# Patient Record
Sex: FEMALE | Race: WHITE | NOT HISPANIC OR LATINO | URBAN - METROPOLITAN AREA
[De-identification: names, ages, dates, MRNs, and addresses within clinical notes are randomized per-mention and may not be internally consistent; named-entity substitution may affect disease eponyms.]

---

## 2023-10-24 ENCOUNTER — SPECIALTY PHARMACY (OUTPATIENT)
Dept: PHARMACY | Facility: CLINIC | Age: 68
End: 2023-10-24

## 2023-10-24 ENCOUNTER — PHARMACY VISIT (OUTPATIENT)
Dept: PHARMACY | Facility: CLINIC | Age: 68
End: 2023-10-24
Payer: COMMERCIAL

## 2023-10-24 PROCEDURE — RXMED WILLOW AMBULATORY MEDICATION CHARGE

## 2023-11-20 DIAGNOSIS — C50.919 MALIGNANT NEOPLASM OF BREAST IN FEMALE, ESTROGEN RECEPTOR POSITIVE, UNSPECIFIED LATERALITY, UNSPECIFIED SITE OF BREAST (MULTI): Primary | ICD-10-CM

## 2023-11-20 DIAGNOSIS — Z17.0 MALIGNANT NEOPLASM OF BREAST IN FEMALE, ESTROGEN RECEPTOR POSITIVE, UNSPECIFIED LATERALITY, UNSPECIFIED SITE OF BREAST (MULTI): Primary | ICD-10-CM

## 2023-11-20 PROBLEM — M54.50 LOW BACK PAIN, UNSPECIFIED: Status: ACTIVE | Noted: 2023-03-14

## 2023-11-20 PROBLEM — K21.9 GASTRO-ESOPHAGEAL REFLUX DISEASE WITHOUT ESOPHAGITIS: Status: ACTIVE | Noted: 2023-03-14

## 2023-11-20 PROBLEM — R06.00 DYSPNEA: Status: ACTIVE | Noted: 2023-11-20

## 2023-11-20 PROBLEM — Z20.822 CONTACT WITH AND (SUSPECTED) EXPOSURE TO COVID-19: Status: ACTIVE | Noted: 2023-03-14

## 2023-11-20 PROBLEM — D14.32: Status: ACTIVE | Noted: 2023-03-14

## 2023-11-20 PROBLEM — F41.8 MIXED ANXIETY DEPRESSIVE DISORDER: Status: ACTIVE | Noted: 2023-03-14

## 2023-11-20 PROBLEM — K76.9 LIVER DISEASE: Status: ACTIVE | Noted: 2023-04-25

## 2023-11-20 PROBLEM — E78.5 HYPERLIPIDEMIA: Status: ACTIVE | Noted: 2023-03-14

## 2023-11-20 PROBLEM — G35 MULTIPLE SCLEROSIS (MULTI): Status: ACTIVE | Noted: 2023-01-04

## 2023-11-20 PROBLEM — Z91.89 DRIVING SAFETY ISSUE: Status: ACTIVE | Noted: 2023-01-04

## 2023-11-20 PROBLEM — Z71.1 PERSON WITH FEARED COMPLAINT IN WHOM NO DIAGNOSIS WAS MADE: Status: ACTIVE | Noted: 2023-11-20

## 2023-11-20 PROBLEM — N63.0 MASS OF BREAST: Status: ACTIVE | Noted: 2023-08-10

## 2023-11-20 PROBLEM — G89.29 OTHER CHRONIC PAIN: Status: ACTIVE | Noted: 2023-03-14

## 2023-11-20 PROBLEM — R91.8 OTHER NONSPECIFIC ABNORMAL FINDING OF LUNG FIELD: Status: ACTIVE | Noted: 2023-08-10

## 2023-11-20 PROBLEM — R06.02 SHORTNESS OF BREATH: Status: ACTIVE | Noted: 2023-11-20

## 2023-11-20 PROBLEM — J90 PLEURAL EFFUSION: Status: ACTIVE | Noted: 2023-03-10

## 2023-11-20 PROBLEM — R09.02 HYPOXEMIA: Status: ACTIVE | Noted: 2023-04-27

## 2023-11-20 PROBLEM — K76.9 LIVER LESION: Status: ACTIVE | Noted: 2023-03-14

## 2023-11-20 PROBLEM — F17.200 NICOTINE DEPENDENCE: Status: ACTIVE | Noted: 2023-03-14

## 2023-11-20 PROBLEM — J90 LARGE PLEURAL EFFUSION: Status: ACTIVE | Noted: 2023-11-20

## 2023-11-20 PROBLEM — E87.6 HYPOKALEMIA: Status: ACTIVE | Noted: 2023-03-14

## 2023-11-20 PROBLEM — I10 HYPERTENSION: Status: ACTIVE | Noted: 2023-06-29

## 2023-11-20 PROBLEM — J18.9 PNEUMONIA: Status: ACTIVE | Noted: 2023-03-14

## 2023-11-20 PROBLEM — J90 PLEURAL EFFUSION ON RIGHT: Status: ACTIVE | Noted: 2023-11-20

## 2023-11-20 RX ORDER — HYDROXYZINE HYDROCHLORIDE 25 MG/1
25 TABLET, FILM COATED ORAL DAILY
COMMUNITY

## 2023-11-20 RX ORDER — METHYLPREDNISOLONE 4 MG/1
4 TABLET ORAL
COMMUNITY

## 2023-11-20 RX ORDER — RIBOCICLIB 200 MG/1
TABLET, FILM COATED ORAL
Qty: 63 EACH | Refills: 2 | Status: CANCELLED | OUTPATIENT
Start: 2023-11-20 | End: 2024-11-19

## 2023-11-20 RX ORDER — LANOLIN ALCOHOL/MO/W.PET/CERES
1000 CREAM (GRAM) TOPICAL DAILY
COMMUNITY

## 2023-11-20 RX ORDER — ROSUVASTATIN CALCIUM 20 MG/1
20 TABLET, COATED ORAL DAILY
COMMUNITY

## 2023-11-20 RX ORDER — AZITHROMYCIN 250 MG/1
750 TABLET, FILM COATED ORAL DAILY
COMMUNITY

## 2023-11-20 RX ORDER — HYDROCHLOROTHIAZIDE 12.5 MG/1
12.5 CAPSULE ORAL DAILY
COMMUNITY

## 2023-11-20 RX ORDER — CYCLOBENZAPRINE HCL 10 MG
10 TABLET ORAL EVERY 12 HOURS
COMMUNITY

## 2023-11-20 RX ORDER — MAGNESIUM GLUCONATE 27 MG(500)
10 TABLET ORAL NIGHTLY
COMMUNITY

## 2023-11-20 RX ORDER — LETROZOLE 2.5 MG/1
2.5 TABLET, FILM COATED ORAL DAILY
COMMUNITY
Start: 2023-03-30 | End: 2024-05-30 | Stop reason: SDUPTHER

## 2023-11-20 RX ORDER — BUPROPION HYDROCHLORIDE 300 MG/1
300 TABLET ORAL EVERY 24 HOURS
COMMUNITY

## 2023-11-20 RX ORDER — OMEPRAZOLE 20 MG/1
20 CAPSULE, DELAYED RELEASE ORAL DAILY
COMMUNITY

## 2023-11-20 RX ORDER — IBUPROFEN 800 MG/1
800 TABLET ORAL 2 TIMES DAILY
COMMUNITY

## 2023-11-20 RX ORDER — CARBAMAZEPINE 100 MG/1
100 TABLET, CHEWABLE ORAL EVERY 6 HOURS
COMMUNITY

## 2023-11-24 ENCOUNTER — TELEPHONE (OUTPATIENT)
Dept: HEMATOLOGY/ONCOLOGY | Facility: CLINIC | Age: 68
End: 2023-11-24
Payer: COMMERCIAL

## 2023-11-24 NOTE — TELEPHONE ENCOUNTER
Refill kisqali phone into  specialty pharmacy. Radha also said if there are any issues please call them back and ask for nursing.

## 2023-11-25 ENCOUNTER — PHARMACY VISIT (OUTPATIENT)
Dept: PHARMACY | Facility: CLINIC | Age: 68
End: 2023-11-25
Payer: COMMERCIAL

## 2023-11-25 PROCEDURE — RXMED WILLOW AMBULATORY MEDICATION CHARGE

## 2023-11-30 ENCOUNTER — LAB (OUTPATIENT)
Dept: LAB | Facility: HOSPITAL | Age: 68
End: 2023-11-30
Payer: COMMERCIAL

## 2023-11-30 DIAGNOSIS — C50.911 MALIGNANT NEOPLASM OF UNSPECIFIED SITE OF RIGHT FEMALE BREAST (MULTI): ICD-10-CM

## 2023-11-30 LAB
ALBUMIN SERPL BCP-MCNC: 3.2 G/DL (ref 3.4–5)
ALP SERPL-CCNC: 57 U/L (ref 33–136)
ALT SERPL W P-5'-P-CCNC: 20 U/L (ref 7–45)
ANION GAP SERPL CALC-SCNC: 9 MMOL/L (ref 10–20)
AST SERPL W P-5'-P-CCNC: 17 U/L (ref 9–39)
BASOPHILS # BLD AUTO: 0.05 X10*3/UL (ref 0–0.1)
BASOPHILS NFR BLD AUTO: 0.5 %
BILIRUB SERPL-MCNC: 0.2 MG/DL (ref 0–1.2)
BUN SERPL-MCNC: 33 MG/DL (ref 6–23)
CALCIUM SERPL-MCNC: 9 MG/DL (ref 8.6–10.3)
CHLORIDE SERPL-SCNC: 98 MMOL/L (ref 98–107)
CO2 SERPL-SCNC: 34 MMOL/L (ref 21–32)
CREAT SERPL-MCNC: 1.07 MG/DL (ref 0.5–1.05)
EOSINOPHIL # BLD AUTO: 0.12 X10*3/UL (ref 0–0.7)
EOSINOPHIL NFR BLD AUTO: 1.3 %
ERYTHROCYTE [DISTWIDTH] IN BLOOD BY AUTOMATED COUNT: 19.9 % (ref 11.5–14.5)
GFR SERPL CREATININE-BSD FRML MDRD: 57 ML/MIN/1.73M*2
GLUCOSE SERPL-MCNC: 94 MG/DL (ref 74–99)
HCT VFR BLD AUTO: 30.9 % (ref 36–46)
HGB BLD-MCNC: 9.4 G/DL (ref 12–16)
IMM GRANULOCYTES # BLD AUTO: 0.04 X10*3/UL (ref 0–0.7)
IMM GRANULOCYTES NFR BLD AUTO: 0.4 % (ref 0–0.9)
LYMPHOCYTES # BLD AUTO: 0.67 X10*3/UL (ref 1.2–4.8)
LYMPHOCYTES NFR BLD AUTO: 7.3 %
MCH RBC QN AUTO: 28.2 PG (ref 26–34)
MCHC RBC AUTO-ENTMCNC: 30.4 G/DL (ref 32–36)
MCV RBC AUTO: 93 FL (ref 80–100)
MONOCYTES # BLD AUTO: 0.42 X10*3/UL (ref 0.1–1)
MONOCYTES NFR BLD AUTO: 4.6 %
NEUTROPHILS # BLD AUTO: 7.9 X10*3/UL (ref 1.2–7.7)
NEUTROPHILS NFR BLD AUTO: 85.9 %
PLATELET # BLD AUTO: 685 X10*3/UL (ref 150–450)
POTASSIUM SERPL-SCNC: 3.6 MMOL/L (ref 3.5–5.3)
PROT SERPL-MCNC: 7.9 G/DL (ref 6.4–8.2)
RBC # BLD AUTO: 3.33 X10*6/UL (ref 4–5.2)
SODIUM SERPL-SCNC: 137 MMOL/L (ref 136–145)
WBC # BLD AUTO: 9.2 X10*3/UL (ref 4.4–11.3)

## 2023-11-30 PROCEDURE — 80053 COMPREHEN METABOLIC PANEL: CPT

## 2023-11-30 PROCEDURE — 86300 IMMUNOASSAY TUMOR CA 15-3: CPT

## 2023-11-30 PROCEDURE — 36415 COLL VENOUS BLD VENIPUNCTURE: CPT

## 2023-11-30 PROCEDURE — 85025 COMPLETE CBC W/AUTO DIFF WBC: CPT

## 2023-12-01 LAB — CANCER AG27-29 SERPL-ACNC: 44.6 U/ML (ref 0–38.6)

## 2023-12-07 ENCOUNTER — ONCOLOGY MEDICATION OUTREACH (OUTPATIENT)
Dept: HEMATOLOGY/ONCOLOGY | Facility: CLINIC | Age: 68
End: 2023-12-07
Payer: COMMERCIAL

## 2023-12-07 ENCOUNTER — OFFICE VISIT (OUTPATIENT)
Dept: HEMATOLOGY/ONCOLOGY | Facility: CLINIC | Age: 68
End: 2023-12-07
Payer: COMMERCIAL

## 2023-12-07 VITALS
RESPIRATION RATE: 16 BRPM | TEMPERATURE: 97.3 F | BODY MASS INDEX: 21.64 KG/M2 | WEIGHT: 126.76 LBS | SYSTOLIC BLOOD PRESSURE: 113 MMHG | OXYGEN SATURATION: 98 % | HEIGHT: 64 IN | HEART RATE: 78 BPM | DIASTOLIC BLOOD PRESSURE: 63 MMHG

## 2023-12-07 DIAGNOSIS — Z17.0 MALIGNANT NEOPLASM OF LOWER-OUTER QUADRANT OF RIGHT BREAST OF FEMALE, ESTROGEN RECEPTOR POSITIVE (MULTI): Primary | ICD-10-CM

## 2023-12-07 DIAGNOSIS — C50.511 MALIGNANT NEOPLASM OF LOWER-OUTER QUADRANT OF RIGHT BREAST OF FEMALE, ESTROGEN RECEPTOR POSITIVE (MULTI): Primary | ICD-10-CM

## 2023-12-07 PROCEDURE — 1159F MED LIST DOCD IN RCRD: CPT | Performed by: INTERNAL MEDICINE

## 2023-12-07 PROCEDURE — 1125F AMNT PAIN NOTED PAIN PRSNT: CPT | Performed by: INTERNAL MEDICINE

## 2023-12-07 PROCEDURE — 3078F DIAST BP <80 MM HG: CPT | Performed by: INTERNAL MEDICINE

## 2023-12-07 PROCEDURE — 99213 OFFICE O/P EST LOW 20 MIN: CPT | Performed by: INTERNAL MEDICINE

## 2023-12-07 PROCEDURE — 3074F SYST BP LT 130 MM HG: CPT | Performed by: INTERNAL MEDICINE

## 2023-12-07 ASSESSMENT — ENCOUNTER SYMPTOMS
DEPRESSION: 0
OCCASIONAL FEELINGS OF UNSTEADINESS: 1
LOSS OF SENSATION IN FEET: 0

## 2023-12-07 ASSESSMENT — COLUMBIA-SUICIDE SEVERITY RATING SCALE - C-SSRS
1. IN THE PAST MONTH, HAVE YOU WISHED YOU WERE DEAD OR WISHED YOU COULD GO TO SLEEP AND NOT WAKE UP?: NO
2. HAVE YOU ACTUALLY HAD ANY THOUGHTS OF KILLING YOURSELF?: NO
6. HAVE YOU EVER DONE ANYTHING, STARTED TO DO ANYTHING, OR PREPARED TO DO ANYTHING TO END YOUR LIFE?: NO

## 2023-12-07 ASSESSMENT — PATIENT HEALTH QUESTIONNAIRE - PHQ9
2. FEELING DOWN, DEPRESSED OR HOPELESS: NOT AT ALL
SUM OF ALL RESPONSES TO PHQ9 QUESTIONS 1 AND 2: 0
1. LITTLE INTEREST OR PLEASURE IN DOING THINGS: NOT AT ALL

## 2023-12-07 ASSESSMENT — PAIN SCALES - GENERAL: PAINLEVEL: 2

## 2023-12-07 NOTE — PATIENT INSTRUCTIONS
Continue Kisqali.     Labs due 12/28/2023, 1/25/2024, 4/25/2024    Follow up in 4 months, after PET scan.

## 2023-12-07 NOTE — PROGRESS NOTES
Patient Visit Information:   Visit Type: Follow Up Visit      Cancer History:   Treatment Synopsis:    1.  invasive ductal carcinoma right breast, stage IV, right pleural effusion, multiple hepatic lesions     Patient is a 68-year-old female with history of MS, chair bound who was admitted in the hospital because of shortness of breath.        March 10, 2023 CAT scan of chest did show right pleural effusion, right breast mass 12 o'clock position, multiple hepatic lesion and multiple pulmonary nodule.      March 11, 2023, status post paracentesis, cytology did show atypical cells      March 13, 2023, ultrasound-guided biopsy of right breast mass done pathology positive for invasive ductal carcinoma, grade 2, ER 95%, [place] 60%, HER2 negative  , Ultrasound biopsy of right axillary lymph node done and negative for malignancy.     4/25/23 , PET scan did show abnormal metabolic activity of right breast, pulmonary nodules, mediastinal lymph node, hepatic lesion.     Current treatment, letrozole, Kisqali        8/11/23 , CT of chest abdominal pelvis, partial response, right breast mass, pulmonary nodules decreased in size           History of Present Illness:      ID Statement:    OLINDA GUEVARA is a 68 year old Female        Chief Complaint: Right breast cancer   Interval History:    Patient is a 68-year-old female with history of MS, chair bound who was admitted in the hospital because of shortness of breath.      March 10, 2023 CAT scan of chest did show right pleural effusion, right breast mass 12 o'clock position, multiple hepatic lesion.      March 11, 2023, status post paracentesis, cytology did show atypical cells      March 13, 2023, ultrasound-guided biopsy of right breast mass done pathology positive for invasive ductal carcinoma, grade 2, ER 95%, [place] 60%, HER2 negative , Ultrasound biopsy of right axillary lymph node done and negative for malignancy.     Medical oncology consultation requested, patient is very  anxious about her diagnosis.        Pathology report discussed with the patient.      Patient is sitting on chair, very anxious, no headache and dizziness, no shortness of breath, no nausea vomiting, no abdominal pain, generalized weakness and fatigue.     12/07/23     Has a history of metastatic breast cancer taking letrozole only,  Kisqali was restarted.      Patient doing very well, patient is on home oxygen, no chest pain, no fever, no sore throat, no diarrhea, no abdominal pain, lab result discussed with the patient     Review of Systems:   Review of Systems:    No headache or dizziness      No fever      some shortness of breath        No breast tenderness      No nausea vomiting      No abdominal pain      Generalized weakness      All body pain, arthritis      Patient not active difficult to ambulate due to Morphine Sulfate        Allergies and Intolerances:       Allergies:         contrast (specific type unknown): Contrast, Anaphylaxis,  Active         iodine: Drug, Anaphylaxis, Active         Shellfish: Food, Anaphylaxis, Active     Outpatient Medication Profile:  * Patient Currently Takes Medications as of 07-Sep-2023 11:20 documented in Structured Notes         disability placard : valid for 5 years         exp: 9/2028, Start Date: 07-Sep-2023         Kisqali (200 mg daily dose) oral tablet: Last Dose Taken:  , 3 tab(s)  orally once a day  daily for 21 days then 7 days off , Start Date: 16-Aug-2023         letrozole 2.5 mg oral tablet: Last Dose Taken:  , 1 tab(s) orally once  a day , Start Date: 31-Jul-2023         portable home oxygen concentrator 2L/min continuously via nasal canula : Last Dose Taken:  , Start Date: 27-Apr-2023         image guided biopsy of liver lesion : Last Dose Taken:  , Start Date:  30-Mar-2023         K-Tab 20 mEq oral tablet, extended release: Last Dose Taken:  , 2 tab(s)  orally once a day , Start Date: 14-Mar-2023         amoxicillin-clavulanate 875 mg-125 mg oral tablet:  Last Dose Taken:   , 1 tab(s) orally 2 times a day , Start Date: 14-Mar-2023         buPROPion 300 mg/24 hours (XL) oral tablet, extended release: Last Dose  Taken:  , 1 tab(s) orally every 24 hours         carBAMazepine 100 mg oral tablet, chewable: Last Dose Taken:  , 1 tab  by mouth 3 times daily. Then 3 tabs by mouth once every evening         cyclobenzaprine 10 mg oral tablet: Last Dose Taken:  , 1 tab(s) orally  2 times a day         hydroCHLOROthiazide 12.5 mg oral capsule: Last Dose Taken:  , 1 cap(s)  orally once a day         hydrOXYzine hydrochloride 25 mg oral tablet: Last Dose Taken:  , 1 tab(s)  orally once a day         hydrOXYzine hydrochloride 50 mg oral tablet: Last Dose Taken:  , 1 tab(s)  orally once a day (at bedtime)         ibuprofen 800 mg oral tablet: Last Dose Taken:  , 1 tab(s) orally 2 times  a day         MethylPREDNISolone Dose Pack 4 mg oral tablet: Last Dose Taken:  , Day  3: 1 tab by mouth four times a day         Day 4: 1 tab by mouth three times a day          Day 5: 1 tab by mouth twice a day         day 6: 1 tab by mouth once          rosuvastatin 20 mg oral tablet: Last Dose Taken:  , 1 tab(s) orally once  a day         Vitamin B12 1000 mcg oral tablet: Last Dose Taken:  , 1 tab(s) orally  once a day         azithromycin 250 mg oral tablet: Last Dose Taken:  , 3 tabs by mouth  once every evening          melatonin 10 mg oral tablet, extended release: Last Dose Taken:  , 1  tab(s) orally once a day (at bedtime)         dextromethorphan-quinidine 20 mg-10 mg oral capsule: Last Dose Taken:   , 1 cap(s) orally every 12 hours         omeprazole 20 mg oral delayed release capsule: Last Dose Taken:  , 1  cap(s) orally once a day             Medical History:         Hypoxia: ICD-10: R09.02, Status: Active         Pleural effusion on right: ICD-10: J90, Status: Active         Infiltrating ductal carcinoma of right breast, stage 4: ICD-10:  C50.911, Status: Active         MS (multiple  sclerosis): ICD-10: G35, Status: Active     Family History: No Family History items are recorded  in the problem list.      Social History:   Social Substance History:  ·  Smoking Status never smoker (1)   ·  Alcohol Use denies   ·  Drug Use denies            Vitals and Measurements:   Vitals: Temp: 36.5  HR: NA  RR: 16  BP: 103/63  SPO2%:   NA   Measurements: HT(cm): 162.4  WT(kg): 53.8  BSA: 1.55   BMI:  20.3   Second Set of Vitals/Vitals Comment: unable to get  patient's pulse ox(2)   Last 3 Weights & Heights: Date:                           Weight/Scale Type:                    Height:   07-Sep-2023 10:40                53.8  kg                     162.4  cm  08-Jun-2023 14:55                63.2  kg                     162.4  cm  27-Apr-2023 11:20                69.5  kg                     162.4  cm      Physical Exam:      Constitutional: awake/alert/oriented x3, no distress,  very anxious, sitting on chair   Eyes: PERRL, EOMI, clear sclera   Head/Neck: Neck supple, no cervical lymphadenopathy   Respiratory/Thorax: Fair air movement on left side,  decreased breath sounds of right side   Cardiovascular: Regular, rate and rhythm,  normal  S 1and S 2   Gastrointestinal: Nondistended, soft, non-tender,   no masses palpable, no organomegaly, +BS,   Extremities: normal extremities, no cyanosis edema,   Neurological: alert and oriented x3,   Breast: Right breast examination did show a mass  measuring 2 cm on 12 o'clock position     Left breast examination within normal limits   Lymphatic: No significant lymphadenopathy   Psychological: Patient is very anxious   Skin: Warm and dry, no lesions, no rashes         Lab Results:       Ref Range & Units 7 d ago  (11/30/23) 3 mo ago  (9/7/23) 3 mo ago  (8/10/23) 4 mo ago  (7/13/23) 5 mo ago  (6/29/23) 6 mo ago  (6/8/23) 8 mo ago  (3/30/23)   WBC  4.4 - 11.3 x10*3/uL 9.2 7.0 R 6.5 R 4.9 R 6.9 R 5.8 R 16.8 High  R   RBC  4.00 - 5.20 x10*6/uL 3.33 Low  3.22 Low  R 2.81 Low  R  3.08 Low  R 3.37 Low  R 3.71 Low  R 4.96 R   Hemoglobin  12.0 - 16.0 g/dL 9.4 Low  9.7 Low  8.9 Low  9.5 Low  9.8 Low  10.5 Low  13.1   Hematocrit  36.0 - 46.0 % 30.9 Low  30.4 Low  28.0 Low  29.1 Low  30.3 Low  31.9 Low  39.8   MCV  80 - 100 fL 93 94 100 94 90 86 80   MCH  26.0 - 34.0 pg 28.2         MCHC  32.0 - 36.0 g/dL 30.4 Low  31.9 Low  31.8 Low  32.6 32.3 32.9 32.9   RDW  11.5 - 14.5 % 19.9 High  16.7 High  19.2 High  20.6 High  19.0 High  16.9 High  16.5 High    Platelets  150 - 450 x10*3/uL 685 High              7 d ago  (11/30/23) 3 mo ago  (9/7/23) 3 mo ago  (8/10/23) 4 mo ago  (7/13/23) 5 mo ago  (6/29/23) 6 mo ago  (6/8/23) 8 mo ago  (3/30/23)    Glucose  74 - 99 mg/dL 94 116 High  98 93 115 High  118 High  112 High    Sodium  136 - 145 mmol/L 137 133 Low  133 Low  136 136 137 136   Potassium  3.5 - 5.3 mmol/L 3.6 3.4 Low  3.2 Low  3.2 Low  3.1 Low  2.8 Low Panic  CM 3.5   Chloride  98 - 107 mmol/L 98 93 Low  92 Low  95 Low  94 Low  92 Low  98   Bicarbonate  21 - 32 mmol/L 34 High  30 31 30 33 High  32 29   Anion Gap  10 - 20 mmol/L 9 Low  13 13 14 12 16 13   Urea Nitrogen  6 - 23 mg/dL 33 High  25 High  20 19 16 21 22   Creatinine  0.50 - 1.05 mg/dL 1.07 High  0.93 0.87 1.08 High  1.01 0.95 0.79   eGFR  >60 mL/min/1.73m*2 57 Low                Component  Ref Range & Units 7 d ago   CA 27.29  0.0 - 38.6 U/mL 44.6 High    Resulting Agency [facility]                      ·  Results             Assessment and Plan:      Assessment and Plan:   Assessment:    1 invasive ductal carcinoma right breast, stage IV, right pleural effusion, multiple hepatic lesions  ER positive, [place] positive, HER2 negative  Current treatment with letrozole started in April 2023     Patient is a 68-year-old female with history of MS, chair bound who was admitted in the hospital because of shortness of breath.      March 10, 2023 CAT scan of chest did show right pleural effusion, right breast mass 12 o'clock position, multiple hepatic  lesion.      March 11, 2023, status post paracentesis, cytology did show atypical cells      March 13, 2023, ultrasound-guided biopsy of right breast mass done pathology positive for invasive ductal carcinoma, grade 2, ER 95%, [place] 60%, HER2 negative , Ultrasound biopsy of right axillary lymph node done and negative for malignancy.     Current treatment, letrozole 2.5 mg p.o. daily, kisqali     8/11/23 , CT of CAP , partial response     12/07/23     Metastatic breast cancer, ER positive [place] positive,  Currently patient is taking letrozole 2.5 mg p.o. daily without any problem.  Kisqali was restarted.  Patient stable   Lab result discussed with the patient    No change in treatment    Repeat PET/CT after 3-month    Follow-up after PET scan  Patient is not a gaining weight patient advised to take at least 1500 to 2000-calorie every day.     Time spent 30 minutes.  .

## 2023-12-10 DIAGNOSIS — Z17.0 MALIGNANT NEOPLASM OF RIGHT BREAST IN FEMALE, ESTROGEN RECEPTOR POSITIVE, UNSPECIFIED SITE OF BREAST (MULTI): Primary | ICD-10-CM

## 2023-12-10 DIAGNOSIS — C50.911 MALIGNANT NEOPLASM OF RIGHT BREAST IN FEMALE, ESTROGEN RECEPTOR POSITIVE, UNSPECIFIED SITE OF BREAST (MULTI): Primary | ICD-10-CM

## 2023-12-11 ENCOUNTER — SPECIALTY PHARMACY (OUTPATIENT)
Dept: HEMATOLOGY/ONCOLOGY | Facility: CLINIC | Age: 68
End: 2023-12-11
Payer: COMMERCIAL

## 2023-12-13 ENCOUNTER — TELEPHONE (OUTPATIENT)
Dept: HEMATOLOGY/ONCOLOGY | Facility: CLINIC | Age: 68
End: 2023-12-13
Payer: COMMERCIAL

## 2023-12-13 NOTE — TELEPHONE ENCOUNTER
Jennifer from [place] at [facility] called to get a refill on Mildred's medication, Kisqali 200 mg phoned into  [facility].

## 2023-12-13 NOTE — TELEPHONE ENCOUNTER
Informed Jennifer at the [facility] that either Yanely or herself will need to call specialty pharmacy to arrange delivery of kisqali as they should have 2 refills still available. She voiced correct understanding and was provided contact number for  Specialty pharmacy.

## 2023-12-20 PROCEDURE — RXMED WILLOW AMBULATORY MEDICATION CHARGE

## 2023-12-22 ENCOUNTER — PHARMACY VISIT (OUTPATIENT)
Dept: PHARMACY | Facility: CLINIC | Age: 68
End: 2023-12-22
Payer: COMMERCIAL

## 2023-12-28 ENCOUNTER — ONCOLOGY MEDICATION OUTREACH (OUTPATIENT)
Dept: HEMATOLOGY/ONCOLOGY | Facility: CLINIC | Age: 68
End: 2023-12-28
Payer: COMMERCIAL

## 2023-12-28 ENCOUNTER — LAB (OUTPATIENT)
Dept: LAB | Facility: HOSPITAL | Age: 68
End: 2023-12-28
Payer: COMMERCIAL

## 2023-12-28 DIAGNOSIS — C50.911 MALIGNANT NEOPLASM OF RIGHT BREAST IN FEMALE, ESTROGEN RECEPTOR POSITIVE, UNSPECIFIED SITE OF BREAST (MULTI): ICD-10-CM

## 2023-12-28 DIAGNOSIS — C50.511 MALIGNANT NEOPLASM OF LOWER-OUTER QUADRANT OF RIGHT BREAST OF FEMALE, ESTROGEN RECEPTOR POSITIVE (MULTI): ICD-10-CM

## 2023-12-28 DIAGNOSIS — Z17.0 MALIGNANT NEOPLASM OF RIGHT BREAST IN FEMALE, ESTROGEN RECEPTOR POSITIVE, UNSPECIFIED SITE OF BREAST (MULTI): ICD-10-CM

## 2023-12-28 DIAGNOSIS — Z17.0 MALIGNANT NEOPLASM OF LOWER-OUTER QUADRANT OF RIGHT BREAST OF FEMALE, ESTROGEN RECEPTOR POSITIVE (MULTI): ICD-10-CM

## 2023-12-28 LAB
ALBUMIN SERPL BCP-MCNC: 3.1 G/DL (ref 3.4–5)
ALP SERPL-CCNC: 66 U/L (ref 33–136)
ALT SERPL W P-5'-P-CCNC: 18 U/L (ref 7–45)
ANION GAP SERPL CALC-SCNC: 13 MMOL/L (ref 10–20)
AST SERPL W P-5'-P-CCNC: 15 U/L (ref 9–39)
BASOPHILS # BLD AUTO: 0.03 X10*3/UL (ref 0–0.1)
BASOPHILS NFR BLD AUTO: 0.4 %
BILIRUB SERPL-MCNC: 0.2 MG/DL (ref 0–1.2)
BUN SERPL-MCNC: 25 MG/DL (ref 6–23)
CALCIUM SERPL-MCNC: 8.5 MG/DL (ref 8.6–10.3)
CANCER AG27-29 SERPL-ACNC: 46.3 U/ML (ref 0–38.6)
CHLORIDE SERPL-SCNC: 97 MMOL/L (ref 98–107)
CO2 SERPL-SCNC: 29 MMOL/L (ref 21–32)
CREAT SERPL-MCNC: 1.29 MG/DL (ref 0.5–1.05)
EOSINOPHIL # BLD AUTO: 0.1 X10*3/UL (ref 0–0.7)
EOSINOPHIL NFR BLD AUTO: 1.3 %
ERYTHROCYTE [DISTWIDTH] IN BLOOD BY AUTOMATED COUNT: 19.5 % (ref 11.5–14.5)
GFR SERPL CREATININE-BSD FRML MDRD: 46 ML/MIN/1.73M*2
GLUCOSE SERPL-MCNC: 79 MG/DL (ref 74–99)
HCT VFR BLD AUTO: 32 % (ref 36–46)
HGB BLD-MCNC: 9.8 G/DL (ref 12–16)
IMM GRANULOCYTES # BLD AUTO: 0.02 X10*3/UL (ref 0–0.7)
IMM GRANULOCYTES NFR BLD AUTO: 0.3 % (ref 0–0.9)
LYMPHOCYTES # BLD AUTO: 0.98 X10*3/UL (ref 1.2–4.8)
LYMPHOCYTES NFR BLD AUTO: 12.9 %
MCH RBC QN AUTO: 28.6 PG (ref 26–34)
MCHC RBC AUTO-ENTMCNC: 30.6 G/DL (ref 32–36)
MCV RBC AUTO: 93 FL (ref 80–100)
MONOCYTES # BLD AUTO: 0.61 X10*3/UL (ref 0.1–1)
MONOCYTES NFR BLD AUTO: 8.1 %
NEUTROPHILS # BLD AUTO: 5.83 X10*3/UL (ref 1.2–7.7)
NEUTROPHILS NFR BLD AUTO: 77 %
PLATELET # BLD AUTO: 561 X10*3/UL (ref 150–450)
POTASSIUM SERPL-SCNC: 3.7 MMOL/L (ref 3.5–5.3)
PROT SERPL-MCNC: 7.2 G/DL (ref 6.4–8.2)
RBC # BLD AUTO: 3.43 X10*6/UL (ref 4–5.2)
SODIUM SERPL-SCNC: 135 MMOL/L (ref 136–145)
WBC # BLD AUTO: 7.6 X10*3/UL (ref 4.4–11.3)

## 2023-12-28 PROCEDURE — 85025 COMPLETE CBC W/AUTO DIFF WBC: CPT

## 2023-12-28 PROCEDURE — 36415 COLL VENOUS BLD VENIPUNCTURE: CPT

## 2023-12-28 PROCEDURE — 86300 IMMUNOASSAY TUMOR CA 15-3: CPT

## 2023-12-28 PROCEDURE — 80053 COMPREHEN METABOLIC PANEL: CPT

## 2024-01-15 ENCOUNTER — SPECIALTY PHARMACY (OUTPATIENT)
Dept: PHARMACY | Facility: CLINIC | Age: 69
End: 2024-01-15

## 2024-01-16 DIAGNOSIS — J90 PLEURAL EFFUSION: ICD-10-CM

## 2024-01-16 DIAGNOSIS — F17.219 CIGARETTE NICOTINE DEPENDENCE WITH NICOTINE-INDUCED DISORDER: ICD-10-CM

## 2024-01-16 DIAGNOSIS — C50.919 MALIGNANT NEOPLASM OF FEMALE BREAST, UNSPECIFIED ESTROGEN RECEPTOR STATUS, UNSPECIFIED LATERALITY, UNSPECIFIED SITE OF BREAST (MULTI): ICD-10-CM

## 2024-01-18 PROCEDURE — RXMED WILLOW AMBULATORY MEDICATION CHARGE

## 2024-01-24 ENCOUNTER — PHARMACY VISIT (OUTPATIENT)
Dept: PHARMACY | Facility: CLINIC | Age: 69
End: 2024-01-24
Payer: COMMERCIAL

## 2024-01-24 ENCOUNTER — SPECIALTY PHARMACY (OUTPATIENT)
Dept: PHARMACY | Facility: CLINIC | Age: 69
End: 2024-01-24

## 2024-01-31 ENCOUNTER — SPECIALTY PHARMACY (OUTPATIENT)
Dept: PHARMACY | Facility: CLINIC | Age: 69
End: 2024-01-31

## 2024-02-13 ENCOUNTER — SPECIALTY PHARMACY (OUTPATIENT)
Dept: PHARMACY | Facility: CLINIC | Age: 69
End: 2024-02-13

## 2024-02-13 DIAGNOSIS — Z17.0 MALIGNANT NEOPLASM OF BREAST IN FEMALE, ESTROGEN RECEPTOR POSITIVE, UNSPECIFIED LATERALITY, UNSPECIFIED SITE OF BREAST (MULTI): ICD-10-CM

## 2024-02-13 DIAGNOSIS — C50.919 MALIGNANT NEOPLASM OF BREAST IN FEMALE, ESTROGEN RECEPTOR POSITIVE, UNSPECIFIED LATERALITY, UNSPECIFIED SITE OF BREAST (MULTI): ICD-10-CM

## 2024-02-13 RX ORDER — RIBOCICLIB 200 MG/1
TABLET, FILM COATED ORAL
Qty: 63 EACH | Refills: 2 | Status: CANCELLED | OUTPATIENT
Start: 2024-02-13 | End: 2025-02-11

## 2024-02-15 DIAGNOSIS — Z17.0 MALIGNANT NEOPLASM OF BREAST IN FEMALE, ESTROGEN RECEPTOR POSITIVE, UNSPECIFIED LATERALITY, UNSPECIFIED SITE OF BREAST (MULTI): ICD-10-CM

## 2024-02-15 DIAGNOSIS — C50.919 MALIGNANT NEOPLASM OF BREAST IN FEMALE, ESTROGEN RECEPTOR POSITIVE, UNSPECIFIED LATERALITY, UNSPECIFIED SITE OF BREAST (MULTI): ICD-10-CM

## 2024-02-15 RX ORDER — RIBOCICLIB 200 MG/1
TABLET, FILM COATED ORAL
Qty: 63 EACH | Refills: 2 | Status: CANCELLED | OUTPATIENT
Start: 2024-02-13 | End: 2025-02-11

## 2024-02-20 ENCOUNTER — SPECIALTY PHARMACY (OUTPATIENT)
Dept: PHARMACY | Facility: CLINIC | Age: 69
End: 2024-02-20

## 2024-02-20 ENCOUNTER — PHARMACY VISIT (OUTPATIENT)
Dept: PHARMACY | Facility: CLINIC | Age: 69
End: 2024-02-20
Payer: COMMERCIAL

## 2024-02-20 DIAGNOSIS — C50.919 MALIGNANT NEOPLASM OF BREAST IN FEMALE, ESTROGEN RECEPTOR POSITIVE, UNSPECIFIED LATERALITY, UNSPECIFIED SITE OF BREAST (MULTI): ICD-10-CM

## 2024-02-20 DIAGNOSIS — Z17.0 MALIGNANT NEOPLASM OF BREAST IN FEMALE, ESTROGEN RECEPTOR POSITIVE, UNSPECIFIED LATERALITY, UNSPECIFIED SITE OF BREAST (MULTI): ICD-10-CM

## 2024-02-20 PROCEDURE — RXMED WILLOW AMBULATORY MEDICATION CHARGE

## 2024-03-11 ENCOUNTER — SPECIALTY PHARMACY (OUTPATIENT)
Dept: HEMATOLOGY/ONCOLOGY | Facility: CLINIC | Age: 69
End: 2024-03-11
Payer: COMMERCIAL

## 2024-03-18 ENCOUNTER — SPECIALTY PHARMACY (OUTPATIENT)
Dept: PHARMACY | Facility: CLINIC | Age: 69
End: 2024-03-18

## 2024-03-18 PROCEDURE — RXMED WILLOW AMBULATORY MEDICATION CHARGE

## 2024-03-19 ENCOUNTER — SPECIALTY PHARMACY (OUTPATIENT)
Dept: PHARMACY | Facility: CLINIC | Age: 69
End: 2024-03-19

## 2024-03-19 ENCOUNTER — PHARMACY VISIT (OUTPATIENT)
Dept: PHARMACY | Facility: CLINIC | Age: 69
End: 2024-03-19
Payer: COMMERCIAL

## 2024-04-15 ENCOUNTER — SPECIALTY PHARMACY (OUTPATIENT)
Dept: PHARMACY | Facility: CLINIC | Age: 69
End: 2024-04-15

## 2024-04-15 ENCOUNTER — PHARMACY VISIT (OUTPATIENT)
Dept: PHARMACY | Facility: CLINIC | Age: 69
End: 2024-04-15
Payer: COMMERCIAL

## 2024-04-15 PROCEDURE — RXMED WILLOW AMBULATORY MEDICATION CHARGE

## 2024-04-25 ENCOUNTER — LAB (OUTPATIENT)
Dept: LAB | Facility: HOSPITAL | Age: 69
End: 2024-04-25
Payer: COMMERCIAL

## 2024-04-25 DIAGNOSIS — C50.511 MALIGNANT NEOPLASM OF LOWER-OUTER QUADRANT OF RIGHT BREAST OF FEMALE, ESTROGEN RECEPTOR POSITIVE (MULTI): ICD-10-CM

## 2024-04-25 DIAGNOSIS — Z17.0 MALIGNANT NEOPLASM OF LOWER-OUTER QUADRANT OF RIGHT BREAST OF FEMALE, ESTROGEN RECEPTOR POSITIVE (MULTI): ICD-10-CM

## 2024-04-25 LAB
ALBUMIN SERPL BCP-MCNC: 3.3 G/DL (ref 3.4–5)
ALP SERPL-CCNC: 67 U/L (ref 33–136)
ALT SERPL W P-5'-P-CCNC: 20 U/L (ref 7–45)
ANION GAP SERPL CALC-SCNC: 12 MMOL/L (ref 10–20)
AST SERPL W P-5'-P-CCNC: 17 U/L (ref 9–39)
BASOPHILS # BLD AUTO: 0.03 X10*3/UL (ref 0–0.1)
BASOPHILS NFR BLD AUTO: 0.5 %
BILIRUB SERPL-MCNC: 0.2 MG/DL (ref 0–1.2)
BUN SERPL-MCNC: 30 MG/DL (ref 6–23)
CALCIUM SERPL-MCNC: 8.4 MG/DL (ref 8.6–10.3)
CHLORIDE SERPL-SCNC: 103 MMOL/L (ref 98–107)
CO2 SERPL-SCNC: 29 MMOL/L (ref 21–32)
CREAT SERPL-MCNC: 1.12 MG/DL (ref 0.5–1.05)
EGFRCR SERPLBLD CKD-EPI 2021: 54 ML/MIN/1.73M*2
EOSINOPHIL # BLD AUTO: 0.28 X10*3/UL (ref 0–0.7)
EOSINOPHIL NFR BLD AUTO: 4.8 %
ERYTHROCYTE [DISTWIDTH] IN BLOOD BY AUTOMATED COUNT: 18.9 % (ref 11.5–14.5)
GLUCOSE SERPL-MCNC: 97 MG/DL (ref 74–99)
HCT VFR BLD AUTO: 31.9 % (ref 36–46)
HGB BLD-MCNC: 10.1 G/DL (ref 12–16)
IMM GRANULOCYTES # BLD AUTO: 0.01 X10*3/UL (ref 0–0.7)
IMM GRANULOCYTES NFR BLD AUTO: 0.2 % (ref 0–0.9)
LYMPHOCYTES # BLD AUTO: 0.73 X10*3/UL (ref 1.2–4.8)
LYMPHOCYTES NFR BLD AUTO: 12.4 %
MCH RBC QN AUTO: 30.4 PG (ref 26–34)
MCHC RBC AUTO-ENTMCNC: 31.7 G/DL (ref 32–36)
MCV RBC AUTO: 96 FL (ref 80–100)
MONOCYTES # BLD AUTO: 0.44 X10*3/UL (ref 0.1–1)
MONOCYTES NFR BLD AUTO: 7.5 %
NEUTROPHILS # BLD AUTO: 4.4 X10*3/UL (ref 1.2–7.7)
NEUTROPHILS NFR BLD AUTO: 74.6 %
PLATELET # BLD AUTO: 474 X10*3/UL (ref 150–450)
POTASSIUM SERPL-SCNC: 4.5 MMOL/L (ref 3.5–5.3)
PROT SERPL-MCNC: 6.8 G/DL (ref 6.4–8.2)
RBC # BLD AUTO: 3.32 X10*6/UL (ref 4–5.2)
SODIUM SERPL-SCNC: 139 MMOL/L (ref 136–145)
WBC # BLD AUTO: 5.9 X10*3/UL (ref 4.4–11.3)

## 2024-04-25 PROCEDURE — 85025 COMPLETE CBC W/AUTO DIFF WBC: CPT

## 2024-04-25 PROCEDURE — 80053 COMPREHEN METABOLIC PANEL: CPT

## 2024-04-25 PROCEDURE — 36415 COLL VENOUS BLD VENIPUNCTURE: CPT

## 2024-05-02 ENCOUNTER — APPOINTMENT (OUTPATIENT)
Dept: RADIOLOGY | Facility: HOSPITAL | Age: 69
End: 2024-05-02
Payer: COMMERCIAL

## 2024-05-02 ENCOUNTER — HOSPITAL ENCOUNTER (OUTPATIENT)
Dept: RADIOLOGY | Facility: HOSPITAL | Age: 69
Discharge: HOME | End: 2024-05-02
Payer: COMMERCIAL

## 2024-05-02 DIAGNOSIS — Z17.0 MALIGNANT NEOPLASM OF LOWER-OUTER QUADRANT OF RIGHT BREAST OF FEMALE, ESTROGEN RECEPTOR POSITIVE (MULTI): ICD-10-CM

## 2024-05-02 DIAGNOSIS — C50.511 MALIGNANT NEOPLASM OF LOWER-OUTER QUADRANT OF RIGHT BREAST OF FEMALE, ESTROGEN RECEPTOR POSITIVE (MULTI): ICD-10-CM

## 2024-05-02 PROCEDURE — 3430000001 HC RX 343 DIAGNOSTIC RADIOPHARMACEUTICALS: Performed by: INTERNAL MEDICINE

## 2024-05-02 PROCEDURE — A9552 F18 FDG: HCPCS | Performed by: INTERNAL MEDICINE

## 2024-05-02 PROCEDURE — 78815 PET IMAGE W/CT SKULL-THIGH: CPT | Mod: PET TUMOR SUBSQ TX STRATEGY | Performed by: STUDENT IN AN ORGANIZED HEALTH CARE EDUCATION/TRAINING PROGRAM

## 2024-05-02 PROCEDURE — 78815 PET IMAGE W/CT SKULL-THIGH: CPT | Mod: PS

## 2024-05-02 RX ORDER — FLUDEOXYGLUCOSE F 18 200 MCI/ML
10.4 INJECTION, SOLUTION INTRAVENOUS
Status: COMPLETED | OUTPATIENT
Start: 2024-05-02 | End: 2024-05-02

## 2024-05-02 RX ADMIN — FLUDEOXYGLUCOSE F 18 10.4 MILLICURIE: 200 INJECTION, SOLUTION INTRAVENOUS at 09:08

## 2024-05-03 DIAGNOSIS — Z17.0 MALIGNANT NEOPLASM OF BREAST IN FEMALE, ESTROGEN RECEPTOR POSITIVE, UNSPECIFIED LATERALITY, UNSPECIFIED SITE OF BREAST (MULTI): ICD-10-CM

## 2024-05-03 DIAGNOSIS — C50.919 MALIGNANT NEOPLASM OF BREAST IN FEMALE, ESTROGEN RECEPTOR POSITIVE, UNSPECIFIED LATERALITY, UNSPECIFIED SITE OF BREAST (MULTI): ICD-10-CM

## 2024-05-09 ENCOUNTER — OFFICE VISIT (OUTPATIENT)
Dept: HEMATOLOGY/ONCOLOGY | Facility: CLINIC | Age: 69
End: 2024-05-09
Payer: COMMERCIAL

## 2024-05-09 ENCOUNTER — ONCOLOGY MEDICATION OUTREACH (OUTPATIENT)
Dept: HEMATOLOGY/ONCOLOGY | Facility: CLINIC | Age: 69
End: 2024-05-09
Payer: COMMERCIAL

## 2024-05-09 VITALS
WEIGHT: 134.92 LBS | SYSTOLIC BLOOD PRESSURE: 128 MMHG | RESPIRATION RATE: 16 BRPM | BODY MASS INDEX: 23.03 KG/M2 | TEMPERATURE: 98.2 F | HEART RATE: 93 BPM | OXYGEN SATURATION: 99 % | HEIGHT: 64 IN | DIASTOLIC BLOOD PRESSURE: 72 MMHG

## 2024-05-09 DIAGNOSIS — C50.511 MALIGNANT NEOPLASM OF LOWER-OUTER QUADRANT OF RIGHT BREAST OF FEMALE, ESTROGEN RECEPTOR POSITIVE (MULTI): ICD-10-CM

## 2024-05-09 DIAGNOSIS — Z17.0 MALIGNANT NEOPLASM OF LOWER-OUTER QUADRANT OF RIGHT BREAST OF FEMALE, ESTROGEN RECEPTOR POSITIVE (MULTI): ICD-10-CM

## 2024-05-09 PROCEDURE — 99214 OFFICE O/P EST MOD 30 MIN: CPT | Performed by: INTERNAL MEDICINE

## 2024-05-09 PROCEDURE — 1157F ADVNC CARE PLAN IN RCRD: CPT | Performed by: INTERNAL MEDICINE

## 2024-05-09 PROCEDURE — 3074F SYST BP LT 130 MM HG: CPT | Performed by: INTERNAL MEDICINE

## 2024-05-09 PROCEDURE — 1159F MED LIST DOCD IN RCRD: CPT | Performed by: INTERNAL MEDICINE

## 2024-05-09 PROCEDURE — 3078F DIAST BP <80 MM HG: CPT | Performed by: INTERNAL MEDICINE

## 2024-05-09 PROCEDURE — 1160F RVW MEDS BY RX/DR IN RCRD: CPT | Performed by: INTERNAL MEDICINE

## 2024-05-09 PROCEDURE — 1125F AMNT PAIN NOTED PAIN PRSNT: CPT | Performed by: INTERNAL MEDICINE

## 2024-05-09 ASSESSMENT — NCCN CANCER DISTRESS MANAGEMENT
NCCN EMOTIONAL CONCERNS: 1
NCCN PHYSICAL CONCERNS: 3

## 2024-05-09 ASSESSMENT — PAIN SCALES - GENERAL: PAINLEVEL: 2

## 2024-05-09 NOTE — PROGRESS NOTES
Patient Visit Information:   Visit Type: Follow Up Visit      Cancer History:   Treatment Synopsis:    1.  invasive ductal carcinoma right breast, stage IV, right pleural effusion, multiple hepatic lesions     Patient is a 68-year-old female with history of MS, chair bound who was admitted in the hospital because of shortness of breath.        March 10, 2023 CAT scan of chest did show right pleural effusion, right breast mass 12 o'clock position, multiple hepatic lesion and multiple pulmonary nodule.      March 11, 2023, status post paracentesis, cytology did show atypical cells      March 13, 2023, ultrasound-guided biopsy of right breast mass done pathology positive for invasive ductal carcinoma, grade 2, ER 95%, [place] 60%, HER2 negative  , Ultrasound biopsy of right axillary lymph node done and negative for malignancy.     4/25/23 , PET scan did show abnormal metabolic activity of right breast, pulmonary nodules, mediastinal lymph node, hepatic lesion.     Current treatment, letrozole, Kisqali      8/11/23 , CT of chest abdominal pelvis, partial response, right breast mass, pulmonary nodules decreased in size   5/2/24 , PET , excellent partial response, left breast mass decreased in size, pulmonary nodules and adrenal nodule decreased in size hepatic nodule resolved        History of Present Illness:      ID Statement:    OLINDA GUEVARA is a 68 year old Female        Chief Complaint: Right breast cancer   Interval History:    Patient is a 68-year-old female with history of MS, chair bound who was admitted in the hospital because of shortness of breath.      March 10, 2023 CAT scan of chest did show right pleural effusion, right breast mass 12 o'clock position, multiple hepatic lesion.      March 11, 2023, status post paracentesis, cytology did show atypical cells      March 13, 2023, ultrasound-guided biopsy of right breast mass done pathology positive for invasive ductal carcinoma, grade 2, ER 95%, [place] 60%,  HER2 negative , Ultrasound biopsy of right axillary lymph node done and negative for malignancy.     Medical oncology consultation requested, patient is very anxious about her diagnosis.        Pathology report discussed with the patient.      Patient is sitting on chair, very anxious, no headache and dizziness, no shortness of breath, no nausea vomiting, no abdominal pain, generalized weakness and fatigue.     5/9/24     Has a history of metastatic breast cancer taking letrozole only,  Kisqali was restarted.      Patient doing very well, patient is on home oxygen, no chest pain, no fever, no sore throat, no diarrhea, no abdominal pain, lab result discussed with the patient, PET scan result discussed with patient     Review of Systems:   Review of Systems:    No headache or dizziness      No fever      some shortness of breath        No breast tenderness      No nausea vomiting      No abdominal pain      Generalized weakness      All body pain, arthritis      Patient not active difficult to ambulate due to Morphine Sulfate        Allergies and Intolerances:       Allergies:         contrast (specific type unknown): Contrast, Anaphylaxis,  Active         iodine: Drug, Anaphylaxis, Active         Shellfish: Food, Anaphylaxis, Active     Outpatient Medication Profile:  * Patient Currently Takes Medications as of 07-Sep-2023 11:20 documented in Structured Notes         disability placard : valid for 5 years         exp: 9/2028, Start Date: 07-Sep-2023         Kisqali (200 mg daily dose) oral tablet: Last Dose Taken:  , 3 tab(s)  orally once a day  daily for 21 days then 7 days off , Start Date: 16-Aug-2023         letrozole 2.5 mg oral tablet: Last Dose Taken:  , 1 tab(s) orally once  a day , Start Date: 31-Jul-2023         portable home oxygen concentrator 2L/min continuously via nasal canula : Last Dose Taken:  , Start Date: 27-Apr-2023         image guided biopsy of liver lesion : Last Dose Taken:  , Start Date:   30-Mar-2023         K-Tab 20 mEq oral tablet, extended release: Last Dose Taken:  , 2 tab(s)  orally once a day , Start Date: 14-Mar-2023         amoxicillin-clavulanate 875 mg-125 mg oral tablet: Last Dose Taken:   , 1 tab(s) orally 2 times a day , Start Date: 14-Mar-2023         buPROPion 300 mg/24 hours (XL) oral tablet, extended release: Last Dose  Taken:  , 1 tab(s) orally every 24 hours         carBAMazepine 100 mg oral tablet, chewable: Last Dose Taken:  , 1 tab  by mouth 3 times daily. Then 3 tabs by mouth once every evening         cyclobenzaprine 10 mg oral tablet: Last Dose Taken:  , 1 tab(s) orally  2 times a day         hydroCHLOROthiazide 12.5 mg oral capsule: Last Dose Taken:  , 1 cap(s)  orally once a day         hydrOXYzine hydrochloride 25 mg oral tablet: Last Dose Taken:  , 1 tab(s)  orally once a day         hydrOXYzine hydrochloride 50 mg oral tablet: Last Dose Taken:  , 1 tab(s)  orally once a day (at bedtime)         ibuprofen 800 mg oral tablet: Last Dose Taken:  , 1 tab(s) orally 2 times  a day         MethylPREDNISolone Dose Pack 4 mg oral tablet: Last Dose Taken:  , Day  3: 1 tab by mouth four times a day         Day 4: 1 tab by mouth three times a day          Day 5: 1 tab by mouth twice a day         day 6: 1 tab by mouth once          rosuvastatin 20 mg oral tablet: Last Dose Taken:  , 1 tab(s) orally once  a day         Vitamin B12 1000 mcg oral tablet: Last Dose Taken:  , 1 tab(s) orally  once a day         azithromycin 250 mg oral tablet: Last Dose Taken:  , 3 tabs by mouth  once every evening          melatonin 10 mg oral tablet, extended release: Last Dose Taken:  , 1  tab(s) orally once a day (at bedtime)         dextromethorphan-quinidine 20 mg-10 mg oral capsule: Last Dose Taken:   , 1 cap(s) orally every 12 hours         omeprazole 20 mg oral delayed release capsule: Last Dose Taken:  , 1  cap(s) orally once a day             Medical History:         Hypoxia: ICD-10: R09.02,  Status: Active         Pleural effusion on right: ICD-10: J90, Status: Active         Infiltrating ductal carcinoma of right breast, stage 4: ICD-10:  C50.911, Status: Active         MS (multiple sclerosis): ICD-10: G35, Status: Active     Family History: No Family History items are recorded  in the problem list.      Social History:   Social Substance History:  ·  Smoking Status never smoker (1)   ·  Alcohol Use denies   ·  Drug Use denies            Vitals and Measurements:   Vitals: Temp: 36.5  HR: NA  RR: 16  BP: 103/63  SPO2%:   NA   Measurements: HT(cm): 162.4  WT(kg): 53.8  BSA: 1.55   BMI:  20.3   Second Set of Vitals/Vitals Comment: unable to get  patient's pulse ox(2)   Last 3 Weights & Heights: Date:                           Weight/Scale Type:                    Height:   07-Sep-2023 10:40                53.8  kg                     162.4  cm  08-Jun-2023 14:55                63.2  kg                     162.4  cm  27-Apr-2023 11:20                69.5  kg                     162.4  cm      Physical Exam:      Constitutional: awake/alert/oriented x3, no distress,  very anxious, sitting on chair   Eyes: PERRL, EOMI, clear sclera   Head/Neck: Neck supple, no cervical lymphadenopathy   Respiratory/Thorax: Fair air movement on left side,  decreased breath sounds of right side   Cardiovascular: Regular, rate and rhythm,  normal  S 1and S 2   Gastrointestinal: Nondistended, soft, non-tender,   no masses palpable, no organomegaly, +BS,   Extremities: normal extremities, no cyanosis edema,   Neurological: alert and oriented x3,   Breast: Right breast examination did show a mass  measuring 2 cm on 12 o'clock position     Left breast examination within normal limits   Lymphatic: No significant lymphadenopathy   Psychological: Patient is very anxious   Skin: Warm and dry, no lesions, no rashes         Lab Results:     Component  Ref Range & Units 2 wk ago  (4/25/24) 4 mo ago  (12/28/23) 5 mo ago  (11/30/23) 8 mo  ago  (9/7/23) 9 mo ago  (8/10/23) 10 mo ago  (7/13/23) 10 mo ago  (6/29/23)   WBC  4.4 - 11.3 x10*3/uL 5.9 7.6 9.2 7.0 R 6.5 R 4.9 R 6.9 R   RBC  4.00 - 5.20 x10*6/uL 3.32 Low  3.43 Low  3.33 Low  3.22 Low  R 2.81 Low  R 3.08 Low  R 3.37 Low  R   Hemoglobin  12.0 - 16.0 g/dL 10.1 Low  9.8 Low  9.4 Low  9.7 Low  8.9 Low  9.5 Low  9.8 Low    Hematocrit  36.0 - 46.0 % 31.9 Low  32.0 Low  30.9 Low  30.4 Low  28.0 Low  29.1 Low  30.3 Low    MCV  80 - 100 fL 96 93 93 94 100 94 90   MCH  26.0 - 34.0 pg 30.4 28.6 28.2       MCHC  32.0 - 36.0 g/dL 31.7 Low  30.6 Low  30.4 Low  31.9 Low  31.8 Low  32.6 32.3   RDW  11.5 - 14.5 % 18.9 High  19.5 High  19.9 High  16.7 High  19.2 High  20.6 High  19.0 High    Platelets  150 - 450 x10*3/uL 474 High  561 High  685 High                 Component  Ref Range & Units 7 d ago   CA 27.29  0.0 - 38.6 U/mL 44.6 High    Resulting Agency [facility]                      ·  Results             Assessment and Plan:      Assessment and Plan:   Assessment:    1 invasive ductal carcinoma right breast, stage IV, right pleural effusion, multiple hepatic lesions  ER positive, [place] positive, HER2 negative  Current treatment with letrozole started in April 2023     Patient is a 68-year-old female with history of MS, chair bound who was admitted in the hospital because of shortness of breath.      March 10, 2023 CAT scan of chest did show right pleural effusion, right breast mass 12 o'clock position, multiple hepatic lesion.      March 11, 2023, status post paracentesis, cytology did show atypical cells      March 13, 2023, ultrasound-guided biopsy of right breast mass done pathology positive for invasive ductal carcinoma, grade 2, ER 95%, [place] 60%, HER2 negative , Ultrasound biopsy of right axillary lymph node done and negative for malignancy.     Current treatment, letrozole 2.5 mg p.o. daily, kisqali     8/11/23 , CT of CAP , partial response     5/9/24     Metastatic breast cancer, ER positive [place] positive,   Currently patient is taking letrozole 2.5 mg p.o. daily without any problem.  Kisqali was restarted.  Patient stable, PET scan result discussed with patient     No change in treatment      Patient is not a gaining weight patient advised to take at least 1500 to 2000-calorie every day.  Follow-up after 3 months, repeat a chest x-ray if patient has persistent right pleural effusion will consider thoracentesis.  Time spent 30 minutes.  .

## 2024-05-20 ENCOUNTER — SPECIALTY PHARMACY (OUTPATIENT)
Dept: PHARMACY | Facility: CLINIC | Age: 69
End: 2024-05-20

## 2024-05-20 PROCEDURE — RXMED WILLOW AMBULATORY MEDICATION CHARGE

## 2024-05-22 ENCOUNTER — PHARMACY VISIT (OUTPATIENT)
Dept: PHARMACY | Facility: CLINIC | Age: 69
End: 2024-05-22
Payer: COMMERCIAL

## 2024-05-30 ENCOUNTER — TELEPHONE (OUTPATIENT)
Dept: HEMATOLOGY/ONCOLOGY | Facility: CLINIC | Age: 69
End: 2024-05-30
Payer: COMMERCIAL

## 2024-05-30 DIAGNOSIS — C50.919 MALIGNANT NEOPLASM OF BREAST IN FEMALE, ESTROGEN RECEPTOR POSITIVE, UNSPECIFIED LATERALITY, UNSPECIFIED SITE OF BREAST (MULTI): Primary | ICD-10-CM

## 2024-05-30 DIAGNOSIS — Z17.0 MALIGNANT NEOPLASM OF BREAST IN FEMALE, ESTROGEN RECEPTOR POSITIVE, UNSPECIFIED LATERALITY, UNSPECIFIED SITE OF BREAST (MULTI): Primary | ICD-10-CM

## 2024-05-30 RX ORDER — LETROZOLE 2.5 MG/1
2.5 TABLET, FILM COATED ORAL DAILY
Qty: 90 TABLET | Refills: 3 | Status: SHIPPED | OUTPATIENT
Start: 2024-05-30

## 2024-06-14 ENCOUNTER — SPECIALTY PHARMACY (OUTPATIENT)
Dept: PHARMACY | Facility: CLINIC | Age: 69
End: 2024-06-14

## 2024-06-14 PROCEDURE — RXMED WILLOW AMBULATORY MEDICATION CHARGE

## 2024-06-15 ENCOUNTER — PHARMACY VISIT (OUTPATIENT)
Dept: PHARMACY | Facility: CLINIC | Age: 69
End: 2024-06-15
Payer: COMMERCIAL

## 2024-07-11 ENCOUNTER — SPECIALTY PHARMACY (OUTPATIENT)
Dept: PHARMACY | Facility: CLINIC | Age: 69
End: 2024-07-11

## 2024-07-11 PROCEDURE — RXMED WILLOW AMBULATORY MEDICATION CHARGE

## 2024-07-15 ENCOUNTER — PHARMACY VISIT (OUTPATIENT)
Dept: PHARMACY | Facility: CLINIC | Age: 69
End: 2024-07-15
Payer: COMMERCIAL

## 2024-08-06 DIAGNOSIS — C50.919 MALIGNANT NEOPLASM OF BREAST IN FEMALE, ESTROGEN RECEPTOR POSITIVE, UNSPECIFIED LATERALITY, UNSPECIFIED SITE OF BREAST (MULTI): ICD-10-CM

## 2024-08-06 DIAGNOSIS — Z17.0 MALIGNANT NEOPLASM OF BREAST IN FEMALE, ESTROGEN RECEPTOR POSITIVE, UNSPECIFIED LATERALITY, UNSPECIFIED SITE OF BREAST (MULTI): ICD-10-CM

## 2024-08-07 PROCEDURE — RXMED WILLOW AMBULATORY MEDICATION CHARGE

## 2024-08-08 ENCOUNTER — SPECIALTY PHARMACY (OUTPATIENT)
Dept: HEMATOLOGY/ONCOLOGY | Facility: CLINIC | Age: 69
End: 2024-08-08

## 2024-08-08 ENCOUNTER — HOSPITAL ENCOUNTER (OUTPATIENT)
Dept: RADIOLOGY | Facility: HOSPITAL | Age: 69
Discharge: HOME | End: 2024-08-08
Payer: COMMERCIAL

## 2024-08-08 ENCOUNTER — SOCIAL WORK (OUTPATIENT)
Dept: HEMATOLOGY/ONCOLOGY | Facility: CLINIC | Age: 69
End: 2024-08-08

## 2024-08-08 ENCOUNTER — ONCOLOGY MEDICATION OUTREACH (OUTPATIENT)
Dept: HEMATOLOGY/ONCOLOGY | Facility: CLINIC | Age: 69
End: 2024-08-08

## 2024-08-08 ENCOUNTER — OFFICE VISIT (OUTPATIENT)
Dept: HEMATOLOGY/ONCOLOGY | Facility: CLINIC | Age: 69
End: 2024-08-08
Payer: COMMERCIAL

## 2024-08-08 ENCOUNTER — SPECIALTY PHARMACY (OUTPATIENT)
Dept: PHARMACY | Facility: CLINIC | Age: 69
End: 2024-08-08

## 2024-08-08 VITALS
RESPIRATION RATE: 16 BRPM | HEART RATE: 81 BPM | OXYGEN SATURATION: 94 % | TEMPERATURE: 97.3 F | BODY MASS INDEX: 23.52 KG/M2 | DIASTOLIC BLOOD PRESSURE: 73 MMHG | WEIGHT: 137.79 LBS | SYSTOLIC BLOOD PRESSURE: 121 MMHG | HEIGHT: 64 IN

## 2024-08-08 DIAGNOSIS — Z17.0 MALIGNANT NEOPLASM OF LOWER-OUTER QUADRANT OF RIGHT BREAST OF FEMALE, ESTROGEN RECEPTOR POSITIVE (MULTI): ICD-10-CM

## 2024-08-08 DIAGNOSIS — C50.511 MALIGNANT NEOPLASM OF LOWER-OUTER QUADRANT OF RIGHT BREAST OF FEMALE, ESTROGEN RECEPTOR POSITIVE (MULTI): ICD-10-CM

## 2024-08-08 LAB
ALBUMIN SERPL BCP-MCNC: 3.5 G/DL (ref 3.4–5)
ALP SERPL-CCNC: 73 U/L (ref 33–136)
ALT SERPL W P-5'-P-CCNC: 20 U/L (ref 7–45)
ANION GAP SERPL CALC-SCNC: 11 MMOL/L (ref 10–20)
AST SERPL W P-5'-P-CCNC: 15 U/L (ref 9–39)
BASOPHILS # BLD AUTO: 0.03 X10*3/UL (ref 0–0.1)
BASOPHILS NFR BLD AUTO: 0.7 %
BILIRUB SERPL-MCNC: 0.3 MG/DL (ref 0–1.2)
BUN SERPL-MCNC: 28 MG/DL (ref 6–23)
CALCIUM SERPL-MCNC: 9 MG/DL (ref 8.6–10.3)
CANCER AG27-29 SERPL-ACNC: 45.8 U/ML (ref 0–38.6)
CHLORIDE SERPL-SCNC: 102 MMOL/L (ref 98–107)
CO2 SERPL-SCNC: 28 MMOL/L (ref 21–32)
CREAT SERPL-MCNC: 1.04 MG/DL (ref 0.5–1.05)
EGFRCR SERPLBLD CKD-EPI 2021: 59 ML/MIN/1.73M*2
EOSINOPHIL # BLD AUTO: 0.11 X10*3/UL (ref 0–0.7)
EOSINOPHIL NFR BLD AUTO: 2.4 %
ERYTHROCYTE [DISTWIDTH] IN BLOOD BY AUTOMATED COUNT: 17.3 % (ref 11.5–14.5)
GLUCOSE SERPL-MCNC: 84 MG/DL (ref 74–99)
HCT VFR BLD AUTO: 35.6 % (ref 36–46)
HGB BLD-MCNC: 11.4 G/DL (ref 12–16)
IMM GRANULOCYTES # BLD AUTO: 0.01 X10*3/UL (ref 0–0.7)
IMM GRANULOCYTES NFR BLD AUTO: 0.2 % (ref 0–0.9)
LYMPHOCYTES # BLD AUTO: 0.68 X10*3/UL (ref 1.2–4.8)
LYMPHOCYTES NFR BLD AUTO: 14.8 %
MCH RBC QN AUTO: 32.3 PG (ref 26–34)
MCHC RBC AUTO-ENTMCNC: 32 G/DL (ref 32–36)
MCV RBC AUTO: 101 FL (ref 80–100)
MONOCYTES # BLD AUTO: 0.27 X10*3/UL (ref 0.1–1)
MONOCYTES NFR BLD AUTO: 5.9 %
NEUTROPHILS # BLD AUTO: 3.51 X10*3/UL (ref 1.2–7.7)
NEUTROPHILS NFR BLD AUTO: 76 %
PLATELET # BLD AUTO: 284 X10*3/UL (ref 150–450)
POTASSIUM SERPL-SCNC: 4.7 MMOL/L (ref 3.5–5.3)
PROT SERPL-MCNC: 7.4 G/DL (ref 6.4–8.2)
RBC # BLD AUTO: 3.53 X10*6/UL (ref 4–5.2)
SODIUM SERPL-SCNC: 136 MMOL/L (ref 136–145)
WBC # BLD AUTO: 4.6 X10*3/UL (ref 4.4–11.3)

## 2024-08-08 PROCEDURE — 84075 ASSAY ALKALINE PHOSPHATASE: CPT | Performed by: INTERNAL MEDICINE

## 2024-08-08 PROCEDURE — 36415 COLL VENOUS BLD VENIPUNCTURE: CPT | Performed by: INTERNAL MEDICINE

## 2024-08-08 PROCEDURE — 71046 X-RAY EXAM CHEST 2 VIEWS: CPT

## 2024-08-08 PROCEDURE — 3008F BODY MASS INDEX DOCD: CPT | Performed by: INTERNAL MEDICINE

## 2024-08-08 PROCEDURE — 1125F AMNT PAIN NOTED PAIN PRSNT: CPT | Performed by: INTERNAL MEDICINE

## 2024-08-08 PROCEDURE — 99214 OFFICE O/P EST MOD 30 MIN: CPT | Performed by: INTERNAL MEDICINE

## 2024-08-08 PROCEDURE — 3074F SYST BP LT 130 MM HG: CPT | Performed by: INTERNAL MEDICINE

## 2024-08-08 PROCEDURE — 85025 COMPLETE CBC W/AUTO DIFF WBC: CPT | Performed by: INTERNAL MEDICINE

## 2024-08-08 PROCEDURE — 1159F MED LIST DOCD IN RCRD: CPT | Performed by: INTERNAL MEDICINE

## 2024-08-08 PROCEDURE — 3078F DIAST BP <80 MM HG: CPT | Performed by: INTERNAL MEDICINE

## 2024-08-08 PROCEDURE — 1160F RVW MEDS BY RX/DR IN RCRD: CPT | Performed by: INTERNAL MEDICINE

## 2024-08-08 PROCEDURE — 86300 IMMUNOASSAY TUMOR CA 15-3: CPT | Mod: PORLAB | Performed by: INTERNAL MEDICINE

## 2024-08-08 PROCEDURE — 1157F ADVNC CARE PLAN IN RCRD: CPT | Performed by: INTERNAL MEDICINE

## 2024-08-08 ASSESSMENT — NCCN CANCER DISTRESS MANAGEMENT
NCCN PHYSICAL CONCERNS: 3
NCCN PHYSICAL CONCERNS: 2
NCCN EMOTIONAL CONCERNS: 1

## 2024-08-08 ASSESSMENT — PAIN SCALES - GENERAL: PAINLEVEL: 2

## 2024-08-08 NOTE — PROGRESS NOTES
Patient Visit Information:   Visit Type: Follow Up Visit      Cancer History:   Treatment Synopsis:    1.  invasive ductal carcinoma right breast, stage IV, right pleural effusion, multiple hepatic lesions     Patient is a 68-year-old female with history of MS, chair bound who was admitted in the hospital because of shortness of breath.        March 10, 2023 CAT scan of chest did show right pleural effusion, right breast mass 12 o'clock position, multiple hepatic lesion and multiple pulmonary nodule.      March 11, 2023, status post paracentesis, cytology did show atypical cells      March 13, 2023, ultrasound-guided biopsy of right breast mass done pathology positive for invasive ductal carcinoma, grade 2, ER 95%, TX 60%, HER2 negative  , Ultrasound biopsy of right axillary lymph node done and negative for malignancy.     4/25/23 , PET scan did show abnormal metabolic activity of right breast, pulmonary nodules, mediastinal lymph node, hepatic lesion.     Current treatment, letrozole, Kisqali      8/11/23 , CT of chest abdominal pelvis, partial response, right breast mass, pulmonary nodules decreased in size   5/2/24 , PET , excellent partial response, left breast mass decreased in size, pulmonary nodules and adrenal nodule decreased in size hepatic nodule resolved        History of Present Illness:      ID Statement:    OLINDA GUEVARA is a 68 year old Female        Chief Complaint: Right breast cancer   Interval History:    Patient is a 68-year-old female with history of MS, chair bound who was admitted in the hospital because of shortness of breath.      March 10, 2023 CAT scan of chest did show right pleural effusion, right breast mass 12 o'clock position, multiple hepatic lesion.      March 11, 2023, status post paracentesis, cytology did show atypical cells      March 13, 2023, ultrasound-guided biopsy of right breast mass done pathology positive for invasive ductal carcinoma, grade 2, ER 95%, TX 60%,  HER2 negative , Ultrasound biopsy of right axillary lymph node done and negative for malignancy.     Medical oncology consultation requested, patient is very anxious about her diagnosis.        Pathology report discussed with the patient.      Patient is sitting on chair, very anxious, no headache and dizziness, no shortness of breath, no nausea vomiting, no abdominal pain, generalized weakness and fatigue.     8/8/24   Has a history of metastatic breast cancer taking letrozole only,  Kisqali was restarted.      Patient doing very well, patient is on home oxygen, no chest pain, no fever, no sore throat, no diarrhea, no abdominal pain,    Patient is complaining increasing shortness of breath with minimal activity.  Chest x-ray done today did show right pleural effusion, official reading is pending  Review of Systems:   Review of Systems:    No headache or dizziness      No fever      some shortness of breath        No breast tenderness      No nausea vomiting      No abdominal pain      Generalized weakness      All body pain, arthritis      Patient not active difficult to ambulate due to Morphine Sulfate        Allergies and Intolerances:       Allergies:         contrast (specific type unknown): Contrast, Anaphylaxis,  Active         iodine: Drug, Anaphylaxis, Active         Shellfish: Food, Anaphylaxis, Active     Outpatient Medication Profile:  * Patient Currently Takes Medications as of 07-Sep-2023 11:20 documented in Structured Notes         disability placard : valid for 5 years         exp: 9/2028, Start Date: 07-Sep-2023         Kisqali (200 mg daily dose) oral tablet: Last Dose Taken:  , 3 tab(s)  orally once a day  daily for 21 days then 7 days off , Start Date: 16-Aug-2023         letrozole 2.5 mg oral tablet: Last Dose Taken:  , 1 tab(s) orally once  a day , Start Date: 31-Jul-2023         portable home oxygen concentrator 2L/min continuously via nasal canula : Last Dose Taken:  , Start Date:  27-Apr-2023         image guided biopsy of liver lesion : Last Dose Taken:  , Start Date:  30-Mar-2023         K-Tab 20 mEq oral tablet, extended release: Last Dose Taken:  , 2 tab(s)  orally once a day , Start Date: 14-Mar-2023         amoxicillin-clavulanate 875 mg-125 mg oral tablet: Last Dose Taken:   , 1 tab(s) orally 2 times a day , Start Date: 14-Mar-2023         buPROPion 300 mg/24 hours (XL) oral tablet, extended release: Last Dose  Taken:  , 1 tab(s) orally every 24 hours         carBAMazepine 100 mg oral tablet, chewable: Last Dose Taken:  , 1 tab  by mouth 3 times daily. Then 3 tabs by mouth once every evening         cyclobenzaprine 10 mg oral tablet: Last Dose Taken:  , 1 tab(s) orally  2 times a day         hydroCHLOROthiazide 12.5 mg oral capsule: Last Dose Taken:  , 1 cap(s)  orally once a day         hydrOXYzine hydrochloride 25 mg oral tablet: Last Dose Taken:  , 1 tab(s)  orally once a day         hydrOXYzine hydrochloride 50 mg oral tablet: Last Dose Taken:  , 1 tab(s)  orally once a day (at bedtime)         ibuprofen 800 mg oral tablet: Last Dose Taken:  , 1 tab(s) orally 2 times  a day         MethylPREDNISolone Dose Pack 4 mg oral tablet: Last Dose Taken:  , Day  3: 1 tab by mouth four times a day         Day 4: 1 tab by mouth three times a day          Day 5: 1 tab by mouth twice a day         day 6: 1 tab by mouth once          rosuvastatin 20 mg oral tablet: Last Dose Taken:  , 1 tab(s) orally once  a day         Vitamin B12 1000 mcg oral tablet: Last Dose Taken:  , 1 tab(s) orally  once a day         azithromycin 250 mg oral tablet: Last Dose Taken:  , 3 tabs by mouth  once every evening          melatonin 10 mg oral tablet, extended release: Last Dose Taken:  , 1  tab(s) orally once a day (at bedtime)         dextromethorphan-quinidine 20 mg-10 mg oral capsule: Last Dose Taken:   , 1 cap(s) orally every 12 hours         omeprazole 20 mg oral delayed release capsule: Last Dose Taken:  , 1   cap(s) orally once a day             Medical History:         Hypoxia: ICD-10: R09.02, Status: Active         Pleural effusion on right: ICD-10: J90, Status: Active         Infiltrating ductal carcinoma of right breast, stage 4: ICD-10:  C50.911, Status: Active         MS (multiple sclerosis): ICD-10: G35, Status: Active     Family History: No Family History items are recorded  in the problem list.      Social History:   Social Substance History:  ·  Smoking Status never smoker (1)   ·  Alcohol Use denies   ·  Drug Use denies            Vitals and Measurements:   Vitals: Temp: 36.5  HR: NA  RR: 16  BP: 103/63  SPO2%:   NA   Measurements: HT(cm): 162.4  WT(kg): 53.8  BSA: 1.55   BMI:  20.3   Second Set of Vitals/Vitals Comment: unable to get  patient's pulse ox(2)   Last 3 Weights & Heights: Date:                           Weight/Scale Type:                    Height:   07-Sep-2023 10:40                53.8  kg                     162.4  cm  08-Jun-2023 14:55                63.2  kg                     162.4  cm  27-Apr-2023 11:20                69.5  kg                     162.4  cm      Physical Exam:      Constitutional: awake/alert/oriented x3, no distress,  very anxious, sitting on chair   Eyes: PERRL, EOMI, clear sclera   Head/Neck: Neck supple, no cervical lymphadenopathy   Respiratory/Thorax: Fair air movement on left side,  decreased breath sounds of right side   Cardiovascular: Regular, rate and rhythm,  normal  S 1and S 2   Gastrointestinal: Nondistended, soft, non-tender,   no masses palpable, no organomegaly, +BS,   Extremities: normal extremities, no cyanosis edema,   Neurological: alert and oriented x3,   Breast: Right breast examination did show a mass  measuring 1 cm on 12 o'clock position     Left breast examination within normal limits   Lymphatic: No significant lymphadenopathy   Psychological: Patient is very anxious   Skin: Warm and dry, no lesions, no rashes         Lab Results:       Ref  Range & Units 11:06 3 mo ago 7 mo ago 8 mo ago 11 mo ago 12 mo ago 1 yr ago   WBC  4.4 - 11.3 x10*3/uL 4.6 5.9 7.6 9.2 7.0 R 6.5 R 4.9 R   RBC  4.00 - 5.20 x10*6/uL 3.53 Low  3.32 Low  3.43 Low  3.33 Low  3.22 Low  R 2.81 Low  R 3.08 Low  R   Hemoglobin  12.0 - 16.0 g/dL 11.4 Low  10.1 Low  9.8 Low  9.4 Low  9.7 Low  8.9 Low  9.5 Low    Hematocrit  36.0 - 46.0 % 35.6 Low  31.9 Low  32.0 Low  30.9 Low  30.4 Low  28.0 Low  29.1 Low    MCV  80 - 100 fL 101 High  96 93 93 94 100 94   MCH  26.0 - 34.0 pg 32.3 30.4 28.6 28.2      MCHC  32.0 - 36.0 g/dL 32.0 31.7 Low  30.6 Low  30.4 Low  31.9 Low  31.8 Low  32.6   RDW  11.5 - 14.5 % 17.3 High  18.9 High  19.5 High  19.9 High  16.7 High  19.2 High  20.6 High    Platelets  150 - 450 x10*3/uL 284 474 High  5                           ·  Results             Assessment and Plan:      Assessment and Plan:   Assessment:    1 invasive ductal carcinoma right breast, stage IV, right pleural effusion, multiple hepatic lesions  ER positive, IA positive, HER2 negative  Current treatment with letrozole started in April 2023     Patient is a 68-year-old female with history of MS, chair bound who was admitted in the hospital because of shortness of breath.      March 10, 2023 CAT scan of chest did show right pleural effusion, right breast mass 12 o'clock position, multiple hepatic lesion.      March 11, 2023, status post paracentesis, cytology did show atypical cells      March 13, 2023, ultrasound-guided biopsy of right breast mass done pathology positive for invasive ductal carcinoma, grade 2, ER 95%, IA 60%, HER2 negative , Ultrasound biopsy of right axillary lymph node done and negative for malignancy.     Current treatment, letrozole 2.5 mg p.o. daily, kisqali     8/11/23 , CT of CAP , partial response     8/8/24     Metastatic breast cancer, ER positive IA positive,  Currently patient is taking letrozole 2.5 mg p.o. daily without any problem.  Kisqali was restarted.    Patient  increased shortness of breath chest x-ray showed right pleural effusion.    Discussed with the patient, scheduled for thoracentesis, continue same treatment, reevaluation after 2-month         Time spent 30 minutes.  .

## 2024-08-08 NOTE — PROGRESS NOTES
Patient had a follow up appointment today.  She scored a 6 on her distress screen.  (SW) met with patient today and her brother to assess needs and offer support.  Patient was A&Ox3 with appropriate and congruent mood and affect.   Patient stated that she felt better talking with Dr. Guzman.  Patient stated that she always gets nervous coming to her appointments.  SW provided SW information if she would need anything.  Patient was appreciative.    Jd Kaur MSW, LSW

## 2024-08-08 NOTE — PATIENT INSTRUCTIONS
Follow up with Dr. Guzman today for history of right breast cancer.     Continue treatment with letrozole and kisqali.     Thoracentesis (procedure to drain fluid from lung area) will be scheduled by radiology department. They will call you to schedule.     Follow up with Dr. Guzman in 2 months.

## 2024-08-09 ENCOUNTER — PHARMACY VISIT (OUTPATIENT)
Dept: PHARMACY | Facility: CLINIC | Age: 69
End: 2024-08-09
Payer: COMMERCIAL

## 2024-08-21 ENCOUNTER — HOSPITAL ENCOUNTER (OUTPATIENT)
Dept: RADIOLOGY | Facility: HOSPITAL | Age: 69
Discharge: HOME | End: 2024-08-21
Payer: COMMERCIAL

## 2024-08-21 DIAGNOSIS — Z17.0 MALIGNANT NEOPLASM OF LOWER-OUTER QUADRANT OF RIGHT BREAST OF FEMALE, ESTROGEN RECEPTOR POSITIVE (MULTI): ICD-10-CM

## 2024-08-21 DIAGNOSIS — C50.511 MALIGNANT NEOPLASM OF LOWER-OUTER QUADRANT OF RIGHT BREAST OF FEMALE, ESTROGEN RECEPTOR POSITIVE (MULTI): ICD-10-CM

## 2024-08-21 PROCEDURE — 32555 ASPIRATE PLEURA W/ IMAGING: CPT | Performed by: RADIOLOGY

## 2024-08-21 PROCEDURE — 32555 ASPIRATE PLEURA W/ IMAGING: CPT

## 2024-08-21 NOTE — POST-PROCEDURE NOTE
Interventional Radiology Brief Postprocedure Note    Attending: Arnol Lester MD      Assistant: none    Diagnosis: malignant effusion    Description of procedure: thoracentesis     Anesthesia:  Local    Complications: None    Estimated Blood Loss: none    No specimens collected      See detailed result report with images in PACS.    The patient tolerated the procedure well without incident or complication.

## 2024-08-22 ENCOUNTER — TELEPHONE (OUTPATIENT)
Dept: HEMATOLOGY/ONCOLOGY | Facility: CLINIC | Age: 69
End: 2024-08-22
Payer: COMMERCIAL

## 2024-08-22 NOTE — TELEPHONE ENCOUNTER
Patient was unable to complete US THORACENTESIS and would to be rescheduled for appointment. I need a new order, please.

## 2024-08-23 NOTE — TELEPHONE ENCOUNTER
Per Dr. Guzman if pt is still symptomatic will need referred to thoracic surgery as fluid was loculated and IR unable to drain. Spoke to pt and she stated she is not experiencing any sx of SOB at this time. She will notify office if symptoms return.

## 2024-09-04 ENCOUNTER — TELEPHONE (OUTPATIENT)
Dept: HEMATOLOGY/ONCOLOGY | Facility: CLINIC | Age: 69
End: 2024-09-04
Payer: COMMERCIAL

## 2024-09-04 PROCEDURE — RXMED WILLOW AMBULATORY MEDICATION CHARGE

## 2024-09-04 NOTE — TELEPHONE ENCOUNTER
Phone call back to Mildred, no answer, left a msg for return call  Discussed with dr. Guzman, he will order another thoracentesis  Spoke with Mildred this am, she is still feeling dyspneic. She sounds dyspneic while speaking on the phone. Will have dr. Guzman enter an order for IR to perform    Thoracentesis ordered by dr. Guzman, spoke with IR, they scheduled Mildred on 9/10. Mildred was notified

## 2024-09-05 ENCOUNTER — SPECIALTY PHARMACY (OUTPATIENT)
Dept: PHARMACY | Facility: CLINIC | Age: 69
End: 2024-09-05

## 2024-09-05 DIAGNOSIS — Z17.0 ESTROGEN RECEPTOR POSITIVE STATUS (ER+): Primary | ICD-10-CM

## 2024-09-06 ENCOUNTER — PHARMACY VISIT (OUTPATIENT)
Dept: PHARMACY | Facility: CLINIC | Age: 69
End: 2024-09-06
Payer: COMMERCIAL

## 2024-09-10 ENCOUNTER — HOSPITAL ENCOUNTER (OUTPATIENT)
Dept: RADIOLOGY | Facility: HOSPITAL | Age: 69
Discharge: HOME | End: 2024-09-10
Payer: COMMERCIAL

## 2024-09-10 ENCOUNTER — APPOINTMENT (OUTPATIENT)
Dept: RADIOLOGY | Facility: HOSPITAL | Age: 69
End: 2024-09-10
Payer: COMMERCIAL

## 2024-09-10 ENCOUNTER — HOSPITAL ENCOUNTER (EMERGENCY)
Facility: HOSPITAL | Age: 69
Discharge: HOME | End: 2024-09-10
Attending: EMERGENCY MEDICINE
Payer: COMMERCIAL

## 2024-09-10 ENCOUNTER — APPOINTMENT (OUTPATIENT)
Dept: CARDIOLOGY | Facility: HOSPITAL | Age: 69
End: 2024-09-10
Payer: COMMERCIAL

## 2024-09-10 VITALS
WEIGHT: 126.54 LBS | HEIGHT: 64 IN | TEMPERATURE: 95.5 F | DIASTOLIC BLOOD PRESSURE: 82 MMHG | RESPIRATION RATE: 16 BRPM | HEART RATE: 70 BPM | SYSTOLIC BLOOD PRESSURE: 148 MMHG | BODY MASS INDEX: 21.6 KG/M2 | OXYGEN SATURATION: 100 %

## 2024-09-10 VITALS
SYSTOLIC BLOOD PRESSURE: 146 MMHG | OXYGEN SATURATION: 100 % | RESPIRATION RATE: 22 BRPM | DIASTOLIC BLOOD PRESSURE: 94 MMHG | HEART RATE: 78 BPM

## 2024-09-10 DIAGNOSIS — J93.9 PNEUMOTHORAX, UNSPECIFIED TYPE: ICD-10-CM

## 2024-09-10 DIAGNOSIS — R06.02 SHORTNESS OF BREATH: Primary | ICD-10-CM

## 2024-09-10 DIAGNOSIS — Z98.890 STATUS POST THORACENTESIS: ICD-10-CM

## 2024-09-10 DIAGNOSIS — Z17.0 ESTROGEN RECEPTOR POSITIVE STATUS (ER+): ICD-10-CM

## 2024-09-10 LAB
ALBUMIN SERPL BCP-MCNC: 3.9 G/DL (ref 3.4–5)
ALP SERPL-CCNC: 85 U/L (ref 33–136)
ALT SERPL W P-5'-P-CCNC: 20 U/L (ref 7–45)
ANION GAP SERPL CALC-SCNC: 12 MMOL/L (ref 10–20)
AST SERPL W P-5'-P-CCNC: 17 U/L (ref 9–39)
BASOPHILS # BLD AUTO: 0.02 X10*3/UL (ref 0–0.1)
BASOPHILS NFR BLD AUTO: 0.4 %
BILIRUB SERPL-MCNC: 0.2 MG/DL (ref 0–1.2)
BNP SERPL-MCNC: 19 PG/ML (ref 0–99)
BUN SERPL-MCNC: 20 MG/DL (ref 6–23)
CALCIUM SERPL-MCNC: 9 MG/DL (ref 8.6–10.3)
CARDIAC TROPONIN I PNL SERPL HS: 4 NG/L (ref 0–13)
CHLORIDE SERPL-SCNC: 101 MMOL/L (ref 98–107)
CO2 SERPL-SCNC: 28 MMOL/L (ref 21–32)
CREAT SERPL-MCNC: 1.03 MG/DL (ref 0.5–1.05)
EGFRCR SERPLBLD CKD-EPI 2021: 60 ML/MIN/1.73M*2
EOSINOPHIL # BLD AUTO: 0.09 X10*3/UL (ref 0–0.7)
EOSINOPHIL NFR BLD AUTO: 1.6 %
ERYTHROCYTE [DISTWIDTH] IN BLOOD BY AUTOMATED COUNT: 16.2 % (ref 11.5–14.5)
GLUCOSE SERPL-MCNC: 162 MG/DL (ref 74–99)
HCT VFR BLD AUTO: 39.7 % (ref 36–46)
HGB BLD-MCNC: 13.2 G/DL (ref 12–16)
IMM GRANULOCYTES # BLD AUTO: 0.03 X10*3/UL (ref 0–0.7)
IMM GRANULOCYTES NFR BLD AUTO: 0.5 % (ref 0–0.9)
LYMPHOCYTES # BLD AUTO: 0.45 X10*3/UL (ref 1.2–4.8)
LYMPHOCYTES NFR BLD AUTO: 7.9 %
MCH RBC QN AUTO: 32.7 PG (ref 26–34)
MCHC RBC AUTO-ENTMCNC: 33.2 G/DL (ref 32–36)
MCV RBC AUTO: 98 FL (ref 80–100)
MONOCYTES # BLD AUTO: 0.23 X10*3/UL (ref 0.1–1)
MONOCYTES NFR BLD AUTO: 4 %
NEUTROPHILS # BLD AUTO: 4.86 X10*3/UL (ref 1.2–7.7)
NEUTROPHILS NFR BLD AUTO: 85.6 %
NRBC BLD-RTO: 0 /100 WBCS (ref 0–0)
PLATELET # BLD AUTO: 350 X10*3/UL (ref 150–450)
POTASSIUM SERPL-SCNC: 4 MMOL/L (ref 3.5–5.3)
PROT SERPL-MCNC: 8.3 G/DL (ref 6.4–8.2)
RBC # BLD AUTO: 4.04 X10*6/UL (ref 4–5.2)
SODIUM SERPL-SCNC: 137 MMOL/L (ref 136–145)
WBC # BLD AUTO: 5.7 X10*3/UL (ref 4.4–11.3)

## 2024-09-10 PROCEDURE — 83880 ASSAY OF NATRIURETIC PEPTIDE: CPT | Performed by: NURSE PRACTITIONER

## 2024-09-10 PROCEDURE — 84484 ASSAY OF TROPONIN QUANT: CPT | Performed by: NURSE PRACTITIONER

## 2024-09-10 PROCEDURE — 96375 TX/PRO/DX INJ NEW DRUG ADDON: CPT

## 2024-09-10 PROCEDURE — 71045 X-RAY EXAM CHEST 1 VIEW: CPT

## 2024-09-10 PROCEDURE — 85025 COMPLETE CBC W/AUTO DIFF WBC: CPT | Performed by: NURSE PRACTITIONER

## 2024-09-10 PROCEDURE — 71250 CT THORAX DX C-: CPT

## 2024-09-10 PROCEDURE — 2500000004 HC RX 250 GENERAL PHARMACY W/ HCPCS (ALT 636 FOR OP/ED): Performed by: NURSE PRACTITIONER

## 2024-09-10 PROCEDURE — 36415 COLL VENOUS BLD VENIPUNCTURE: CPT | Performed by: NURSE PRACTITIONER

## 2024-09-10 PROCEDURE — 32555 ASPIRATE PLEURA W/ IMAGING: CPT

## 2024-09-10 PROCEDURE — 71250 CT THORAX DX C-: CPT | Performed by: STUDENT IN AN ORGANIZED HEALTH CARE EDUCATION/TRAINING PROGRAM

## 2024-09-10 PROCEDURE — 96374 THER/PROPH/DIAG INJ IV PUSH: CPT

## 2024-09-10 PROCEDURE — 93005 ELECTROCARDIOGRAM TRACING: CPT

## 2024-09-10 PROCEDURE — 99285 EMERGENCY DEPT VISIT HI MDM: CPT | Mod: 25

## 2024-09-10 PROCEDURE — 80053 COMPREHEN METABOLIC PANEL: CPT | Performed by: NURSE PRACTITIONER

## 2024-09-10 RX ORDER — ONDANSETRON HYDROCHLORIDE 2 MG/ML
4 INJECTION, SOLUTION INTRAVENOUS ONCE
Status: COMPLETED | OUTPATIENT
Start: 2024-09-10 | End: 2024-09-10

## 2024-09-10 RX ORDER — HYDROMORPHONE HYDROCHLORIDE 1 MG/ML
1 INJECTION, SOLUTION INTRAMUSCULAR; INTRAVENOUS; SUBCUTANEOUS ONCE
Status: COMPLETED | OUTPATIENT
Start: 2024-09-10 | End: 2024-09-10

## 2024-09-10 ASSESSMENT — PAIN SCALES - GENERAL
PAINLEVEL_OUTOF10: 8
PAINLEVEL_OUTOF10: 6
PAINLEVEL_OUTOF10: 8
PAINLEVEL_OUTOF10: 2
PAINLEVEL_OUTOF10: 8
PAINLEVEL_OUTOF10: 8

## 2024-09-10 ASSESSMENT — LIFESTYLE VARIABLES
TOTAL SCORE: 0
EVER FELT BAD OR GUILTY ABOUT YOUR DRINKING: NO
EVER HAD A DRINK FIRST THING IN THE MORNING TO STEADY YOUR NERVES TO GET RID OF A HANGOVER: NO
HAVE YOU EVER FELT YOU SHOULD CUT DOWN ON YOUR DRINKING: NO
HAVE PEOPLE ANNOYED YOU BY CRITICIZING YOUR DRINKING: NO

## 2024-09-10 ASSESSMENT — PAIN - FUNCTIONAL ASSESSMENT
PAIN_FUNCTIONAL_ASSESSMENT: 0-10

## 2024-09-10 ASSESSMENT — COLUMBIA-SUICIDE SEVERITY RATING SCALE - C-SSRS
6. HAVE YOU EVER DONE ANYTHING, STARTED TO DO ANYTHING, OR PREPARED TO DO ANYTHING TO END YOUR LIFE?: NO
1. IN THE PAST MONTH, HAVE YOU WISHED YOU WERE DEAD OR WISHED YOU COULD GO TO SLEEP AND NOT WAKE UP?: NO
2. HAVE YOU ACTUALLY HAD ANY THOUGHTS OF KILLING YOURSELF?: NO

## 2024-09-10 ASSESSMENT — PAIN DESCRIPTION - ORIENTATION: ORIENTATION: RIGHT

## 2024-09-10 ASSESSMENT — PAIN DESCRIPTION - PAIN TYPE: TYPE: ACUTE PAIN

## 2024-09-10 ASSESSMENT — PAIN DESCRIPTION - DESCRIPTORS: DESCRIPTORS: ACHING

## 2024-09-10 ASSESSMENT — PAIN DESCRIPTION - FREQUENCY: FREQUENCY: CONSTANT/CONTINUOUS

## 2024-09-10 ASSESSMENT — PAIN DESCRIPTION - LOCATION: LOCATION: BACK

## 2024-09-10 ASSESSMENT — PAIN DESCRIPTION - PROGRESSION: CLINICAL_PROGRESSION: GRADUALLY IMPROVING

## 2024-09-10 NOTE — NURSING NOTE
Called to room by US staff as pt feeling more short of breath post thoracentesis. Pt was diaphoretic upon arrival. All vital signs stable. Pt saturating 100% on 2L nasal cannula. Pt could only tolerate a small amount of fluid being removed during thoracentesis d/t severe pain. Chest xray obtained that shows no evidence of a pneumothorax per Dr. Romero. Clinically the pt was stable post procedure and Dr. Romero cleared for discharge. Pt does not feel ok to go home and states she feels way more short of breath than prior to procedure. Dr. Romero aware and suggests the pt be seen in the emergency department. Report called to ED resource and pt was transferred to ED bed 5 at this time.

## 2024-09-10 NOTE — ED PROVIDER NOTES
HPI   Chief Complaint   Patient presents with    Shortness of Breath     Was at IR for thoracentesis, reported increase in pain and inability to finish treatment. C/O right side pain at site, and SOB.        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  The patient had a thoracentesis performed today.  She reported that the procedure was very painful.  She states that she was having extremely difficult time breathing denies any hemoptysis or hematemesis.  The patient wears 2 L nasal cannula at baseline which is occasionally bumped up to 3 L at home.  The last time the patient had a thoracentesis was a month ago.  She reports that that they premedicated her to the pain medication.  They did not do that at this time.  She did endorse that it was painful last time.  They were not able to complete the procedure due to the patient's discomfort.  An x-ray was performed and the patient still has a large collection of fluid.  The patient does not have a pneumothorax.  She is not having any dizziness, palpitations, paresthesias, or focal weakness.  Denies any abdominal pain, nausea, vomiting.  Denies any fever or cold-like symptoms.      History provided by:  Patient   used: No            Patient History   No past medical history on file.  No past surgical history on file.  No family history on file.  Social History     Tobacco Use    Smoking status: Former     Current packs/day: 0.00     Average packs/day: 0.5 packs/day for 2.0 years (1.0 ttl pk-yrs)     Types: Cigarettes     Start date:      Quit date:      Years since quittin.7    Smokeless tobacco: Not on file   Substance Use Topics    Alcohol use: Not on file    Drug use: Not on file       Physical Exam   ED Triage Vitals    Temperature Heart Rate Respirations BP   09/10/24 1438 09/10/24 1438 09/10/24 1438 09/10/24 1544   35.3 °C (95.5 °F) 73 20 152/85      Pulse Ox Temp src Heart Rate Source Patient Position   09/10/24 1438 -- -- --   100 %         BP Location FiO2 (%)     -- --             Physical Exam  Vitals and nursing note reviewed.   Constitutional:       General: She is in acute distress.   HENT:      Head: Normocephalic.      Mouth/Throat:      Pharynx: Pharyngeal swelling and oropharyngeal exudate present.   Eyes:      Extraocular Movements: Extraocular movements intact.      Pupils: Pupils are equal, round, and reactive to light.   Cardiovascular:      Rate and Rhythm: Normal rate and regular rhythm.   Pulmonary:      Breath sounds: Examination of the right-middle field reveals decreased breath sounds. Examination of the right-lower field reveals decreased breath sounds. Decreased breath sounds present.   Chest:      Chest wall: No mass, deformity, tenderness or crepitus.   Abdominal:      General: Bowel sounds are normal.      Palpations: Abdomen is soft.   Musculoskeletal:         General: Normal range of motion.      Cervical back: Normal range of motion.   Skin:     General: Skin is warm.      Capillary Refill: Capillary refill takes less than 2 seconds.   Neurological:      General: No focal deficit present.      Mental Status: She is alert.   Psychiatric:         Mood and Affect: Mood normal.           ED Course & MDM   ED Course as of 09/10/24 1846   Tue Sep 10, 2024   1842 Twelve-lead EKG interpreted by me.  Sinus rhythm at a rate of 71.  FL interval is 138, QRS is 101, QT is 417, QTc is 454.  Normal axis.  Normal interval.  No acute ischemia or injury pattern noted. [CT]      ED Course User Index  [CT] CANDY Diamond-CNP         Diagnoses as of 09/10/24 1846   Shortness of breath   Pneumothorax, unspecified type                 No data recorded     Abundio Coma Scale Score: 15 (09/10/24 1440 : Chinyere  DANIELLE Tovar)                           Medical Decision Making  Patient was seen and evaluated with the attending physician, Dr. Beltran.  The patient is presenting to the emergency room with complaints of chest and back pain status postthoracentesis.  The patient reported that she was having a hard time breathing.  Differential diagnosis includes pain, pneumothorax, hemothorax, ACS, pulmonary embolism, or other acute process.  Patient was placed on cardiac monitor and continuous pulse oximetry.  The patient's vital signs were stable on her baseline 2 L nasal cannula.  The patient was administered 1 mg of Dilaudid IVP and Zofran 4 mg IVP.  The patient does not have any acute leukocytosis.  CMP revealed a glucose of 162 and a total protein 8.3.  Her BNP and troponins were negative.  CT of the chest was performed and showed interval decrease of pleural effusion with the development of upper lobe hydropneumothorax.  The remainder of the findings are stable from previous imaging.  The images were independently reviewed and was noted that the pneumothorax is very tiny and inconsequential.  The patient was notified of the laboratory and imaging findings.  We felt the patient could be discharged home.  The patient declined the need for pain medication at this time.  The patient is to follow up with their primary care physician in the next 2-3 days.  The patient is to return to the ED worse in any way.  The patient was discharged in stable condition with computer discharge instructions given. Patient was agreeable with discharge planning.        Procedure  Procedures     LUDIN Diamond  09/10/24 1843       LUDIN Diamond  09/10/24 1846

## 2024-09-15 LAB
ATRIAL RATE: 71 BPM
P AXIS: 37 DEGREES
PR INTERVAL: 138 MS
Q ONSET: 251 MS
QRS COUNT: 11 BEATS
QRS DURATION: 101 MS
QT INTERVAL: 417 MS
QTC CALCULATION(BAZETT): 454 MS
QTC FREDERICIA: 441 MS
R AXIS: -20 DEGREES
T AXIS: 26 DEGREES
T OFFSET: 460 MS
VENTRICULAR RATE: 71 BPM

## 2024-09-17 ENCOUNTER — TELEPHONE (OUTPATIENT)
Dept: HEMATOLOGY/ONCOLOGY | Facility: CLINIC | Age: 69
End: 2024-09-17
Payer: COMMERCIAL

## 2024-09-17 NOTE — TELEPHONE ENCOUNTER
Attempted to call Yanely back with Dr. Guzman's recommendations. No answer. Contacted Mykel, pt's brother and emergency contact. Informed Mykel that Dr. Guzman is recommending that Yanely go to [facility] ED for evaluation and likely admission for thoracic surgery consultation given quick return of sx and pt's inability to tolerate IR procedures. Explained to Mykel that CT surgery may be able to perform a different procedure to get to the fluid better than IR is able to is the thought process. Explained that given Yanely's symptoms Dr. Guzman feels ED is best route at this time as outpatient workup/referral may take several weeks before pt is able to be evaluated and scheduled and coincidently get relief. Mykel is agreeable and was provided with address of [facility]. Mykel denied additional questions at this time.

## 2024-09-17 NOTE — TELEPHONE ENCOUNTER
Calling to request another procedure of having liquid removed form her lungs, as the last one wasn't completed. She also asked for pain medication. Ibuprofen and acetaminophen don't do enough for her anymore.

## 2024-09-18 NOTE — TELEPHONE ENCOUNTER
Attempted to return patient's call. LVM with office call back number. Pt is scheduled with CT surgery on 9/24/2024. Informed Dr. Guzman.

## 2024-09-18 NOTE — TELEPHONE ENCOUNTER
Spoke with Yanely and verified plan is for her to be evaluated by thoracic surgeon, Dr. Moreland for recurrent pleural effusions. Yanely denied further questions or needs at this time.

## 2024-09-18 NOTE — TELEPHONE ENCOUNTER
Pt returned call and this called was unavailable, attempted to return call and was unable to leave a message this time

## 2024-09-24 ENCOUNTER — LAB (OUTPATIENT)
Dept: LAB | Facility: LAB | Age: 69
End: 2024-09-24
Payer: COMMERCIAL

## 2024-09-24 ENCOUNTER — OFFICE VISIT (OUTPATIENT)
Dept: SURGERY | Facility: CLINIC | Age: 69
End: 2024-09-24
Payer: COMMERCIAL

## 2024-09-24 VITALS
SYSTOLIC BLOOD PRESSURE: 108 MMHG | DIASTOLIC BLOOD PRESSURE: 64 MMHG | OXYGEN SATURATION: 97 % | WEIGHT: 137 LBS | BODY MASS INDEX: 23.39 KG/M2 | HEIGHT: 64 IN | TEMPERATURE: 98 F | HEART RATE: 67 BPM

## 2024-09-24 DIAGNOSIS — G35 MULTIPLE SCLEROSIS (MULTI): ICD-10-CM

## 2024-09-24 DIAGNOSIS — J90 PLEURAL EFFUSION: ICD-10-CM

## 2024-09-24 DIAGNOSIS — C50.919 MALIGNANT NEOPLASM OF FEMALE BREAST, UNSPECIFIED ESTROGEN RECEPTOR STATUS, UNSPECIFIED LATERALITY, UNSPECIFIED SITE OF BREAST: ICD-10-CM

## 2024-09-24 DIAGNOSIS — C78.02 SECONDARY MALIGNANT NEOPLASM OF LEFT LUNG (MULTI): Primary | ICD-10-CM

## 2024-09-24 LAB
ABO GROUP (TYPE) IN BLOOD: NORMAL
ALBUMIN SERPL BCP-MCNC: 3.2 G/DL (ref 3.4–5)
ALP SERPL-CCNC: 82 U/L (ref 33–136)
ALT SERPL W P-5'-P-CCNC: 32 U/L (ref 7–45)
ANION GAP SERPL CALC-SCNC: 10 MMOL/L (ref 10–20)
ANTIBODY SCREEN: NORMAL
AST SERPL W P-5'-P-CCNC: 23 U/L (ref 9–39)
BILIRUB SERPL-MCNC: 0.3 MG/DL (ref 0–1.2)
BUN SERPL-MCNC: 22 MG/DL (ref 6–23)
CALCIUM SERPL-MCNC: 8.6 MG/DL (ref 8.6–10.3)
CHLORIDE SERPL-SCNC: 101 MMOL/L (ref 98–107)
CO2 SERPL-SCNC: 30 MMOL/L (ref 21–32)
CREAT SERPL-MCNC: 1.03 MG/DL (ref 0.5–1.05)
EGFRCR SERPLBLD CKD-EPI 2021: 60 ML/MIN/1.73M*2
ERYTHROCYTE [DISTWIDTH] IN BLOOD BY AUTOMATED COUNT: 15.1 % (ref 11.5–14.5)
GLUCOSE SERPL-MCNC: 118 MG/DL (ref 74–99)
HCT VFR BLD AUTO: 35.3 % (ref 36–46)
HGB BLD-MCNC: 11.3 G/DL (ref 12–16)
INR PPP: 1 (ref 0.9–1.1)
MCH RBC QN AUTO: 32.1 PG (ref 26–34)
MCHC RBC AUTO-ENTMCNC: 32 G/DL (ref 32–36)
MCV RBC AUTO: 100 FL (ref 80–100)
NRBC BLD-RTO: 0 /100 WBCS (ref 0–0)
PLATELET # BLD AUTO: 645 X10*3/UL (ref 150–450)
POTASSIUM SERPL-SCNC: 5 MMOL/L (ref 3.5–5.3)
PROT SERPL-MCNC: 6.8 G/DL (ref 6.4–8.2)
PROTHROMBIN TIME: 11.8 SECONDS (ref 9.8–12.8)
RBC # BLD AUTO: 3.52 X10*6/UL (ref 4–5.2)
RH FACTOR (ANTIGEN D): NORMAL
SODIUM SERPL-SCNC: 136 MMOL/L (ref 136–145)
WBC # BLD AUTO: 6.4 X10*3/UL (ref 4.4–11.3)

## 2024-09-24 PROCEDURE — 3008F BODY MASS INDEX DOCD: CPT | Performed by: STUDENT IN AN ORGANIZED HEALTH CARE EDUCATION/TRAINING PROGRAM

## 2024-09-24 PROCEDURE — 86850 RBC ANTIBODY SCREEN: CPT

## 2024-09-24 PROCEDURE — 1036F TOBACCO NON-USER: CPT | Performed by: STUDENT IN AN ORGANIZED HEALTH CARE EDUCATION/TRAINING PROGRAM

## 2024-09-24 PROCEDURE — 3078F DIAST BP <80 MM HG: CPT | Performed by: STUDENT IN AN ORGANIZED HEALTH CARE EDUCATION/TRAINING PROGRAM

## 2024-09-24 PROCEDURE — 1157F ADVNC CARE PLAN IN RCRD: CPT | Performed by: STUDENT IN AN ORGANIZED HEALTH CARE EDUCATION/TRAINING PROGRAM

## 2024-09-24 PROCEDURE — 80053 COMPREHEN METABOLIC PANEL: CPT

## 2024-09-24 PROCEDURE — 85027 COMPLETE CBC AUTOMATED: CPT

## 2024-09-24 PROCEDURE — 85610 PROTHROMBIN TIME: CPT

## 2024-09-24 PROCEDURE — 99205 OFFICE O/P NEW HI 60 MIN: CPT | Performed by: STUDENT IN AN ORGANIZED HEALTH CARE EDUCATION/TRAINING PROGRAM

## 2024-09-24 PROCEDURE — 1159F MED LIST DOCD IN RCRD: CPT | Performed by: STUDENT IN AN ORGANIZED HEALTH CARE EDUCATION/TRAINING PROGRAM

## 2024-09-24 PROCEDURE — 86901 BLOOD TYPING SEROLOGIC RH(D): CPT

## 2024-09-24 PROCEDURE — 99215 OFFICE O/P EST HI 40 MIN: CPT | Mod: 57 | Performed by: STUDENT IN AN ORGANIZED HEALTH CARE EDUCATION/TRAINING PROGRAM

## 2024-09-24 PROCEDURE — 86900 BLOOD TYPING SEROLOGIC ABO: CPT

## 2024-09-24 PROCEDURE — 3074F SYST BP LT 130 MM HG: CPT | Performed by: STUDENT IN AN ORGANIZED HEALTH CARE EDUCATION/TRAINING PROGRAM

## 2024-09-24 RX ORDER — PROPRANOLOL HYDROCHLORIDE 20 MG/1
TABLET ORAL EVERY 8 HOURS
COMMUNITY
Start: 2023-07-25

## 2024-09-24 RX ORDER — POTASSIUM CHLORIDE 20 MEQ/1
TABLET, EXTENDED RELEASE ORAL EVERY 24 HOURS
COMMUNITY
Start: 2024-09-04

## 2024-09-24 RX ORDER — CHOLECALCIFEROL (VITAMIN D3) 50 MCG
1 TABLET ORAL DAILY
COMMUNITY
Start: 2024-09-04 | End: 2024-10-04

## 2024-09-24 RX ORDER — GABAPENTIN 300 MG/1
CAPSULE ORAL
COMMUNITY

## 2024-09-24 RX ORDER — FERROUS SULFATE 325(65) MG
TABLET ORAL
COMMUNITY
Start: 2024-09-04

## 2024-09-24 RX ORDER — SODIUM CHLORIDE, SODIUM LACTATE, POTASSIUM CHLORIDE, CALCIUM CHLORIDE 600; 310; 30; 20 MG/100ML; MG/100ML; MG/100ML; MG/100ML
20 INJECTION, SOLUTION INTRAVENOUS CONTINUOUS
OUTPATIENT
Start: 2024-09-24

## 2024-09-24 RX ORDER — HEPARIN SODIUM 5000 [USP'U]/ML
5000 INJECTION, SOLUTION INTRAVENOUS; SUBCUTANEOUS ONCE
OUTPATIENT
Start: 2024-09-24 | End: 2024-09-24

## 2024-09-24 RX ORDER — DULOXETIN HYDROCHLORIDE 60 MG/1
1 CAPSULE, DELAYED RELEASE ORAL EVERY 24 HOURS
COMMUNITY
Start: 2024-09-04 | End: 2024-10-04

## 2024-09-24 RX ORDER — FOLIC ACID 1 MG/1
1 TABLET ORAL DAILY
COMMUNITY
Start: 2024-09-04 | End: 2024-10-04

## 2024-09-24 RX ORDER — CEFAZOLIN SODIUM 2 G/100ML
2 INJECTION, SOLUTION INTRAVENOUS ONCE
OUTPATIENT
Start: 2024-09-24 | End: 2024-09-24

## 2024-09-24 NOTE — H&P (VIEW-ONLY)
Chief complaint:  Recurrent right pleural effusion    History Of Present Illness  Mildred Moyer is a 69 y.o. female, former smoker (minimal, couple year hx), presenting with a recurrent right pleural effusion - likely malignant, in the setting of metastatic breast cancer. Patient was referred by Dr. Guzman.     Patient was diagnosed with breast CA in the spring of 2023, at that time had pleural effusion showing atypical cells. PET scan showed avidity and thickening along with bilateral pulmonary nodules concerning for metastasis. She had a thoracentesis months ago and drained 2.2L. On most recent thora 9/18 had less output (400mL) but notable pain. During thora prior on 9/10 was seen in ED and had CT chest showing loculated effusion along with nodules and pleural thickening. She presents today for discuss. Patient is on 2-3L NC at baseline. This has not changed despite previous thora procedures but has also not increased recently.     No history of MI or CVA. Could walk a mile or climb 2 flights of stairs: no     Past Medical History  She has no past medical history on file.    Surgical History  She has no past surgical history on file.     Social History  She reports that she quit smoking about 41 years ago. Her smoking use included cigarettes. She started smoking about 43 years ago. She has a 1 pack-year smoking history. She has never used smokeless tobacco. She reports that she does not drink alcohol and does not use drugs.    Family History  Family history of cancer: No  No family history on file.     Medications    Current Outpatient Medications:     buPROPion XL (Wellbutrin XL) 300 mg 24 hr tablet, Take 1 tablet (300 mg) by mouth once every 24 hours., Disp: , Rfl:     cholecalciferol (Vitamin D-3) 50 MCG (2000 UT) tablet, Take 1 tablet (2,000 Units) by mouth once daily., Disp: , Rfl:     cyanocobalamin (Vitamin B-12) 1,000 mcg tablet, Take 1 tablet (1,000 mcg) by mouth once daily., Disp: , Rfl:      cyclobenzaprine (Flexeril) 10 mg tablet, Take 1 tablet (10 mg) by mouth every 12 hours., Disp: , Rfl:     dextromethorphan-quinidine (NUEDEXTA) 20-10 mg capsule, Take by mouth every 12 hours., Disp: , Rfl:     DULoxetine (Cymbalta) 60 mg DR capsule, Take 1 capsule (60 mg) by mouth once every 24 hours., Disp: , Rfl:     ferrous sulfate, 325 mg ferrous sulfate, tablet, 1 tablet Orally one time a day for 30 days, Disp: , Rfl:     folic acid (Folvite) 1 mg tablet, Take 1 tablet (1 mg) by mouth once daily., Disp: , Rfl:     gabapentin (Neurontin) 300 mg capsule, TAKE 1 CAPSULE BY MOUTH THREE TIMES DAILY for 30, Disp: , Rfl:     hydroCHLOROthiazide (Microzide) 12.5 mg capsule, Take 1 capsule (12.5 mg) by mouth once daily., Disp: , Rfl:     hydrOXYzine HCL (Atarax) 25 mg tablet, Take 1 tablet (25 mg) by mouth once daily., Disp: , Rfl:     ibuprofen 800 mg tablet, Take 1 tablet (800 mg) by mouth 2 times a day., Disp: , Rfl:     letrozole (Femara) 2.5 mg tablet, Take 1 tablet (2.5 mg total) by mouth once daily., Disp: 90 tablet, Rfl: 3    melatonin 10 mg tablet extended release, Take 10 mg by mouth once daily at bedtime., Disp: , Rfl:     methylPREDNISolone (Medrol) 4 mg tablet, Take 1 tablet (4 mg) by mouth., Disp: , Rfl:     omeprazole (PriLOSEC) 20 mg DR capsule, Take 1 capsule (20 mg) by mouth once daily., Disp: , Rfl:     potassium chloride CR 20 mEq ER tablet, once every 24 hours., Disp: , Rfl:     propranolol (Inderal) 20 mg tablet, every 8 hours., Disp: , Rfl:     ribociclib (Kisqali) 3 x 200 mg tablet therapy pack, TAKE THREE (3) TABLETS BY MOUTH ONCE DAILY FOR 21 DAYS THEN 7 DAYS OFF, Disp: 63 each, Rfl: 2    rosuvastatin (Crestor) 20 mg tablet, Take 1 tablet (20 mg) by mouth once daily., Disp: , Rfl:     azithromycin (Zithromax) 250 mg tablet, Take 3 tablets (750 mg) by mouth once daily., Disp: , Rfl:     carBAMazepine (TEGretol) 100 mg chewable tablet, Chew 1 tablet (100 mg) every 6 hours., Disp: , Rfl:  "    Allergies  Iodinated contrast media, Iodine, Shellfish containing products, Oxymetazoline, and Trazodone    Review of Systems:  Review of Systems   Constitutional: No fevers, chills, unexpected weight change  HENT: No sore throat, congestion, or nasal drainage  Eyes: No visual changes or eye itching  Respiratory: see HPI. No cough, worsening dyspnea, wheezing  Cardiac: No chest pain, palpitations, or lower extremity edema  Gastrointestinal: No nausea, vomiting, diarrhea. No abdominal pain  Genitourinary: No dysuria or hematuria  Musculoskeletal: No back pain. No significant myalgias or arthralgias  Neurologic: No headaches, dizziness, or seizures.  Hematologic: No easy bleeding or bruising.  Psychiatric: No anxiety or depression.    Physical Exam:  Physical Exam  /64   Pulse 67   Temp 36.7 °C (98 °F)   Ht 1.626 m (5' 4\")   Wt 62.1 kg (137 lb)   SpO2 97%   BMI 23.52 kg/m²   Constitutional:       General: Patient is not in acute distress.     Appearance: Normal appearance; not ill-appearing.   HENT:      Head: Normocephalic.      Nose: No congestion or rhinorrhea.   Cardiovascular:      Rate and Rhythm: Normal rate and regular rhythm.      Pulses: Normal pulses.   Pulmonary:      Effort: Pulmonary effort is normal. No respiratory distress.  No conversational dyspnea     Breath sounds: No stridor. No wheezing.   Abdominal:      General: There is no distension.      Palpations: Abdomen is soft.      Tenderness: There is no abdominal tenderness.   Musculoskeletal:         General: No swelling, tenderness or deformity. Normal range of motion.      Cervical back: Normal range of motion. No rigidity.   Lymphadenopathy:      Cervical: No cervical adenopathy.   Skin:     General: Skin is warm and dry.   Neurological:      General: No focal deficit present.      Mental Status: Patient is alert and oriented to person, place, and time.   Psychiatric:         Mood and Affect: Mood normal.     Relevant " Results    Pathology:  Date of Procedure:  3/11/2023       Date Reported: 3/15/2023   Date Received:  3/13/2023   Date of Birth / Sex 1955 (Age: 68) / F   Race: WHITE   Submitting Physician: ANDREA LEE MD   Attending Physician: MD KAELYN MAO MD            Other External #          FINAL CYTOLOGICAL INTERPRETATION     A.  PLEURAL FLUID - RIGHT SIDE WITH CELL BLOCK:        ATYPICAL CELLS ARE PRESENT, SEE COMMENT.      Imaging:    US thoracentesis    Result Date: 9/18/2024  Interpreted By:  Shun Romero, STUDY: US THORACENTESIS;  9/10/2024 1:29 pm   INDICATION: Signs/Symptoms:Thoracentesis, metastatic breast cancer.   COMPARISON: None.   ACCESSION NUMBER(S): [id number]   ORDERING CLINICIAN: KRISTYN CHESTER   TECHNIQUE: INTERVENTIONALIST(S): Shun Romero MD   CONSENT: The patient/patient's POA/next of kin was informed of the nature of the proposed procedure. The purposes, alternatives, risks, and benefits were explained and discussed. All questions were answered and consent was obtained.   SEDATION: None   MEDICATION/CONTRAST: No additional   TIME OUT: A time out was performed immediately prior to procedure start with the interventional team, correctly identifying the patient name, date of birth, MRN, procedure, anatomy (including marking of site and side), patient position, procedure consent form, relevant laboratory and imaging test results, antibiotic administration, safety precautions, and procedure-specific equipment needs.   FINDINGS: The patient was placed in the sitting position.   The right pleural space was evaluated with grey scale ultrasound demonstrating a moderate complex/loculated pleural effusion. The most accessible fluid was identified and marked for thoracentesis.   The skin was prepped and draped in usual manner. Local anesthesia with Lidocaine was administered. A 5 Hebrew One-Step thoracentesis needle/catheter was then placed into the right pleural  space under direct US guidance. Approximately 400 mL of yellow colored fluid was removed. However, the procedure was aborted as the patient was unable to tolerate further aspiration of the pleural effusion due to pain. The needle/catheter was then removed.   Post procedure US showed no significant change in volume of pleural effusion   There were no immediate complications.       Ultrasound guided right thoracentesis, as detailed above. Only 400 cc of fluid was able to be removed as the patient was unable to tolerate further aspiration of fluid due to pain with aspiration.   I personally performed and/or directly supervised this study and was present for the entire procedure.   I personally reviewed the study and resident interpretation. I agree with the findings as stated.   Performed and dictated at [facility].   MACRO: None   Signed by: Shun Romero 9/18/2024 11:52 AM Dictation workstation:   [id number]    XR chest 1 view    Result Date: 9/18/2024  Interpreted By:  Shun Romero, STUDY: XR CHEST 1 VIEW;  9/10/2024 2:10 pm   INDICATION: Signs/Symptoms:SOB.   ,Z98.890 Other specified postprocedural states   COMPARISON: 08/08/2024   ACCESSION NUMBER(S): [id number]   ORDERING CLINICIAN: SHUN ROMERO   FINDINGS:         CARDIOMEDIASTINAL SILHOUETTE: Cardiomediastinal silhouette appears unchanged.   LUNGS: Large right pleural effusion with mid to lower lung collapse and small amount of aerated upper lung overall unchanged from 08/08/2024. No pneumothorax. The left lung is clear.   ABDOMEN: No remarkable upper abdominal findings.   BONES: No acute osseous changes.       1.  Unchanged large right pleural effusion and mid to lower lung atelectasis with minimal aeration of the right upper lung. No pneumothorax.       MACRO: None   Signed by: Shun Romero 9/18/2024 9:50 AM Dictation workstation:   [id number]    ECG 12 lead    Result Date: 9/15/2024  Sinus rhythm Borderline left axis  deviation See ED provider note for full interpretation and clinical correlation Confirmed by Mackenzie Conn ([id number]) on 9/15/2024 12:02:01 PM    CT chest wo IV contrast    Result Date: 9/10/2024  Interpreted By:  Chon Cabrera, STUDY: CT CHEST WO IV CONTRAST;  9/10/2024 5:26 pm   INDICATION: Signs/Symptoms:chest pain.   COMPARISON: CT chest, abdomen and pelvis 08/10/2023   ACCESSION NUMBER(S): [id number]   ORDERING CLINICIAN: JOANIE DEXTER   TECHNIQUE: Helical data acquisition of the chest was obtained  without IV contrast material.  Images were reformatted in axial, coronal, and sagittal planes.   FINDINGS: LUNGS AND AIRWAYS: Central airways are patent without endobronchial lesions. Compared with the most recent CT from 08/10/2023, there has been interval decrease in the size of the loculated right pleural effusion. However, there is development of an upper lobe hydropneumothorax (series 3, image 57). There is stable circumferential pleural thickening. Unchanged right hemithorax volume loss with stable degree of aerated lung parenchyma. Multiple unchanged pulmonary nodules are noted, measuring up to 5 mm (series 3, image 102). Additionally, in the left hemithorax, there are multiple pulmonary nodules stable to increased in size, since 2023. For example a 10 mm left lower lobe nodule (series 3, image 229) previously 5 mm; a 7 mm left lower lobe nodule (image 206) previously 4 mm. Multiple other pulmonary nodules are noted. No pneumothorax or pleural effusion in the left hemithorax.   MEDIASTINUM AND LARA, LOWER NECK AND AXILLA: 2.3 cm left thyroid lobe low-attenuation nodule.   Prominent but nonenlarged by size criteria mediastinal and axillary lymph nodes are noted.   Esophagus appears within normal limits as seen.   HEART AND VESSELS: Stable dilated ascending thoracic aorta, measuring 4.1 cm with moderate atherosclerotic calcifications.   Main pulmonary artery and its branches are normal in caliber.    "Mild coronary artery calcifications are seen. The study is not optimized for evaluation of coronary arteries.   The cardiac chambers are not enlarged.   Trace pericardial effusion.   UPPER ABDOMEN: Bilobar hepatic simple cysts the largest in the left hepatic lobe measures 2.9 cm.   CHEST WALL AND OSSEOUS STRUCTURES: 2.2 cm right chest wall soft tissue mass, unchanged since prior. No suspicious osseous lesions. Degenerative changes of the thoracic spine.       1. Interval decrease in the loculated right pleural effusion with diffuse pleural thickening interval development of a small apical hydropneumothorax, since 08/10/2023. 2. Multiple bilateral pulmonary nodules, stable to increased in size. 3. Stable 2.2 cm right chest wall soft tissue mass. 4. Prominent but nonenlarged by size criteria hilar and mediastinal lymph nodes. 5. Other findings as described above.   Signed by: Chon Cabrera 9/10/2024 5:54 PM Dictation workstation:   [id number]       Pulmonary Functions Testing Results:    No results found for: \"FEV1\", \"FVC\", \"FDJ2SWF\", \"TLC\", \"DLCO\"    CT chest and PET personally reviewed     Assessment/Plan   Problem List Items Addressed This Visit             ICD-10-CM    Pleural effusion J90    Relevant Orders    CBC    Comprehensive Metabolic Panel    Protime-INR    Type And Screen    Malignant neoplasm of breast (female) (Multi) C50.919    Relevant Orders    CBC    Comprehensive Metabolic Panel    Protime-INR    Type And Screen       Ms. Moyer is a pleasant 69 year old female who presents with a recurrent right pleural effusion s/p multiple thoracentesis procedures. Effusion is becoming more loculated/painful thoracentesis procedures. We discussed VATS decortication with PleurX placement and patient is agreeable. Discussed 10/3 but because of social/family reasons needs to wait until 10/10 at [facility]- will get labs today at [facility].        I spent 70 minutes in the professional and overall care of this " patient.      Rose Shepherd, DO  Thoracic & Esophageal Surgery

## 2024-09-24 NOTE — PATIENT INSTRUCTIONS
You are scheduled for surgery on 10/10/24   at  [facility].   You will need blood work only.   You will need to get this done at [facility].     PRESURGICAL INSTRUCTIONS    **Please call the office with any questions.  [phone]    BLOOD WORK/LABS:  *If your surgeon does not want you to get PAT and has requested you to have blood work/labs drawn, get them done at least 3 days prior to surgery.  Your surgeon will give you instructions on where to have your labs drawn. Labs are good for 21 days prior to surgery date.      MEDICATIONS:  *Take  your regularly scheduled medications the morning of surgery with a sip of water unless instructed by your surgeon.  If you have any questions ask your surgeon.   Exceptions include:  **Blood thinners, check with your surgeon about holding for surgery. (Aspirin 81 mg is ok to take)   *Ibuprofen, multivitamins, fish oil, supplements- STOP 7 days before surgery.  *Lisinopril/ Losartan - HOLD morning of surgery  *Diabetes medications (ie: metformin, glipizide) - HOLD morning of surgery  *MonjaChi beltranempic - HOLD 1 week prior to surgery    CONTACT SURGEON'S OFFICE IF YOU DEVELOP:  * Fever = 100.4 F   * New respiratory symptoms (e.g. cough, shortness of breath, respiratory distress, sore throat)  * Recent loss of taste or smell  *Flu like symptoms such as headache, fatigue or gastrointestinal symptoms  * You develop any open sores, shingles, burning or painful urination   AND/OR:  * You no longer wish to have the surgery.  * Any other personal circumstances change that may lead to the need to cancel or defer this surgery.  *You were admitted to any hospital within one week of your planned procedure.  *There have been any changes to your insurance, address, or phone number.     SMOKING:  *Stop smoking, using street drugs, or consuming excessive alcohol.   *Quitting smoking can make a huge difference to your health and recovery from surgery.    *If you need help with quitting, call  1-800-QUIT-NOW.       SURGICAL TIME/PARKING AND ARRIVAL:    [place]: You will be contacted between 2 p.m. and 6 p.m. the business day before your surgery with your arrival time. *If you haven't received a call by 6pm, call [phone].  Check in at the Main Entrance desk and let them know you are here for surgery.        *You will be directed to the 2nd floor surgical waiting area.    *Scheduled surgery times may change and you will be notified if this occurs-check your personal voicemail for any updates.    THE DAY BEFORE SURGERY:  *Do not eat any food after midnight the night before surgery.       *Shower with any antibacterial soap the night before and/or the morning of your surgery.    **After showering, do NOT apply hair products, lotions, powders, creams, makeup, nail polish, Vaseline, or deodorant.**       ON THE MORNING OF SURGERY:  *Do not eat or drink anything.  This includes gum or candy.  *You may take your morning meds with a small sips of water.   DIABETICS:  Please check fasting blood sugar  upon waking up.  If fasting sugar is <80 mg/dl, please drink 100ml/3oz of apple juice no later than 2 hours prior to surgery.  *Brush your teeth before coming to the hospital.   *Do not use moisturizers, creams, lotions or perfume.  *Wear comfortable, loose fitting clothing.   *All jewelry and valuables should be left at home.  *Prosthetic devices such as contact lenses, hearing aids, dentures, eyelash extensions, hairpins and body piercing must be removed before surgery.     BRING WITH YOU:  *Photo ID and insurance card  *Current list of medicines and allergies Include dose and how often you take it.   *Pacemaker/Defibrillator/Heart stent cards  *CPAP machine and mask  *Slings/splints/crutches/walker  *Copy of your complete Advanced Directive/DHPOA-if applicable  *Neurostimulator implant remote       AFTER SURGERY:  *A responsible adult MUST accompany you at the time of discharge and stay with you for 24 hours  after your surgery.  *You may NOT drive yourself home after surgery.  *You may use a taxi or ride sharing service ([employer], Uber) to return home ONLY if you are accompanied by a friend or family member.  *Instructions for resuming your medications will be provided by your surgeon.    FOLLOW UP:  *You will be scheduled for a post op follow up visit Dr. Shepherd about 2 weeks after you leave the hospital.    *You will likely need to get a Chest XRAY prior to seeing her.  You can get this done the same day as your appointment 30 min prior in Radiology ([facility] 1st floor).    *If there is pathology this is usually resulted by this appointment for Dr. Shepherd to go over with you.      **For More Information about your condition or surgery visit [email or web address]

## 2024-09-24 NOTE — PROGRESS NOTES
Chief complaint:  Recurrent right pleural effusion    History Of Present Illness  Mildred Moyer is a 69 y.o. female, former smoker (minimal, couple year hx), presenting with a recurrent right pleural effusion - likely malignant, in the setting of metastatic breast cancer. Patient was referred by Dr. Guzman.     Patient was diagnosed with breast CA in the spring of 2023, at that time had pleural effusion showing atypical cells. PET scan showed avidity and thickening along with bilateral pulmonary nodules concerning for metastasis. She had a thoracentesis months ago and drained 2.2L. On most recent thora 9/18 had less output (400mL) but notable pain. During thora prior on 9/10 was seen in ED and had CT chest showing loculated effusion along with nodules and pleural thickening. She presents today for discuss. Patient is on 2-3L NC at baseline. This has not changed despite previous thora procedures but has also not increased recently.     No history of MI or CVA. Could walk a mile or climb 2 flights of stairs: no     Past Medical History  She has no past medical history on file.    Surgical History  She has no past surgical history on file.     Social History  She reports that she quit smoking about 41 years ago. Her smoking use included cigarettes. She started smoking about 43 years ago. She has a 1 pack-year smoking history. She has never used smokeless tobacco. She reports that she does not drink alcohol and does not use drugs.    Family History  Family history of cancer: No  No family history on file.     Medications    Current Outpatient Medications:     buPROPion XL (Wellbutrin XL) 300 mg 24 hr tablet, Take 1 tablet (300 mg) by mouth once every 24 hours., Disp: , Rfl:     cholecalciferol (Vitamin D-3) 50 MCG (2000 UT) tablet, Take 1 tablet (2,000 Units) by mouth once daily., Disp: , Rfl:     cyanocobalamin (Vitamin B-12) 1,000 mcg tablet, Take 1 tablet (1,000 mcg) by mouth once daily., Disp: , Rfl:      cyclobenzaprine (Flexeril) 10 mg tablet, Take 1 tablet (10 mg) by mouth every 12 hours., Disp: , Rfl:     dextromethorphan-quinidine (NUEDEXTA) 20-10 mg capsule, Take by mouth every 12 hours., Disp: , Rfl:     DULoxetine (Cymbalta) 60 mg DR capsule, Take 1 capsule (60 mg) by mouth once every 24 hours., Disp: , Rfl:     ferrous sulfate, 325 mg ferrous sulfate, tablet, 1 tablet Orally one time a day for 30 days, Disp: , Rfl:     folic acid (Folvite) 1 mg tablet, Take 1 tablet (1 mg) by mouth once daily., Disp: , Rfl:     gabapentin (Neurontin) 300 mg capsule, TAKE 1 CAPSULE BY MOUTH THREE TIMES DAILY for 30, Disp: , Rfl:     hydroCHLOROthiazide (Microzide) 12.5 mg capsule, Take 1 capsule (12.5 mg) by mouth once daily., Disp: , Rfl:     hydrOXYzine HCL (Atarax) 25 mg tablet, Take 1 tablet (25 mg) by mouth once daily., Disp: , Rfl:     ibuprofen 800 mg tablet, Take 1 tablet (800 mg) by mouth 2 times a day., Disp: , Rfl:     letrozole (Femara) 2.5 mg tablet, Take 1 tablet (2.5 mg total) by mouth once daily., Disp: 90 tablet, Rfl: 3    melatonin 10 mg tablet extended release, Take 10 mg by mouth once daily at bedtime., Disp: , Rfl:     methylPREDNISolone (Medrol) 4 mg tablet, Take 1 tablet (4 mg) by mouth., Disp: , Rfl:     omeprazole (PriLOSEC) 20 mg DR capsule, Take 1 capsule (20 mg) by mouth once daily., Disp: , Rfl:     potassium chloride CR 20 mEq ER tablet, once every 24 hours., Disp: , Rfl:     propranolol (Inderal) 20 mg tablet, every 8 hours., Disp: , Rfl:     ribociclib (Kisqali) 3 x 200 mg tablet therapy pack, TAKE THREE (3) TABLETS BY MOUTH ONCE DAILY FOR 21 DAYS THEN 7 DAYS OFF, Disp: 63 each, Rfl: 2    rosuvastatin (Crestor) 20 mg tablet, Take 1 tablet (20 mg) by mouth once daily., Disp: , Rfl:     azithromycin (Zithromax) 250 mg tablet, Take 3 tablets (750 mg) by mouth once daily., Disp: , Rfl:     carBAMazepine (TEGretol) 100 mg chewable tablet, Chew 1 tablet (100 mg) every 6 hours., Disp: , Rfl:  "    Allergies  Iodinated contrast media, Iodine, Shellfish containing products, Oxymetazoline, and Trazodone    Review of Systems:  Review of Systems   Constitutional: No fevers, chills, unexpected weight change  HENT: No sore throat, congestion, or nasal drainage  Eyes: No visual changes or eye itching  Respiratory: see HPI. No cough, worsening dyspnea, wheezing  Cardiac: No chest pain, palpitations, or lower extremity edema  Gastrointestinal: No nausea, vomiting, diarrhea. No abdominal pain  Genitourinary: No dysuria or hematuria  Musculoskeletal: No back pain. No significant myalgias or arthralgias  Neurologic: No headaches, dizziness, or seizures.  Hematologic: No easy bleeding or bruising.  Psychiatric: No anxiety or depression.    Physical Exam:  Physical Exam  /64   Pulse 67   Temp 36.7 °C (98 °F)   Ht 1.626 m (5' 4\")   Wt 62.1 kg (137 lb)   SpO2 97%   BMI 23.52 kg/m²   Constitutional:       General: Patient is not in acute distress.     Appearance: Normal appearance; not ill-appearing.   HENT:      Head: Normocephalic.      Nose: No congestion or rhinorrhea.   Cardiovascular:      Rate and Rhythm: Normal rate and regular rhythm.      Pulses: Normal pulses.   Pulmonary:      Effort: Pulmonary effort is normal. No respiratory distress.  No conversational dyspnea     Breath sounds: No stridor. No wheezing.   Abdominal:      General: There is no distension.      Palpations: Abdomen is soft.      Tenderness: There is no abdominal tenderness.   Musculoskeletal:         General: No swelling, tenderness or deformity. Normal range of motion.      Cervical back: Normal range of motion. No rigidity.   Lymphadenopathy:      Cervical: No cervical adenopathy.   Skin:     General: Skin is warm and dry.   Neurological:      General: No focal deficit present.      Mental Status: Patient is alert and oriented to person, place, and time.   Psychiatric:         Mood and Affect: Mood normal.     Relevant " Results    Pathology:  Date of Procedure:  3/11/2023       Date Reported: 3/15/2023   Date Received:  3/13/2023   Date of Birth / Sex 1955 (Age: 68) / F   Race: WHITE   Submitting Physician: ANDREA LEE MD   Attending Physician: MD KAELYN MAO MD            Other External #          FINAL CYTOLOGICAL INTERPRETATION     A.  PLEURAL FLUID - RIGHT SIDE WITH CELL BLOCK:        ATYPICAL CELLS ARE PRESENT, SEE COMMENT.      Imaging:    US thoracentesis    Result Date: 9/18/2024  Interpreted By:  Shun Romero, STUDY: US THORACENTESIS;  9/10/2024 1:29 pm   INDICATION: Signs/Symptoms:Thoracentesis, metastatic breast cancer.   COMPARISON: None.   ACCESSION NUMBER(S): [id number]   ORDERING CLINICIAN: KRISTYN CHESTER   TECHNIQUE: INTERVENTIONALIST(S): Shun Romero MD   CONSENT: The patient/patient's POA/next of kin was informed of the nature of the proposed procedure. The purposes, alternatives, risks, and benefits were explained and discussed. All questions were answered and consent was obtained.   SEDATION: None   MEDICATION/CONTRAST: No additional   TIME OUT: A time out was performed immediately prior to procedure start with the interventional team, correctly identifying the patient name, date of birth, MRN, procedure, anatomy (including marking of site and side), patient position, procedure consent form, relevant laboratory and imaging test results, antibiotic administration, safety precautions, and procedure-specific equipment needs.   FINDINGS: The patient was placed in the sitting position.   The right pleural space was evaluated with grey scale ultrasound demonstrating a moderate complex/loculated pleural effusion. The most accessible fluid was identified and marked for thoracentesis.   The skin was prepped and draped in usual manner. Local anesthesia with Lidocaine was administered. A 5 Tajik One-Step thoracentesis needle/catheter was then placed into the right pleural  space under direct US guidance. Approximately 400 mL of yellow colored fluid was removed. However, the procedure was aborted as the patient was unable to tolerate further aspiration of the pleural effusion due to pain. The needle/catheter was then removed.   Post procedure US showed no significant change in volume of pleural effusion   There were no immediate complications.       Ultrasound guided right thoracentesis, as detailed above. Only 400 cc of fluid was able to be removed as the patient was unable to tolerate further aspiration of fluid due to pain with aspiration.   I personally performed and/or directly supervised this study and was present for the entire procedure.   I personally reviewed the study and resident interpretation. I agree with the findings as stated.   Performed and dictated at [facility].   MACRO: None   Signed by: Shun Romero 9/18/2024 11:52 AM Dictation workstation:   [id number]    XR chest 1 view    Result Date: 9/18/2024  Interpreted By:  Shun Romero, STUDY: XR CHEST 1 VIEW;  9/10/2024 2:10 pm   INDICATION: Signs/Symptoms:SOB.   ,Z98.890 Other specified postprocedural states   COMPARISON: 08/08/2024   ACCESSION NUMBER(S): [id number]   ORDERING CLINICIAN: SHUN ROMERO   FINDINGS:         CARDIOMEDIASTINAL SILHOUETTE: Cardiomediastinal silhouette appears unchanged.   LUNGS: Large right pleural effusion with mid to lower lung collapse and small amount of aerated upper lung overall unchanged from 08/08/2024. No pneumothorax. The left lung is clear.   ABDOMEN: No remarkable upper abdominal findings.   BONES: No acute osseous changes.       1.  Unchanged large right pleural effusion and mid to lower lung atelectasis with minimal aeration of the right upper lung. No pneumothorax.       MACRO: None   Signed by: Shun Romero 9/18/2024 9:50 AM Dictation workstation:   [id number]    ECG 12 lead    Result Date: 9/15/2024  Sinus rhythm Borderline left axis  deviation See ED provider note for full interpretation and clinical correlation Confirmed by Mackenzie Conn ([id number]) on 9/15/2024 12:02:01 PM    CT chest wo IV contrast    Result Date: 9/10/2024  Interpreted By:  Chon Cabrera, STUDY: CT CHEST WO IV CONTRAST;  9/10/2024 5:26 pm   INDICATION: Signs/Symptoms:chest pain.   COMPARISON: CT chest, abdomen and pelvis 08/10/2023   ACCESSION NUMBER(S): [id number]   ORDERING CLINICIAN: JOANIE DEXTER   TECHNIQUE: Helical data acquisition of the chest was obtained  without IV contrast material.  Images were reformatted in axial, coronal, and sagittal planes.   FINDINGS: LUNGS AND AIRWAYS: Central airways are patent without endobronchial lesions. Compared with the most recent CT from 08/10/2023, there has been interval decrease in the size of the loculated right pleural effusion. However, there is development of an upper lobe hydropneumothorax (series 3, image 57). There is stable circumferential pleural thickening. Unchanged right hemithorax volume loss with stable degree of aerated lung parenchyma. Multiple unchanged pulmonary nodules are noted, measuring up to 5 mm (series 3, image 102). Additionally, in the left hemithorax, there are multiple pulmonary nodules stable to increased in size, since 2023. For example a 10 mm left lower lobe nodule (series 3, image 229) previously 5 mm; a 7 mm left lower lobe nodule (image 206) previously 4 mm. Multiple other pulmonary nodules are noted. No pneumothorax or pleural effusion in the left hemithorax.   MEDIASTINUM AND LARA, LOWER NECK AND AXILLA: 2.3 cm left thyroid lobe low-attenuation nodule.   Prominent but nonenlarged by size criteria mediastinal and axillary lymph nodes are noted.   Esophagus appears within normal limits as seen.   HEART AND VESSELS: Stable dilated ascending thoracic aorta, measuring 4.1 cm with moderate atherosclerotic calcifications.   Main pulmonary artery and its branches are normal in caliber.    "Mild coronary artery calcifications are seen. The study is not optimized for evaluation of coronary arteries.   The cardiac chambers are not enlarged.   Trace pericardial effusion.   UPPER ABDOMEN: Bilobar hepatic simple cysts the largest in the left hepatic lobe measures 2.9 cm.   CHEST WALL AND OSSEOUS STRUCTURES: 2.2 cm right chest wall soft tissue mass, unchanged since prior. No suspicious osseous lesions. Degenerative changes of the thoracic spine.       1. Interval decrease in the loculated right pleural effusion with diffuse pleural thickening interval development of a small apical hydropneumothorax, since 08/10/2023. 2. Multiple bilateral pulmonary nodules, stable to increased in size. 3. Stable 2.2 cm right chest wall soft tissue mass. 4. Prominent but nonenlarged by size criteria hilar and mediastinal lymph nodes. 5. Other findings as described above.   Signed by: Chon Cabrera 9/10/2024 5:54 PM Dictation workstation:   [id number]       Pulmonary Functions Testing Results:    No results found for: \"FEV1\", \"FVC\", \"GVQ5VAH\", \"TLC\", \"DLCO\"    CT chest and PET personally reviewed     Assessment/Plan   Problem List Items Addressed This Visit             ICD-10-CM    Pleural effusion J90    Relevant Orders    CBC    Comprehensive Metabolic Panel    Protime-INR    Type And Screen    Malignant neoplasm of breast (female) (Multi) C50.919    Relevant Orders    CBC    Comprehensive Metabolic Panel    Protime-INR    Type And Screen       Ms. Moyer is a pleasant 69 year old female who presents with a recurrent right pleural effusion s/p multiple thoracentesis procedures. Effusion is becoming more loculated/painful thoracentesis procedures. We discussed VATS decortication with PleurX placement and patient is agreeable. Discussed 10/3 but because of social/family reasons needs to wait until 10/10 at [facility]- will get labs today at [facility].        I spent 70 minutes in the professional and overall care of this " patient.      Rose Shepherd, DO  Thoracic & Esophageal Surgery

## 2024-09-30 PROCEDURE — RXMED WILLOW AMBULATORY MEDICATION CHARGE

## 2024-10-01 ENCOUNTER — SPECIALTY PHARMACY (OUTPATIENT)
Dept: PHARMACY | Facility: CLINIC | Age: 69
End: 2024-10-01

## 2024-10-03 ENCOUNTER — PHARMACY VISIT (OUTPATIENT)
Dept: PHARMACY | Facility: CLINIC | Age: 69
End: 2024-10-03
Payer: COMMERCIAL

## 2024-10-03 ENCOUNTER — SPECIALTY PHARMACY (OUTPATIENT)
Dept: PHARMACY | Facility: CLINIC | Age: 69
End: 2024-10-03

## 2024-10-07 ENCOUNTER — HOME HEALTH ADMISSION (OUTPATIENT)
Dept: HOME HEALTH SERVICES | Facility: HOME HEALTH | Age: 69
End: 2024-10-07
Payer: COMMERCIAL

## 2024-10-07 ENCOUNTER — TELEPHONE (OUTPATIENT)
Dept: HOME HEALTH SERVICES | Facility: HOME HEALTH | Age: 69
End: 2024-10-07

## 2024-10-07 NOTE — TELEPHONE ENCOUNTER
Good morning,  Home Care has a referral for this patient. In order to provide home care services to this patient she will need to have a few pleurx drainage kits in her home at start off care (we will order more after we admit to home care) and we will need a learner for the pleurx. Can you verify this patient has someone to learn and assist her with draining?    Thank you,   [facility] Intake

## 2024-10-09 ENCOUNTER — DOCUMENTATION (OUTPATIENT)
Dept: HOME HEALTH SERVICES | Facility: HOME HEALTH | Age: 69
End: 2024-10-09
Payer: COMMERCIAL

## 2024-10-09 NOTE — HH CARE COORDINATION
Home Care received a Referral for Nursing. We have processed the referral for a Start of Care on 10/11/24.     If you have any questions or concerns, please feel free to contact us at [phone]. Follow the prompts, enter your five digit zip code, and you will be directed to your care team on [facility].

## 2024-10-10 ENCOUNTER — PHARMACY VISIT (OUTPATIENT)
Dept: PHARMACY | Facility: CLINIC | Age: 69
End: 2024-10-10
Payer: COMMERCIAL

## 2024-10-10 ENCOUNTER — HOSPITAL ENCOUNTER (OUTPATIENT)
Facility: HOSPITAL | Age: 69
Setting detail: OUTPATIENT SURGERY
Discharge: HOME HEALTH | End: 2024-10-10
Attending: STUDENT IN AN ORGANIZED HEALTH CARE EDUCATION/TRAINING PROGRAM | Admitting: STUDENT IN AN ORGANIZED HEALTH CARE EDUCATION/TRAINING PROGRAM
Payer: COMMERCIAL

## 2024-10-10 ENCOUNTER — APPOINTMENT (OUTPATIENT)
Dept: RADIOLOGY | Facility: HOSPITAL | Age: 69
End: 2024-10-10
Payer: COMMERCIAL

## 2024-10-10 ENCOUNTER — ANESTHESIA (OUTPATIENT)
Dept: OPERATING ROOM | Facility: HOSPITAL | Age: 69
End: 2024-10-10
Payer: COMMERCIAL

## 2024-10-10 ENCOUNTER — ANESTHESIA EVENT (OUTPATIENT)
Dept: OPERATING ROOM | Facility: HOSPITAL | Age: 69
End: 2024-10-10
Payer: COMMERCIAL

## 2024-10-10 VITALS
BODY MASS INDEX: 23.6 KG/M2 | HEART RATE: 78 BPM | TEMPERATURE: 98.2 F | WEIGHT: 138.23 LBS | SYSTOLIC BLOOD PRESSURE: 123 MMHG | OXYGEN SATURATION: 95 % | DIASTOLIC BLOOD PRESSURE: 76 MMHG | HEIGHT: 64 IN | RESPIRATION RATE: 18 BRPM

## 2024-10-10 DIAGNOSIS — J90 PLEURAL EFFUSION: Primary | ICD-10-CM

## 2024-10-10 DIAGNOSIS — G89.18 POST-OP PAIN: ICD-10-CM

## 2024-10-10 DIAGNOSIS — C78.02 SECONDARY MALIGNANT NEOPLASM OF LEFT LUNG (MULTI): ICD-10-CM

## 2024-10-10 PROBLEM — R06.09 DYSPNEA ON EXERTION: Status: ACTIVE | Noted: 2024-10-10

## 2024-10-10 PROBLEM — Z99.81 HISTORY OF HOME OXYGEN THERAPY: Status: ACTIVE | Noted: 2024-10-10

## 2024-10-10 LAB
ABO GROUP (TYPE) IN BLOOD: NORMAL
RH FACTOR (ANTIGEN D): NORMAL

## 2024-10-10 PROCEDURE — C1729 CATH, DRAINAGE: HCPCS | Performed by: STUDENT IN AN ORGANIZED HEALTH CARE EDUCATION/TRAINING PROGRAM

## 2024-10-10 PROCEDURE — 88342 IMHCHEM/IMCYTCHM 1ST ANTB: CPT | Performed by: PATHOLOGY

## 2024-10-10 PROCEDURE — 3600000003 HC OR TIME - INITIAL BASE CHARGE - PROCEDURE LEVEL THREE: Performed by: STUDENT IN AN ORGANIZED HEALTH CARE EDUCATION/TRAINING PROGRAM

## 2024-10-10 PROCEDURE — 7100000002 HC RECOVERY ROOM TIME - EACH INCREMENTAL 1 MINUTE: Performed by: STUDENT IN AN ORGANIZED HEALTH CARE EDUCATION/TRAINING PROGRAM

## 2024-10-10 PROCEDURE — 88305 TISSUE EXAM BY PATHOLOGIST: CPT | Performed by: PATHOLOGY

## 2024-10-10 PROCEDURE — 71045 X-RAY EXAM CHEST 1 VIEW: CPT

## 2024-10-10 PROCEDURE — 2500000005 HC RX 250 GENERAL PHARMACY W/O HCPCS: Performed by: ANESTHESIOLOGIST ASSISTANT

## 2024-10-10 PROCEDURE — 32609 THORACOSCOPY W/BX PLEURA: CPT | Performed by: STUDENT IN AN ORGANIZED HEALTH CARE EDUCATION/TRAINING PROGRAM

## 2024-10-10 PROCEDURE — 2500000005 HC RX 250 GENERAL PHARMACY W/O HCPCS: Performed by: STUDENT IN AN ORGANIZED HEALTH CARE EDUCATION/TRAINING PROGRAM

## 2024-10-10 PROCEDURE — 7100000009 HC PHASE TWO TIME - INITIAL BASE CHARGE: Performed by: STUDENT IN AN ORGANIZED HEALTH CARE EDUCATION/TRAINING PROGRAM

## 2024-10-10 PROCEDURE — 7100000010 HC PHASE TWO TIME - EACH INCREMENTAL 1 MINUTE: Performed by: STUDENT IN AN ORGANIZED HEALTH CARE EDUCATION/TRAINING PROGRAM

## 2024-10-10 PROCEDURE — 32550 INSERT PLEURAL CATH: CPT | Performed by: STUDENT IN AN ORGANIZED HEALTH CARE EDUCATION/TRAINING PROGRAM

## 2024-10-10 PROCEDURE — 2500000004 HC RX 250 GENERAL PHARMACY W/ HCPCS (ALT 636 FOR OP/ED): Performed by: ANESTHESIOLOGIST ASSISTANT

## 2024-10-10 PROCEDURE — 2500000004 HC RX 250 GENERAL PHARMACY W/ HCPCS (ALT 636 FOR OP/ED): Mod: JZ | Performed by: STUDENT IN AN ORGANIZED HEALTH CARE EDUCATION/TRAINING PROGRAM

## 2024-10-10 PROCEDURE — 3700000002 HC GENERAL ANESTHESIA TIME - EACH INCREMENTAL 1 MINUTE: Performed by: STUDENT IN AN ORGANIZED HEALTH CARE EDUCATION/TRAINING PROGRAM

## 2024-10-10 PROCEDURE — 2500000004 HC RX 250 GENERAL PHARMACY W/ HCPCS (ALT 636 FOR OP/ED): Performed by: STUDENT IN AN ORGANIZED HEALTH CARE EDUCATION/TRAINING PROGRAM

## 2024-10-10 PROCEDURE — 3700000001 HC GENERAL ANESTHESIA TIME - INITIAL BASE CHARGE: Performed by: STUDENT IN AN ORGANIZED HEALTH CARE EDUCATION/TRAINING PROGRAM

## 2024-10-10 PROCEDURE — 3600000008 HC OR TIME - EACH INCREMENTAL 1 MINUTE - PROCEDURE LEVEL THREE: Performed by: STUDENT IN AN ORGANIZED HEALTH CARE EDUCATION/TRAINING PROGRAM

## 2024-10-10 PROCEDURE — 36415 COLL VENOUS BLD VENIPUNCTURE: CPT | Performed by: STUDENT IN AN ORGANIZED HEALTH CARE EDUCATION/TRAINING PROGRAM

## 2024-10-10 PROCEDURE — 96372 THER/PROPH/DIAG INJ SC/IM: CPT | Performed by: STUDENT IN AN ORGANIZED HEALTH CARE EDUCATION/TRAINING PROGRAM

## 2024-10-10 PROCEDURE — RXMED WILLOW AMBULATORY MEDICATION CHARGE

## 2024-10-10 PROCEDURE — 88313 SPECIAL STAINS GROUP 2: CPT | Performed by: PATHOLOGY

## 2024-10-10 PROCEDURE — 2720000007 HC OR 272 NO HCPCS: Performed by: STUDENT IN AN ORGANIZED HEALTH CARE EDUCATION/TRAINING PROGRAM

## 2024-10-10 PROCEDURE — 88305 TISSUE EXAM BY PATHOLOGIST: CPT | Mod: TC,AHULAB | Performed by: STUDENT IN AN ORGANIZED HEALTH CARE EDUCATION/TRAINING PROGRAM

## 2024-10-10 PROCEDURE — 7100000001 HC RECOVERY ROOM TIME - INITIAL BASE CHARGE: Performed by: STUDENT IN AN ORGANIZED HEALTH CARE EDUCATION/TRAINING PROGRAM

## 2024-10-10 PROCEDURE — 88341 IMHCHEM/IMCYTCHM EA ADD ANTB: CPT | Performed by: PATHOLOGY

## 2024-10-10 PROCEDURE — 71045 X-RAY EXAM CHEST 1 VIEW: CPT | Performed by: RADIOLOGY

## 2024-10-10 PROCEDURE — 99238 HOSP IP/OBS DSCHRG MGMT 30/<: CPT | Performed by: NURSE PRACTITIONER

## 2024-10-10 RX ORDER — CEFAZOLIN SODIUM 2 G/100ML
2 INJECTION, SOLUTION INTRAVENOUS ONCE
Status: COMPLETED | OUTPATIENT
Start: 2024-10-10 | End: 2024-10-10

## 2024-10-10 RX ORDER — SODIUM CHLORIDE, SODIUM LACTATE, POTASSIUM CHLORIDE, CALCIUM CHLORIDE 600; 310; 30; 20 MG/100ML; MG/100ML; MG/100ML; MG/100ML
INJECTION, SOLUTION INTRAVENOUS CONTINUOUS PRN
Status: DISCONTINUED | OUTPATIENT
Start: 2024-10-10 | End: 2024-10-10

## 2024-10-10 RX ORDER — ALBUTEROL SULFATE 0.83 MG/ML
2.5 SOLUTION RESPIRATORY (INHALATION) ONCE AS NEEDED
Status: DISCONTINUED | OUTPATIENT
Start: 2024-10-10 | End: 2024-10-10 | Stop reason: HOSPADM

## 2024-10-10 RX ORDER — FENTANYL CITRATE 50 UG/ML
INJECTION, SOLUTION INTRAMUSCULAR; INTRAVENOUS AS NEEDED
Status: DISCONTINUED | OUTPATIENT
Start: 2024-10-10 | End: 2024-10-10

## 2024-10-10 RX ORDER — FENTANYL CITRATE 50 UG/ML
12.5 INJECTION, SOLUTION INTRAMUSCULAR; INTRAVENOUS EVERY 5 MIN PRN
Status: DISCONTINUED | OUTPATIENT
Start: 2024-10-10 | End: 2024-10-10 | Stop reason: HOSPADM

## 2024-10-10 RX ORDER — BUPIVACAINE HCL/EPINEPHRINE 0.25-.0005
VIAL (ML) INJECTION AS NEEDED
Status: DISCONTINUED | OUTPATIENT
Start: 2024-10-10 | End: 2024-10-10 | Stop reason: HOSPADM

## 2024-10-10 RX ORDER — SODIUM CHLORIDE, SODIUM LACTATE, POTASSIUM CHLORIDE, CALCIUM CHLORIDE 600; 310; 30; 20 MG/100ML; MG/100ML; MG/100ML; MG/100ML
20 INJECTION, SOLUTION INTRAVENOUS CONTINUOUS
Status: DISCONTINUED | OUTPATIENT
Start: 2024-10-10 | End: 2024-10-10 | Stop reason: HOSPADM

## 2024-10-10 RX ORDER — ONDANSETRON HYDROCHLORIDE 2 MG/ML
4 INJECTION, SOLUTION INTRAVENOUS ONCE AS NEEDED
Status: DISCONTINUED | OUTPATIENT
Start: 2024-10-10 | End: 2024-10-10 | Stop reason: HOSPADM

## 2024-10-10 RX ORDER — DROPERIDOL 2.5 MG/ML
0.62 INJECTION, SOLUTION INTRAMUSCULAR; INTRAVENOUS ONCE AS NEEDED
Status: DISCONTINUED | OUTPATIENT
Start: 2024-10-10 | End: 2024-10-10 | Stop reason: HOSPADM

## 2024-10-10 RX ORDER — HEPARIN SODIUM 5000 [USP'U]/ML
5000 INJECTION, SOLUTION INTRAVENOUS; SUBCUTANEOUS ONCE
Status: COMPLETED | OUTPATIENT
Start: 2024-10-10 | End: 2024-10-10

## 2024-10-10 RX ORDER — MIDAZOLAM HYDROCHLORIDE 1 MG/ML
INJECTION INTRAMUSCULAR; INTRAVENOUS AS NEEDED
Status: DISCONTINUED | OUTPATIENT
Start: 2024-10-10 | End: 2024-10-10

## 2024-10-10 RX ORDER — PROPOFOL 10 MG/ML
INJECTION, EMULSION INTRAVENOUS CONTINUOUS PRN
Status: DISCONTINUED | OUTPATIENT
Start: 2024-10-10 | End: 2024-10-10

## 2024-10-10 RX ORDER — LIDOCAINE HYDROCHLORIDE 10 MG/ML
0.1 INJECTION, SOLUTION EPIDURAL; INFILTRATION; INTRACAUDAL; PERINEURAL ONCE
Status: DISCONTINUED | OUTPATIENT
Start: 2024-10-10 | End: 2024-10-10 | Stop reason: HOSPADM

## 2024-10-10 RX ORDER — OXYCODONE HYDROCHLORIDE 5 MG/1
5 TABLET ORAL EVERY 6 HOURS PRN
Status: DISCONTINUED | OUTPATIENT
Start: 2024-10-10 | End: 2024-10-10 | Stop reason: HOSPADM

## 2024-10-10 RX ORDER — SODIUM CHLORIDE, SODIUM LACTATE, POTASSIUM CHLORIDE, CALCIUM CHLORIDE 600; 310; 30; 20 MG/100ML; MG/100ML; MG/100ML; MG/100ML
100 INJECTION, SOLUTION INTRAVENOUS CONTINUOUS
Status: DISCONTINUED | OUTPATIENT
Start: 2024-10-10 | End: 2024-10-10 | Stop reason: HOSPADM

## 2024-10-10 RX ORDER — OXYCODONE HYDROCHLORIDE 5 MG/1
5 TABLET ORAL EVERY 6 HOURS PRN
Qty: 5 TABLET | Refills: 0 | Status: SHIPPED | OUTPATIENT
Start: 2024-10-10

## 2024-10-10 RX ORDER — LABETALOL HYDROCHLORIDE 5 MG/ML
5 INJECTION, SOLUTION INTRAVENOUS ONCE AS NEEDED
Status: DISCONTINUED | OUTPATIENT
Start: 2024-10-10 | End: 2024-10-10 | Stop reason: HOSPADM

## 2024-10-10 ASSESSMENT — PAIN SCALES - GENERAL
PAINLEVEL_OUTOF10: 3
PAINLEVEL_OUTOF10: 0 - NO PAIN

## 2024-10-10 ASSESSMENT — COLUMBIA-SUICIDE SEVERITY RATING SCALE - C-SSRS
2. HAVE YOU ACTUALLY HAD ANY THOUGHTS OF KILLING YOURSELF?: NO
1. IN THE PAST MONTH, HAVE YOU WISHED YOU WERE DEAD OR WISHED YOU COULD GO TO SLEEP AND NOT WAKE UP?: NO
6. HAVE YOU EVER DONE ANYTHING, STARTED TO DO ANYTHING, OR PREPARED TO DO ANYTHING TO END YOUR LIFE?: NO

## 2024-10-10 ASSESSMENT — PAIN - FUNCTIONAL ASSESSMENT
PAIN_FUNCTIONAL_ASSESSMENT: 0-10
PAIN_FUNCTIONAL_ASSESSMENT: UNABLE TO SELF-REPORT
PAIN_FUNCTIONAL_ASSESSMENT: 0-10
PAIN_FUNCTIONAL_ASSESSMENT: UNABLE TO SELF-REPORT
PAIN_FUNCTIONAL_ASSESSMENT: 0-10
PAIN_FUNCTIONAL_ASSESSMENT: 0-10

## 2024-10-10 NOTE — ANESTHESIA PREPROCEDURE EVALUATION
Patient: Mildred Moyer    Procedure Information       Date/Time: 10/10/24 1045    Procedure: Thoracoscopy; Right PleurX Placement (Right)    Location: [facility] A OR 01 / [facility] OR    Surgeons: Rose Shepherd, DO            Relevant Problems   Cardiac   (+) Hyperlipidemia   (+) Hypertension      Pulmonary   (+) Dyspnea on exertion   (+) History of home oxygen therapy   (+) Pneumonia   (+) Secondary malignant neoplasm of left lung (Multi)      Neuro   (+) Mixed anxiety depressive disorder   (+) Multiple sclerosis (Multi)      GI   (+) Gastro-esophageal reflux disease without esophagitis      Liver   (+) Liver disease   (+) Liver lesion      ID   (+) Pneumonia      GYN   (+) Malignant neoplasm of breast (female)      Respiratory   (+) Pleural effusion       Clinical information reviewed:   Tobacco  Allergies    Med Hx  Surg Hx  OB Status  Fam Hx  Soc Hx        NPO Detail:  NPO/Void Status  Carbohydrate Drink Given Prior to Surgery? : N  Date of Last Liquid: 10/10/24  Time of Last Liquid: 0600  Date of Last Solid: 10/09/24  Time of Last Solid: 2200  Last Intake Type: Clear fluids  Time of Last Void: 1000         PHYSICAL EXAM    Anesthesia Plan    History of general anesthesia?: yes  History of complications of general anesthesia?: no    ASA 3     general     intravenous induction   Postoperative administration of opioids is intended.  Trial extubation is planned.  Anesthetic plan and risks discussed with patient.  Use of blood products discussed with patient who.    Plan discussed with [facility].

## 2024-10-10 NOTE — DISCHARGE SUMMARY
Discharge Diagnosis  Secondary malignant neoplasm of left lung (Multi)    Issues Requiring Follow-Up  Pleurx drainage pathology    Test Results Pending At Discharge  Pending Labs       Order Current Status    Surgical Pathology Exam In process            Hospital Course   Mildred Moyer is an 69 y.o. female who is having surgery for Pleural effusion [J90]. She has had a recurrent right pleural effusion and imaging consistent with thickened rind and likely trapped lung. She has a h/o metastatic breast cancer likely the source of initial effusion.  Outpatient Thoracoscopy; Right PleurX Placement, with pleural biopsies with Dr Shepherd  Findings: Completely trapped lung, diffusely thickened parietal and visceral pleura   She was discharged home with [facility] for Pleurx drainage 2 x week, a case of supplies.     Seen in PACU. Sitting up on RA talking, denies pain, alert and oriented  VSS.       Pertinent Physical Exam At Time of Discharge  Physical Exam  Vitals reviewed.   Constitutional:       General: She is not in acute distress.     Appearance: Normal appearance. She is not ill-appearing.   Cardiovascular:      Rate and Rhythm: Normal rate and regular rhythm.   Pulmonary:      Effort: Pulmonary effort is normal. No respiratory distress.      Breath sounds: No stridor.      Comments: Pleurx with initial post op dressing c/d/I  RA sating %  Chest:      Chest wall: No tenderness.   Skin:     General: Skin is warm and dry.   Neurological:      General: No focal deficit present.      Mental Status: She is alert and oriented to person, place, and time.   Psychiatric:         Mood and Affect: Mood normal.         Behavior: Behavior normal.         Home Medications     Medication List      CONTINUE taking these medications     azithromycin 250 mg tablet; Commonly known as: Zithromax   buPROPion  mg 24 hr tablet; Commonly known as: Wellbutrin XL   carBAMazepine 100 mg chewable tablet; Commonly known as: TEGretol    cyanocobalamin 1,000 mcg tablet; Commonly known as: Vitamin B-12   cyclobenzaprine 10 mg tablet; Commonly known as: Flexeril   dextromethorphan-quinidine 20-10 mg capsule; Commonly known as: NUEDEXTA   DULoxetine 60 mg DR capsule; Commonly known as: Cymbalta   ferrous sulfate (325 mg ferrous sulfate) tablet   gabapentin 300 mg capsule; Commonly known as: Neurontin   hydroCHLOROthiazide 12.5 mg capsule; Commonly known as: Microzide   hydrOXYzine HCL 25 mg tablet; Commonly known as: Atarax   ibuprofen 800 mg tablet   Kisqali 600 mg/day (200 mg x 3) tablet therapy pack; Generic drug:   ribociclib; TAKE THREE (3) TABLETS BY MOUTH ONCE DAILY FOR 21 DAYS THEN 7   DAYS OFF   letrozole 2.5 mg tablet; Commonly known as: Femara; Take 1 tablet (2.5   mg total) by mouth once daily.   melatonin 10 mg tablet extended release   methylPREDNISolone 4 mg tablet; Commonly known as: Medrol   omeprazole 20 mg DR capsule; Commonly known as: PriLOSEC   potassium chloride CR 20 mEq ER tablet; Commonly known as: Klor-Con M20   propranolol 20 mg tablet; Commonly known as: Inderal   rosuvastatin 20 mg tablet; Commonly known as: Crestor     ASK your doctor about these medications     cholecalciferol 50 MCG (2000 UT) tablet; Commonly known as: Vitamin D-3;   Ask about: Should I take this medication?   folic acid 1 mg tablet; Commonly known as: Folvite; Ask about: Should I   take this medication?       Outpatient Follow-Up  Future Appointments   Date Time Provider Department Center   10/11/2024 To Be Determined Ibis Brown RN [facility]   10/24/2024 11:30 AM Anatoly Guzman MD [id number]       Bushra Bynum, APRN-CNP

## 2024-10-10 NOTE — OP NOTE
Thoracoscopy; Right PleurX Placement (R) Operative Note     Date: 10/10/2024  OR Location: [facility] A OR    Name: Mildred Moyer, : 1955, Age: 69 y.o., MRN: [id number], Sex: female    Diagnosis  Pre-op Diagnosis      * Pleural effusion [J90]     * Secondary malignant neoplasm of left lung (Multi) [C78.02] Post-op Diagnosis     * Pleural effusion [J90]     * Secondary malignant neoplasm of left lung (Multi) [C78.02]     Procedures  Thoracoscopy; Right PleurX Placement, with pleural biopsies    Surgeons      * Rose Shepherd - Primary    Resident/Fellow/Other Assistant:  Surgeons and Role:  * No surgeons found with a matching role *    Procedure Summary  Anesthesia: General  ASA: III  Anesthesia Staff: Anesthesiologist: Arthur Moseley MD  C-AA: JONY Bradford  Estimated Blood Loss: 25mL  Intra-op Medications:   Administrations occurring from 1045 to 1245 on 10/10/24:   Medication Name Total Dose   BUPivacaine-EPINEPHrine (Marcaine w/EPI) 0.25 %-1:200,000 injection 30 mL   ceFAZolin (Ancef) 2 g in dextrose (iso)  mL 2 g              Anesthesia Record               Intraprocedure I/O Totals          Intake    ceFAZolin (Ancef) 2 g in dextrose (iso)  mL 10.00 mL    Total Intake 10 mL          Specimen:   ID Type Source Tests Collected by Time   1 : RIGHT PLEURAL BIOPSY Tissue PLEURA BIOPSY RIGHT SURGICAL PATHOLOGY EXAM Rose Shepherd DO 10/10/2024 1125        Staff:   Ariannaulator: Evelina  Circulator: Sandeep Madridub Person: Crispin  Scrub Person: Lion         Drains and/or Catheters:   Chest Tube 1 Right Pleural  (Active)       Tourniquet Times:         Implants:     Findings: Completely trapped lung, diffusely thickened parietal and visceral pleura    Indications: Mildred Moyer is an 69 y.o. female who is having surgery for Pleural effusion [J90]. She has had a recurrent right pleural effusion and imaging consistent with thickened rind and likely trapped lung. She has a h/o metastatic breast  cancer likely the source of initial effusion.     The patient was seen in the preoperative area. The risks, benefits, complications, treatment options, non-operative alternatives, expected recovery and outcomes were discussed with the patient. The possibilities of reaction to medication, pulmonary aspiration, injury to surrounding structures, bleeding, recurrent infection, the need for additional procedures, failure to diagnose a condition, and creating a complication requiring transfusion or operation were discussed with the patient. The patient concurred with the proposed plan, giving informed consent.  The site of surgery was properly noted/marked if necessary per policy. The patient has been actively warmed in preoperative area. Preoperative antibiotics have been ordered and given within 1 hours of incision. Venous thrombosis prophylaxis have been ordered including bilateral sequential compression devices and chemical prophylaxis    Procedure Details: Patient was brought into the breathing suite and procedural information was confirmed.  MAC anesthesia was induced and patient was transition to a sloppy left lateral decubitus position and appropriately padded.  Her right chest was prepped and draped in the typical sterile fashion.  We entered the chest in the seventh intercostal space along the posterior axillary line.  Her rib spaces were quite tight.  We entered the chest with a 5 mm 30 degree scope and she saw diffuse pleural thickening of the visceral and parietal pleura with a completely trapped lung.  We removed approximately 450 mL of straw-colored effusion.  Pleural biopsies were obtained and sent for permanent pathology.  We then tunneled a Pleurx catheter to the right subcostal margin and laid this in the chest.  This was secured in place and our incision over top was closed in layers.  The Pleurx catheter was capped and bandage placed.  Patient was placed supine and taken to PACU in stable  condition.    Complications:  None; patient tolerated the procedure well.    Disposition: PACU - hemodynamically stable.  Condition: stable         Additional Details:   PleurX capped  CXR in PACU  Possible discharge home later today on regular diet with [facility] for PleurX drainage    Attending Attestation: I was present and scrubbed for the entire procedure.    Rose Shepherd  Phone Number: [phone]

## 2024-10-10 NOTE — TELEPHONE ENCOUNTER
Please disregard my previous message. I received the new orders for start of care on Monday 10/14/24.     Thank you,   [facility] Intake

## 2024-10-10 NOTE — TELEPHONE ENCOUNTER
Good afternoon, this patient's referral has a requested start of care date for home care for tomorrow-Friday 10/11/24. Is it ok that she be drained again tomorrow or would you like to have start of care on Saturday instead to allow a day between draining the pleurx?    [facility] Intake

## 2024-10-10 NOTE — ANESTHESIA POSTPROCEDURE EVALUATION
Patient: Mildred Moyer    Procedure Summary       Date: 10/10/24 Room / Location: [facility] A OR 01 / Virtual [facility] A OR    Anesthesia Start: 1055 Anesthesia Stop: 1148    Procedure: Thoracoscopy; Right PleurX Placement (Right) Diagnosis:       Pleural effusion      Secondary malignant neoplasm of left lung (Multi)      (Pleural effusion [J90])      (Secondary malignant neoplasm of left lung (Multi) [C78.02])    Surgeons: Rose Shepherd DO Responsible Provider: Arthur Moseley MD    Anesthesia Type: general ASA Status: 3            Anesthesia Type: general    Vitals Value Taken Time   BP 96/75 10/10/24 1304   Temp 36.8 °C (98.2 °F) 10/10/24 1245   Pulse 73 10/10/24 1309   Resp 15 10/10/24 1300   SpO2 98 % 10/10/24 1309   Vitals shown include unfiled device data.    Anesthesia Post Evaluation    Patient location during evaluation: PACU  Patient participation: complete - patient participated  Level of consciousness: awake and alert  Pain management: adequate  Multimodal analgesia pain management approach  Airway patency: patent  Cardiovascular status: acceptable  Respiratory status: acceptable  Hydration status: acceptable  Postoperative Nausea and Vomiting: none        No notable events documented.

## 2024-10-10 NOTE — DISCHARGE INSTRUCTIONS
.Caring for Your PleurX Drainage Catheter  To care for your PleurX drainage catheter, you will:  Inspect your catheter every day.  Drain the fluid from your pleural space every day or as directed by your healthcare provider.  Change your dressing with every drainage  Always change your dressing as soon as possible if it's loose, wet, or dirty.  Try to plan ahead and change your dressing when you're draining your pleural space. That way, you'll only need to open 1 drainage kit.  Your nurse will teach you how to do these things before your procedure. It's best if your caregiver learns with you so they can help you.  After your procedure, a home care nurse may visit you to help you care for your catheter. Their main job is to help you and your caregiver get comfortable caring for your PleurX catheter on your own. You can use the information in this resource to help you remember what to do.  Inspecting your catheter  Inspect your PleurX catheter every day. You can use a handheld mirror or have your caregiver help you.  Check the dressing over your catheter exit site. If it's wet, dirty, loose, or has started to lift from your skin, change it. Follow the instructions in the section “Changing your PleurX dressing.”  Check for kinks (bends) in your catheter. If it's kinked, straighten it.  Check if your catheter is damaged, cut, or broken. If it is:  Pinch the catheter closed between your fingers.  Open a drainage kit and take out the blue emergency slide clamp. Push it onto the catheter until the catheter is pinched closed. If you don't have a slide clamp, bend the catheter and tape it in this position.  Call your doctor's office.  Draining your pleural space  Drain your PleurX catheter 3 times a week and as needed for Shortness of Breath. Drainage amount  Don't drain more than 1,500 milliliters (mL). Your goal should be to drain your PleurX catheter on a regular schedule, not to drain a certain amount of fluid.  If you  drain 200 mL or less for 3 days in a row, call your doctor's office to tell them. They may tell you to start draining your catheter less often. If you find that you're draining less and less fluid over time, your doctor may recommend that your PleurX catheter be removed.     Drainage log  Every time you drain your PleurX catheter, write down:  The date and time  The amount of fluid drained  The color of the fluid  Any symptoms you have (such as discomfort)  This will help your doctor develop a drainage schedule that's right for you. It will also help you notice differences in your drainage. You can use the drainage log at the end of this resource, or you can make your own.  Bring your drainage log to your appointments. Have it nearby if you need to call your doctor's office about your PleurX catheter.  How to drain your PleurX catheter    Figure 3. Vacuum bottle  To drain the fluid from your pleural space, you'll attach your PleurX catheter to a vacuum bottle (see Figure 3). The vacuum will pull the fluid from your pleural space into the bottle. It's best to have your caregiver help you, especially at first.  If you have pain when you drain your PleurX catheter, take pain medication 30 minutes before you drain it. Follow your healthcare provider's instructions.     Gather your supplies  Before you start, set up your supplies on a clean, open surface. You'll need:  A PleurX drainage kit. Don't use a different brand drainage kit without talking with your healthcare team first. The kit includes:  1 vacuum bottle (500 mL or 1,000 mL)  1 PleurX Procedure Pack. The pack includes:  1 self-adhesive dressing  1 pair of medical gloves  3 alcohol wipes  1 valve replacement cap  4 (4-inch) square gauze pads  1 foam catheter pad  1 emergency clamp  Only use the emergency clamp if your PleurX catheter is broken or leaking.  An extra pair of medical gloves (if you're also changing your dressing)  Extra alcohol wipes  A trash  can  Your drainage log  A pen  Clean your hands  Clean your hands with soap and warm water or an alcohol-based hand . If your caregiver is helping you, they should clean their hands too.  If you're washing your hands with soap and water, wet your hands and apply soap. Rub your hands together for 20 seconds, then rinse. Dry your hands with a paper towel and use that same towel to turn off the faucet.  If you're using an alcohol-based hand , be sure to cover your hands with it. Then, rub your hands together until they're dry.  If you're also changing your dressing, take it off  If you're also changing your PleurX dressing, take it off before you get your supplies ready. To do this:  Put on a pair of medical gloves.  Hold your catheter in place with your non-dominant hand (the hand you don't write with). Using your other hand, gently remove the clear dressing over your catheter and the foam pad underneath your catheter. Throw them away.  Take off the gloves and throw them away.  Clean your hands.  Get your supplies ready  Once your hands are clean, get your supplies ready. If your caregiver is helping you, they should do this part for you.  Open the PleurX drainage kit and remove the PleurX Procedure Pack pouch.  Open the Procedure Pack pouch. Set the self-adhesive dressing aside.  Spread open the blue wrapping in the PleurX Procedure Pack so you can see the supplies inside. If you're both draining your catheter and changing your dressing, you'll use all the supplies. If you're only draining your catheter, you'll only use the alcohol wipes and valve replacement cap.  Take out the vacuum bottle and look at the end of the drainage line. Make sure the hard plastic access tip is covered with a soft plastic sleeve (see Figure 4). The sleeve keeps the access tip clean while you're setting up.  Be careful not to touch the access tip. Don't let the tip touch anything besides the blue wrap.  If the plastic  sleeve is missing, start over with a new drainage kit.    Figure 4. Plastic sleeve  Set the bottle near the blue wrap. Take off the paper holding the drainage line in a coil. Throw the paper away. Place the access tip on the blue wrap.  Put on the gloves in the PleurX Procedure pack. Be careful not to touch anything else in the pack.  Tear open the 3 alcohol pads, but don't remove the pads from their pouches. Place them on the blue wrap.  Close the roller clamp on the drainage line by rolling the wheel towards the drainage bottle (see Figure 5). Make sure it's completely closed.    Figure 5. Tighten the clamp to close it  Remove the soft plastic cover from the access tip by twisting it and pulling gently. Throw away the cover. Set the access tip back on the blue wrap.  Clean your PleurX valve and connect the drainage line  If your caregiver is helping you, hold your catheter away from your body while they clean the valve and connect the drainage line for you. If you're draining your PleurX catheter yourself, use your non-dominant hand (the hand you don't write with) to hold your catheter away from your body. Use your other hand to clean the valve and connect the drainage line.   the end of your catheter and hold it away from your body.  Twist off the valve cap and throw it away (see Figure 6). Keep holding your catheter away from your body. Make sure the valve doesn't touch anything.    Figure 6. Take off the valve cap  Use an alcohol wipe to clean the valve well for 15 seconds. Then, throw the wipe away. Keep holding your catheter away from your body. Make sure the valve doesn't touch anything.  Push the access tip of the drainage line into the clean catheter valve. You'll hear and feel a snap when the tip and valve are locked together (see Figure 7). Never put anything other than the access tip into the catheter.    Figure 7. Connect the drainage line  Remove the support clip beneath the T-plunger by  holding the flat part and pulling outward (see Figure 8). Throw the clip away. Don't press the T-plunger down yet.    Figure 8. Remove the support clip  Hold the drainage bottle with one hand. Push down on the T-plunger to puncture the seal and activate the vacuum in the bottle (see Figure 9).    Figure 9. Activate the vacuum bottle  Drain the pleural fluid  You may want to do the following part yourself, even if your caregiver is helping you. This lets you adjust the flow of fluid if you feel discomfort.  Slowly roll the wheel on the roller clamp away from the bottle (see Figure 10). Fluid from your pleural space should start flowing into the drainage line.    Figure 10. Release the clamp  When fluid starts to flow into the drainage line, you can partially close the roller clamp to slow the flow of fluid by rolling the wheel on the roller clamp toward the bottle.  As you drain the fluid, the flow will start to slow down. This happens either because your pleural space is completely drained or because the bottle has lost its suction. This is normal. You may also notice more foam or bubbles as the flow gets slower.  If you feel pain or start to cough, slow down the flow of fluid. If you still have pain, stop draining. Call your doctor's office after you disconnect and empty the drainage bottle.  When the flow stops or the bottle is filled, close the roller clamp by rolling the wheel towards the bottle as far as it will go.  If your doctor told you to drain more than 500 mL, you're using a 500 mL bottle, and the bottle is full or has lost its suction, you'll need to connect another bottle and finish draining. Get the drainage bottle ready the same way you did before:  Open another drainage kit.  Check to make sure the drainage line's hard plastic access tip is covered by a soft plastic cover.  Remove and throw out the paper holding the drainage line in a coil.  Completely close the roller clamp by rolling the wheel  towards the bottle.  Remove the support clip beneath the T-plunger.  Disconnect the drainage bottle  If your caregiver is helping you, hold your catheter away from your body while they disconnect the bottle for you. If you're draining your PleurX catheter yourself, use your non-dominant hand to hold your catheter away from your body. Use your other hand to disconnect the drainage bottle.  Check that the roller clamp is completely closed.   the end of your catheter and hold it away from your body.  Pull the access tip of the drainage line out of the valve in a firm, smooth motion (see Figure 11). Set the access tip down. Keep holding your catheter away from your body. Make sure the valve doesn't touch anything.    Figure 11. Disconnect the drainage line  If you're connecting a second drainage bottle, clean the catheter valve with an alcohol pad and connect the drainage line to your catheter the same way you did before. Follow steps 13 to 21.  Once you're all done draining and have disconnected the drainage line, use an alcohol wipe to clean the valve well for 15 seconds. Then, throw away the wipe. Keep holding your catheter away from your body. Make sure the valve doesn't touch anything.   the replacement valve cap. Don't touch the inside. Place the new cap over the clean catheter valve and twist the cap clockwise (to the right) until it snaps into its locked position (see Figure 12).    Figure 12. Lock the replacement valve cap  If you touch the inside of the valve cap, throw it away. Keep holding your catheter away from your body. Open another drainage kit and PleurX Procedure Pack and use a new valve replacement cap.  You're now done draining your PleurX catheter. If you're also changing your PleurX dressing, keep your gloves on. Follow steps 5 to 15 in the section “How to change your PleurX dressing.”  Empty the drainage and throw away the drainage bottle(s)  Make sure the clamp on the vacuum  drainage bottle is tightly closed.  Remove the top part of the bottle by pushing up on the rounded end of the bottle opener.  Remove the bottle opener from the drainage line by squeezing the flexible cap and pulling the bottle opener off. Use the pointed end of the bottle opener to widen the foil opening in the drainage bottle. This will make it easier to empty the bottle.  Empty the drainage into the toilet.  Place the drainage bottle and tubing in a plastic bag. Seal the bag tightly. Throw it away with your household garbage.  Remember to fill out your drainage log. If the drainage amount, color, or thickness is different from the last time you drained your catheter, call your doctor's office.  Managing problems with draining your catheter  If no fluid drains from your chest into the vacuum bottle:  Make sure the T-plunger at the top of the vacuum bottle is pushed all the way down.  Make sure the drainage line is securely connected to the PleurX catheter valve.  Make sure the roller clamp on the drainage line is open.  Make sure there aren't any kinks in your catheter.  Check if your PleurX catheter is clogged. If it is, roll it between your fingers. This will help loosen anything that's blocking the drainage flow.  If these steps don't work, repeat the drainage procedure with a new PleurX drainage kit. If fluid still isn't draining, you may not have enough fluid in your pleural space to drain. Call your doctor if fluid isn't draining.  How to change your PleurX dressing  It's best to have someone help you change your dressing. In these instructions, the word “you” refers to the person changing the dressing.  Gather your supplies  Before you start, set up your supplies on a clean, open surface. If you just drained your PleurX catheter, use the supplies from that drainage kit. Otherwise, open a new PleurX drainage kit and PleurX Procedure Pack. You'll need:  1 alcohol pad  4 (4-inch) square gauze pads  1 foam  catheter pad  1 self-adhesive dressing with 3 layers:  The printed liner. This covers the adhesive (sticky) side of the dressing.  The clear wound dressing. This is what will stay over your catheter exit site once you're done.  The paper center panel and frame. These make it easier to handle the dressing while you're putting it on.  2 pairs of medical gloves (if you haven't already taken off your dressing)  A trash can  Clean your hands  If you're not already wearing gloves from draining your catheter, clean your hands with soap and warm water or an alcohol-based hand . If your caregiver is helping you, they should clean their hands too.  If you're washing your hands with soap and water, wet your hands, apply soap, rub your hands together well for 20 seconds, then rinse. Dry your hands with a disposable towel and use that same towel to turn off the faucet.  If you're using an alcohol-based hand , be sure to cover your hands with it, then rub them together until they're dry.  Take off your dressing  Put on a pair of medical gloves, if needed.  Hold the catheter in place with your non-dominant hand (the hand you don't write with). Using your other hand, gently remove the clear dressing and gauze over the catheter and the foam pad underneath the catheter. Throw them away.  Take off your gloves and clean your hands.  Put on a new pair of medical gloves.  Clean the skin around your catheter  Use an alcohol pad to clean the skin around the catheter (see Figure 13).    Figure 13. Clean around the catheter  Examine the skin around the PleurX catheter. There should be no redness, areas of broken skin, rash, or leaking fluid. If there is, call your healthcare provider after you change the dressing.  Let the skin around the catheter air dry for 30 seconds.  Put on a new dressing  Once the skin is dry, place the new foam pad under the catheter (see Figure 14). Your healthcare provider will tell you how to  position your catheter on top of the foam pad. Follow their instructions.    Figure 14. Place catheter pad  Place the new gauze pad over the catheter and foam pad.  If your caregiver is helping you change your dressing, it's helpful if you hold the gauze in place while they do the next steps.  Take off the medical gloves.  Peel the printed liner from the self-adhesive dressing to expose the sticky surface. Throw the printed liner away.  Center the self-adhesive dressing over the gauze pads and press it down against your skin. Make sure you don't stretch the dressing when you put it on. Your healthcare provider will tell you if the tip of your PleurX catheter should be underneath or outside the dressing (see Figure 15). Follow their instructions.    Figure 15. End of catheter outside dressing  Slowly peel off the paper center panel and frame. Smooth and press the dressing down onto the skin to make sure it's completely secure. Throw the paper center panel and frame away.  Clean your hands.  You're now done changing your PleurX dressing. If you also drained your PleurX catheter, empty the drainage and throw away the drainage bottle(s). Follow steps 24 to 18 in the section “How to drain your PleurX catheter.”  Showering with your PleurX catheter  You can shower with your PleurX catheter in place using a one-time-use waterproof cover that goes over your dressing (such as Aquaguard®). You can buy waterproof covers online or from [employer]. If you want to buy it from [employer], ask your nurse. It's also helpful to use a hand-held shower to direct the water away from your dressing.  Each time you shower, cover your dressing completely with a new waterproof cover to keep it from getting wet. To put on the waterproof cover:  Peel off the top and side strips.  Place the top edge above your dressing. Don't let the tape on the waterproof cover touch your dressing. It can lift your dressing when you remove the waterproof cover after  showering. Smooth the cover down over your dressing.  Peel off the bottom strip. Make sure the bottom edge of the waterproof cover is below your dressing and the end of your PleurX catheter is tucked into the waterproof cover and completely covered. Smooth the bottom edge down.  Don't shower for longer than 15 minutes. Use warm water, not hot water. This will help keep the waterproof cover from coming off.  After your shower, dry the waterproof cover before you take it off. If your dressing gets wet, change it. If a wet dressing is left against your skin, it can make your skin irritated and sore.  When to Call Your Healthcare Provider  Call your healthcare provider if:  You have a fever of 101 °F (38 °C) or higher  The drainage changes color or thickness  The drainage is cloudy or smells bad  The amount of drainage changes  You have pain when you drain the catheter  You have pain after you drain your catheter  You have redness, swelling, drainage, or pain in the area around your catheter  Your catheter is damaged, cut, broken, or falls out  Liquid is leaking from your valve or catheter  You have any concerns about your catheter

## 2024-10-14 ENCOUNTER — HOME CARE VISIT (OUTPATIENT)
Dept: HOME HEALTH SERVICES | Facility: HOME HEALTH | Age: 69
End: 2024-10-14
Payer: COMMERCIAL

## 2024-10-14 PROCEDURE — G0299 HHS/HOSPICE OF RN EA 15 MIN: HCPCS

## 2024-10-15 VITALS
OXYGEN SATURATION: 99 % | SYSTOLIC BLOOD PRESSURE: 118 MMHG | RESPIRATION RATE: 14 BRPM | DIASTOLIC BLOOD PRESSURE: 64 MMHG | TEMPERATURE: 96.6 F

## 2024-10-15 SDOH — HEALTH STABILITY: MENTAL HEALTH: SMOKING IN HOME: 0

## 2024-10-15 SDOH — ECONOMIC STABILITY: HOUSING INSECURITY: EVIDENCE OF SMOKING MATERIAL: 0

## 2024-10-15 ASSESSMENT — ENCOUNTER SYMPTOMS
SUBJECTIVE PAIN PROGRESSION: UNCHANGED
PAIN LOCATION: CHEST
PAIN LOCATION - PAIN SEVERITY: 5/10
PAIN LOCATION - RELIEVING FACTORS: MEDICATION, REST
PAIN: 1
PAIN SEVERITY GOAL: 1/10
PAIN LOCATION - PAIN DURATION: VARIES
LOWEST PAIN SEVERITY IN PAST 24 HOURS: 2/10
HIGHEST PAIN SEVERITY IN PAST 24 HOURS: 5/10
PAIN LOCATION - PAIN FREQUENCY: INTERMITTENT
PERSON REPORTING PAIN: PATIENT

## 2024-10-15 ASSESSMENT — ACTIVITIES OF DAILY LIVING (ADL): ENTERING_EXITING_HOME: MODERATE ASSIST

## 2024-10-17 ENCOUNTER — HOME CARE VISIT (OUTPATIENT)
Dept: HOME HEALTH SERVICES | Facility: HOME HEALTH | Age: 69
End: 2024-10-17
Payer: COMMERCIAL

## 2024-10-17 VITALS
RESPIRATION RATE: 14 BRPM | DIASTOLIC BLOOD PRESSURE: 62 MMHG | SYSTOLIC BLOOD PRESSURE: 110 MMHG | TEMPERATURE: 97 F | OXYGEN SATURATION: 99 % | HEART RATE: 64 BPM

## 2024-10-17 PROCEDURE — G0299 HHS/HOSPICE OF RN EA 15 MIN: HCPCS

## 2024-10-17 ASSESSMENT — ENCOUNTER SYMPTOMS
PAIN SEVERITY GOAL: 0/10
SUBJECTIVE PAIN PROGRESSION: UNCHANGED
PAIN LOCATION - PAIN SEVERITY: 4/10
PAIN LOCATION - RELIEVING FACTORS: MEDICATION, REST
HIGHEST PAIN SEVERITY IN PAST 24 HOURS: 6/10
PAIN LOCATION - PAIN QUALITY: ACHING, CRAMPING
PAIN LOCATION - PAIN DURATION: VARIES
PAIN: 1
LOWEST PAIN SEVERITY IN PAST 24 HOURS: 2/10
PAIN LOCATION - EXACERBATING FACTORS: LUNG CA
PAIN LOCATION: CHEST
PERSON REPORTING PAIN: PATIENT
PAIN LOCATION - PAIN FREQUENCY: INTERMITTENT

## 2024-10-19 SDOH — HEALTH STABILITY: MENTAL HEALTH: SMOKING IN HOME: 0

## 2024-10-19 SDOH — ECONOMIC STABILITY: GENERAL

## 2024-10-19 SDOH — ECONOMIC STABILITY: HOUSING INSECURITY: EVIDENCE OF SMOKING MATERIAL: 0

## 2024-10-19 ASSESSMENT — ACTIVITIES OF DAILY LIVING (ADL)
MONEY MANAGEMENT (EXPENSES/BILLS): INDEPENDENT
AMBULATION ASSISTANCE: 1
AMBULATION ASSISTANCE: STAND BY ASSIST
AMBULATION ASSISTANCE: 1
AMBULATION ASSISTANCE: STAND BY ASSIST
OASIS_M1830: 03

## 2024-10-19 ASSESSMENT — ENCOUNTER SYMPTOMS
FATIGUE: 1
SHORTNESS OF BREATH: 1
RESPIRATORY PAIN: 1
DYSPNEA ON EXERTION: 1
MUSCLE WEAKNESS: 1
CHANGE IN APPETITE: UNCHANGED
DYSPNEA ACTIVITY LEVEL: AFTER AMBULATING 10 - 20 FT
FATIGUES EASILY: 1
MUSCLE WEAKNESS: 1
FATIGUE: 1
FATIGUES EASILY: 1
CHANGE IN APPETITE: UNCHANGED
DIZZINESS: 1
APPETITE LEVEL: GOOD
APPETITE LEVEL: FAIR
SHORTNESS OF BREATH: 1
DYSPNEA ACTIVITY LEVEL: AFTER AMBULATING 10 - 20 FT

## 2024-10-21 DIAGNOSIS — Z17.0 MALIGNANT NEOPLASM OF BREAST IN FEMALE, ESTROGEN RECEPTOR POSITIVE, UNSPECIFIED LATERALITY, UNSPECIFIED SITE OF BREAST: ICD-10-CM

## 2024-10-21 DIAGNOSIS — C50.919 MALIGNANT NEOPLASM OF BREAST IN FEMALE, ESTROGEN RECEPTOR POSITIVE, UNSPECIFIED LATERALITY, UNSPECIFIED SITE OF BREAST: ICD-10-CM

## 2024-10-22 ENCOUNTER — OFFICE VISIT (OUTPATIENT)
Dept: SURGERY | Facility: CLINIC | Age: 69
End: 2024-10-22
Payer: COMMERCIAL

## 2024-10-22 ENCOUNTER — HOSPITAL ENCOUNTER (OUTPATIENT)
Dept: RADIOLOGY | Facility: CLINIC | Age: 69
Discharge: HOME | End: 2024-10-22
Payer: COMMERCIAL

## 2024-10-22 ENCOUNTER — APPOINTMENT (OUTPATIENT)
Dept: HEMATOLOGY/ONCOLOGY | Facility: CLINIC | Age: 69
End: 2024-10-22
Payer: COMMERCIAL

## 2024-10-22 VITALS
OXYGEN SATURATION: 96 % | HEIGHT: 64 IN | DIASTOLIC BLOOD PRESSURE: 65 MMHG | TEMPERATURE: 97 F | SYSTOLIC BLOOD PRESSURE: 113 MMHG | HEART RATE: 67 BPM | BODY MASS INDEX: 24.24 KG/M2 | WEIGHT: 142 LBS

## 2024-10-22 DIAGNOSIS — J90 PLEURAL EFFUSION: ICD-10-CM

## 2024-10-22 PROCEDURE — 3074F SYST BP LT 130 MM HG: CPT | Performed by: STUDENT IN AN ORGANIZED HEALTH CARE EDUCATION/TRAINING PROGRAM

## 2024-10-22 PROCEDURE — 99211 OFF/OP EST MAY X REQ PHY/QHP: CPT | Performed by: STUDENT IN AN ORGANIZED HEALTH CARE EDUCATION/TRAINING PROGRAM

## 2024-10-22 PROCEDURE — 71046 X-RAY EXAM CHEST 2 VIEWS: CPT

## 2024-10-22 PROCEDURE — 3078F DIAST BP <80 MM HG: CPT | Performed by: STUDENT IN AN ORGANIZED HEALTH CARE EDUCATION/TRAINING PROGRAM

## 2024-10-22 PROCEDURE — 71046 X-RAY EXAM CHEST 2 VIEWS: CPT | Performed by: RADIOLOGY

## 2024-10-22 PROCEDURE — 1125F AMNT PAIN NOTED PAIN PRSNT: CPT | Performed by: STUDENT IN AN ORGANIZED HEALTH CARE EDUCATION/TRAINING PROGRAM

## 2024-10-22 PROCEDURE — 1157F ADVNC CARE PLAN IN RCRD: CPT | Performed by: STUDENT IN AN ORGANIZED HEALTH CARE EDUCATION/TRAINING PROGRAM

## 2024-10-22 PROCEDURE — 1159F MED LIST DOCD IN RCRD: CPT | Performed by: STUDENT IN AN ORGANIZED HEALTH CARE EDUCATION/TRAINING PROGRAM

## 2024-10-22 PROCEDURE — 3008F BODY MASS INDEX DOCD: CPT | Performed by: STUDENT IN AN ORGANIZED HEALTH CARE EDUCATION/TRAINING PROGRAM

## 2024-10-22 RX ORDER — POTASSIUM CHLORIDE 3 G/15ML
40 SOLUTION ORAL ONCE
COMMUNITY

## 2024-10-22 RX ORDER — DEXTROMETHORPHAN HYDROBROMIDE, GUAIFENESIN 5; 100 MG/5ML; MG/5ML
LIQUID ORAL EVERY 8 HOURS
COMMUNITY
Start: 2024-09-04

## 2024-10-22 RX ORDER — HYDROCHLOROTHIAZIDE 50 MG/1
50 TABLET ORAL DAILY
COMMUNITY
Start: 2024-06-03

## 2024-10-22 RX ORDER — CALCIUM CARBONATE 160(400)MG
TABLET,CHEWABLE ORAL
COMMUNITY
Start: 2023-03-22

## 2024-10-22 RX ORDER — LANOLIN ALCOHOL/MO/W.PET/CERES
1000 CREAM (GRAM) TOPICAL DAILY
COMMUNITY

## 2024-10-22 ASSESSMENT — PAIN SCALES - GENERAL: PAINLEVEL_OUTOF10: 2

## 2024-10-22 NOTE — PATIENT INSTRUCTIONS
TODAY: KEEP DRAINING PLEURX TWO TIMES A WEEK...    Once drainage is less than 150cc for two subsequent drains, decrease drainage to once a week.    Once drainage is less than 50cc week for two weeks,  call the office for an appointment with an XRAY to have your Pleurx removed.     Call with any questions.  [phone]

## 2024-10-23 ENCOUNTER — HOME CARE VISIT (OUTPATIENT)
Dept: HOME HEALTH SERVICES | Facility: HOME HEALTH | Age: 69
End: 2024-10-23
Payer: COMMERCIAL

## 2024-10-23 DIAGNOSIS — C50.511 MALIGNANT NEOPLASM OF LOWER-OUTER QUADRANT OF RIGHT BREAST OF FEMALE, ESTROGEN RECEPTOR POSITIVE: ICD-10-CM

## 2024-10-23 DIAGNOSIS — Z17.0 MALIGNANT NEOPLASM OF LOWER-OUTER QUADRANT OF RIGHT BREAST OF FEMALE, ESTROGEN RECEPTOR POSITIVE: ICD-10-CM

## 2024-10-23 PROCEDURE — G0299 HHS/HOSPICE OF RN EA 15 MIN: HCPCS

## 2024-10-24 ENCOUNTER — OFFICE VISIT (OUTPATIENT)
Dept: HEMATOLOGY/ONCOLOGY | Facility: CLINIC | Age: 69
End: 2024-10-24
Payer: COMMERCIAL

## 2024-10-24 ENCOUNTER — ONCOLOGY MEDICATION OUTREACH (OUTPATIENT)
Dept: HEMATOLOGY/ONCOLOGY | Facility: CLINIC | Age: 69
End: 2024-10-24

## 2024-10-24 VITALS
TEMPERATURE: 97.2 F | OXYGEN SATURATION: 97 % | DIASTOLIC BLOOD PRESSURE: 72 MMHG | HEART RATE: 88 BPM | RESPIRATION RATE: 12 BRPM | SYSTOLIC BLOOD PRESSURE: 126 MMHG

## 2024-10-24 VITALS
SYSTOLIC BLOOD PRESSURE: 128 MMHG | HEIGHT: 64 IN | RESPIRATION RATE: 16 BRPM | OXYGEN SATURATION: 99 % | TEMPERATURE: 97.3 F | DIASTOLIC BLOOD PRESSURE: 63 MMHG | HEART RATE: 71 BPM | BODY MASS INDEX: 24.07 KG/M2 | WEIGHT: 140.98 LBS

## 2024-10-24 DIAGNOSIS — Z17.0 MALIGNANT NEOPLASM OF LOWER-OUTER QUADRANT OF RIGHT BREAST OF FEMALE, ESTROGEN RECEPTOR POSITIVE: ICD-10-CM

## 2024-10-24 DIAGNOSIS — C50.511 MALIGNANT NEOPLASM OF LOWER-OUTER QUADRANT OF RIGHT BREAST OF FEMALE, ESTROGEN RECEPTOR POSITIVE: ICD-10-CM

## 2024-10-24 LAB
ALBUMIN SERPL BCP-MCNC: 3.2 G/DL (ref 3.4–5)
ALP SERPL-CCNC: 71 U/L (ref 33–136)
ALT SERPL W P-5'-P-CCNC: 23 U/L (ref 7–45)
ANION GAP SERPL CALC-SCNC: 10 MMOL/L (ref 10–20)
AST SERPL W P-5'-P-CCNC: 18 U/L (ref 9–39)
BASOPHILS # BLD AUTO: 0.02 X10*3/UL (ref 0–0.1)
BASOPHILS NFR BLD AUTO: 0.4 %
BILIRUB SERPL-MCNC: 0.3 MG/DL (ref 0–1.2)
BUN SERPL-MCNC: 16 MG/DL (ref 6–23)
CALCIUM SERPL-MCNC: 8.4 MG/DL (ref 8.6–10.3)
CANCER AG27-29 SERPL-ACNC: 37.9 U/ML (ref 0–38.6)
CHLORIDE SERPL-SCNC: 102 MMOL/L (ref 98–107)
CO2 SERPL-SCNC: 28 MMOL/L (ref 21–32)
CREAT SERPL-MCNC: 1.15 MG/DL (ref 0.5–1.05)
EGFRCR SERPLBLD CKD-EPI 2021: 52 ML/MIN/1.73M*2
EOSINOPHIL # BLD AUTO: 0.16 X10*3/UL (ref 0–0.7)
EOSINOPHIL NFR BLD AUTO: 3 %
ERYTHROCYTE [DISTWIDTH] IN BLOOD BY AUTOMATED COUNT: 15.6 % (ref 11.5–14.5)
GLUCOSE SERPL-MCNC: 116 MG/DL (ref 74–99)
HCT VFR BLD AUTO: 32.3 % (ref 36–46)
HGB BLD-MCNC: 10.1 G/DL (ref 12–16)
IMM GRANULOCYTES # BLD AUTO: 0.02 X10*3/UL (ref 0–0.7)
IMM GRANULOCYTES NFR BLD AUTO: 0.4 % (ref 0–0.9)
LAB AP ASR DISCLAIMER: NORMAL
LABORATORY COMMENT REPORT: NORMAL
LYMPHOCYTES # BLD AUTO: 0.75 X10*3/UL (ref 1.2–4.8)
LYMPHOCYTES NFR BLD AUTO: 13.8 %
MCH RBC QN AUTO: 32.5 PG (ref 26–34)
MCHC RBC AUTO-ENTMCNC: 31.3 G/DL (ref 32–36)
MCV RBC AUTO: 104 FL (ref 80–100)
MONOCYTES # BLD AUTO: 0.23 X10*3/UL (ref 0.1–1)
MONOCYTES NFR BLD AUTO: 4.2 %
NEUTROPHILS # BLD AUTO: 4.24 X10*3/UL (ref 1.2–7.7)
NEUTROPHILS NFR BLD AUTO: 78.2 %
PATH REPORT.FINAL DX SPEC: NORMAL
PATH REPORT.GROSS SPEC: NORMAL
PATH REPORT.RELEVANT HX SPEC: NORMAL
PATH REPORT.TOTAL CANCER: NORMAL
PLATELET # BLD AUTO: 458 X10*3/UL (ref 150–450)
POTASSIUM SERPL-SCNC: 4.2 MMOL/L (ref 3.5–5.3)
PROT SERPL-MCNC: 7.1 G/DL (ref 6.4–8.2)
RBC # BLD AUTO: 3.11 X10*6/UL (ref 4–5.2)
SODIUM SERPL-SCNC: 136 MMOL/L (ref 136–145)
WBC # BLD AUTO: 5.4 X10*3/UL (ref 4.4–11.3)

## 2024-10-24 PROCEDURE — 99214 OFFICE O/P EST MOD 30 MIN: CPT | Performed by: INTERNAL MEDICINE

## 2024-10-24 PROCEDURE — 86300 IMMUNOASSAY TUMOR CA 15-3: CPT | Mod: PORLAB | Performed by: INTERNAL MEDICINE

## 2024-10-24 PROCEDURE — 1125F AMNT PAIN NOTED PAIN PRSNT: CPT | Performed by: INTERNAL MEDICINE

## 2024-10-24 PROCEDURE — 1157F ADVNC CARE PLAN IN RCRD: CPT | Performed by: INTERNAL MEDICINE

## 2024-10-24 PROCEDURE — 1160F RVW MEDS BY RX/DR IN RCRD: CPT | Performed by: INTERNAL MEDICINE

## 2024-10-24 PROCEDURE — RXMED WILLOW AMBULATORY MEDICATION CHARGE

## 2024-10-24 PROCEDURE — 1159F MED LIST DOCD IN RCRD: CPT | Performed by: INTERNAL MEDICINE

## 2024-10-24 PROCEDURE — 3074F SYST BP LT 130 MM HG: CPT | Performed by: INTERNAL MEDICINE

## 2024-10-24 PROCEDURE — 36415 COLL VENOUS BLD VENIPUNCTURE: CPT | Performed by: INTERNAL MEDICINE

## 2024-10-24 PROCEDURE — 85025 COMPLETE CBC W/AUTO DIFF WBC: CPT | Performed by: INTERNAL MEDICINE

## 2024-10-24 PROCEDURE — 3078F DIAST BP <80 MM HG: CPT | Performed by: INTERNAL MEDICINE

## 2024-10-24 PROCEDURE — 80053 COMPREHEN METABOLIC PANEL: CPT | Performed by: INTERNAL MEDICINE

## 2024-10-24 PROCEDURE — 3008F BODY MASS INDEX DOCD: CPT | Performed by: INTERNAL MEDICINE

## 2024-10-24 SDOH — ECONOMIC STABILITY: GENERAL

## 2024-10-24 SDOH — ECONOMIC STABILITY: HOUSING INSECURITY: EVIDENCE OF SMOKING MATERIAL: 0

## 2024-10-24 SDOH — HEALTH STABILITY: MENTAL HEALTH: SMOKING IN HOME: 0

## 2024-10-24 ASSESSMENT — ENCOUNTER SYMPTOMS
HEADACHES: 1
PAIN LOCATION - PAIN DURATION: VARIES
PAIN LOCATION - PAIN FREQUENCY: INTERMITTENT
FATIGUE: 1
SUBJECTIVE PAIN PROGRESSION: UNCHANGED
CHANGE IN APPETITE: UNCHANGED
PAIN SEVERITY GOAL: 0/10
PAIN: 1
MUSCLE WEAKNESS: 1
LOWEST PAIN SEVERITY IN PAST 24 HOURS: 2/10
PAIN LOCATION - PAIN SEVERITY: 4/10
PAIN LOCATION - RELIEVING FACTORS: MEDICATION REST
FATIGUES EASILY: 1
DYSPNEA ACTIVITY LEVEL: AFTER AMBULATING 10 - 20 FT
PAIN LOCATION - PAIN QUALITY: ACHING, SHARP
PAIN LOCATION: CHEST
PAIN LOCATION - EXACERBATING FACTORS: LUNG CA
HIGHEST PAIN SEVERITY IN PAST 24 HOURS: 4/10
DIZZINESS: 1
PERSON REPORTING PAIN: PATIENT
APPETITE LEVEL: FAIR
SHORTNESS OF BREATH: 1

## 2024-10-24 ASSESSMENT — ACTIVITIES OF DAILY LIVING (ADL)
AMBULATION ASSISTANCE: STAND BY ASSIST
AMBULATION ASSISTANCE: 1
MONEY MANAGEMENT (EXPENSES/BILLS): NEEDS ASSISTANCE

## 2024-10-24 ASSESSMENT — PAIN SCALES - GENERAL: PAINLEVEL_OUTOF10: 4

## 2024-10-24 NOTE — PROGRESS NOTES
Mildred Moyer   Female,   1955  MRN: [id number]    Patient Visit Information:   Visit Type: Follow Up Visit      Cancer History:   Treatment Synopsis:    1.  invasive ductal carcinoma right breast, stage IV, right pleural effusion, multiple hepatic lesions     Patient is a 68-year-old female with history of MS, chair bound who was admitted in the hospital because of shortness of breath.        March 10, 2023 CAT scan of chest did show right pleural effusion, right breast mass 12 o'clock position, multiple hepatic lesion and multiple pulmonary nodule.      March 11, 2023, status post paracentesis, cytology did show atypical cells      March 13, 2023, ultrasound-guided biopsy of right breast mass done pathology positive for invasive ductal carcinoma, grade 2, ER 95%, CT 60%, HER2 negative  , Ultrasound biopsy of right axillary lymph node done and negative for malignancy.     4/25/23 , PET scan did show abnormal metabolic activity of right breast, pulmonary nodules, mediastinal lymph node, hepatic lesion.     Current treatment, letrozole, Kisqali      8/11/23 , CT of chest abdominal pelvis, partial response, right breast mass, pulmonary nodules decreased in size   5/2/24 , PET , excellent partial response, left breast mass decreased in size, pulmonary nodules and adrenal nodule decreased in size hepatic nodule resolved  History of right pleural effusion and recurrent thoracentesis, large loculated  Thoracic surgical evaluation  10/10/24 ,  Thoracoscopy; Right PleurX Placement, with pleural biopsies with Dr Shepherd  Findings: Completely trapped lung, diffusely thickened parietal and visceral pleura   History of Present Illness:      ID Statement:    MILDRED MOYER is a 68 year old Female        Chief Complaint: Right breast cancer   Interval History:    Patient is a 68-year-old female with history of MS, chair bound who was admitted in the hospital because  of shortness of breath.      March 10, 2023 CAT scan of chest did show right pleural effusion, right breast mass 12 o'clock position, multiple hepatic lesion.      March 11, 2023, status post paracentesis, cytology did show atypical cells      March 13, 2023, ultrasound-guided biopsy of right breast mass done pathology positive for invasive ductal carcinoma, grade 2, ER 95%, MT 60%, HER2 negative , Ultrasound biopsy of right axillary lymph node done and negative for malignancy.     Medical oncology consultation requested, patient is very anxious about her diagnosis.        Pathology report discussed with the patient.      Patient is sitting on chair, very anxious, no headache and dizziness, no shortness of breath, no nausea vomiting, no abdominal pain, generalized weakness and fatigue.     8/8/24   Has a history of metastatic breast cancer taking letrozole and Kisqali.    Patient history of recurrent right pleural effusion status post multiple thoracentesis and was diagnosed with loculated pleural effusion, thoracic surgical evaluation and on 10/10/24  s/pThoracoscopy; Right PleurX Placement, with pleural biopsies with Dr Shepherd  Findings: Completely trapped lung, diffusely thickened parietal and visceral pleura      Patient doing well, on oxygen supplement, very mild right chest pain, some shortness of breath.      Review of Systems:   Review of Systems:    No headache or dizziness      No fever      some shortness of breath        No breast tenderness      No nausea vomiting      No abdominal pain      Generalized weakness      All body pain, arthritis      Patient not active difficult to ambulate due to Morphine Sulfate        Allergies and Intolerances:       Allergies:         contrast (specific type unknown): Contrast, Anaphylaxis,  Active         iodine: Drug, Anaphylaxis, Active         Shellfish: Food, Anaphylaxis, Active     Outpatient Medication Profile:  * Patient Currently Takes Medications as of  07-Sep-2023 11:20 documented in Structured Notes         disability placard : valid for 5 years         exp: 9/2028, Start Date: 07-Sep-2023         Kisqali (200 mg daily dose) oral tablet: Last Dose Taken:  , 3 tab(s)  orally once a day  daily for 21 days then 7 days off , Start Date: 16-Aug-2023         letrozole 2.5 mg oral tablet: Last Dose Taken:  , 1 tab(s) orally once  a day , Start Date: 31-Jul-2023         portable home oxygen concentrator 2L/min continuously via nasal canula : Last Dose Taken:  , Start Date: 27-Apr-2023         image guided biopsy of liver lesion : Last Dose Taken:  , Start Date:  30-Mar-2023         K-Tab 20 mEq oral tablet, extended release: Last Dose Taken:  , 2 tab(s)  orally once a day , Start Date: 14-Mar-2023         amoxicillin-clavulanate 875 mg-125 mg oral tablet: Last Dose Taken:   , 1 tab(s) orally 2 times a day , Start Date: 14-Mar-2023         buPROPion 300 mg/24 hours (XL) oral tablet, extended release: Last Dose  Taken:  , 1 tab(s) orally every 24 hours         carBAMazepine 100 mg oral tablet, chewable: Last Dose Taken:  , 1 tab  by mouth 3 times daily. Then 3 tabs by mouth once every evening         cyclobenzaprine 10 mg oral tablet: Last Dose Taken:  , 1 tab(s) orally  2 times a day         hydroCHLOROthiazide 12.5 mg oral capsule: Last Dose Taken:  , 1 cap(s)  orally once a day         hydrOXYzine hydrochloride 25 mg oral tablet: Last Dose Taken:  , 1 tab(s)  orally once a day         hydrOXYzine hydrochloride 50 mg oral tablet: Last Dose Taken:  , 1 tab(s)  orally once a day (at bedtime)         ibuprofen 800 mg oral tablet: Last Dose Taken:  , 1 tab(s) orally 2 times  a day         MethylPREDNISolone Dose Pack 4 mg oral tablet: Last Dose Taken:  , Day  3: 1 tab by mouth four times a day         Day 4: 1 tab by mouth three times a day          Day 5: 1 tab by mouth twice a day         day 6: 1 tab by mouth once          rosuvastatin 20 mg oral tablet: Last Dose Taken:   , 1 tab(s) orally once  a day         Vitamin B12 1000 mcg oral tablet: Last Dose Taken:  , 1 tab(s) orally  once a day         azithromycin 250 mg oral tablet: Last Dose Taken:  , 3 tabs by mouth  once every evening          melatonin 10 mg oral tablet, extended release: Last Dose Taken:  , 1  tab(s) orally once a day (at bedtime)         dextromethorphan-quinidine 20 mg-10 mg oral capsule: Last Dose Taken:   , 1 cap(s) orally every 12 hours         omeprazole 20 mg oral delayed release capsule: Last Dose Taken:  , 1  cap(s) orally once a day             Medical History:         Hypoxia: ICD-10: R09.02, Status: Active         Pleural effusion on right: ICD-10: J90, Status: Active         Infiltrating ductal carcinoma of right breast, stage 4: ICD-10:  C50.911, Status: Active         MS (multiple sclerosis): ICD-10: G35, Status: Active     Family History: No Family History items are recorded  in the problem list.      Social History:   Social Substance History:  ·  Smoking Status never smoker (1)   ·  Alcohol Use denies   ·  Drug Use denies            Vitals and Measurements:   Vitals: Temp: 36.5  HR: NA  RR: 16  BP: 103/63  SPO2%:   NA   Measurements: HT(cm): 162.4  WT(kg): 53.8  BSA: 1.55   BMI:  20.3   Second Set of Vitals/Vitals Comment: unable to get  patient's pulse ox(2)   Last 3 Weights & Heights: Date:                           Weight/Scale Type:                    Height:   07-Sep-2023 10:40                53.8  kg                     162.4  cm  08-Jun-2023 14:55                63.2  kg                     162.4  cm  27-Apr-2023 11:20                69.5  kg                     162.4  cm      Physical Exam:      Constitutional: awake/alert/oriented x3, no distress,  very anxious, sitting on chair   Eyes: PERRL, EOMI, clear sclera   Head/Neck: Neck supple, no cervical lymphadenopathy   Respiratory/Thorax: Fair air movement on left side,  decreased breath sounds of right side status post right Pleurx  catheter placement   Cardiovascular: Regular, rate and rhythm,  normal  S 1and S 2   Gastrointestinal: Nondistended, soft, non-tender,   no masses palpable, no organomegaly, +BS,   Extremities: normal extremities, no cyanosis edema,   Neurological: alert and oriented x3,   Breast: Right breast examination did show a mass  measuring 1 cm on 12 o'clock position     Left breast examination within normal limits   Lymphatic: No significant lymphadenopathy   Psychological: Patient is very anxious   Skin: Warm and dry, no lesions, no rashes         Lab Results:         11:14  (10/24/24) 1 mo ago  (9/24/24) 1 mo ago  (9/10/24) 2 mo ago  (8/8/24) 6 mo ago  (4/25/24) 10 mo ago  (12/28/23) 10 mo ago  (11/30/23)    WBC  4.4 - 11.3 x10*3/uL 5.4 6.4 5.7 4.6 5.9 7.6 9.2   RBC  4.00 - 5.20 x10*6/uL 3.11 Low  3.52 Low  4.04 3.53 Low  3.32 Low  3.43 Low  3.33 Low    Hemoglobin  12.0 - 16.0 g/dL 10.1 Low  11.3 Low  13.2 11.4 Low  10.1 Low  9.8 Low  9.4 Low    Hematocrit  36.0 - 46.0 % 32.3 Low  35.3 Low  39.7 35.6 Low  31.9 Low  32.0 Low  30.9 Low    MCV  80 - 100 fL 104 High  100 98 101 High  96 93 93   MCH  26.0 - 34.0 pg 32.5 32.1 32.7 32.3 30.4 28.6 28.2   MCHC  32.0 - 36.0 g/dL 31.3 Low  32.0 33.2 32.0 31.7 Low  30.6 Low  30.4 Low    RDW  11.5 - 14.5 % 15.6 High  15.1 High  16.2 High  17.3 High  18.9 High  19.5 High  19.9 High    Platelets  150 - 450 x10*3/uL 458 High                                ·  Results      , Surgical Pathology Exam: [id number]  Order: [id number]   Collected 10/10/2024 11:25       Status: Final result       Visible to patient: Yes (not seen)       Dx: Pleural effusion; Secondary malignant...    0 Result Notes       Component  Resulting Agency   FINAL DIAGNOSIS    A.  Lung, right pleural biopsy:  - Acute fibrinous pleuritis with atypia.  See note     Note:  Microscopic examination of H&E sections demonstrates acute fibrinous pleuritis with scattered cellular atypia.  AE1/AE3, CK7 and calretinin highlight  some of the atypical cells, consistent with reactive mesothelial cells.  Additionally there is lymphoplasmacytic inflammation.  TRPS 1 highlights inflammatory cells.. WT1 is not contributory.  Congo red is negative for amyloid.           Assessment and Plan:      Assessment and Plan:   Assessment:    1 invasive ductal carcinoma right breast, stage IV, right pleural effusion, multiple hepatic lesions  ER positive, OK positive, HER2 negative  Current treatment with letrozole started in April 2023     Patient is a 68-year-old female with history of MS, chair bound who was admitted in the hospital because of shortness of breath.      March 10, 2023 CAT scan of chest did show right pleural effusion, right breast mass 12 o'clock position, multiple hepatic lesion.      March 11, 2023, status post paracentesis, cytology did show atypical cells      March 13, 2023, ultrasound-guided biopsy of right breast mass done pathology positive for invasive ductal carcinoma, grade 2, ER 95%, OK 60%, HER2 negative , Ultrasound biopsy of right axillary lymph node done and negative for malignancy.     Current treatment, letrozole 2.5 mg p.o. daily, kisqali     8/11/23 , CT of CAP , partial response   5/2/24 , PET , excellent partial response, left breast mass decreased in size, pulmonary nodules and adrenal nodule decreased in size hepatic nodule resolved  History of right pleural effusion and recurrent thoracentesis, large loculated  Thoracic surgical evaluation  10/10/24 ,  Thoracoscopy; Right PleurX Placement, with pleural biopsies with Dr Shepherd  Findings: Completely trapped lung, diffusely thickened parietal and visceral pleura   History of Present Illness:     10/24/24     Metastatic breast cancer, ER positive OK positive,  Currently patient is taking letrozole 2.5 mg p.o. daily  and Kisqali was restarted.  History of recurrent right pleural effusion which is loculated status post thoracotomy and right Pleurx catheter  placement.    Continue current support  No change in treatment and reevaluation after 2 months.                 Time spent 30 minutes.  .

## 2024-10-28 ENCOUNTER — HOME CARE VISIT (OUTPATIENT)
Dept: HOME HEALTH SERVICES | Facility: HOME HEALTH | Age: 69
End: 2024-10-28
Payer: COMMERCIAL

## 2024-10-28 PROCEDURE — G0299 HHS/HOSPICE OF RN EA 15 MIN: HCPCS

## 2024-10-28 NOTE — PROGRESS NOTES
Chief complaint:  Post-op    History Of Present Illness  Mildred Moyer is a 69 y.o. female, former smoker (minimal, couple year hx), with recurrent right pleural effusion now s/p Rt VATS, pleural biopsy with PleurX catheter placement on 10/10/14 at [facility] [place]. She did well and was discharged later that day. Final pathology showed atypia and pleuritis - intra-op the lung was trapped with thick pleural rind.     She did well post-operatively, has had catheter drained just a couple times. Some pressure with drainage. No other acute issues, minimal pain at surgical site. No new cough/congestion. On baseline O2.     By way of review: I saw patient in fall 2024 for a recurrent right pleural effusion - likely malignant, in the setting of metastatic breast cancer. Patient was referred by Dr. Guzman.   Patient was diagnosed with breast CA in the spring of 2023, at that time had pleural effusion showing atypical cells. PET scan showed avidity and thickening along with bilateral pulmonary nodules concerning for metastasis. She had a thoracentesis months ago and drained 2.2L. On most recent thora 9/18 had less output (400mL) but notable pain. During thora prior on 9/10 was seen in ED and had CT chest showing loculated effusion along with nodules and pleural thickening. She presents today for discuss. Patient is on 2-3L NC at baseline. This has not changed despite previous thora procedures but has also not increased recently.     No history of MI or CVA. Could walk a mile or climb 2 flights of stairs: no     Past Medical History  She has a past medical history of GERD (gastroesophageal reflux disease), HLD (hyperlipidemia), HTN (hypertension), Liver disease, Lung neoplasm, MS (multiple sclerosis) (Multi), and Pleural effusion.    Surgical History  She has a past surgical history that includes Dilation and curettage of uterus and Percutaneous chest drain insertion (Right, 10/10/2024).     Social History  She reports that she quit  smoking about 41 years ago. Her smoking use included cigarettes. She started smoking about 43 years ago. She has a 1 pack-year smoking history. She has never used smokeless tobacco. She reports that she does not currently use alcohol. She reports that she does not use drugs.    Family History  Family history of cancer: No  No family history on file.     Medications    Current Outpatient Medications:     acetaminophen (Tylenol 8 HOUR) 650 mg ER tablet, Take by mouth every 8 hours., Disp: , Rfl:     azithromycin (Zithromax) 250 mg tablet, Take 3 tablets (750 mg) by mouth once daily., Disp: , Rfl:     buPROPion XL (Wellbutrin XL) 300 mg 24 hr tablet, Take 1 tablet (300 mg) by mouth once every 24 hours., Disp: , Rfl:     carBAMazepine (TEGretol) 100 mg chewable tablet, Chew 1 tablet (100 mg) every 6 hours., Disp: , Rfl:     cyanocobalamin (Vitamin B-12) 1,000 mcg tablet, Take 1 tablet (1,000 mcg) by mouth once daily., Disp: , Rfl:     cyanocobalamin (Vitamin B-12) 1,000 mcg tablet, Take 1 tablet (1,000 mcg) by mouth once daily., Disp: , Rfl:     cyclobenzaprine (Flexeril) 10 mg tablet, Take 1 tablet (10 mg) by mouth every 12 hours., Disp: , Rfl:     dextromethorphan-quinidine (NUEDEXTA) 20-10 mg capsule, Take by mouth every 12 hours., Disp: , Rfl:     ferrous sulfate, 325 mg ferrous sulfate, tablet, 1 tablet Orally one time a day for 30 days, Disp: , Rfl:     gabapentin (Neurontin) 300 mg capsule, TAKE 1 CAPSULE BY MOUTH THREE TIMES DAILY for 30, Disp: , Rfl:     hydroCHLOROthiazide (HYDRODiuril) 50 mg tablet, Take 1 tablet (50 mg) by mouth once daily., Disp: , Rfl:     hydroCHLOROthiazide (Microzide) 12.5 mg capsule, Take 1 capsule (12.5 mg) by mouth once daily., Disp: , Rfl:     hydrOXYzine HCL (Atarax) 25 mg tablet, Take 1 tablet (25 mg) by mouth once daily., Disp: , Rfl:     ibuprofen 800 mg tablet, Take 1 tablet (800 mg) by mouth 2 times a day., Disp: , Rfl:     letrozole (Femara) 2.5 mg tablet, Take 1 tablet (2.5  mg total) by mouth once daily., Disp: 90 tablet, Rfl: 3    melatonin 10 mg tablet extended release, Take 10 mg by mouth once daily at bedtime., Disp: , Rfl:     methylPREDNISolone (Medrol) 4 mg tablet, Take 1 tablet (4 mg) by mouth., Disp: , Rfl:     omeprazole (PriLOSEC) 20 mg DR capsule, Take 1 capsule (20 mg) by mouth once daily., Disp: , Rfl:     oxyCODONE (Roxicodone) 5 mg immediate release tablet, Take 1 tablet (5 mg) by mouth every 6 hours if needed for severe pain (7 - 10) for up to 5 doses., Disp: 5 tablet, Rfl: 0    oxygen DME/Hospice (O2) therapy, Inhale 3 L/min continuously. Indications: heart failure with reduced ejection fraction, Disp: , Rfl:     potassium chloride 40 mEq/15 mL liquid, Take 15 mL (40 mEq) by mouth 1 time., Disp: , Rfl:     potassium chloride CR 20 mEq ER tablet, once every 24 hours., Disp: , Rfl:     propranolol (Inderal) 20 mg tablet, every 8 hours., Disp: , Rfl:     rosuvastatin (Crestor) 20 mg tablet, Take 1 tablet (20 mg) by mouth once daily., Disp: , Rfl:     walker (Ultra-Light Rollator) misc, as directed, Disp: , Rfl:     DULoxetine (Cymbalta) 60 mg DR capsule, Take 1 capsule (60 mg) by mouth once every 24 hours., Disp: , Rfl:     ribociclib (Kisqali) 3 x 200 mg tablet therapy pack, TAKE THREE (3) TABLETS BY MOUTH ONCE DAILY FOR 21 DAYS THEN 7 DAYS OFF, Disp: 63 each, Rfl: 2    Allergies  Iodinated contrast media, Iodine, Shellfish containing products, and Oxymetazoline    Review of Systems:  Review of Systems   Constitutional: No fevers, chills, unexpected weight change  HENT: No sore throat, congestion, or nasal drainage  Eyes: No visual changes or eye itching  Respiratory: see HPI. No cough, worsening dyspnea, wheezing  Cardiac: No chest pain, palpitations, or lower extremity edema  Gastrointestinal: No nausea, vomiting, diarrhea. No abdominal pain  Genitourinary: No dysuria or hematuria  Musculoskeletal: No back pain. No significant myalgias or arthralgias  Neurologic: No  "headaches, dizziness, or seizures.  Hematologic: No easy bleeding or bruising.  Psychiatric: No anxiety or depression.    Physical Exam:  Physical Exam  /65   Pulse 67   Temp 36.1 °C (97 °F) (Temporal)   Ht 1.626 m (5' 4\")   Wt 64.4 kg (142 lb)   SpO2 96%   BMI 24.37 kg/m²   Constitutional:       General: Patient is not in acute distress.     Appearance: Normal appearance; not ill-appearing.   HENT:      Head: Normocephalic.      Nose: No congestion or rhinorrhea.   Cardiovascular:      Rate and Rhythm: Normal rate and regular rhythm.      Pulses: Normal pulses.   Pulmonary:      Effort: Pulmonary effort is normal. No respiratory distress.  No conversational dyspnea     Right PleurX cath site clean/dry     Breath sounds: No stridor. No wheezing.   Abdominal:      General: There is no distension.      Palpations: Abdomen is soft.      Tenderness: There is no abdominal tenderness.   Musculoskeletal:         General: No swelling, tenderness or deformity. Normal range of motion.      Cervical back: Normal range of motion. No rigidity.   Lymphadenopathy:      Cervical: No cervical adenopathy.   Skin:     General: Skin is warm and dry.   Neurological:      General: No focal deficit present.      Mental Status: Patient is alert and oriented to person, place, and time.   Psychiatric:         Mood and Affect: Mood normal.     Relevant Results    Pathology:  Date of Procedure:  3/11/2023       Date Reported: 3/15/2023   Date Received:  3/13/2023   Date of Birth / Sex 1955 (Age: 68) / F   Race: WHITE   Submitting Physician: ANDREA LEE MD   Attending Physician: MD KAELYN MAO MD            Other External #          FINAL CYTOLOGICAL INTERPRETATION     A.  PLEURAL FLUID - RIGHT SIDE WITH CELL BLOCK:        ATYPICAL CELLS ARE PRESENT, SEE COMMENT.      Imaging:    US thoracentesis    Result Date: 9/18/2024  Interpreted By:  Shun Romero, STUDY: US THORACENTESIS;  " 9/10/2024 1:29 pm   INDICATION: Signs/Symptoms:Thoracentesis, metastatic breast cancer.   COMPARISON: None.   ACCESSION NUMBER(S): [id number]   ORDERING CLINICIAN: KRISTYN CHESTER   TECHNIQUE: INTERVENTIONALIST(S): Shun Romero MD   CONSENT: The patient/patient's POA/next of kin was informed of the nature of the proposed procedure. The purposes, alternatives, risks, and benefits were explained and discussed. All questions were answered and consent was obtained.   SEDATION: None   MEDICATION/CONTRAST: No additional   TIME OUT: A time out was performed immediately prior to procedure start with the interventional team, correctly identifying the patient name, date of birth, MRN, procedure, anatomy (including marking of site and side), patient position, procedure consent form, relevant laboratory and imaging test results, antibiotic administration, safety precautions, and procedure-specific equipment needs.   FINDINGS: The patient was placed in the sitting position.   The right pleural space was evaluated with grey scale ultrasound demonstrating a moderate complex/loculated pleural effusion. The most accessible fluid was identified and marked for thoracentesis.   The skin was prepped and draped in usual manner. Local anesthesia with Lidocaine was administered. A 5 Dutch One-Step thoracentesis needle/catheter was then placed into the right pleural space under direct US guidance. Approximately 400 mL of yellow colored fluid was removed. However, the procedure was aborted as the patient was unable to tolerate further aspiration of the pleural effusion due to pain. The needle/catheter was then removed.   Post procedure US showed no significant change in volume of pleural effusion   There were no immediate complications.       Ultrasound guided right thoracentesis, as detailed above. Only 400 cc of fluid was able to be removed as the patient was unable to tolerate further aspiration of fluid due to pain with aspiration.   I  "personally performed and/or directly supervised this study and was present for the entire procedure.   I personally reviewed the study and resident interpretation. I agree with the findings as stated.   Performed and dictated at [facility].   MACRO: None   Signed by: Shun Romero 9/18/2024 11:52 AM Dictation workstation:   [id number]    XR chest 1 view    Result Date: 9/18/2024  Interpreted By:  Shun Romero, STUDY: XR CHEST 1 VIEW;  9/10/2024 2:10 pm   INDICATION: Signs/Symptoms:SOB.   ,Z98.890 Other specified postprocedural states   COMPARISON: 08/08/2024   ACCESSION NUMBER(S): [id number]   ORDERING CLINICIAN: SHUN ROMERO   FINDINGS:         CARDIOMEDIASTINAL SILHOUETTE: Cardiomediastinal silhouette appears unchanged.   LUNGS: Large right pleural effusion with mid to lower lung collapse and small amount of aerated upper lung overall unchanged from 08/08/2024. No pneumothorax. The left lung is clear.   ABDOMEN: No remarkable upper abdominal findings.   BONES: No acute osseous changes.       1.  Unchanged large right pleural effusion and mid to lower lung atelectasis with minimal aeration of the right upper lung. No pneumothorax.       MACRO: None   Signed by: Shun Romero 9/18/2024 9:50 AM Dictation workstation:   [id number]       Pulmonary Functions Testing Results:    No results found for: \"FEV1\", \"FVC\", \"SLB9TNQ\", \"TLC\", \"DLCO\"    CXR personally reviewed     Assessment/Plan   Problem List Items Addressed This Visit             ICD-10-CM    Pleural effusion J90    Relevant Orders    XR chest 2 views (Completed)       Ms. Moyer is a pleasant 69 year old female who presents with a recurrent right pleural effusion now s/p Rt VATS, pleural biopsy with PleurX catheter placement on 10/10/14 at [facility] [place]. Final pathology showed atypia and pleuritis - intra-op the lung was trapped with thick pleural rind. She continues to follow with Dr. Guzman for her h/o breast cancer. For now we will " continue to drain her PleurX and when it is <25mL for 2 subsequent weeks will bring her back for removal.       Rose Shepherd, DO  Thoracic & Esophageal Surgery

## 2024-10-29 VITALS
DIASTOLIC BLOOD PRESSURE: 64 MMHG | TEMPERATURE: 97.1 F | SYSTOLIC BLOOD PRESSURE: 130 MMHG | HEART RATE: 64 BPM | OXYGEN SATURATION: 97 % | RESPIRATION RATE: 12 BRPM

## 2024-10-29 SDOH — ECONOMIC STABILITY: HOUSING INSECURITY: EVIDENCE OF SMOKING MATERIAL: 0

## 2024-10-29 SDOH — HEALTH STABILITY: MENTAL HEALTH: SMOKING IN HOME: 0

## 2024-10-29 ASSESSMENT — ENCOUNTER SYMPTOMS
PAIN LOCATION - PAIN SEVERITY: 5/10
PAIN LOCATION - RELIEVING FACTORS: MEDICATION, REST
PAIN LOCATION: CHEST
HIGHEST PAIN SEVERITY IN PAST 24 HOURS: 5/10
PAIN LOCATION - PAIN QUALITY: SHARP
PAIN SEVERITY GOAL: 0/10
SUBJECTIVE PAIN PROGRESSION: UNCHANGED
PERSON REPORTING PAIN: PATIENT
LOWEST PAIN SEVERITY IN PAST 24 HOURS: 2/10
PAIN LOCATION - PAIN FREQUENCY: INTERMITTENT
PAIN: 1
PAIN LOCATION - PAIN DURATION: VARIES

## 2024-10-30 SDOH — ECONOMIC STABILITY: GENERAL

## 2024-10-30 ASSESSMENT — ENCOUNTER SYMPTOMS
CHANGE IN APPETITE: UNCHANGED
APPETITE LEVEL: GOOD
FATIGUES EASILY: 1
SHORTNESS OF BREATH: 1
DYSPNEA ON EXERTION: 1
RESPIRATORY PAIN: 1
MUSCLE WEAKNESS: 1
DYSPNEA ACTIVITY LEVEL: AFTER AMBULATING MORE THAN 20 FT

## 2024-10-30 ASSESSMENT — ACTIVITIES OF DAILY LIVING (ADL)
MONEY MANAGEMENT (EXPENSES/BILLS): NEEDS ASSISTANCE
AMBULATION ASSISTANCE: STAND BY ASSIST
AMBULATION ASSISTANCE: 1

## 2024-10-31 ENCOUNTER — SPECIALTY PHARMACY (OUTPATIENT)
Dept: PHARMACY | Facility: CLINIC | Age: 69
End: 2024-10-31

## 2024-11-01 ENCOUNTER — PHARMACY VISIT (OUTPATIENT)
Dept: PHARMACY | Facility: CLINIC | Age: 69
End: 2024-11-01
Payer: COMMERCIAL

## 2024-11-07 ENCOUNTER — HOME CARE VISIT (OUTPATIENT)
Dept: HOME HEALTH SERVICES | Facility: HOME HEALTH | Age: 69
End: 2024-11-07
Payer: COMMERCIAL

## 2024-11-14 ENCOUNTER — HOME CARE VISIT (OUTPATIENT)
Dept: HOME HEALTH SERVICES | Facility: HOME HEALTH | Age: 69
End: 2024-11-14
Payer: COMMERCIAL

## 2024-11-14 VITALS
DIASTOLIC BLOOD PRESSURE: 58 MMHG | SYSTOLIC BLOOD PRESSURE: 124 MMHG | OXYGEN SATURATION: 97 % | TEMPERATURE: 97.8 F | HEART RATE: 78 BPM | RESPIRATION RATE: 12 BRPM

## 2024-11-14 PROCEDURE — G0299 HHS/HOSPICE OF RN EA 15 MIN: HCPCS

## 2024-11-14 SDOH — ECONOMIC STABILITY: GENERAL

## 2024-11-14 SDOH — ECONOMIC STABILITY: HOUSING INSECURITY: EVIDENCE OF SMOKING MATERIAL: 0

## 2024-11-14 SDOH — HEALTH STABILITY: MENTAL HEALTH: SMOKING IN HOME: 0

## 2024-11-14 ASSESSMENT — ENCOUNTER SYMPTOMS
SUBJECTIVE PAIN PROGRESSION: UNCHANGED
APPETITE LEVEL: GOOD
PAIN LOCATION - PAIN FREQUENCY: INTERMITTENT
PAIN: 1
PAIN LOCATION - PAIN DURATION: VARIES
PAIN LOCATION - RELIEVING FACTORS: MEDICATION, REST
HIGHEST PAIN SEVERITY IN PAST 24 HOURS: 5/10
PAIN LOCATION: BACK
FATIGUES EASILY: 1
SHORTNESS OF BREATH: 1
PERSON REPORTING PAIN: PATIENT
DYSPNEA ACTIVITY LEVEL: AFTER AMBULATING MORE THAN 20 FT
MUSCLE WEAKNESS: 1
PAIN SEVERITY GOAL: 0/10
CHANGE IN APPETITE: UNCHANGED
PAIN LOCATION - PAIN SEVERITY: 5/10
PAIN LOCATION - EXACERBATING FACTORS: LUNG CANCER
FATIGUE: 1
LOWEST PAIN SEVERITY IN PAST 24 HOURS: 2/10
PAIN LOCATION - PAIN QUALITY: SHARP
DYSPNEA ON EXERTION: 1

## 2024-11-14 ASSESSMENT — ACTIVITIES OF DAILY LIVING (ADL)
AMBULATION ASSISTANCE: 1
MONEY MANAGEMENT (EXPENSES/BILLS): NEEDS ASSISTANCE
AMBULATION ASSISTANCE: STAND BY ASSIST

## 2024-11-20 ENCOUNTER — HOME CARE VISIT (OUTPATIENT)
Dept: HOME HEALTH SERVICES | Facility: HOME HEALTH | Age: 69
End: 2024-11-20
Payer: COMMERCIAL

## 2024-11-20 PROCEDURE — G0299 HHS/HOSPICE OF RN EA 15 MIN: HCPCS

## 2024-11-21 VITALS
DIASTOLIC BLOOD PRESSURE: 62 MMHG | HEART RATE: 68 BPM | TEMPERATURE: 97.2 F | OXYGEN SATURATION: 92 % | SYSTOLIC BLOOD PRESSURE: 110 MMHG | RESPIRATION RATE: 14 BRPM

## 2024-11-21 SDOH — ECONOMIC STABILITY: HOUSING INSECURITY: EVIDENCE OF SMOKING MATERIAL: 0

## 2024-11-21 SDOH — HEALTH STABILITY: MENTAL HEALTH: SMOKING IN HOME: 0

## 2024-11-21 ASSESSMENT — ENCOUNTER SYMPTOMS
PERSON REPORTING PAIN: PATIENT
PAIN LOCATION - PAIN FREQUENCY: INTERMITTENT
PAIN LOCATION - PAIN SEVERITY: 5/10
PAIN SEVERITY GOAL: 1/10
PAIN LOCATION - RELIEVING FACTORS: MEDICATION
LOWEST PAIN SEVERITY IN PAST 24 HOURS: 3/10
PAIN: 1
PAIN LOCATION - PAIN QUALITY: SHARP
HIGHEST PAIN SEVERITY IN PAST 24 HOURS: 6/10
PAIN LOCATION - PAIN DURATION: VARIES
PAIN LOCATION: BACK
SUBJECTIVE PAIN PROGRESSION: UNCHANGED
PAIN LOCATION - EXACERBATING FACTORS: LUNG CA

## 2024-11-22 SDOH — ECONOMIC STABILITY: GENERAL

## 2024-11-22 ASSESSMENT — ENCOUNTER SYMPTOMS
FATIGUE: 1
FATIGUES EASILY: 1
SHORTNESS OF BREATH: 1
CHANGE IN APPETITE: UNCHANGED
APPETITE LEVEL: GOOD
RESPIRATORY PAIN: 1
MUSCLE WEAKNESS: 1
DYSPNEA ACTIVITY LEVEL: AFTER AMBULATING 10 - 20 FT

## 2024-11-26 PROCEDURE — RXMED WILLOW AMBULATORY MEDICATION CHARGE

## 2024-11-27 ENCOUNTER — HOME CARE VISIT (OUTPATIENT)
Dept: HOME HEALTH SERVICES | Facility: HOME HEALTH | Age: 69
End: 2024-11-27
Payer: COMMERCIAL

## 2024-11-27 VITALS
RESPIRATION RATE: 12 BRPM | OXYGEN SATURATION: 99 % | HEART RATE: 74 BPM | TEMPERATURE: 97 F | DIASTOLIC BLOOD PRESSURE: 62 MMHG | SYSTOLIC BLOOD PRESSURE: 110 MMHG

## 2024-11-27 PROCEDURE — G0299 HHS/HOSPICE OF RN EA 15 MIN: HCPCS

## 2024-11-27 SDOH — ECONOMIC STABILITY: GENERAL

## 2024-11-27 ASSESSMENT — ENCOUNTER SYMPTOMS
HIGHEST PAIN SEVERITY IN PAST 24 HOURS: 4/10
PAIN LOCATION - RELIEVING FACTORS: MEDICATION, REST
PAIN: 1
FATIGUES EASILY: 1
LOWEST PAIN SEVERITY IN PAST 24 HOURS: 2/10
APPETITE LEVEL: GOOD
PAIN LOCATION - EXACERBATING FACTORS: LUNG CA
PAIN LOCATION: CHEST
DYSPNEA ACTIVITY LEVEL: AFTER AMBULATING 10 - 20 FT
FATIGUE: 1
PERSON REPORTING PAIN: PATIENT
MUSCLE WEAKNESS: 1
SUBJECTIVE PAIN PROGRESSION: UNCHANGED
PAIN LOCATION - PAIN QUALITY: SHARP
PAIN LOCATION - PAIN FREQUENCY: INTERMITTENT
PAIN LOCATION - PAIN SEVERITY: 4/10
PAIN SEVERITY GOAL: 0/10
PAIN LOCATION - PAIN DURATION: VARIES
CHANGE IN APPETITE: UNCHANGED
SHORTNESS OF BREATH: 1

## 2024-12-02 ENCOUNTER — SPECIALTY PHARMACY (OUTPATIENT)
Dept: PHARMACY | Facility: CLINIC | Age: 69
End: 2024-12-02

## 2024-12-03 ENCOUNTER — PHARMACY VISIT (OUTPATIENT)
Dept: PHARMACY | Facility: CLINIC | Age: 69
End: 2024-12-03
Payer: COMMERCIAL

## 2024-12-04 ENCOUNTER — HOME CARE VISIT (OUTPATIENT)
Dept: HOME HEALTH SERVICES | Facility: HOME HEALTH | Age: 69
End: 2024-12-04
Payer: COMMERCIAL

## 2024-12-04 PROCEDURE — G0299 HHS/HOSPICE OF RN EA 15 MIN: HCPCS

## 2024-12-05 VITALS
TEMPERATURE: 97.2 F | SYSTOLIC BLOOD PRESSURE: 102 MMHG | RESPIRATION RATE: 16 BRPM | OXYGEN SATURATION: 99 % | DIASTOLIC BLOOD PRESSURE: 72 MMHG | HEART RATE: 72 BPM

## 2024-12-05 SDOH — ECONOMIC STABILITY: GENERAL

## 2024-12-05 SDOH — ECONOMIC STABILITY: HOUSING INSECURITY: EVIDENCE OF SMOKING MATERIAL: 0

## 2024-12-05 SDOH — HEALTH STABILITY: MENTAL HEALTH: SMOKING IN HOME: 0

## 2024-12-05 ASSESSMENT — ENCOUNTER SYMPTOMS
FATIGUES EASILY: 1
FATIGUE: 1
PERSON REPORTING PAIN: PATIENT
LAST BOWEL MOVEMENT: 67178
MUSCLE WEAKNESS: 1
DENIES PAIN: 1
APPETITE LEVEL: GOOD

## 2024-12-05 ASSESSMENT — PAIN SCALES - PAIN ASSESSMENT IN ADVANCED DEMENTIA (PAINAD)
CONSOLABILITY: 0 - NO NEED TO CONSOLE.
NEGVOCALIZATION: 0
FACIALEXPRESSION: 0 - SMILING OR INEXPRESSIVE.
BODYLANGUAGE: 0 - RELAXED.
FACIALEXPRESSION: 0
BODYLANGUAGE: 0
TOTALSCORE: 0
BREATHING: 0
CONSOLABILITY: 0
NEGVOCALIZATION: 0 - NONE.

## 2024-12-05 ASSESSMENT — ACTIVITIES OF DAILY LIVING (ADL)
CURRENT_FUNCTION: STAND BY ASSIST
AMBULATION ASSISTANCE: STAND BY ASSIST
MONEY MANAGEMENT (EXPENSES/BILLS): INDEPENDENT

## 2024-12-05 NOTE — HOME HEALTH
With this SN visit, patient presented alert and oriented x 3 and was pleasant and cooperative. Patient sat on edge of bed in room at [facility] of patient's residence for assessment. The skill of a nurse is required to perform pleurx drain as ordered due to pleural effusion. Discussed DC from services when goals are met. Patient verbalized understanding.

## 2024-12-09 ENCOUNTER — HOME CARE VISIT (OUTPATIENT)
Dept: HOME HEALTH SERVICES | Facility: HOME HEALTH | Age: 69
End: 2024-12-09
Payer: COMMERCIAL

## 2024-12-09 VITALS
OXYGEN SATURATION: 97 % | TEMPERATURE: 97 F | HEART RATE: 70 BPM | SYSTOLIC BLOOD PRESSURE: 120 MMHG | DIASTOLIC BLOOD PRESSURE: 70 MMHG | RESPIRATION RATE: 12 BRPM

## 2024-12-09 PROCEDURE — G0299 HHS/HOSPICE OF RN EA 15 MIN: HCPCS

## 2024-12-12 SDOH — ECONOMIC STABILITY: HOUSING INSECURITY: EVIDENCE OF SMOKING MATERIAL: 0

## 2024-12-12 SDOH — HEALTH STABILITY: MENTAL HEALTH: SMOKING IN HOME: 0

## 2024-12-12 ASSESSMENT — ENCOUNTER SYMPTOMS
APPETITE LEVEL: GOOD
MUSCLE WEAKNESS: 1
RESPIRATORY PAIN: 1
PAIN: 1
PAIN LOCATION - EXACERBATING FACTORS: PLEUREX
CHANGE IN APPETITE: UNCHANGED
PAIN LOCATION - RELIEVING FACTORS: MEDICATION, REST
PAIN SEVERITY GOAL: 0/10
PAIN LOCATION - PAIN SEVERITY: 3/10
SUBJECTIVE PAIN PROGRESSION: UNCHANGED
FATIGUES EASILY: 1
DYSPNEA ACTIVITY LEVEL: AFTER AMBULATING 10 - 20 FT
PAIN LOCATION - PAIN FREQUENCY: INTERMITTENT
SHORTNESS OF BREATH: 1
PAIN LOCATION - PAIN DURATION: VARIES
PAIN LOCATION: CHEST
PERSON REPORTING PAIN: PATIENT
HIGHEST PAIN SEVERITY IN PAST 24 HOURS: 5/10
DYSPNEA ON EXERTION: 1
FATIGUE: 1
LOWEST PAIN SEVERITY IN PAST 24 HOURS: 2/10

## 2024-12-12 ASSESSMENT — ACTIVITIES OF DAILY LIVING (ADL)
OASIS_M1830: 03
CURRENT_FUNCTION: STAND BY ASSIST
ENTERING_EXITING_HOME: MODERATE ASSIST
AMBULATION ASSISTANCE: STAND BY ASSIST

## 2024-12-17 ENCOUNTER — HOME CARE VISIT (OUTPATIENT)
Dept: HOME HEALTH SERVICES | Facility: HOME HEALTH | Age: 69
End: 2024-12-17
Payer: COMMERCIAL

## 2024-12-17 PROCEDURE — G0299 HHS/HOSPICE OF RN EA 15 MIN: HCPCS

## 2024-12-18 VITALS
DIASTOLIC BLOOD PRESSURE: 50 MMHG | SYSTOLIC BLOOD PRESSURE: 92 MMHG | HEART RATE: 50 BPM | TEMPERATURE: 98.6 F | RESPIRATION RATE: 20 BRPM

## 2024-12-18 SDOH — ECONOMIC STABILITY: GENERAL

## 2024-12-18 ASSESSMENT — ACTIVITIES OF DAILY LIVING (ADL)
MONEY MANAGEMENT (EXPENSES/BILLS): INDEPENDENT
CURRENT_FUNCTION: STAND BY ASSIST
AMBULATION ASSISTANCE: STAND BY ASSIST

## 2024-12-18 ASSESSMENT — PAIN SCALES - PAIN ASSESSMENT IN ADVANCED DEMENTIA (PAINAD)
NEGVOCALIZATION: 0
BODYLANGUAGE: 0 - RELAXED.
BODYLANGUAGE: 0
CONSOLABILITY: 0 - NO NEED TO CONSOLE.
TOTALSCORE: 0
BREATHING: 0
FACIALEXPRESSION: 0
NEGVOCALIZATION: 0 - NONE.
FACIALEXPRESSION: 0 - SMILING OR INEXPRESSIVE.
CONSOLABILITY: 0

## 2024-12-18 ASSESSMENT — ENCOUNTER SYMPTOMS
PERSON REPORTING PAIN: PATIENT
FATIGUE: 1
CHANGE IN APPETITE: UNCHANGED
FATIGUES EASILY: 1
APPETITE LEVEL: GOOD
DENIES PAIN: 1
MUSCLE WEAKNESS: 1

## 2024-12-18 NOTE — HOME HEALTH
With this SN visit, patient presented alert and oriented x 3 and was pleasant and cooperative. Patient sat on edge of bed in bedroom for asssessment. Used Pleurx drain to drain 200 cc serous fluid as ordered from insertion site right side upper abd. Patient tolerated well. Sterile technique followed. Discussed DC from nursing when goals are met. Patient verbalized understanding.

## 2024-12-24 ENCOUNTER — HOME CARE VISIT (OUTPATIENT)
Dept: HOME HEALTH SERVICES | Facility: HOME HEALTH | Age: 69
End: 2024-12-24
Payer: COMMERCIAL

## 2024-12-24 PROCEDURE — G0299 HHS/HOSPICE OF RN EA 15 MIN: HCPCS

## 2024-12-26 ENCOUNTER — SPECIALTY PHARMACY (OUTPATIENT)
Dept: PHARMACY | Facility: CLINIC | Age: 69
End: 2024-12-26

## 2024-12-26 VITALS
DIASTOLIC BLOOD PRESSURE: 72 MMHG | OXYGEN SATURATION: 97 % | RESPIRATION RATE: 12 BRPM | SYSTOLIC BLOOD PRESSURE: 126 MMHG | HEART RATE: 68 BPM | TEMPERATURE: 97.2 F

## 2024-12-26 PROCEDURE — RXMED WILLOW AMBULATORY MEDICATION CHARGE

## 2024-12-26 SDOH — ECONOMIC STABILITY: GENERAL

## 2024-12-26 ASSESSMENT — ENCOUNTER SYMPTOMS
PAIN LOCATION: CHEST
PAIN LOCATION - PAIN SEVERITY: 4/10
PAIN LOCATION - PAIN FREQUENCY: INTERMITTENT
PAIN SEVERITY GOAL: 0/10
SUBJECTIVE PAIN PROGRESSION: UNCHANGED
PAIN LOCATION - RELIEVING FACTORS: MEDICATION REST
LOWEST PAIN SEVERITY IN PAST 24 HOURS: 2/10
APPETITE LEVEL: GOOD
FATIGUES EASILY: 1
PERSON REPORTING PAIN: PATIENT
PAIN LOCATION - EXACERBATING FACTORS: LUNG CA
HIGHEST PAIN SEVERITY IN PAST 24 HOURS: 5/10
DYSPNEA ACTIVITY LEVEL: AFTER AMBULATING MORE THAN 20 FT
CHANGE IN APPETITE: UNCHANGED
PAIN LOCATION - PAIN DURATION: VARIES
FATIGUE: 1
SHORTNESS OF BREATH: 1
PAIN: 1

## 2024-12-26 ASSESSMENT — ACTIVITIES OF DAILY LIVING (ADL)
AMBULATION ASSISTANCE: 1
AMBULATION ASSISTANCE: SUPERVISION
MONEY MANAGEMENT (EXPENSES/BILLS): NEEDS ASSISTANCE

## 2024-12-27 ENCOUNTER — PHARMACY VISIT (OUTPATIENT)
Dept: PHARMACY | Facility: CLINIC | Age: 69
End: 2024-12-27
Payer: COMMERCIAL

## 2024-12-30 DIAGNOSIS — Z17.0 MALIGNANT NEOPLASM OF LOWER-OUTER QUADRANT OF RIGHT BREAST OF FEMALE, ESTROGEN RECEPTOR POSITIVE: ICD-10-CM

## 2024-12-30 DIAGNOSIS — C50.511 MALIGNANT NEOPLASM OF LOWER-OUTER QUADRANT OF RIGHT BREAST OF FEMALE, ESTROGEN RECEPTOR POSITIVE: ICD-10-CM

## 2024-12-31 ENCOUNTER — HOME CARE VISIT (OUTPATIENT)
Dept: HOME HEALTH SERVICES | Facility: HOME HEALTH | Age: 69
End: 2024-12-31
Payer: COMMERCIAL

## 2025-01-01 ENCOUNTER — TELEPHONE (OUTPATIENT)
Dept: HEMATOLOGY/ONCOLOGY | Facility: CLINIC | Age: 70
End: 2025-01-01
Payer: COMMERCIAL

## 2025-01-02 ENCOUNTER — SPECIALTY PHARMACY (OUTPATIENT)
Dept: HEMATOLOGY/ONCOLOGY | Facility: CLINIC | Age: 70
End: 2025-01-02
Payer: COMMERCIAL

## 2025-01-02 ENCOUNTER — ONCOLOGY MEDICATION OUTREACH (OUTPATIENT)
Dept: HEMATOLOGY/ONCOLOGY | Facility: CLINIC | Age: 70
End: 2025-01-02
Payer: COMMERCIAL

## 2025-01-02 ENCOUNTER — OFFICE VISIT (OUTPATIENT)
Dept: HEMATOLOGY/ONCOLOGY | Facility: CLINIC | Age: 70
End: 2025-01-02
Payer: COMMERCIAL

## 2025-01-02 VITALS
TEMPERATURE: 97.3 F | SYSTOLIC BLOOD PRESSURE: 132 MMHG | WEIGHT: 139.44 LBS | BODY MASS INDEX: 23.81 KG/M2 | HEIGHT: 64 IN | DIASTOLIC BLOOD PRESSURE: 75 MMHG | RESPIRATION RATE: 16 BRPM | OXYGEN SATURATION: 99 %

## 2025-01-02 DIAGNOSIS — C50.511 MALIGNANT NEOPLASM OF LOWER-OUTER QUADRANT OF RIGHT BREAST OF FEMALE, ESTROGEN RECEPTOR POSITIVE: ICD-10-CM

## 2025-01-02 DIAGNOSIS — Z17.0 MALIGNANT NEOPLASM OF LOWER-OUTER QUADRANT OF RIGHT BREAST OF FEMALE, ESTROGEN RECEPTOR POSITIVE: ICD-10-CM

## 2025-01-02 LAB
ALBUMIN SERPL BCP-MCNC: 3.5 G/DL (ref 3.4–5)
ALP SERPL-CCNC: 76 U/L (ref 33–136)
ALT SERPL W P-5'-P-CCNC: 16 U/L (ref 7–45)
ANION GAP SERPL CALC-SCNC: 10 MMOL/L (ref 10–20)
AST SERPL W P-5'-P-CCNC: 19 U/L (ref 9–39)
BASOPHILS # BLD AUTO: 0.02 X10*3/UL (ref 0–0.1)
BASOPHILS NFR BLD AUTO: 0.4 %
BILIRUB SERPL-MCNC: 0.3 MG/DL (ref 0–1.2)
BUN SERPL-MCNC: 15 MG/DL (ref 6–23)
CALCIUM SERPL-MCNC: 9 MG/DL (ref 8.6–10.3)
CANCER AG27-29 SERPL-ACNC: 62.4 U/ML (ref 0–38.6)
CHLORIDE SERPL-SCNC: 102 MMOL/L (ref 98–107)
CO2 SERPL-SCNC: 31 MMOL/L (ref 21–32)
CREAT SERPL-MCNC: 1.11 MG/DL (ref 0.5–1.05)
EGFRCR SERPLBLD CKD-EPI 2021: 55 ML/MIN/1.73M*2
EOSINOPHIL # BLD AUTO: 0.2 X10*3/UL (ref 0–0.7)
EOSINOPHIL NFR BLD AUTO: 4.1 %
ERYTHROCYTE [DISTWIDTH] IN BLOOD BY AUTOMATED COUNT: 15.7 % (ref 11.5–14.5)
GLUCOSE SERPL-MCNC: 104 MG/DL (ref 74–99)
HCT VFR BLD AUTO: 35.1 % (ref 36–46)
HGB BLD-MCNC: 11.4 G/DL (ref 12–16)
IMM GRANULOCYTES # BLD AUTO: 0.01 X10*3/UL (ref 0–0.7)
IMM GRANULOCYTES NFR BLD AUTO: 0.2 % (ref 0–0.9)
LYMPHOCYTES # BLD AUTO: 1.09 X10*3/UL (ref 1.2–4.8)
LYMPHOCYTES NFR BLD AUTO: 22.5 %
MCH RBC QN AUTO: 33.9 PG (ref 26–34)
MCHC RBC AUTO-ENTMCNC: 32.5 G/DL (ref 32–36)
MCV RBC AUTO: 105 FL (ref 80–100)
MONOCYTES # BLD AUTO: 0.72 X10*3/UL (ref 0.1–1)
MONOCYTES NFR BLD AUTO: 14.8 %
NEUTROPHILS # BLD AUTO: 2.81 X10*3/UL (ref 1.2–7.7)
NEUTROPHILS NFR BLD AUTO: 58 %
PLATELET # BLD AUTO: 277 X10*3/UL (ref 150–450)
POTASSIUM SERPL-SCNC: 3.8 MMOL/L (ref 3.5–5.3)
PROT SERPL-MCNC: 7.2 G/DL (ref 6.4–8.2)
RBC # BLD AUTO: 3.36 X10*6/UL (ref 4–5.2)
SODIUM SERPL-SCNC: 139 MMOL/L (ref 136–145)
WBC # BLD AUTO: 4.9 X10*3/UL (ref 4.4–11.3)

## 2025-01-02 PROCEDURE — 1157F ADVNC CARE PLAN IN RCRD: CPT | Performed by: INTERNAL MEDICINE

## 2025-01-02 PROCEDURE — 80053 COMPREHEN METABOLIC PANEL: CPT | Performed by: INTERNAL MEDICINE

## 2025-01-02 PROCEDURE — 3008F BODY MASS INDEX DOCD: CPT | Performed by: INTERNAL MEDICINE

## 2025-01-02 PROCEDURE — 86300 IMMUNOASSAY TUMOR CA 15-3: CPT | Mod: PORLAB | Performed by: INTERNAL MEDICINE

## 2025-01-02 PROCEDURE — 85025 COMPLETE CBC W/AUTO DIFF WBC: CPT | Performed by: INTERNAL MEDICINE

## 2025-01-02 PROCEDURE — 99214 OFFICE O/P EST MOD 30 MIN: CPT | Performed by: INTERNAL MEDICINE

## 2025-01-02 PROCEDURE — 1126F AMNT PAIN NOTED NONE PRSNT: CPT | Performed by: INTERNAL MEDICINE

## 2025-01-02 PROCEDURE — 36415 COLL VENOUS BLD VENIPUNCTURE: CPT | Performed by: INTERNAL MEDICINE

## 2025-01-02 PROCEDURE — 3078F DIAST BP <80 MM HG: CPT | Performed by: INTERNAL MEDICINE

## 2025-01-02 PROCEDURE — 3075F SYST BP GE 130 - 139MM HG: CPT | Performed by: INTERNAL MEDICINE

## 2025-01-02 PROCEDURE — 1159F MED LIST DOCD IN RCRD: CPT | Performed by: INTERNAL MEDICINE

## 2025-01-02 PROCEDURE — 1160F RVW MEDS BY RX/DR IN RCRD: CPT | Performed by: INTERNAL MEDICINE

## 2025-01-02 ASSESSMENT — PAIN SCALES - GENERAL: PAINLEVEL_OUTOF10: 0-NO PAIN

## 2025-01-02 NOTE — PROGRESS NOTES
Mildred Moyer   Female,   1955  MRN: [id number]    Patient Visit Information:   Visit Type: Follow Up Visit      Cancer History:   Treatment Synopsis:    1.  invasive ductal carcinoma right breast, stage IV, right pleural effusion, multiple hepatic lesions     Patient is a 68-year-old female with history of MS, chair bound who was admitted in the hospital because of shortness of breath.        March 10, 2023 CAT scan of chest did show right pleural effusion, right breast mass 12 o'clock position, multiple hepatic lesion and multiple pulmonary nodule.      March 11, 2023, status post paracentesis, cytology did show atypical cells      March 13, 2023, ultrasound-guided biopsy of right breast mass done pathology positive for invasive ductal carcinoma, grade 2, ER 95%, NV 60%, HER2 negative  , Ultrasound biopsy of right axillary lymph node done and negative for malignancy.     4/25/23 , PET scan did show abnormal metabolic activity of right breast, pulmonary nodules, mediastinal lymph node, hepatic lesion.     Current treatment, letrozole, Kisqali      8/11/23 , CT of chest abdominal pelvis, partial response, right breast mass, pulmonary nodules decreased in size   5/2/24 , PET , excellent partial response, left breast mass decreased in size, pulmonary nodules and adrenal nodule decreased in size hepatic nodule resolved  History of right pleural effusion and recurrent thoracentesis, large loculated  Thoracic surgical evaluation  10/10/24 ,  Thoracoscopy; Right PleurX Placement, with pleural biopsies with Dr Shepherd  Findings: Completely trapped lung, diffusely thickened parietal and visceral pleura     History of Present Illness:        ID Statement:    MILDRED MOYER is a 68 year old Female        Chief Complaint: Right breast cancer   Interval History:    Patient is a 68-year-old female with history of MS, chair bound who was admitted in the hospital  because of shortness of breath.      March 10, 2023 CAT scan of chest did show right pleural effusion, right breast mass 12 o'clock position, multiple hepatic lesion.      March 11, 2023, status post paracentesis, cytology did show atypical cells      March 13, 2023, ultrasound-guided biopsy of right breast mass done pathology positive for invasive ductal carcinoma, grade 2, ER 95%, TX 60%, HER2 negative , Ultrasound biopsy of right axillary lymph node done and negative for malignancy.     Medical oncology consultation requested, patient is very anxious about her diagnosis.        Pathology report discussed with the patient.      Patient is sitting on chair, very anxious, no headache and dizziness, no shortness of breath, no nausea vomiting, no abdominal pain, generalized weakness and fatigue.     1/2/25  Has a history of metastatic breast cancer taking letrozole and Kisqali.    Patient history of recurrent right pleural effusion status post multiple thoracentesis and was diagnosed with loculated pleural effusion, thoracic surgical evaluation and on 10/10/24  s/pThoracoscopy; Right PleurX Placement, with pleural biopsies with Dr Shepherd  Findings: Completely trapped lung, diffusely thickened parietal and visceral pleura      Patient doing well, on oxygen supplement, very mild right chest pain, some shortness of breath.  Thoracentesis is done once a week, no problem with the medication    Review of Systems:   Review of Systems:    No headache or dizziness      No fever      some shortness of breath        No breast tenderness      No nausea vomiting      No abdominal pain      Generalized weakness      All body pain, arthritis      Patient not active difficult to ambulate due to Morphine Sulfate        Allergies and Intolerances:       Allergies:         contrast (specific type unknown): Contrast, Anaphylaxis,  Active         iodine: Drug, Anaphylaxis, Active         Shellfish: Food, Anaphylaxis, Active      Outpatient Medication Profile:  * Patient Currently Takes Medications as of 07-Sep-2023 11:20 documented in Structured Notes         disability placard : valid for 5 years         exp: 9/2028, Start Date: 07-Sep-2023         Kisqali (200 mg daily dose) oral tablet: Last Dose Taken:  , 3 tab(s)  orally once a day  daily for 21 days then 7 days off , Start Date: 16-Aug-2023         letrozole 2.5 mg oral tablet: Last Dose Taken:  , 1 tab(s) orally once  a day , Start Date: 31-Jul-2023         portable home oxygen concentrator 2L/min continuously via nasal canula : Last Dose Taken:  , Start Date: 27-Apr-2023         image guided biopsy of liver lesion : Last Dose Taken:  , Start Date:  30-Mar-2023         K-Tab 20 mEq oral tablet, extended release: Last Dose Taken:  , 2 tab(s)  orally once a day , Start Date: 14-Mar-2023         amoxicillin-clavulanate 875 mg-125 mg oral tablet: Last Dose Taken:   , 1 tab(s) orally 2 times a day , Start Date: 14-Mar-2023         buPROPion 300 mg/24 hours (XL) oral tablet, extended release: Last Dose  Taken:  , 1 tab(s) orally every 24 hours         carBAMazepine 100 mg oral tablet, chewable: Last Dose Taken:  , 1 tab  by mouth 3 times daily. Then 3 tabs by mouth once every evening         cyclobenzaprine 10 mg oral tablet: Last Dose Taken:  , 1 tab(s) orally  2 times a day         hydroCHLOROthiazide 12.5 mg oral capsule: Last Dose Taken:  , 1 cap(s)  orally once a day         hydrOXYzine hydrochloride 25 mg oral tablet: Last Dose Taken:  , 1 tab(s)  orally once a day         hydrOXYzine hydrochloride 50 mg oral tablet: Last Dose Taken:  , 1 tab(s)  orally once a day (at bedtime)         ibuprofen 800 mg oral tablet: Last Dose Taken:  , 1 tab(s) orally 2 times  a day         MethylPREDNISolone Dose Pack 4 mg oral tablet: Last Dose Taken:  , Day  3: 1 tab by mouth four times a day         Day 4: 1 tab by mouth three times a day          Day 5: 1 tab by mouth twice a day         day 6:  1 tab by mouth once          rosuvastatin 20 mg oral tablet: Last Dose Taken:  , 1 tab(s) orally once  a day         Vitamin B12 1000 mcg oral tablet: Last Dose Taken:  , 1 tab(s) orally  once a day         azithromycin 250 mg oral tablet: Last Dose Taken:  , 3 tabs by mouth  once every evening          melatonin 10 mg oral tablet, extended release: Last Dose Taken:  , 1  tab(s) orally once a day (at bedtime)         dextromethorphan-quinidine 20 mg-10 mg oral capsule: Last Dose Taken:   , 1 cap(s) orally every 12 hours         omeprazole 20 mg oral delayed release capsule: Last Dose Taken:  , 1  cap(s) orally once a day             Medical History:         Hypoxia: ICD-10: R09.02, Status: Active         Pleural effusion on right: ICD-10: J90, Status: Active         Infiltrating ductal carcinoma of right breast, stage 4: ICD-10:  C50.911, Status: Active         MS (multiple sclerosis): ICD-10: G35, Status: Active     Family History: No Family History items are recorded  in the problem list.      Social History:   Social Substance History:  ·  Smoking Status never smoker (1)   ·  Alcohol Use denies   ·  Drug Use denies            Vitals and Measurements:   Vitals: Temp: 36.5  HR: NA  RR: 16  BP: 103/63  SPO2%:   NA   Measurements: HT(cm): 162.4  WT(kg): 53.8  BSA: 1.55   BMI:  20.3   Second Set of Vitals/Vitals Comment: unable to get  patient's pulse ox(2)   Last 3 Weights & Heights: Date:                           Weight/Scale Type:                    Height:   07-Sep-2023 10:40                53.8  kg                     162.4  cm  08-Jun-2023 14:55                63.2  kg                     162.4  cm  27-Apr-2023 11:20                69.5  kg                     162.4  cm      Physical Exam:      Constitutional: awake/alert/oriented x3, no distress,  very anxious, sitting on chair   Eyes: PERRL, EOMI, clear sclera   Head/Neck: Neck supple, no cervical lymphadenopathy   Respiratory/Thorax: Fair air movement on  left side,  decreased breath sounds of right side status post right Pleurx catheter placement   Cardiovascular: Regular, rate and rhythm,  normal  S 1and S 2   Gastrointestinal: Nondistended, soft, non-tender,   no masses palpable, no organomegaly, +BS,   Extremities: normal extremities, no cyanosis edema,   Neurological: alert and oriented x3,   Breast: Right breast examination did show a mass  measuring 1 cm on 12 o'clock position     Left breast examination within normal limits   Lymphatic: No significant lymphadenopathy   Psychological: Patient is very anxious   Skin: Warm and dry, no lesions, no rashes         Lab Results:       /2/25) 2 mo ago  (10/24/24) 3 mo ago  (9/24/24) 3 mo ago  (9/10/24) 4 mo ago  (8/8/24) 8 mo ago  (4/25/24) 1 yr ago  (12/28/23)    WBC  4.4 - 11.3 x10*3/uL 4.9 5.4 6.4 5.7 4.6 5.9 7.6   RBC  4.00 - 5.20 x10*6/uL 3.36 Low  3.11 Low  3.52 Low  4.04 3.53 Low  3.32 Low  3.43 Low    Hemoglobin  12.0 - 16.0 g/dL 11.4 Low  10.1 Low  11.3 Low  13.2 11.4 Low  10.1 Low  9.8 Low    Hematocrit  36.0 - 46.0 % 35.1 Low  32.3 Low  35.3 Low  39.7 35.6 Low  31.9 Low  32.0 Low    MCV  80 - 100 fL 105 High  104 High  100 98 101 High  96 93   MCH  26.0 - 34.0 pg 33.9 32.5 32.1 32.7 32.3 30.4 28.6   MCHC  32.0 - 36.0 g/dL 32.5 31.3 Low  32.0 33.2 32.0 31.7 Low  30.6 Low    RDW  11.5 - 14.5 % 15.7 High  15.6 High  15.1 High  16.2 High  17.3 High  18.9 High  19.5 High    Platelets  150 - 450 x10*3/uL 277                               ·  Results      , Surgical Pathology Exam: [id number]  Order: [id number]   Collected 10/10/2024 11:25       Status: Final result       Visible to patient: Yes (not seen)       Dx: Pleural effusion; Secondary malignant...    0 Result Notes       Component  Resulting Agency   FINAL DIAGNOSIS    A.  Lung, right pleural biopsy:  - Acute fibrinous pleuritis with atypia.  See note     Note:  Microscopic examination of H&E sections demonstrates acute fibrinous pleuritis with scattered  cellular atypia.  AE1/AE3, CK7 and calretinin highlight some of the atypical cells, consistent with reactive mesothelial cells.  Additionally there is lymphoplasmacytic inflammation.  TRPS 1 highlights inflammatory cells.. WT1 is not contributory.  Congo red is negative for amyloid.           Assessment and Plan:      Assessment and Plan:   Assessment:    1 invasive ductal carcinoma right breast, stage IV, right pleural effusion, multiple hepatic lesions  ER positive, IL positive, HER2 negative  Current treatment with letrozole started in April 2023     Patient is a 68-year-old female with history of MS, chair bound who was admitted in the hospital because of shortness of breath.      March 10, 2023 CAT scan of chest did show right pleural effusion, right breast mass 12 o'clock position, multiple hepatic lesion.      March 11, 2023, status post paracentesis, cytology did show atypical cells      March 13, 2023, ultrasound-guided biopsy of right breast mass done pathology positive for invasive ductal carcinoma, grade 2, ER 95%, IL 60%, HER2 negative , Ultrasound biopsy of right axillary lymph node done and negative for malignancy.     Current treatment, letrozole 2.5 mg p.o. daily, kisqali     8/11/23 , CT of CAP , partial response   5/2/24 , PET , excellent partial response, left breast mass decreased in size, pulmonary nodules and adrenal nodule decreased in size hepatic nodule resolved  History of right pleural effusion and recurrent thoracentesis, large loculated  Thoracic surgical evaluation  10/10/24 ,  Thoracoscopy; Right PleurX Placement, with pleural biopsies with Dr Shepherd  Findings: Completely trapped lung, diffusely thickened parietal and visceral pleura   History of Present Illness:     1/2/25     Metastatic breast cancer, ER positive IL positive,  Currently patient is taking letrozole 2.5 mg p.o. daily  and Kisqali .  History of recurrent right pleural effusion which is loculated status post  thoracotomy and right Pleurx catheter placement.  Patient is getting thoracentesis once a week    Continue current support  No change in treatment and reevaluation after32 months.                 Time spent 30 minutes.  .

## 2025-01-02 NOTE — PATIENT INSTRUCTIONS
Follow up visit for history of metastatic right breast cancer.     Continue letrozole and kisqali.     Follow up with Dr. Guzman in 3 months.

## 2025-01-04 ENCOUNTER — HOME CARE VISIT (OUTPATIENT)
Dept: HOME HEALTH SERVICES | Facility: HOME HEALTH | Age: 70
End: 2025-01-04
Payer: COMMERCIAL

## 2025-01-04 VITALS — HEART RATE: 55 BPM | TEMPERATURE: 97.1 F | OXYGEN SATURATION: 90 % | RESPIRATION RATE: 16 BRPM

## 2025-01-04 PROCEDURE — G0299 HHS/HOSPICE OF RN EA 15 MIN: HCPCS

## 2025-01-04 SDOH — ECONOMIC STABILITY: HOUSING INSECURITY: EVIDENCE OF SMOKING MATERIAL: 0

## 2025-01-04 SDOH — HEALTH STABILITY: MENTAL HEALTH: SMOKING IN HOME: 0

## 2025-01-04 ASSESSMENT — ENCOUNTER SYMPTOMS
SHORTNESS OF BREATH: 1
LIMITED RANGE OF MOTION: 1
SUBJECTIVE PAIN PROGRESSION: UNCHANGED
DYSPNEA ACTIVITY LEVEL: AFTER AMBULATING LESS THAN 10 FT
PAIN LOCATION - PAIN SEVERITY: 3/10
PAIN: 1
PAIN LOCATION: LEFT HIP
MUSCLE WEAKNESS: 1
APPETITE LEVEL: FAIR
CHANGE IN APPETITE: UNCHANGED
PERSON REPORTING PAIN: PATIENT

## 2025-01-09 ENCOUNTER — HOME CARE VISIT (OUTPATIENT)
Dept: HOME HEALTH SERVICES | Facility: HOME HEALTH | Age: 70
End: 2025-01-09
Payer: COMMERCIAL

## 2025-01-09 VITALS
OXYGEN SATURATION: 97 % | HEART RATE: 70 BPM | DIASTOLIC BLOOD PRESSURE: 74 MMHG | RESPIRATION RATE: 16 BRPM | SYSTOLIC BLOOD PRESSURE: 124 MMHG | TEMPERATURE: 97.7 F

## 2025-01-09 PROCEDURE — G0300 HHS/HOSPICE OF LPN EA 15 MIN: HCPCS

## 2025-01-09 SDOH — ECONOMIC STABILITY: GENERAL

## 2025-01-09 ASSESSMENT — PAIN DESCRIPTION - PAIN TYPE: TYPE: CHRONIC PAIN

## 2025-01-09 ASSESSMENT — PAIN SCALES - PAIN ASSESSMENT IN ADVANCED DEMENTIA (PAINAD)
FACIALEXPRESSION: 0 - SMILING OR INEXPRESSIVE.
NEGVOCALIZATION: 0
CONSOLABILITY: 0
BODYLANGUAGE: 0
TOTALSCORE: 0
BODYLANGUAGE: 0 - RELAXED.
FACIALEXPRESSION: 0
BREATHING: 0
NEGVOCALIZATION: 0 - NONE.
CONSOLABILITY: 0 - NO NEED TO CONSOLE.

## 2025-01-09 ASSESSMENT — ENCOUNTER SYMPTOMS
STOOL FREQUENCY: DAILY
PERSON REPORTING PAIN: PATIENT
APPETITE LEVEL: GOOD
LAST BOWEL MOVEMENT: 67214
CHANGE IN APPETITE: UNCHANGED
DENIES PAIN: 1

## 2025-01-09 ASSESSMENT — ACTIVITIES OF DAILY LIVING (ADL): MONEY MANAGEMENT (EXPENSES/BILLS): INDEPENDENT

## 2025-01-13 ENCOUNTER — HOME CARE VISIT (OUTPATIENT)
Dept: HOME HEALTH SERVICES | Facility: HOME HEALTH | Age: 70
End: 2025-01-13
Payer: COMMERCIAL

## 2025-01-13 VITALS
HEART RATE: 72 BPM | TEMPERATURE: 98.7 F | SYSTOLIC BLOOD PRESSURE: 128 MMHG | OXYGEN SATURATION: 98 % | DIASTOLIC BLOOD PRESSURE: 86 MMHG | RESPIRATION RATE: 16 BRPM

## 2025-01-13 DIAGNOSIS — C50.919 MALIGNANT NEOPLASM OF BREAST IN FEMALE, ESTROGEN RECEPTOR POSITIVE, UNSPECIFIED LATERALITY, UNSPECIFIED SITE OF BREAST: ICD-10-CM

## 2025-01-13 DIAGNOSIS — Z17.0 MALIGNANT NEOPLASM OF BREAST IN FEMALE, ESTROGEN RECEPTOR POSITIVE, UNSPECIFIED LATERALITY, UNSPECIFIED SITE OF BREAST: ICD-10-CM

## 2025-01-13 PROCEDURE — G0300 HHS/HOSPICE OF LPN EA 15 MIN: HCPCS

## 2025-01-13 SDOH — ECONOMIC STABILITY: HOUSING INSECURITY: EVIDENCE OF SMOKING MATERIAL: 0

## 2025-01-13 SDOH — HEALTH STABILITY: MENTAL HEALTH: SMOKING IN HOME: 0

## 2025-01-13 ASSESSMENT — ENCOUNTER SYMPTOMS
DENIES PAIN: 1
CHANGE IN APPETITE: UNCHANGED
LAST BOWEL MOVEMENT: 67218
APPETITE LEVEL: GOOD

## 2025-01-16 PROCEDURE — RXMED WILLOW AMBULATORY MEDICATION CHARGE

## 2025-01-21 ENCOUNTER — HOME CARE VISIT (OUTPATIENT)
Dept: HOME HEALTH SERVICES | Facility: HOME HEALTH | Age: 70
End: 2025-01-21
Payer: COMMERCIAL

## 2025-01-21 VITALS
OXYGEN SATURATION: 100 % | TEMPERATURE: 97.1 F | HEART RATE: 68 BPM | DIASTOLIC BLOOD PRESSURE: 73 MMHG | SYSTOLIC BLOOD PRESSURE: 141 MMHG

## 2025-01-21 PROCEDURE — G0300 HHS/HOSPICE OF LPN EA 15 MIN: HCPCS

## 2025-01-21 SDOH — ECONOMIC STABILITY: HOUSING INSECURITY: EVIDENCE OF SMOKING MATERIAL: 0

## 2025-01-21 SDOH — HEALTH STABILITY: MENTAL HEALTH: SMOKING IN HOME: 0

## 2025-01-21 ASSESSMENT — ENCOUNTER SYMPTOMS
SHORTNESS OF BREATH: 1
DYSPNEA ACTIVITY LEVEL: AT REST
FATIGUES EASILY: 1
APPETITE LEVEL: GOOD
DENIES PAIN: 1
CHANGE IN APPETITE: UNCHANGED
DYSPNEA ON EXERTION: 1

## 2025-01-23 ENCOUNTER — SPECIALTY PHARMACY (OUTPATIENT)
Dept: PHARMACY | Facility: CLINIC | Age: 70
End: 2025-01-23

## 2025-01-27 ENCOUNTER — HOME CARE VISIT (OUTPATIENT)
Dept: HOME HEALTH SERVICES | Facility: HOME HEALTH | Age: 70
End: 2025-01-27
Payer: COMMERCIAL

## 2025-01-27 VITALS
HEART RATE: 76 BPM | DIASTOLIC BLOOD PRESSURE: 68 MMHG | RESPIRATION RATE: 12 BRPM | SYSTOLIC BLOOD PRESSURE: 114 MMHG | OXYGEN SATURATION: 97 % | TEMPERATURE: 97.2 F

## 2025-01-27 PROCEDURE — G0299 HHS/HOSPICE OF RN EA 15 MIN: HCPCS

## 2025-01-27 SDOH — ECONOMIC STABILITY: GENERAL

## 2025-01-27 ASSESSMENT — ENCOUNTER SYMPTOMS
PAIN LOCATION - EXACERBATING FACTORS: LUNG CANCER
PAIN LOCATION - PAIN FREQUENCY: INTERMITTENT
PAIN LOCATION - PAIN DURATION: VARIES
PAIN LOCATION - RELIEVING FACTORS: MEDICATION, REST
FATIGUE: 1
PAIN LOCATION - PAIN QUALITY: ACHING, THROBBING
SUBJECTIVE PAIN PROGRESSION: UNCHANGED
PAIN SEVERITY GOAL: 0/10
HIGHEST PAIN SEVERITY IN PAST 24 HOURS: 3/10
MUSCLE WEAKNESS: 1
PERSON REPORTING PAIN: PATIENT
APPETITE LEVEL: GOOD
CHANGE IN APPETITE: UNCHANGED
LOWEST PAIN SEVERITY IN PAST 24 HOURS: 1/10
PAIN LOCATION - PAIN SEVERITY: 3/10
PAIN LOCATION: CHEST
PAIN: 1
FATIGUES EASILY: 1

## 2025-01-27 ASSESSMENT — ACTIVITIES OF DAILY LIVING (ADL)
AMBULATION ASSISTANCE: 1
AMBULATION ASSISTANCE: STAND BY ASSIST
MONEY MANAGEMENT (EXPENSES/BILLS): INDEPENDENT

## 2025-01-28 ENCOUNTER — PHARMACY VISIT (OUTPATIENT)
Dept: PHARMACY | Facility: CLINIC | Age: 70
End: 2025-01-28
Payer: COMMERCIAL

## 2025-02-06 ENCOUNTER — HOME CARE VISIT (OUTPATIENT)
Dept: HOME HEALTH SERVICES | Facility: HOME HEALTH | Age: 70
End: 2025-02-06
Payer: COMMERCIAL

## 2025-02-06 PROCEDURE — G0299 HHS/HOSPICE OF RN EA 15 MIN: HCPCS

## 2025-02-07 VITALS
HEART RATE: 76 BPM | RESPIRATION RATE: 12 BRPM | SYSTOLIC BLOOD PRESSURE: 120 MMHG | DIASTOLIC BLOOD PRESSURE: 62 MMHG | TEMPERATURE: 96.9 F | OXYGEN SATURATION: 98 %

## 2025-02-07 SDOH — HEALTH STABILITY: MENTAL HEALTH: SMOKING IN HOME: 0

## 2025-02-07 SDOH — ECONOMIC STABILITY: HOUSING INSECURITY: EVIDENCE OF SMOKING MATERIAL: 0

## 2025-02-07 ASSESSMENT — ACTIVITIES OF DAILY LIVING (ADL)
AMBULATION ASSISTANCE: STAND BY ASSIST
AMBULATION ASSISTANCE: 1
OASIS_M1830: 04
ENTERING_EXITING_HOME: MODERATE ASSIST

## 2025-02-07 ASSESSMENT — ENCOUNTER SYMPTOMS
PAIN: 1
SUBJECTIVE PAIN PROGRESSION: UNCHANGED
CHANGE IN APPETITE: UNCHANGED
SHORTNESS OF BREATH: 1
APPETITE LEVEL: GOOD
PERSON REPORTING PAIN: PATIENT
PAIN SEVERITY GOAL: 0/10
FATIGUES EASILY: 1
PAIN LOCATION: CHEST
PAIN LOCATION - RELIEVING FACTORS: MEDICATION, REST
PAIN LOCATION - EXACERBATING FACTORS: LUNG CA
DYSPNEA ACTIVITY LEVEL: AFTER AMBULATING MORE THAN 20 FT
PAIN LOCATION - PAIN QUALITY: SHARP
MUSCLE WEAKNESS: 1
HIGHEST PAIN SEVERITY IN PAST 24 HOURS: 0/10
PAIN LOCATION - PAIN FREQUENCY: INTERMITTENT
LOWEST PAIN SEVERITY IN PAST 24 HOURS: 0/10
FATIGUE: 1
PAIN LOCATION - PAIN SEVERITY: 4/10
DIZZINESS: 1
PAIN LOCATION - PAIN DURATION: VARIES

## 2025-02-13 ENCOUNTER — HOME CARE VISIT (OUTPATIENT)
Dept: HOME HEALTH SERVICES | Facility: HOME HEALTH | Age: 70
End: 2025-02-13
Payer: COMMERCIAL

## 2025-02-13 VITALS
TEMPERATURE: 97.2 F | OXYGEN SATURATION: 96 % | HEART RATE: 66 BPM | DIASTOLIC BLOOD PRESSURE: 60 MMHG | RESPIRATION RATE: 12 BRPM | SYSTOLIC BLOOD PRESSURE: 110 MMHG

## 2025-02-13 PROCEDURE — G0299 HHS/HOSPICE OF RN EA 15 MIN: HCPCS

## 2025-02-14 SDOH — ECONOMIC STABILITY: GENERAL

## 2025-02-14 SDOH — HEALTH STABILITY: MENTAL HEALTH: SMOKING IN HOME: 0

## 2025-02-14 SDOH — ECONOMIC STABILITY: HOUSING INSECURITY: EVIDENCE OF SMOKING MATERIAL: 0

## 2025-02-14 ASSESSMENT — ACTIVITIES OF DAILY LIVING (ADL)
MONEY MANAGEMENT (EXPENSES/BILLS): NEEDS ASSISTANCE
AMBULATION ASSISTANCE: SUPERVISION
AMBULATION ASSISTANCE: 1

## 2025-02-14 ASSESSMENT — ENCOUNTER SYMPTOMS
SHORTNESS OF BREATH: 1
PAIN LOCATION - PAIN QUALITY: SHARP
PAIN LOCATION - PAIN FREQUENCY: INTERMITTENT
FATIGUE: 1
CHANGE IN APPETITE: UNCHANGED
PAIN: 1
PAIN LOCATION - RELIEVING FACTORS: MEDICATION, REST
SUBJECTIVE PAIN PROGRESSION: UNCHANGED
PAIN LOCATION: CHEST
DYSPNEA ACTIVITY LEVEL: AFTER AMBULATING MORE THAN 20 FT
PAIN LOCATION - PAIN DURATION: VARIES
PERSON REPORTING PAIN: PATIENT
LOWEST PAIN SEVERITY IN PAST 24 HOURS: 2/10
FATIGUES EASILY: 1
PAIN LOCATION - EXACERBATING FACTORS: LUNG CA
PAIN LOCATION - PAIN SEVERITY: 3/10
HIGHEST PAIN SEVERITY IN PAST 24 HOURS: 4/10
APPETITE LEVEL: GOOD
PAIN SEVERITY GOAL: 0/10

## 2025-02-18 ENCOUNTER — SPECIALTY PHARMACY (OUTPATIENT)
Dept: PHARMACY | Facility: CLINIC | Age: 70
End: 2025-02-18

## 2025-02-18 PROCEDURE — RXMED WILLOW AMBULATORY MEDICATION CHARGE

## 2025-02-19 ENCOUNTER — PHARMACY VISIT (OUTPATIENT)
Dept: PHARMACY | Facility: CLINIC | Age: 70
End: 2025-02-19
Payer: COMMERCIAL

## 2025-02-20 ENCOUNTER — HOME CARE VISIT (OUTPATIENT)
Dept: HOME HEALTH SERVICES | Facility: HOME HEALTH | Age: 70
End: 2025-02-20
Payer: COMMERCIAL

## 2025-02-20 VITALS
RESPIRATION RATE: 18 BRPM | SYSTOLIC BLOOD PRESSURE: 112 MMHG | HEART RATE: 68 BPM | OXYGEN SATURATION: 97 % | TEMPERATURE: 97.3 F | DIASTOLIC BLOOD PRESSURE: 62 MMHG

## 2025-02-20 PROCEDURE — G0300 HHS/HOSPICE OF LPN EA 15 MIN: HCPCS

## 2025-02-20 SDOH — HEALTH STABILITY: MENTAL HEALTH: SMOKING IN HOME: 0

## 2025-02-20 SDOH — ECONOMIC STABILITY: HOUSING INSECURITY: EVIDENCE OF SMOKING MATERIAL: 0

## 2025-02-20 ASSESSMENT — ENCOUNTER SYMPTOMS
CHANGE IN APPETITE: UNCHANGED
BOWEL PATTERN NORMAL: 1
PERSON REPORTING PAIN: PATIENT
DENIES PAIN: 1
APPETITE LEVEL: GOOD
LAST BOWEL MOVEMENT: 67256
STOOL FREQUENCY: DAILY

## 2025-02-20 ASSESSMENT — PAIN SCALES - PAIN ASSESSMENT IN ADVANCED DEMENTIA (PAINAD)
NEGVOCALIZATION: 0
NEGVOCALIZATION: 0 - NONE.
BREATHING: 0
FACIALEXPRESSION: 0 - SMILING OR INEXPRESSIVE.
BODYLANGUAGE: 0 - RELAXED.
CONSOLABILITY: 0 - NO NEED TO CONSOLE.
TOTALSCORE: 0
CONSOLABILITY: 0
FACIALEXPRESSION: 0
BODYLANGUAGE: 0

## 2025-02-24 ENCOUNTER — HOME CARE VISIT (OUTPATIENT)
Dept: HOME HEALTH SERVICES | Facility: HOME HEALTH | Age: 70
End: 2025-02-24
Payer: COMMERCIAL

## 2025-02-24 PROCEDURE — G0299 HHS/HOSPICE OF RN EA 15 MIN: HCPCS

## 2025-02-26 VITALS
HEART RATE: 80 BPM | DIASTOLIC BLOOD PRESSURE: 64 MMHG | TEMPERATURE: 97.1 F | RESPIRATION RATE: 12 BRPM | OXYGEN SATURATION: 97 % | SYSTOLIC BLOOD PRESSURE: 124 MMHG

## 2025-02-26 SDOH — ECONOMIC STABILITY: GENERAL

## 2025-02-26 ASSESSMENT — ENCOUNTER SYMPTOMS
HIGHEST PAIN SEVERITY IN PAST 24 HOURS: 4/10
FATIGUE: 1
PAIN: 1
PAIN LOCATION - PAIN FREQUENCY: INTERMITTENT
PAIN LOCATION - PAIN QUALITY: SHARP
CHANGE IN APPETITE: UNCHANGED
FATIGUES EASILY: 1
PAIN SEVERITY GOAL: 0/10
PAIN LOCATION - EXACERBATING FACTORS: LUNG CANCER
LOWEST PAIN SEVERITY IN PAST 24 HOURS: 1/10
SHORTNESS OF BREATH: 1
PAIN LOCATION: CHEST
PAIN LOCATION - PAIN DURATION: VARIES
PERSON REPORTING PAIN: PATIENT
SUBJECTIVE PAIN PROGRESSION: UNCHANGED
MUSCLE WEAKNESS: 1
PAIN LOCATION - RELIEVING FACTORS: MEDICATION, REST
APPETITE LEVEL: GOOD
DYSPNEA ACTIVITY LEVEL: AFTER AMBULATING MORE THAN 20 FT
PAIN LOCATION - PAIN SEVERITY: 3/10

## 2025-02-26 ASSESSMENT — ACTIVITIES OF DAILY LIVING (ADL)
CURRENT_FUNCTION: STAND BY ASSIST
AMBULATION ASSISTANCE: STAND BY ASSIST
MONEY MANAGEMENT (EXPENSES/BILLS): NEEDS ASSISTANCE

## 2025-03-03 ENCOUNTER — HOME CARE VISIT (OUTPATIENT)
Dept: HOME HEALTH SERVICES | Facility: HOME HEALTH | Age: 70
End: 2025-03-03
Payer: COMMERCIAL

## 2025-03-03 VITALS
SYSTOLIC BLOOD PRESSURE: 118 MMHG | RESPIRATION RATE: 16 BRPM | OXYGEN SATURATION: 98 % | HEART RATE: 82 BPM | TEMPERATURE: 98.4 F | DIASTOLIC BLOOD PRESSURE: 64 MMHG

## 2025-03-03 PROCEDURE — G0300 HHS/HOSPICE OF LPN EA 15 MIN: HCPCS

## 2025-03-03 ASSESSMENT — ENCOUNTER SYMPTOMS
LAST BOWEL MOVEMENT: 67267
APPETITE LEVEL: GOOD
CHANGE IN APPETITE: UNCHANGED
DENIES PAIN: 1

## 2025-03-10 ENCOUNTER — HOME CARE VISIT (OUTPATIENT)
Dept: HOME HEALTH SERVICES | Facility: HOME HEALTH | Age: 70
End: 2025-03-10
Payer: COMMERCIAL

## 2025-03-10 PROCEDURE — G0299 HHS/HOSPICE OF RN EA 15 MIN: HCPCS

## 2025-03-13 VITALS
HEART RATE: 82 BPM | OXYGEN SATURATION: 97 % | RESPIRATION RATE: 12 BRPM | DIASTOLIC BLOOD PRESSURE: 68 MMHG | SYSTOLIC BLOOD PRESSURE: 124 MMHG | TEMPERATURE: 97.8 F

## 2025-03-13 SDOH — ECONOMIC STABILITY: GENERAL

## 2025-03-13 ASSESSMENT — ENCOUNTER SYMPTOMS
PAIN: 1
PAIN LOCATION - PAIN SEVERITY: 3/10
DYSPNEA ACTIVITY LEVEL: AFTER AMBULATING MORE THAN 20 FT
LOWEST PAIN SEVERITY IN PAST 24 HOURS: 2/10
PAIN LOCATION - EXACERBATING FACTORS: LUNG CANCER
PERSON REPORTING PAIN: PATIENT
SHORTNESS OF BREATH: 1
APPETITE LEVEL: GOOD
PAIN LOCATION - PAIN DURATION: VARIES
FATIGUE: 1
HIGHEST PAIN SEVERITY IN PAST 24 HOURS: 4/10
CHANGE IN APPETITE: UNCHANGED
PAIN LOCATION - PAIN QUALITY: SHARP
PAIN LOCATION - PAIN FREQUENCY: INTERMITTENT
PAIN SEVERITY GOAL: 0/10
SUBJECTIVE PAIN PROGRESSION: UNCHANGED
PAIN LOCATION: CHEST
FATIGUES EASILY: 1

## 2025-03-13 ASSESSMENT — ACTIVITIES OF DAILY LIVING (ADL)
AMBULATION ASSISTANCE: 1
MONEY MANAGEMENT (EXPENSES/BILLS): NEEDS ASSISTANCE

## 2025-03-15 ENCOUNTER — SPECIALTY PHARMACY (OUTPATIENT)
Dept: PHARMACY | Facility: CLINIC | Age: 70
End: 2025-03-15

## 2025-03-15 PROCEDURE — RXMED WILLOW AMBULATORY MEDICATION CHARGE

## 2025-03-18 ENCOUNTER — PHARMACY VISIT (OUTPATIENT)
Dept: PHARMACY | Facility: CLINIC | Age: 70
End: 2025-03-18
Payer: COMMERCIAL

## 2025-03-20 ENCOUNTER — HOME CARE VISIT (OUTPATIENT)
Dept: HOME HEALTH SERVICES | Facility: HOME HEALTH | Age: 70
End: 2025-03-20
Payer: COMMERCIAL

## 2025-03-20 VITALS
TEMPERATURE: 97.6 F | SYSTOLIC BLOOD PRESSURE: 122 MMHG | DIASTOLIC BLOOD PRESSURE: 62 MMHG | HEART RATE: 86 BPM | RESPIRATION RATE: 16 BRPM | OXYGEN SATURATION: 98 %

## 2025-03-20 PROCEDURE — G0300 HHS/HOSPICE OF LPN EA 15 MIN: HCPCS

## 2025-03-21 ASSESSMENT — ENCOUNTER SYMPTOMS
PERSON REPORTING PAIN: PATIENT
STOOL FREQUENCY: DAILY
DENIES PAIN: 1
CHANGE IN APPETITE: UNCHANGED
APPETITE LEVEL: GOOD
LAST BOWEL MOVEMENT: 67285
BOWEL PATTERN NORMAL: 1

## 2025-03-21 ASSESSMENT — PAIN SCALES - PAIN ASSESSMENT IN ADVANCED DEMENTIA (PAINAD)
BODYLANGUAGE: 0 - RELAXED.
BREATHING: 0
NEGVOCALIZATION: 0
CONSOLABILITY: 0 - NO NEED TO CONSOLE.
FACIALEXPRESSION: 0
TOTALSCORE: 0
NEGVOCALIZATION: 0 - NONE.
BODYLANGUAGE: 0
FACIALEXPRESSION: 0 - SMILING OR INEXPRESSIVE.
CONSOLABILITY: 0

## 2025-03-26 ENCOUNTER — HOME CARE VISIT (OUTPATIENT)
Dept: HOME HEALTH SERVICES | Facility: HOME HEALTH | Age: 70
End: 2025-03-26
Payer: COMMERCIAL

## 2025-03-26 VITALS
OXYGEN SATURATION: 93 % | TEMPERATURE: 98.2 F | RESPIRATION RATE: 16 BRPM | SYSTOLIC BLOOD PRESSURE: 118 MMHG | HEART RATE: 78 BPM | DIASTOLIC BLOOD PRESSURE: 66 MMHG

## 2025-03-26 PROCEDURE — G0300 HHS/HOSPICE OF LPN EA 15 MIN: HCPCS

## 2025-03-26 NOTE — HOME HEALTH
[facility] [address] [address] [facility]    Consent for Anesthesia   1.    IPardeep agree to be cared for by a physician anesthesiologist alone and/or with a nurse anesthetist, who is specially trained to monitor me and give me Room 288 allergic reactions to medications, injury to my airway, heart, lungs, vision, nerves, or muscles and in extremely rare instances death. 5. My doctor has explained to me other choices available to me for my care (alternatives).   6. Pregnant Patients (“epid Printed: 12/2/2021 at 3:18 PM    Medical Record #: [id number]                                            Page 1 of 1

## 2025-04-01 ENCOUNTER — HOME CARE VISIT (OUTPATIENT)
Dept: HOME HEALTH SERVICES | Facility: HOME HEALTH | Age: 70
End: 2025-04-01
Payer: COMMERCIAL

## 2025-04-01 VITALS
OXYGEN SATURATION: 98 % | SYSTOLIC BLOOD PRESSURE: 147 MMHG | DIASTOLIC BLOOD PRESSURE: 88 MMHG | RESPIRATION RATE: 24 BRPM | TEMPERATURE: 97.2 F | HEART RATE: 80 BPM

## 2025-04-01 PROCEDURE — G0300 HHS/HOSPICE OF LPN EA 15 MIN: HCPCS

## 2025-04-01 SDOH — ECONOMIC STABILITY: HOUSING INSECURITY: EVIDENCE OF SMOKING MATERIAL: 0

## 2025-04-01 SDOH — HEALTH STABILITY: MENTAL HEALTH: SMOKING IN HOME: 0

## 2025-04-01 ASSESSMENT — ENCOUNTER SYMPTOMS
CHANGE IN APPETITE: UNCHANGED
APPETITE LEVEL: GOOD
SHORTNESS OF BREATH: 1
DYSPNEA ACTIVITY LEVEL: AT REST
DENIES PAIN: 1

## 2025-04-03 ENCOUNTER — OFFICE VISIT (OUTPATIENT)
Dept: HEMATOLOGY/ONCOLOGY | Facility: CLINIC | Age: 70
End: 2025-04-03
Payer: COMMERCIAL

## 2025-04-03 ENCOUNTER — ONCOLOGY MEDICATION OUTREACH (OUTPATIENT)
Dept: HEMATOLOGY/ONCOLOGY | Facility: CLINIC | Age: 70
End: 2025-04-03

## 2025-04-03 VITALS
BODY MASS INDEX: 22.5 KG/M2 | DIASTOLIC BLOOD PRESSURE: 76 MMHG | OXYGEN SATURATION: 100 % | WEIGHT: 131.28 LBS | SYSTOLIC BLOOD PRESSURE: 124 MMHG | TEMPERATURE: 97.2 F | HEART RATE: 87 BPM | RESPIRATION RATE: 16 BRPM

## 2025-04-03 DIAGNOSIS — C50.511 MALIGNANT NEOPLASM OF LOWER-OUTER QUADRANT OF RIGHT BREAST OF FEMALE, ESTROGEN RECEPTOR POSITIVE: ICD-10-CM

## 2025-04-03 DIAGNOSIS — Z17.0 MALIGNANT NEOPLASM OF LOWER-OUTER QUADRANT OF RIGHT BREAST OF FEMALE, ESTROGEN RECEPTOR POSITIVE: ICD-10-CM

## 2025-04-03 LAB
ALBUMIN SERPL BCP-MCNC: 3.7 G/DL (ref 3.4–5)
ALP SERPL-CCNC: 70 U/L (ref 33–136)
ALT SERPL W P-5'-P-CCNC: 14 U/L (ref 7–45)
ANION GAP SERPL CALC-SCNC: 9 MMOL/L (ref 10–20)
AST SERPL W P-5'-P-CCNC: 16 U/L (ref 9–39)
BASOPHILS # BLD AUTO: 0.03 X10*3/UL (ref 0–0.1)
BASOPHILS NFR BLD AUTO: 0.4 %
BILIRUB SERPL-MCNC: 0.4 MG/DL (ref 0–1.2)
BUN SERPL-MCNC: 22 MG/DL (ref 6–23)
CALCIUM SERPL-MCNC: 9 MG/DL (ref 8.6–10.3)
CANCER AG27-29 SERPL-ACNC: 101.2 U/ML (ref 0–38.6)
CHLORIDE SERPL-SCNC: 99 MMOL/L (ref 98–107)
CO2 SERPL-SCNC: 29 MMOL/L (ref 21–32)
CREAT SERPL-MCNC: 1.07 MG/DL (ref 0.5–1.05)
EGFRCR SERPLBLD CKD-EPI 2021: 57 ML/MIN/1.73M*2
EOSINOPHIL # BLD AUTO: 0.06 X10*3/UL (ref 0–0.7)
EOSINOPHIL NFR BLD AUTO: 0.8 %
ERYTHROCYTE [DISTWIDTH] IN BLOOD BY AUTOMATED COUNT: 14.5 % (ref 11.5–14.5)
GLUCOSE SERPL-MCNC: 106 MG/DL (ref 74–99)
HCT VFR BLD AUTO: 37 % (ref 36–46)
HGB BLD-MCNC: 12.4 G/DL (ref 12–16)
IMM GRANULOCYTES # BLD AUTO: 0.03 X10*3/UL (ref 0–0.7)
IMM GRANULOCYTES NFR BLD AUTO: 0.4 % (ref 0–0.9)
LYMPHOCYTES # BLD AUTO: 0.95 X10*3/UL (ref 1.2–4.8)
LYMPHOCYTES NFR BLD AUTO: 12.8 %
MCH RBC QN AUTO: 33.8 PG (ref 26–34)
MCHC RBC AUTO-ENTMCNC: 33.5 G/DL (ref 32–36)
MCV RBC AUTO: 101 FL (ref 80–100)
MONOCYTES # BLD AUTO: 0.3 X10*3/UL (ref 0.1–1)
MONOCYTES NFR BLD AUTO: 4.1 %
NEUTROPHILS # BLD AUTO: 6.03 X10*3/UL (ref 1.2–7.7)
NEUTROPHILS NFR BLD AUTO: 81.5 %
PLATELET # BLD AUTO: 552 X10*3/UL (ref 150–450)
POTASSIUM SERPL-SCNC: 3.2 MMOL/L (ref 3.5–5.3)
PROT SERPL-MCNC: 7.6 G/DL (ref 6.4–8.2)
RBC # BLD AUTO: 3.67 X10*6/UL (ref 4–5.2)
SODIUM SERPL-SCNC: 134 MMOL/L (ref 136–145)
WBC # BLD AUTO: 7.4 X10*3/UL (ref 4.4–11.3)

## 2025-04-03 PROCEDURE — 80053 COMPREHEN METABOLIC PANEL: CPT | Performed by: INTERNAL MEDICINE

## 2025-04-03 PROCEDURE — 1159F MED LIST DOCD IN RCRD: CPT | Performed by: INTERNAL MEDICINE

## 2025-04-03 PROCEDURE — 1125F AMNT PAIN NOTED PAIN PRSNT: CPT | Performed by: INTERNAL MEDICINE

## 2025-04-03 PROCEDURE — 36415 COLL VENOUS BLD VENIPUNCTURE: CPT | Performed by: INTERNAL MEDICINE

## 2025-04-03 PROCEDURE — 3074F SYST BP LT 130 MM HG: CPT | Performed by: INTERNAL MEDICINE

## 2025-04-03 PROCEDURE — 99214 OFFICE O/P EST MOD 30 MIN: CPT | Performed by: INTERNAL MEDICINE

## 2025-04-03 PROCEDURE — 85025 COMPLETE CBC W/AUTO DIFF WBC: CPT | Performed by: INTERNAL MEDICINE

## 2025-04-03 PROCEDURE — 86300 IMMUNOASSAY TUMOR CA 15-3: CPT | Mod: PORLAB | Performed by: INTERNAL MEDICINE

## 2025-04-03 PROCEDURE — 1157F ADVNC CARE PLAN IN RCRD: CPT | Performed by: INTERNAL MEDICINE

## 2025-04-03 PROCEDURE — 1160F RVW MEDS BY RX/DR IN RCRD: CPT | Performed by: INTERNAL MEDICINE

## 2025-04-03 PROCEDURE — 3078F DIAST BP <80 MM HG: CPT | Performed by: INTERNAL MEDICINE

## 2025-04-03 ASSESSMENT — PAIN SCALES - GENERAL: PAINLEVEL_OUTOF10: 8

## 2025-04-03 NOTE — PROGRESS NOTES
Mildred Moyer   Female,   1955  MRN: [id number]    Patient Visit Information:   Visit Type: Follow Up Visit      Cancer History:   Treatment Synopsis:    1.  invasive ductal carcinoma right breast, stage IV, right pleural effusion, multiple hepatic lesions     Patient is a 68-year-old female with history of MS, chair bound who was admitted in the hospital because of shortness of breath.        March 10, 2023 CAT scan of chest did show right pleural effusion, right breast mass 12 o'clock position, multiple hepatic lesion and multiple pulmonary nodule.      March 11, 2023, status post paracentesis, cytology did show atypical cells      March 13, 2023, ultrasound-guided biopsy of right breast mass done pathology positive for invasive ductal carcinoma, grade 2, ER 95%, GA 60%, HER2 negative  , Ultrasound biopsy of right axillary lymph node done and negative for malignancy.     4/25/23 , PET scan did show abnormal metabolic activity of right breast, pulmonary nodules, mediastinal lymph node, hepatic lesion.     Current treatment, letrozole, Kisqali      8/11/23 , CT of chest abdominal pelvis, partial response, right breast mass, pulmonary nodules decreased in size   5/2/24 , PET , excellent partial response, left breast mass decreased in size, pulmonary nodules and adrenal nodule decreased in size hepatic nodule resolved  History of right pleural effusion and recurrent thoracentesis, large loculated  Thoracic surgical evaluation  10/10/24 ,  Thoracoscopy; Right PleurX Placement, with pleural biopsies with Dr Shepherd  Findings: Completely trapped lung, diffusely thickened parietal and visceral pleura     History of Present Illness:        ID Statement:    MILDRED MOYER is a 68 year old Female        Chief Complaint: Right breast cancer   Interval History:    Patient is a 68-year-old female with history of MS, chair bound who was admitted in the hospital  because of shortness of breath.      March 10, 2023 CAT scan of chest did show right pleural effusion, right breast mass 12 o'clock position, multiple hepatic lesion.      March 11, 2023, status post paracentesis, cytology did show atypical cells      March 13, 2023, ultrasound-guided biopsy of right breast mass done pathology positive for invasive ductal carcinoma, grade 2, ER 95%, MA 60%, HER2 negative , Ultrasound biopsy of right axillary lymph node done and negative for malignancy.     Medical oncology consultation requested, patient is very anxious about her diagnosis.        Pathology report discussed with the patient.      Patient is sitting on chair, very anxious, no headache and dizziness, no shortness of breath, no nausea vomiting, no abdominal pain, generalized weakness and fatigue.     4/3/25  Has a history of metastatic breast cancer taking letrozole and Kisqali.    Patient history of recurrent right pleural effusion status post multiple thoracentesis and was diagnosed with loculated pleural effusion, thoracic surgical evaluation and on 10/10/24  s/pThoracoscopy; Right PleurX Placement, with pleural biopsies with Dr Shepherd  Findings: Completely trapped lung, diffusely thickened parietal and visceral pleura      Patient doing well, on oxygen supplement, patient noticed to have increasing shortness of breath during past 1 week, no chest pain no cough no hemoptysis, shortness of breath increased with ambulation  Review of Systems:   Review of Systems:    No headache or dizziness      No fever      some shortness of breath which is increasing        No breast tenderness      No nausea vomiting      No abdominal pain      Generalized weakness      All body pain, arthritis      Patient not active difficult to ambulate due to Morphine Sulfate        Allergies and Intolerances:       Allergies:         contrast (specific type unknown): Contrast, Anaphylaxis,  Active         iodine: Drug, Anaphylaxis, Active          Shellfish: Food, Anaphylaxis, Active     Outpatient Medication Profile:  * Patient Currently Takes Medications as of 07-Sep-2023 11:20 documented in Structured Notes         disability placard : valid for 5 years         exp: 9/2028, Start Date: 07-Sep-2023         Kisqali (200 mg daily dose) oral tablet: Last Dose Taken:  , 3 tab(s)  orally once a day  daily for 21 days then 7 days off , Start Date: 16-Aug-2023         letrozole 2.5 mg oral tablet: Last Dose Taken:  , 1 tab(s) orally once  a day , Start Date: 31-Jul-2023         portable home oxygen concentrator 2L/min continuously via nasal canula : Last Dose Taken:  , Start Date: 27-Apr-2023         image guided biopsy of liver lesion : Last Dose Taken:  , Start Date:  30-Mar-2023         K-Tab 20 mEq oral tablet, extended release: Last Dose Taken:  , 2 tab(s)  orally once a day , Start Date: 14-Mar-2023         amoxicillin-clavulanate 875 mg-125 mg oral tablet: Last Dose Taken:   , 1 tab(s) orally 2 times a day , Start Date: 14-Mar-2023         buPROPion 300 mg/24 hours (XL) oral tablet, extended release: Last Dose  Taken:  , 1 tab(s) orally every 24 hours         carBAMazepine 100 mg oral tablet, chewable: Last Dose Taken:  , 1 tab  by mouth 3 times daily. Then 3 tabs by mouth once every evening         cyclobenzaprine 10 mg oral tablet: Last Dose Taken:  , 1 tab(s) orally  2 times a day         hydroCHLOROthiazide 12.5 mg oral capsule: Last Dose Taken:  , 1 cap(s)  orally once a day         hydrOXYzine hydrochloride 25 mg oral tablet: Last Dose Taken:  , 1 tab(s)  orally once a day         hydrOXYzine hydrochloride 50 mg oral tablet: Last Dose Taken:  , 1 tab(s)  orally once a day (at bedtime)         ibuprofen 800 mg oral tablet: Last Dose Taken:  , 1 tab(s) orally 2 times  a day         MethylPREDNISolone Dose Pack 4 mg oral tablet: Last Dose Taken:  , Day  3: 1 tab by mouth four times a day         Day 4: 1 tab by mouth three times a day          Day  5: 1 tab by mouth twice a day         day 6: 1 tab by mouth once          rosuvastatin 20 mg oral tablet: Last Dose Taken:  , 1 tab(s) orally once  a day         Vitamin B12 1000 mcg oral tablet: Last Dose Taken:  , 1 tab(s) orally  once a day         azithromycin 250 mg oral tablet: Last Dose Taken:  , 3 tabs by mouth  once every evening          melatonin 10 mg oral tablet, extended release: Last Dose Taken:  , 1  tab(s) orally once a day (at bedtime)         dextromethorphan-quinidine 20 mg-10 mg oral capsule: Last Dose Taken:   , 1 cap(s) orally every 12 hours         omeprazole 20 mg oral delayed release capsule: Last Dose Taken:  , 1  cap(s) orally once a day             Medical History:         Hypoxia: ICD-10: R09.02, Status: Active         Pleural effusion on right: ICD-10: J90, Status: Active         Infiltrating ductal carcinoma of right breast, stage 4: ICD-10:  C50.911, Status: Active         MS (multiple sclerosis): ICD-10: G35, Status: Active     Family History: No Family History items are recorded  in the problem list.      Social History:   Social Substance History:  ·  Smoking Status never smoker (1)   ·  Alcohol Use denies   ·  Drug Use denies            Vitals and Measurements:   Vitals: Temp: 36.5  HR: NA  RR: 16  BP: 103/63  SPO2%:   NA   Measurements: HT(cm): 162.4  WT(kg): 53.8  BSA: 1.55   BMI:  20.3   Second Set of Vitals/Vitals Comment: unable to get  patient's pulse ox(2)   Last 3 Weights & Heights: Date:                           Weight/Scale Type:                    Height:   07-Sep-2023 10:40                53.8  kg                     162.4  cm  08-Jun-2023 14:55                63.2  kg                     162.4  cm  27-Apr-2023 11:20                69.5  kg                     162.4  cm      Physical Exam:      Constitutional: awake/alert/oriented x3, no distress,  very anxious, sitting on chair   Eyes: PERRL, EOMI, clear sclera   Head/Neck: Neck supple, no cervical lymphadenopathy    Respiratory/Thorax: Fair air movement on left side,  decreased breath sounds of right side status post right Pleurx catheter placement   Cardiovascular: Regular, rate and rhythm,  normal  S 1and S 2   Gastrointestinal: Nondistended, soft, non-tender,   no masses palpable, no organomegaly, +BS,   Extremities: normal extremities, no cyanosis edema,   Neurological: alert and oriented x3,   Breast: Right breast examination did show a mass  measuring 1 cm on 12 o'clock position     Left breast examination within normal limits   Lymphatic: No significant lymphadenopathy   Psychological: Patient is very anxious   Skin: Warm and dry, no lesions, no rashes         Lab Results:        /25) 5 mo ago  (10/24/24) 6 mo ago  (9/24/24) 6 mo ago  (9/10/24) 7 mo ago  (8/8/24) 11 mo ago  (4/25/24) 1 yr ago  (12/28/23)    Glucose  74 - 99 mg/dL 104 High  116 High  118 High  162 High  84 97 79   Sodium  136 - 145 mmol/L 139 136 136 137 136 139 135 Low    Potassium  3.5 - 5.3 mmol/L 3.8 4.2 5.0 4.0 4.7 4.5 3.7   Chloride  98 - 107 mmol/L 102 102 101 101 102 103 97 Low    Bicarbonate  21 - 32 mmol/L 31 28 30 28 28 29 29   Anion Gap  10 - 20 mmol/L 10 10 10 12 11 12 13   Urea Nitrogen  6 - 23 mg/dL 15 16 22 20 28 High  30 High  25 High    Creatinine  0.50 - 1.05 mg/dL 1.11 High  1.15 High  1.03 1.03 1.04 1.12 High  1.29 High    eGFR  >60 mL/min/1.73m*2 55 Low                                  ·  Results      , Surgical Pathology Exam: [id number]  Order: [id number]   Collected 10/10/2024 11:25       Status: Final result       Visible to patient: Yes (not seen)       Dx: Pleural effusion; Secondary malignant...    0 Result Notes       Component  Resulting Agency   FINAL DIAGNOSIS    A.  Lung, right pleural biopsy:  - Acute fibrinous pleuritis with atypia.  See note     Note:  Microscopic examination of H&E sections demonstrates acute fibrinous pleuritis with scattered cellular atypia.  AE1/AE3, CK7 and calretinin highlight some of the  atypical cells, consistent with reactive mesothelial cells.  Additionally there is lymphoplasmacytic inflammation.  TRPS 1 highlights inflammatory cells.. WT1 is not contributory.  [place] red is negative for amyloid.           Assessment and Plan:      Assessment and Plan:   Assessment:    1 invasive ductal carcinoma right breast, stage IV, right pleural effusion, multiple hepatic lesions  ER positive, RI positive, HER2 negative  Current treatment with letrozole started in April 2023     Patient is a 68-year-old female with history of MS, chair bound who was admitted in the hospital because of shortness of breath.      March 10, 2023 CAT scan of chest did show right pleural effusion, right breast mass 12 o'clock position, multiple hepatic lesion.      March 11, 2023, status post paracentesis, cytology did show atypical cells      March 13, 2023, ultrasound-guided biopsy of right breast mass done pathology positive for invasive ductal carcinoma, grade 2, ER 95%, RI 60%, HER2 negative , Ultrasound biopsy of right axillary lymph node done and negative for malignancy.     Current treatment, letrozole 2.5 mg p.o. daily, kisqali     8/11/23 , CT of CAP , partial response   5/2/24 , PET , excellent partial response, left breast mass decreased in size, pulmonary nodules and adrenal nodule decreased in size hepatic nodule resolved  History of right pleural effusion and recurrent thoracentesis, large loculated  Thoracic surgical evaluation  10/10/24 ,  Thoracoscopy; Right PleurX Placement, with pleural biopsies with Dr Shepherd  Findings: Completely trapped lung, diffusely thickened parietal and visceral pleura   History of Present Illness:     4/3/25     Metastatic breast cancer, ER positive RI positive,  Currently patient is taking letrozole 2.5 mg p.o. daily  and Kisqali .  History of recurrent right pleural effusion which is loculated status post thoracotomy and right Pleurx catheter placement.  Patient is getting  thoracentesis once a week approximately 150 cc fluid has been drained every week.  Today complaining increased shortness of breath    Continue current support, CT chest after 4-week  No change in treatment and reevaluation after 2 months.                 Time spent 30 minutes.  .

## 2025-04-04 SDOH — ECONOMIC STABILITY: HOUSING INSECURITY: EVIDENCE OF SMOKING MATERIAL: 0

## 2025-04-04 SDOH — HEALTH STABILITY: MENTAL HEALTH: SMOKING IN HOME: 0

## 2025-04-04 ASSESSMENT — ENCOUNTER SYMPTOMS
APPETITE LEVEL: GOOD
DENIES PAIN: 1
CHANGE IN APPETITE: VARYING
LAST BOWEL MOVEMENT: 67289

## 2025-04-04 ASSESSMENT — ACTIVITIES OF DAILY LIVING (ADL)
TOILETING: INDEPENDENT
AMBULATION ASSISTANCE: 1
AMBULATION ASSISTANCE: INDEPENDENT
TOILETING: 1

## 2025-04-07 ENCOUNTER — HOME CARE VISIT (OUTPATIENT)
Dept: HOME HEALTH SERVICES | Facility: HOME HEALTH | Age: 70
End: 2025-04-07
Payer: COMMERCIAL

## 2025-04-07 PROCEDURE — G0299 HHS/HOSPICE OF RN EA 15 MIN: HCPCS

## 2025-04-11 ENCOUNTER — APPOINTMENT (OUTPATIENT)
Dept: RADIOLOGY | Facility: HOSPITAL | Age: 70
DRG: 598 | End: 2025-04-11
Payer: COMMERCIAL

## 2025-04-11 ENCOUNTER — HOSPITAL ENCOUNTER (INPATIENT)
Facility: HOSPITAL | Age: 70
DRG: 598 | End: 2025-04-11
Attending: EMERGENCY MEDICINE | Admitting: INTERNAL MEDICINE
Payer: COMMERCIAL

## 2025-04-11 ENCOUNTER — TELEPHONE (OUTPATIENT)
Dept: HEMATOLOGY/ONCOLOGY | Facility: CLINIC | Age: 70
End: 2025-04-11
Payer: COMMERCIAL

## 2025-04-11 DIAGNOSIS — R06.09 DYSPNEA ON EXERTION: ICD-10-CM

## 2025-04-11 DIAGNOSIS — R06.02 SHORTNESS OF BREATH: ICD-10-CM

## 2025-04-11 DIAGNOSIS — J90 PLEURAL EFFUSION: Primary | ICD-10-CM

## 2025-04-11 DIAGNOSIS — J90 LARGE PLEURAL EFFUSION: ICD-10-CM

## 2025-04-11 LAB
ANION GAP BLDV CALCULATED.4IONS-SCNC: 12 MMOL/L (ref 10–25)
ANION GAP SERPL CALC-SCNC: 21 MMOL/L (ref 10–20)
BASE EXCESS BLDV CALC-SCNC: 1.5 MMOL/L (ref -2–3)
BASOPHILS # BLD AUTO: 0.04 X10*3/UL (ref 0–0.1)
BASOPHILS NFR BLD AUTO: 0.4 %
BASOPHILS NFR FLD MANUAL: 0 %
BLASTS NFR FLD MANUAL: 0 %
BNP SERPL-MCNC: 726 PG/ML (ref 0–99)
BODY TEMPERATURE: 37 DEGREES CELSIUS
BUN SERPL-MCNC: 31 MG/DL (ref 6–23)
CA-I BLDV-SCNC: 1.2 MMOL/L (ref 1.1–1.33)
CALCIUM SERPL-MCNC: 9.1 MG/DL (ref 8.6–10.3)
CHLORIDE BLDV-SCNC: 99 MMOL/L (ref 98–107)
CHLORIDE SERPL-SCNC: 98 MMOL/L (ref 98–107)
CLARITY FLD: CLEAR
CO2 SERPL-SCNC: 25 MMOL/L (ref 21–32)
COLOR FLD: YELLOW
CREAT SERPL-MCNC: 1.14 MG/DL (ref 0.5–1.05)
EGFRCR SERPLBLD CKD-EPI 2021: 53 ML/MIN/1.73M*2
EOSINOPHIL # BLD AUTO: 0.06 X10*3/UL (ref 0–0.7)
EOSINOPHIL NFR BLD AUTO: 0.6 %
EOSINOPHIL NFR FLD MANUAL: 0 %
ERYTHROCYTE [DISTWIDTH] IN BLOOD BY AUTOMATED COUNT: 14.8 % (ref 11.5–14.5)
GLUCOSE BLDV-MCNC: 111 MG/DL (ref 74–99)
GLUCOSE SERPL-MCNC: 107 MG/DL (ref 74–99)
HCO3 BLDV-SCNC: 27.2 MMOL/L (ref 22–26)
HCT VFR BLD AUTO: 38.3 % (ref 36–46)
HCT VFR BLD EST: 41 % (ref 36–46)
HGB BLD-MCNC: 13.1 G/DL (ref 12–16)
HGB BLDV-MCNC: 13.8 G/DL (ref 12–16)
IMM GRANULOCYTES # BLD AUTO: 0.19 X10*3/UL (ref 0–0.7)
IMM GRANULOCYTES NFR BLD AUTO: 1.8 % (ref 0–0.9)
IMMATURE GRANULOCYTES IN FLUID: 0 %
INHALED O2 CONCENTRATION: 44 %
LACTATE BLDV-SCNC: 1.5 MMOL/L (ref 0.4–2)
LYMPHOCYTES # BLD AUTO: 0.68 X10*3/UL (ref 1.2–4.8)
LYMPHOCYTES NFR BLD AUTO: 6.6 %
LYMPHOCYTES NFR FLD MANUAL: 96 %
MAGNESIUM SERPL-MCNC: 1.78 MG/DL (ref 1.6–2.4)
MCH RBC QN AUTO: 34.1 PG (ref 26–34)
MCHC RBC AUTO-ENTMCNC: 34.2 G/DL (ref 32–36)
MCV RBC AUTO: 100 FL (ref 80–100)
MONOCYTES # BLD AUTO: 0.29 X10*3/UL (ref 0.1–1)
MONOCYTES NFR BLD AUTO: 2.8 %
MONOS+MACROS NFR FLD MANUAL: 4 %
NEUTROPHILS # BLD AUTO: 9.06 X10*3/UL (ref 1.2–7.7)
NEUTROPHILS NFR BLD AUTO: 87.8 %
NEUTROPHILS NFR FLD MANUAL: 0 %
NRBC BLD-RTO: 0 /100 WBCS (ref 0–0)
OTHER CELLS NFR FLD MANUAL: 0 %
OXYHGB MFR BLDV: 17.5 % (ref 45–75)
PCO2 BLDV: 46 MM HG (ref 41–51)
PH BLDV: 7.38 PH (ref 7.33–7.43)
PLASMA CELLS NFR FLD MANUAL: 0 %
PLATELET # BLD AUTO: 455 X10*3/UL (ref 150–450)
PO2 BLDV: 18 MM HG (ref 35–45)
POTASSIUM BLDV-SCNC: 3.6 MMOL/L (ref 3.5–5.3)
POTASSIUM SERPL-SCNC: 3.8 MMOL/L (ref 3.5–5.3)
RBC # BLD AUTO: 3.84 X10*6/UL (ref 4–5.2)
RBC # FLD AUTO: 139 /UL
SAO2 % BLDV: 18 % (ref 45–75)
SODIUM BLDV-SCNC: 135 MMOL/L (ref 136–145)
SODIUM SERPL-SCNC: 140 MMOL/L (ref 136–145)
TOTAL CELLS COUNTED FLD: 100
WBC # BLD AUTO: 10.3 X10*3/UL (ref 4.4–11.3)
WBC # FLD AUTO: 156 /UL

## 2025-04-11 PROCEDURE — 83880 ASSAY OF NATRIURETIC PEPTIDE: CPT | Performed by: EMERGENCY MEDICINE

## 2025-04-11 PROCEDURE — 1200000002 HC GENERAL ROOM WITH TELEMETRY DAILY

## 2025-04-11 PROCEDURE — 84157 ASSAY OF PROTEIN OTHER: CPT | Mod: PORLAB

## 2025-04-11 PROCEDURE — 71046 X-RAY EXAM CHEST 2 VIEWS: CPT

## 2025-04-11 PROCEDURE — 2500000004 HC RX 250 GENERAL PHARMACY W/ HCPCS (ALT 636 FOR OP/ED): Performed by: EMERGENCY MEDICINE

## 2025-04-11 PROCEDURE — 71045 X-RAY EXAM CHEST 1 VIEW: CPT | Mod: FOREIGN READ | Performed by: RADIOLOGY

## 2025-04-11 PROCEDURE — 82565 ASSAY OF CREATININE: CPT | Performed by: EMERGENCY MEDICINE

## 2025-04-11 PROCEDURE — 71045 X-RAY EXAM CHEST 1 VIEW: CPT

## 2025-04-11 PROCEDURE — 83735 ASSAY OF MAGNESIUM: CPT | Performed by: EMERGENCY MEDICINE

## 2025-04-11 PROCEDURE — 82945 GLUCOSE OTHER FLUID: CPT | Mod: PORLAB

## 2025-04-11 PROCEDURE — 99285 EMERGENCY DEPT VISIT HI MDM: CPT | Mod: 25 | Performed by: EMERGENCY MEDICINE

## 2025-04-11 PROCEDURE — 83615 LACTATE (LD) (LDH) ENZYME: CPT | Mod: PORLAB

## 2025-04-11 PROCEDURE — 96374 THER/PROPH/DIAG INJ IV PUSH: CPT | Mod: 59

## 2025-04-11 PROCEDURE — 2500000004 HC RX 250 GENERAL PHARMACY W/ HCPCS (ALT 636 FOR OP/ED)

## 2025-04-11 PROCEDURE — 36415 COLL VENOUS BLD VENIPUNCTURE: CPT | Performed by: EMERGENCY MEDICINE

## 2025-04-11 PROCEDURE — 2500000001 HC RX 250 WO HCPCS SELF ADMINISTERED DRUGS (ALT 637 FOR MEDICARE OP): Performed by: EMERGENCY MEDICINE

## 2025-04-11 PROCEDURE — 2500000002 HC RX 250 W HCPCS SELF ADMINISTERED DRUGS (ALT 637 FOR MEDICARE OP, ALT 636 FOR OP/ED)

## 2025-04-11 PROCEDURE — 71046 X-RAY EXAM CHEST 2 VIEWS: CPT | Performed by: STUDENT IN AN ORGANIZED HEALTH CARE EDUCATION/TRAINING PROGRAM

## 2025-04-11 PROCEDURE — 89051 BODY FLUID CELL COUNT: CPT

## 2025-04-11 PROCEDURE — 96376 TX/PRO/DX INJ SAME DRUG ADON: CPT | Mod: 59

## 2025-04-11 PROCEDURE — 99223 1ST HOSP IP/OBS HIGH 75: CPT

## 2025-04-11 PROCEDURE — 32554 ASPIRATE PLEURA W/O IMAGING: CPT | Performed by: EMERGENCY MEDICINE

## 2025-04-11 PROCEDURE — 85025 COMPLETE CBC W/AUTO DIFF WBC: CPT | Performed by: EMERGENCY MEDICINE

## 2025-04-11 PROCEDURE — 87205 SMEAR GRAM STAIN: CPT | Mod: PORLAB

## 2025-04-11 PROCEDURE — 80048 BASIC METABOLIC PNL TOTAL CA: CPT | Performed by: EMERGENCY MEDICINE

## 2025-04-11 RX ORDER — IBUPROFEN 600 MG/1
600 TABLET ORAL ONCE
Status: COMPLETED | OUTPATIENT
Start: 2025-04-11 | End: 2025-04-11

## 2025-04-11 RX ORDER — ROSUVASTATIN CALCIUM 20 MG/1
20 TABLET, COATED ORAL NIGHTLY
Status: DISCONTINUED | OUTPATIENT
Start: 2025-04-11 | End: 2025-04-15 | Stop reason: HOSPADM

## 2025-04-11 RX ORDER — ONDANSETRON HYDROCHLORIDE 2 MG/ML
4 INJECTION, SOLUTION INTRAVENOUS EVERY 6 HOURS PRN
Status: DISCONTINUED | OUTPATIENT
Start: 2025-04-11 | End: 2025-04-15 | Stop reason: HOSPADM

## 2025-04-11 RX ORDER — LETROZOLE 2.5 MG/1
2.5 TABLET, FILM COATED ORAL DAILY
Status: DISCONTINUED | OUTPATIENT
Start: 2025-04-12 | End: 2025-04-15 | Stop reason: HOSPADM

## 2025-04-11 RX ORDER — PANTOPRAZOLE SODIUM 40 MG/1
40 TABLET, DELAYED RELEASE ORAL
Status: DISCONTINUED | OUTPATIENT
Start: 2025-04-12 | End: 2025-04-15 | Stop reason: HOSPADM

## 2025-04-11 RX ORDER — PANTOPRAZOLE SODIUM 40 MG/10ML
40 INJECTION, POWDER, LYOPHILIZED, FOR SOLUTION INTRAVENOUS
Status: DISCONTINUED | OUTPATIENT
Start: 2025-04-12 | End: 2025-04-15 | Stop reason: HOSPADM

## 2025-04-11 RX ORDER — POLYETHYLENE GLYCOL 3350 17 G/17G
17 POWDER, FOR SOLUTION ORAL DAILY PRN
Status: DISCONTINUED | OUTPATIENT
Start: 2025-04-11 | End: 2025-04-15 | Stop reason: HOSPADM

## 2025-04-11 RX ORDER — MORPHINE SULFATE 2 MG/ML
2 INJECTION, SOLUTION INTRAMUSCULAR; INTRAVENOUS EVERY 4 HOURS PRN
Status: DISCONTINUED | OUTPATIENT
Start: 2025-04-11 | End: 2025-04-15 | Stop reason: HOSPADM

## 2025-04-11 RX ORDER — HYDROCHLOROTHIAZIDE 12.5 MG/1
12.5 CAPSULE ORAL DAILY
Status: DISCONTINUED | OUTPATIENT
Start: 2025-04-12 | End: 2025-04-15 | Stop reason: HOSPADM

## 2025-04-11 RX ORDER — GUAIFENESIN 600 MG/1
600 TABLET, EXTENDED RELEASE ORAL EVERY 12 HOURS PRN
Status: DISCONTINUED | OUTPATIENT
Start: 2025-04-11 | End: 2025-04-15 | Stop reason: HOSPADM

## 2025-04-11 RX ORDER — ACETAMINOPHEN 325 MG/1
650 TABLET ORAL EVERY 4 HOURS PRN
Status: DISCONTINUED | OUTPATIENT
Start: 2025-04-11 | End: 2025-04-15 | Stop reason: HOSPADM

## 2025-04-11 RX ORDER — ENOXAPARIN SODIUM 100 MG/ML
40 INJECTION SUBCUTANEOUS EVERY 24 HOURS
Status: DISCONTINUED | OUTPATIENT
Start: 2025-04-11 | End: 2025-04-15 | Stop reason: HOSPADM

## 2025-04-11 RX ORDER — TALC
3 POWDER (GRAM) TOPICAL NIGHTLY PRN
Status: DISCONTINUED | OUTPATIENT
Start: 2025-04-11 | End: 2025-04-15 | Stop reason: HOSPADM

## 2025-04-11 RX ORDER — BUPROPION HYDROCHLORIDE 150 MG/1
300 TABLET ORAL DAILY
Status: DISCONTINUED | OUTPATIENT
Start: 2025-04-12 | End: 2025-04-15 | Stop reason: HOSPADM

## 2025-04-11 RX ORDER — PROPRANOLOL HYDROCHLORIDE 10 MG/1
20 TABLET ORAL 3 TIMES DAILY
Status: DISCONTINUED | OUTPATIENT
Start: 2025-04-11 | End: 2025-04-15 | Stop reason: HOSPADM

## 2025-04-11 RX ORDER — MORPHINE SULFATE 4 MG/ML
4 INJECTION INTRAVENOUS EVERY 4 HOURS PRN
Status: DISCONTINUED | OUTPATIENT
Start: 2025-04-11 | End: 2025-04-15 | Stop reason: HOSPADM

## 2025-04-11 RX ADMIN — ENOXAPARIN SODIUM 40 MG: 40 INJECTION SUBCUTANEOUS at 22:19

## 2025-04-11 RX ADMIN — ROSUVASTATIN CALCIUM 20 MG: 20 TABLET, FILM COATED ORAL at 22:19

## 2025-04-11 RX ADMIN — ONDANSETRON 4 MG: 2 INJECTION INTRAMUSCULAR; INTRAVENOUS at 22:19

## 2025-04-11 RX ADMIN — HYDROMORPHONE HYDROCHLORIDE 0.5 MG: 0.5 INJECTION, SOLUTION INTRAMUSCULAR; INTRAVENOUS; SUBCUTANEOUS at 20:15

## 2025-04-11 RX ADMIN — HYDROMORPHONE HYDROCHLORIDE 0.2 MG: 0.5 INJECTION, SOLUTION INTRAMUSCULAR; INTRAVENOUS; SUBCUTANEOUS at 18:02

## 2025-04-11 RX ADMIN — HYDROMORPHONE HYDROCHLORIDE 0.2 MG: 0.5 INJECTION, SOLUTION INTRAMUSCULAR; INTRAVENOUS; SUBCUTANEOUS at 18:53

## 2025-04-11 RX ADMIN — MORPHINE SULFATE 4 MG: 4 INJECTION, SOLUTION INTRAMUSCULAR; INTRAVENOUS at 22:19

## 2025-04-11 RX ADMIN — IBUPROFEN 600 MG: 600 TABLET, FILM COATED ORAL at 17:13

## 2025-04-11 SDOH — ECONOMIC STABILITY: TRANSPORTATION INSECURITY: IN THE PAST 12 MONTHS, HAS LACK OF TRANSPORTATION KEPT YOU FROM MEDICAL APPOINTMENTS OR FROM GETTING MEDICATIONS?: NO

## 2025-04-11 SDOH — ECONOMIC STABILITY: INCOME INSECURITY: IN THE PAST 12 MONTHS HAS THE ELECTRIC, GAS, OIL, OR WATER COMPANY THREATENED TO SHUT OFF SERVICES IN YOUR HOME?: NO

## 2025-04-11 SDOH — SOCIAL STABILITY: SOCIAL INSECURITY: HAVE YOU HAD THOUGHTS OF HARMING ANYONE ELSE?: NO

## 2025-04-11 SDOH — ECONOMIC STABILITY: FOOD INSECURITY: WITHIN THE PAST 12 MONTHS, YOU WORRIED THAT YOUR FOOD WOULD RUN OUT BEFORE YOU GOT THE MONEY TO BUY MORE.: NEVER TRUE

## 2025-04-11 SDOH — ECONOMIC STABILITY: FOOD INSECURITY: HOW HARD IS IT FOR YOU TO PAY FOR THE VERY BASICS LIKE FOOD, HOUSING, MEDICAL CARE, AND HEATING?: NOT HARD AT ALL

## 2025-04-11 SDOH — ECONOMIC STABILITY: HOUSING INSECURITY: IN THE LAST 12 MONTHS, WAS THERE A TIME WHEN YOU WERE NOT ABLE TO PAY THE MORTGAGE OR RENT ON TIME?: NO

## 2025-04-11 SDOH — SOCIAL STABILITY: SOCIAL INSECURITY
WITHIN THE LAST YEAR, HAVE YOU BEEN KICKED, HIT, SLAPPED, OR OTHERWISE PHYSICALLY HURT BY YOUR PARTNER OR EX-PARTNER?: NO

## 2025-04-11 SDOH — ECONOMIC STABILITY: FOOD INSECURITY: WITHIN THE PAST 12 MONTHS, THE FOOD YOU BOUGHT JUST DIDN'T LAST AND YOU DIDN'T HAVE MONEY TO GET MORE.: NEVER TRUE

## 2025-04-11 SDOH — SOCIAL STABILITY: SOCIAL INSECURITY: WITHIN THE LAST YEAR, HAVE YOU BEEN AFRAID OF YOUR PARTNER OR EX-PARTNER?: NO

## 2025-04-11 SDOH — ECONOMIC STABILITY: HOUSING INSECURITY: AT ANY TIME IN THE PAST 12 MONTHS, WERE YOU HOMELESS OR LIVING IN A SHELTER (INCLUDING NOW)?: NO

## 2025-04-11 SDOH — SOCIAL STABILITY: SOCIAL INSECURITY: WITHIN THE LAST YEAR, HAVE YOU BEEN HUMILIATED OR EMOTIONALLY ABUSED IN OTHER WAYS BY YOUR PARTNER OR EX-PARTNER?: NO

## 2025-04-11 SDOH — ECONOMIC STABILITY: HOUSING INSECURITY: IN THE PAST 12 MONTHS, HOW MANY TIMES HAVE YOU MOVED WHERE YOU WERE LIVING?: 0

## 2025-04-11 SDOH — SOCIAL STABILITY: SOCIAL INSECURITY
WITHIN THE LAST YEAR, HAVE YOU BEEN RAPED OR FORCED TO HAVE ANY KIND OF SEXUAL ACTIVITY BY YOUR PARTNER OR EX-PARTNER?: NO

## 2025-04-11 SDOH — SOCIAL STABILITY: SOCIAL INSECURITY: WERE YOU ABLE TO COMPLETE ALL THE BEHAVIORAL HEALTH SCREENINGS?: YES

## 2025-04-11 SDOH — SOCIAL STABILITY: SOCIAL INSECURITY: ABUSE: ADULT

## 2025-04-11 ASSESSMENT — LIFESTYLE VARIABLES
HOW OFTEN DO YOU HAVE A DRINK CONTAINING ALCOHOL: NEVER
HAVE PEOPLE ANNOYED YOU BY CRITICIZING YOUR DRINKING: NO
SKIP TO QUESTIONS 9-10: 1
EVER HAD A DRINK FIRST THING IN THE MORNING TO STEADY YOUR NERVES TO GET RID OF A HANGOVER: NO
TOTAL SCORE: 0
EVER FELT BAD OR GUILTY ABOUT YOUR DRINKING: NO
HOW MANY STANDARD DRINKS CONTAINING ALCOHOL DO YOU HAVE ON A TYPICAL DAY: PATIENT DOES NOT DRINK
HAVE YOU EVER FELT YOU SHOULD CUT DOWN ON YOUR DRINKING: NO
AUDIT-C TOTAL SCORE: 0
HOW OFTEN DO YOU HAVE 6 OR MORE DRINKS ON ONE OCCASION: NEVER
AUDIT-C TOTAL SCORE: 0

## 2025-04-11 ASSESSMENT — COGNITIVE AND FUNCTIONAL STATUS - GENERAL
DAILY ACTIVITIY SCORE: 24
MOBILITY SCORE: 24
PATIENT BASELINE BEDBOUND: NO

## 2025-04-11 ASSESSMENT — PAIN SCALES - GENERAL
PAINLEVEL_OUTOF10: 8
PAINLEVEL_OUTOF10: 0 - NO PAIN
PAINLEVEL_OUTOF10: 9
PAINLEVEL_OUTOF10: 3
PAINLEVEL_OUTOF10: 0 - NO PAIN

## 2025-04-11 ASSESSMENT — ACTIVITIES OF DAILY LIVING (ADL)
PATIENT'S MEMORY ADEQUATE TO SAFELY COMPLETE DAILY ACTIVITIES?: YES
HEARING - RIGHT EAR: FUNCTIONAL
BATHING: INDEPENDENT
JUDGMENT_ADEQUATE_SAFELY_COMPLETE_DAILY_ACTIVITIES: YES
ADEQUATE_TO_COMPLETE_ADL: YES
DRESSING YOURSELF: INDEPENDENT
ASSISTIVE_DEVICE: WALKER
HEARING - LEFT EAR: FUNCTIONAL
WALKS IN HOME: INDEPENDENT
FEEDING YOURSELF: INDEPENDENT
TOILETING: INDEPENDENT
LACK_OF_TRANSPORTATION: NO
GROOMING: INDEPENDENT

## 2025-04-11 ASSESSMENT — PATIENT HEALTH QUESTIONNAIRE - PHQ9
SUM OF ALL RESPONSES TO PHQ9 QUESTIONS 1 & 2: 0
1. LITTLE INTEREST OR PLEASURE IN DOING THINGS: NOT AT ALL
2. FEELING DOWN, DEPRESSED OR HOPELESS: NOT AT ALL

## 2025-04-11 ASSESSMENT — PAIN DESCRIPTION - LOCATION: LOCATION: OTHER (COMMENT)

## 2025-04-11 ASSESSMENT — PAIN DESCRIPTION - PROGRESSION: CLINICAL_PROGRESSION: NOT CHANGED

## 2025-04-11 ASSESSMENT — PAIN - FUNCTIONAL ASSESSMENT
PAIN_FUNCTIONAL_ASSESSMENT: 0-10

## 2025-04-11 ASSESSMENT — PAIN DESCRIPTION - ORIENTATION: ORIENTATION: RIGHT

## 2025-04-11 NOTE — TELEPHONE ENCOUNTER
Returned call to patient and she is explained that she is having a harder time then ever breathing. She cannot do anything without becoming short of breath. She explained she was having increased SOB when she saw Dr. Guzman, he suggested increasing the her o2. She states her breathing it has since gotten much worse. This RN explained she should call 911. Pt explained that she would rather call her brother to take madison to the ED, however, she was willing to call 911 if he can't get to here soon. Patient verbalized understanding and agreement regarding discussed information via verbal feedback.

## 2025-04-11 NOTE — ED PROVIDER NOTES
HPI   Chief Complaint   Patient presents with    Shortness of Breath     SOB Increasing over the last week       HPI:  70-year-old female with history of MS and breast cancer presents emergency department with complaint of shortness of breath.  She has a history of metastatic breast cancer gets recurrent pleural effusions which get drained weekly and she last had her pleural effusion drained on the right side on Monday however she has had worsening shortness of breath.  She is on her usual 3 L of oxygen via nasal cannula however she is quite winded and speaks in abbreviated sentences she denies any fevers or chills no vomiting no diarrhea no new cough    Limitations to history: None  Independent Historians: None  External Records Reviewed: EMR record  ------------------------------------------------------------------------------------------------------------------------------------------  ROS: a ten point review of systems was performed and negative except as per HPI.  ------------------------------------------------------------------------------------------------------------------------------------------  PMH / PSH: as per HPI, reviewed in EMR and discussed with the patient []  MEDS:  reviewed in EMR and discussed with the patient []  ALLERGIES: reviewed in EMR[]  SocH:  as per HPI, otherwise reviewed in EMR []  FH:  as per HPI, otherwise reviewed in EMR []  ------------------------------------------------------------------------------------------------------------------------------------------  Physical Exam:  VS: As documented in the triage note and EMR flowsheet from this visit was reviewed  General: Well appearing. No acute distress.  Mild respiratory distress mild tachypnea  Eyes:  Extraocular movements grossly intact. No scleral icterus.   HEENT: Atraumatic. Normocephalic.    Neck: Supple. No gross masses  CV: RRR, audible S1/S2, 2+ symmetric peripheral pulses  Resp: Diminished right base no wheeze no  respiratory distress.  Non-labored respirations  GI: Soft, non-tender, non-distended, no rebound or gaurding  MSK: Thin extremities symmetric muscle bulk. No gross step offs or deformities.  Skin: Warm, dry, no obvious rash.  Neuro: Speech fluent. Awake. Alert. Appropriate conversation.  Psych: Appropriate mood and affect for situation  ------------------------------------------------------------------------------------------------------------------------------------------  Hospital Course / Medical Decision Makin-year-old female with recurrent right-sided pleural effusion with a Pleurx catheter normally gets it drained weekly last had a drained on Monday.  She is coming in tachypneic short of breath feels lightheaded and dizzy she was noted to be hypoxic had her home oxygen uptitrated upon arrival.  Patient hooked up to the cardiac monitor peripheral IV access obtained labs are drawn.  Labs were notable for elevated BNP in the 700s and portable chest x-ray showed a large right-sided pleural effusion.  Verbal consent was obtained to access her right-sided Pleurx catheter patient was given ibuprofen and Dilaudid for pain control it took a while to drain the fluid from the right side because the patient would asked to intermittently stop due to pleuritic right-sided sharp pain going into her back.  Total amount of pleural fluid removed was 350 cc patient feeling improved however still complaining of right-sided chest pain also got up to try to ambulate and became quite tachypneic and dyspneic and not feeling much improved does not feel well enough to be discharged home and was agreeable for hospitalization for pain control and hopefully removal of more pleural fluid in the morning.  Patient also given Lasix to try to help with diuresis given her elevated BNP level.    Patient care discussed with: Admitting team  Social Determinants affecting care: [Problems affording transportation, inability to afford  prescriptions, lack of housing, lack of insurance]    Final diagnosis and disposition: Dyspnea, large pleural effusion            Sandra Cueva MD                          Patient History   Past Medical History:   Diagnosis Date    GERD (gastroesophageal reflux disease)     HLD (hyperlipidemia)     HTN (hypertension)     Liver disease     Lung neoplasm     MS (multiple sclerosis) (Multi)     Pleural effusion      Past Surgical History:   Procedure Laterality Date    DILATION AND CURETTAGE OF UTERUS      PERCUTANEOUS CHEST DRAIN INSERTION Right 10/10/2024    Thoracoscopy; Right PleurX Placement, with pleural biopsies     No family history on file.  Social History     Tobacco Use    Smoking status: Former     Current packs/day: 0.00     Average packs/day: 0.5 packs/day for 2.0 years (1.0 ttl pk-yrs)     Types: Cigarettes     Start date:      Quit date:      Years since quittin.3    Smokeless tobacco: Never   Vaping Use    Vaping status: Never Used   Substance Use Topics    Alcohol use: Not Currently    Drug use: Never       Physical Exam   ED Triage Vitals [25 1220]   Temperature Heart Rate Respirations BP   36.1 °C (96.9 °F) 89 20 145/65      Pulse Ox Temp Source Heart Rate Source Patient Position   99 % Temporal Monitor --      BP Location FiO2 (%)     -- --       Physical Exam      ED Course & MDM                  No data recorded     [place] Coma Scale Score: 15 (25 1222 : Franchesca Dickinson RN)                           Medical Decision Making      Procedure  Procedures     Sandra Cueva MD  25

## 2025-04-11 NOTE — TELEPHONE ENCOUNTER
I returned call to [facility], She wanted to give us an update. She checked on the patient, and she was only on 2L o2 and once bumped up to 3L was only stating at 90% and feeling SOB. She explained that they did call the squad and the patient is heading to [place] ED now.

## 2025-04-12 LAB
ALBUMIN SERPL BCP-MCNC: 3.3 G/DL (ref 3.4–5)
ALP SERPL-CCNC: 64 U/L (ref 33–136)
ALT SERPL W P-5'-P-CCNC: 12 U/L (ref 7–45)
ANION GAP SERPL CALC-SCNC: 12 MMOL/L (ref 10–20)
AST SERPL W P-5'-P-CCNC: 30 U/L (ref 9–39)
BILIRUB SERPL-MCNC: 0.3 MG/DL (ref 0–1.2)
BUN SERPL-MCNC: 30 MG/DL (ref 6–23)
CALCIUM SERPL-MCNC: 8.9 MG/DL (ref 8.6–10.3)
CHLORIDE SERPL-SCNC: 101 MMOL/L (ref 98–107)
CO2 SERPL-SCNC: 28 MMOL/L (ref 21–32)
CREAT SERPL-MCNC: 1.05 MG/DL (ref 0.5–1.05)
EGFRCR SERPLBLD CKD-EPI 2021: 58 ML/MIN/1.73M*2
ERYTHROCYTE [DISTWIDTH] IN BLOOD BY AUTOMATED COUNT: 14.8 % (ref 11.5–14.5)
GLUCOSE FLD-MCNC: 62 MG/DL
GLUCOSE SERPL-MCNC: 141 MG/DL (ref 74–99)
HCT VFR BLD AUTO: 36.8 % (ref 36–46)
HGB BLD-MCNC: 12.9 G/DL (ref 12–16)
LDH FLD L TO P-CCNC: 81 U/L
MCH RBC QN AUTO: 34.9 PG (ref 26–34)
MCHC RBC AUTO-ENTMCNC: 35.1 G/DL (ref 32–36)
MCV RBC AUTO: 100 FL (ref 80–100)
NRBC BLD-RTO: 0 /100 WBCS (ref 0–0)
PLATELET # BLD AUTO: 472 X10*3/UL (ref 150–450)
POTASSIUM SERPL-SCNC: 3.5 MMOL/L (ref 3.5–5.3)
PROT FLD-MCNC: 1.8 G/DL
PROT SERPL-MCNC: 7 G/DL (ref 6.4–8.2)
RBC # BLD AUTO: 3.7 X10*6/UL (ref 4–5.2)
SODIUM SERPL-SCNC: 137 MMOL/L (ref 136–145)
WBC # BLD AUTO: 6.8 X10*3/UL (ref 4.4–11.3)

## 2025-04-12 PROCEDURE — 2500000004 HC RX 250 GENERAL PHARMACY W/ HCPCS (ALT 636 FOR OP/ED)

## 2025-04-12 PROCEDURE — 2500000002 HC RX 250 W HCPCS SELF ADMINISTERED DRUGS (ALT 637 FOR MEDICARE OP, ALT 636 FOR OP/ED)

## 2025-04-12 PROCEDURE — 36415 COLL VENOUS BLD VENIPUNCTURE: CPT

## 2025-04-12 PROCEDURE — 1200000002 HC GENERAL ROOM WITH TELEMETRY DAILY

## 2025-04-12 PROCEDURE — 99233 SBSQ HOSP IP/OBS HIGH 50: CPT | Performed by: INTERNAL MEDICINE

## 2025-04-12 PROCEDURE — 400014 HH F/U

## 2025-04-12 PROCEDURE — 84075 ASSAY ALKALINE PHOSPHATASE: CPT

## 2025-04-12 PROCEDURE — 85027 COMPLETE CBC AUTOMATED: CPT

## 2025-04-12 PROCEDURE — 2500000004 HC RX 250 GENERAL PHARMACY W/ HCPCS (ALT 636 FOR OP/ED): Performed by: INTERNAL MEDICINE

## 2025-04-12 PROCEDURE — 2500000001 HC RX 250 WO HCPCS SELF ADMINISTERED DRUGS (ALT 637 FOR MEDICARE OP)

## 2025-04-12 RX ORDER — FUROSEMIDE 10 MG/ML
40 INJECTION INTRAMUSCULAR; INTRAVENOUS ONCE
Status: COMPLETED | OUTPATIENT
Start: 2025-04-12 | End: 2025-04-12

## 2025-04-12 RX ADMIN — MORPHINE SULFATE 2 MG: 2 INJECTION, SOLUTION INTRAMUSCULAR; INTRAVENOUS at 14:35

## 2025-04-12 RX ADMIN — DEXAMETHASONE SODIUM PHOSPHATE 2 MG: 4 INJECTION, SOLUTION INTRAMUSCULAR; INTRAVENOUS at 12:13

## 2025-04-12 RX ADMIN — ENOXAPARIN SODIUM 40 MG: 40 INJECTION SUBCUTANEOUS at 21:21

## 2025-04-12 RX ADMIN — PANTOPRAZOLE SODIUM 40 MG: 40 TABLET, DELAYED RELEASE ORAL at 06:15

## 2025-04-12 RX ADMIN — DEXAMETHASONE SODIUM PHOSPHATE 2 MG: 4 INJECTION, SOLUTION INTRAMUSCULAR; INTRAVENOUS at 21:21

## 2025-04-12 RX ADMIN — HYDROCHLOROTHIAZIDE 12.5 MG: 12.5 CAPSULE ORAL at 08:32

## 2025-04-12 RX ADMIN — BUPROPION HYDROCHLORIDE 300 MG: 150 TABLET, EXTENDED RELEASE ORAL at 08:32

## 2025-04-12 RX ADMIN — LETROZOLE 2.5 MG: 2.5 TABLET ORAL at 08:32

## 2025-04-12 RX ADMIN — PROPRANOLOL HYDROCHLORIDE 20 MG: 10 TABLET ORAL at 21:21

## 2025-04-12 RX ADMIN — ACETAMINOPHEN 650 MG: 325 TABLET ORAL at 21:25

## 2025-04-12 RX ADMIN — PROPRANOLOL HYDROCHLORIDE 20 MG: 10 TABLET ORAL at 14:35

## 2025-04-12 RX ADMIN — FUROSEMIDE 40 MG: 10 INJECTION, SOLUTION INTRAMUSCULAR; INTRAVENOUS at 12:13

## 2025-04-12 RX ADMIN — ROSUVASTATIN CALCIUM 20 MG: 20 TABLET, FILM COATED ORAL at 21:21

## 2025-04-12 RX ADMIN — PROPRANOLOL HYDROCHLORIDE 20 MG: 10 TABLET ORAL at 08:32

## 2025-04-12 ASSESSMENT — COGNITIVE AND FUNCTIONAL STATUS - GENERAL
MOBILITY SCORE: 22
WALKING IN HOSPITAL ROOM: A LITTLE
WALKING IN HOSPITAL ROOM: A LITTLE
DRESSING REGULAR LOWER BODY CLOTHING: A LITTLE
CLIMB 3 TO 5 STEPS WITH RAILING: A LITTLE
DAILY ACTIVITIY SCORE: 23
CLIMB 3 TO 5 STEPS WITH RAILING: A LITTLE
DRESSING REGULAR LOWER BODY CLOTHING: A LITTLE
MOBILITY SCORE: 22
DAILY ACTIVITIY SCORE: 23

## 2025-04-12 ASSESSMENT — PAIN SCALES - GENERAL
PAINLEVEL_OUTOF10: 2
PAINLEVEL_OUTOF10: 3
PAINLEVEL_OUTOF10: 0 - NO PAIN

## 2025-04-12 ASSESSMENT — PAIN DESCRIPTION - ORIENTATION: ORIENTATION: LEFT

## 2025-04-12 ASSESSMENT — PAIN - FUNCTIONAL ASSESSMENT
PAIN_FUNCTIONAL_ASSESSMENT: 0-10
PAIN_FUNCTIONAL_ASSESSMENT: 0-10

## 2025-04-12 ASSESSMENT — PAIN DESCRIPTION - LOCATION: LOCATION: LEG

## 2025-04-12 NOTE — CARE PLAN
Problem: Pain - Adult  Goal: Verbalizes/displays adequate comfort level or baseline comfort level  Outcome: Progressing     Problem: Safety - Adult  Goal: Free from fall injury  Outcome: Progressing     Problem: Discharge Planning  Goal: Discharge to home or other facility with appropriate resources  Outcome: Progressing     Problem: Chronic Conditions and Co-morbidities  Goal: Patient's chronic conditions and co-morbidity symptoms are monitored and maintained or improved  Outcome: Progressing     Problem: Nutrition  Goal: Nutrient intake appropriate for maintaining nutritional needs  Outcome: Progressing     Problem: Pain  Goal: Takes deep breaths with improved pain control throughout the shift  Outcome: Progressing  Goal: Turns in bed with improved pain control throughout the shift  Outcome: Progressing  Goal: Walks with improved pain control throughout the shift  Outcome: Progressing  Goal: Performs ADL's with improved pain control throughout shift  Outcome: Progressing  Goal: Participates in PT with improved pain control throughout the shift  Outcome: Progressing  Goal: Free from opioid side effects throughout the shift  Outcome: Progressing  Goal: Free from acute confusion related to pain meds throughout the shift  Outcome: Progressing     Problem: Fall/Injury  Goal: Not fall by end of shift  Outcome: Progressing  Goal: Be free from injury by end of the shift  Outcome: Progressing  Goal: Verbalize understanding of personal risk factors for fall in the hospital  Outcome: Progressing  Goal: Verbalize understanding of risk factor reduction measures to prevent injury from fall in the home  Outcome: Progressing  Goal: Use assistive devices by end of the shift  Outcome: Progressing  Goal: Pace activities to prevent fatigue by end of the shift  Outcome: Progressing

## 2025-04-12 NOTE — CARE PLAN
Problem: Pain - Adult  Goal: Verbalizes/displays adequate comfort level or baseline comfort level  Outcome: Progressing  Flowsheets (Taken 4/12/2025 1943)  Verbalizes/displays adequate comfort level or baseline comfort level:   Assess pain using appropriate pain scale   Administer analgesics based on type and severity of pain and evaluate response   Implement non-pharmacological measures as appropriate and evaluate response   Consider cultural and social influences on pain and pain management     Problem: Safety - Adult  Goal: Free from fall injury  Outcome: Progressing     Problem: Discharge Planning  Goal: Discharge to home or other facility with appropriate resources  Outcome: Progressing     Problem: Chronic Conditions and Co-morbidities  Goal: Patient's chronic conditions and co-morbidity symptoms are monitored and maintained or improved  Outcome: Progressing     Problem: Nutrition  Goal: Nutrient intake appropriate for maintaining nutritional needs  Outcome: Progressing     Problem: Pain  Goal: Takes deep breaths with improved pain control throughout the shift  Outcome: Progressing  Goal: Turns in bed with improved pain control throughout the shift  Outcome: Progressing  Goal: Walks with improved pain control throughout the shift  Outcome: Progressing  Goal: Performs ADL's with improved pain control throughout shift  Outcome: Progressing  Goal: Participates in PT with improved pain control throughout the shift  Outcome: Progressing  Goal: Free from opioid side effects throughout the shift  Outcome: Progressing  Goal: Free from acute confusion related to pain meds throughout the shift  Outcome: Progressing     Problem: Fall/Injury  Goal: Not fall by end of shift  Outcome: Progressing  Goal: Be free from injury by end of the shift  Outcome: Progressing  Goal: Verbalize understanding of personal risk factors for fall in the hospital  Outcome: Progressing  Goal: Verbalize understanding of risk factor reduction  measures to prevent injury from fall in the home  Outcome: Progressing  Goal: Use assistive devices by end of the shift  Outcome: Progressing  Goal: Pace activities to prevent fatigue by end of the shift  Outcome: Progressing

## 2025-04-12 NOTE — ED PROCEDURE NOTE
Procedure  Thoracentesis    Performed by: Sandra Cueva MD  Authorized by: Sandra Cueva MD    Consent:     Consent obtained:  Verbal    Consent given by:  Patient    Risks, benefits, and alternatives were discussed: yes      Alternatives discussed:  No treatment  Universal protocol:     Patient identity confirmed:  Verbally with patient and arm band  Sedation:     Sedation type:  None  Anesthesia:     Anesthesia method:  None  Procedure details:     Indwelling catheter placed: Pleurx catheter was accessed to drain right-sided pleural effusion.    Post-procedure details:     Chest x-ray performed: yes      Procedure completion:  Tolerated  Comments:      350 cc of pleural fluid was drained via right-sided indwelling Pleurx catheter using a Pleurx kit that all the patient could tolerate that prior to stopping due to right-sided pain             Sandra Cueva MD  04/11/25 2019

## 2025-04-12 NOTE — PROGRESS NOTES
Mildred Moyer is a 70 y.o. female on day 1 of admission presenting with Pleural effusion.    Subjective   History Of Present Illness (HPI):  Mildred Moyer is a 70 y.o. female with PMHx s/f recurrent right pleural effusion status post multiple thoracentesis and right Pleurx placement, chronic hypoxic respiratory failure on 3-4 L nasal cannula, HTN, HLD, metastatic breast cancer, anxiety and depression presenting with shortness of breath.  She states she get her pleural effusion drained weekly, last drained on Monday with her usual fluid output of 100 to 150 cc.  Today she has worsening short of breath with conversation and really winded with exertion. Denies f/c/n/v/d, cough, congestion, chest pain, palpitations.     ED Course:   Vitals on presentation: T 36.1 °C (96.9 °F)  HR 89  /65  RR 20  O2 99 % Supplemental oxygen  Labs:   CBC with WBC 10.3, Hgb 13.1, Plts 455.   CMP with glucose 107, Na 140, K 3.8, BUN 31, sCr 1.14, alk phos 70, ALT 14, AST 16, bilirubin 0.4. Magnesium 1.78.   . Trop 4.   VBG largely unremarkable  EKG: None  Imaging - agree with radiology interpretation(s):   CXR-new small left basilar pleural effusion atelectasis.  Unchanged appearance of right hemothorax with right apical chest tube and large pleural effusion with mild lung aeration.  Interventions: Dilaudid 0.2 mg x 2, 0.5 mg, ibuprofen 600 mg, admission for further management   4/12: Patient with some pleuritic chest pain and shortness of breath but relatively comfortable.  Will start some low-dose Decadron to see if this can help with her shortness of breath.  Give a dose of Lasix x 1.  Add mucolytic and aerosol.  Will need further thoracentesis.  Supportive care and oxygen.       Objective     Physical Exam    EXAM:    Constitutional: Lying in bed short of breath with movement    Head and facial: Nasal cannula on    Neck: Minimal accessory muscle use    Lungs: Decreased breath sounds bilaterally in the  "bases    Heart: Regular heart rate    Abdomen: Nontender    Pelvis/: No flank tenderness no urinary collection device    Extremities: No gross abnormalities    Neurologic: General Frailty    Dermatologic: Minimal subcutaneous tissue    Psychiatric/behavioral: Slightly anxious but appropriate and pleasant.      Last Recorded Vitals  Blood pressure 112/74, pulse 63, temperature 36.4 °C (97.5 °F), temperature source Temporal, resp. rate 18, height 1.626 m (5' 4\"), weight 59 kg (130 lb), SpO2 94%.  Intake/Output last 3 Shifts:  No intake/output data recorded.    Relevant Results    Current Facility-Administered Medications:     acetaminophen (Tylenol) tablet 650 mg, 650 mg, oral, q4h PRN, Ira Chávez PA-C    buPROPion XL (Wellbutrin XL) 24 hr tablet 300 mg, 300 mg, oral, Daily, Ira Chávez, PA-C, 300 mg at 04/12/25 0832    dexAMETHasone (Decadron) injection 2 mg, 2 mg, intravenous, q12h, Josh Rivera MD    enoxaparin (Lovenox) syringe 40 mg, 40 mg, subcutaneous, q24h, Ira Chávze, PA-C, 40 mg at 04/11/25 2219    furosemide (Lasix) injection 40 mg, 40 mg, intravenous, Once, Josh Rivera MD    guaiFENesin (Mucinex) 12 hr tablet 600 mg, 600 mg, oral, q12h PRN, Ira Chávez PA-C    hydroCHLOROthiazide (Microzide) capsule 12.5 mg, 12.5 mg, oral, Daily, Ira Chávez PA-C, 12.5 mg at 04/12/25 0832    letrozole (Femara) tablet 2.5 mg, 2.5 mg, oral, Daily, Ira N Kyler, PA-C, 2.5 mg at 04/12/25 0832    melatonin tablet 3 mg, 3 mg, oral, Nightly PRN, Ira Chávez PA-C    morphine injection 2 mg, 2 mg, intravenous, q4h PRN, Ira N Kyler, PA-C    morphine injection 4 mg, 4 mg, intravenous, q4h PRN, Ira LAKIA Chávez PA-C, 4 mg at 04/11/25 2219    ondansetron (Zofran) injection 4 mg, 4 mg, intravenous, q6h PRN, Ira Chávez PA-C, 4 mg at 04/11/25 2213    pantoprazole (ProtoNix) EC tablet 40 mg, 40 mg, oral, Daily before breakfast, 40 mg at 04/12/25 0615 **OR** pantoprazole (Protonix) injection 40 mg, 40 mg, intravenous, Daily before " breakfast, Ira Chávez PA-C    polyethylene glycol (Glycolax, Miralax) packet 17 g, 17 g, oral, Daily PRN, Ira Chávez PA-C    propranolol (Inderal) tablet 20 mg, 20 mg, oral, TID, Ira Chávez PA-C, 20 mg at 04/12/25 0832    rosuvastatin (Crestor) tablet 20 mg, 20 mg, oral, Nightly, Ira Chávez PA-C, 20 mg at 04/11/25 2219     Labs from the last few days have been reviewed.    X-ray-LUNGS:  Moderate left and large right pleural effusion seen.  Right chest tube  present.   ABDOMEN:  No remarkable upper abdominal findings.     BONES:  No acute osseous changes.  IMPRESSION:  Bilateral pleural effusions.   Scheduled medications  buPROPion XL, 300 mg, oral, Daily  dexAMETHasone, 2 mg, intravenous, BID  enoxaparin, 40 mg, subcutaneous, q24h  furosemide, 40 mg, intravenous, Once  hydroCHLOROthiazide, 12.5 mg, oral, Daily  letrozole, 2.5 mg, oral, Daily  pantoprazole, 40 mg, oral, Daily before breakfast   Or  pantoprazole, 40 mg, intravenous, Daily before breakfast  propranolol, 20 mg, oral, TID  rosuvastatin, 20 mg, oral, Nightly      Continuous medications     PRN medications  PRN medications: acetaminophen, guaiFENesin, melatonin, morphine, morphine, ondansetron, polyethylene glycol      Assessment/Plan     Malignant pleural effusions right greater than left-right sided Pleurx catheter  Pleuritic chest pain  Metastatic breast cancer  Chronic hypoxic respiratory failure  Worsening shortness of breath and weakness  Hypertension  Hyperlipidemia  Anxiety with depression  DVT prophylaxis-Lovenox 40 mg subcu daily    Will likely drain more fluid tomorrow  Short course of IV Decadron  Dose of Lasix 40 mg IV x 1  Hydrochlorothiazide 12.5 mg daily  Letrozole 2.5 mg daily  Crestor 20 mg a day  Propranolol 20 mg 3 times daily  Wellbutrin  mg daily  Protonix 40 mg daily  As needed Tylenol, guaifenesin, melatonin, morphine, MiraLAX  Increase activity as tolerated  Check labs in a.m.  Supportive care  See orders for complete          I spent 45 minutes in the professional and overall care of this patient.      Josh Rivera MD

## 2025-04-12 NOTE — H&P
[facility] - GENERAL MEDICINE HISTORY AND PHYSICAL    HISTORY OF PRESENT ILLNESS     History Obtained From (Primary Source): Patient  Collateral History (Secondary Sources): D/w ED, chart review    History Of Present Illness (HPI):  Mildred Moyer is a 70 y.o. female with PMHx s/f recurrent right pleural effusion status post multiple thoracentesis and right Pleurx placement, chronic hypoxic respiratory failure on 3-4 L nasal cannula, HTN, HLD, metastatic breast cancer, anxiety and depression presenting with shortness of breath.  She states she get her pleural effusion drained weekly, last drained on Monday with her usual fluid output of 100 to 150 cc.  Today she has worsening short of breath with conversation and really winded with exertion. Denies f/c/n/v/d, cough, congestion, chest pain, palpitations.    ED Course:   Vitals on presentation: T 36.1 °C (96.9 °F)  HR 89  /65  RR 20  O2 99 % Supplemental oxygen  Labs:   CBC with WBC 10.3, Hgb 13.1, Plts 455.   CMP with glucose 107, Na 140, K 3.8, BUN 31, sCr 1.14, alk phos 70, ALT 14, AST 16, bilirubin 0.4. Magnesium 1.78.   . Trop 4.   VBG largely unremarkable  EKG: None  Imaging - agree with radiology interpretation(s):   CXR-new small left basilar pleural effusion atelectasis.  Unchanged appearance of right hemothorax with right apical chest tube and large pleural effusion with mild lung aeration.  Interventions: Dilaudid 0.2 mg x 2, 0.5 mg, ibuprofen 600 mg, admission for further management    12-point ROS reviewed and found to be negative aside from aforementioned positives in HPI and/or noted in dedicated ROS section below.     Decision made to admit the patient to the hospitalist service after evaluation of the patient, review of the above, and discussion with ED provider.     LABS AND IMAGING     I have personally reviewed the following labs on 04/11/25: CBC, CMP, Mag, Troponin, BNP, and VBG  I have personally reviewed the  following imaging studies on 04/11/25: CXR, with my personal interpretations as documented in ED course above.   I have personally reviewed any obtained EKGs on 04/11/25, with my interpretation as listed above in the ED summary course.   I have personally reviewed the patient's vitals on presentation to the ED and any/all changes through to time of admission (on 04/11/25).     ED Course (From ED Provider):  Diagnoses as of 04/11/25 2123   Pleural effusion   Shortness of breath     Relevant Results  Results for orders placed or performed during the hospital encounter of 04/11/25 (from the past 24 hours)   CBC and Auto Differential   Result Value Ref Range    WBC 10.3 4.4 - 11.3 x10*3/uL    nRBC 0.0 0.0 - 0.0 /100 WBCs    RBC 3.84 (L) 4.00 - 5.20 x10*6/uL    Hemoglobin 13.1 12.0 - 16.0 g/dL    Hematocrit 38.3 36.0 - 46.0 %     80 - 100 fL    MCH 34.1 (H) 26.0 - 34.0 pg    MCHC 34.2 32.0 - 36.0 g/dL    RDW 14.8 (H) 11.5 - 14.5 %    Platelets 455 (H) 150 - 450 x10*3/uL    Neutrophils % 87.8 40.0 - 80.0 %    Immature Granulocytes %, Automated 1.8 (H) 0.0 - 0.9 %    Lymphocytes % 6.6 13.0 - 44.0 %    Monocytes % 2.8 2.0 - 10.0 %    Eosinophils % 0.6 0.0 - 6.0 %    Basophils % 0.4 0.0 - 2.0 %    Neutrophils Absolute 9.06 (H) 1.20 - 7.70 x10*3/uL    Immature Granulocytes Absolute, Automated 0.19 0.00 - 0.70 x10*3/uL    Lymphocytes Absolute 0.68 (L) 1.20 - 4.80 x10*3/uL    Monocytes Absolute 0.29 0.10 - 1.00 x10*3/uL    Eosinophils Absolute 0.06 0.00 - 0.70 x10*3/uL    Basophils Absolute 0.04 0.00 - 0.10 x10*3/uL   Basic metabolic panel   Result Value Ref Range    Glucose 107 (H) 74 - 99 mg/dL    Sodium 140 136 - 145 mmol/L    Potassium 3.8 3.5 - 5.3 mmol/L    Chloride 98 98 - 107 mmol/L    Bicarbonate 25 21 - 32 mmol/L    Anion Gap 21 (H) 10 - 20 mmol/L    Urea Nitrogen 31 (H) 6 - 23 mg/dL    Creatinine 1.14 (H) 0.50 - 1.05 mg/dL    eGFR 53 (L) >60 mL/min/1.73m*2    Calcium 9.1 8.6 - 10.3 mg/dL   Magnesium   Result  Value Ref Range    Magnesium 1.78 1.60 - 2.40 mg/dL   B-Type Natriuretic Peptide   Result Value Ref Range     (H) 0 - 99 pg/mL   Blood Gas Venous Full Panel   Result Value Ref Range    POCT pH, Venous 7.38 7.33 - 7.43 pH    POCT pCO2, Venous 46 41 - 51 mm Hg    POCT pO2, Venous 18 (L) 35 - 45 mm Hg    POCT SO2, Venous 18 (L) 45 - 75 %    POCT Oxy Hemoglobin, Venous 17.5 (L) 45.0 - 75.0 %    POCT Hematocrit Calculated, Venous 41.0 36.0 - 46.0 %    POCT Sodium, Venous 135 (L) 136 - 145 mmol/L    POCT Potassium, Venous 3.6 3.5 - 5.3 mmol/L    POCT Chloride, Venous 99 98 - 107 mmol/L    POCT Ionized Calicum, Venous 1.20 1.10 - 1.33 mmol/L    POCT Glucose, Venous 111 (H) 74 - 99 mg/dL    POCT Lactate, Venous 1.5 0.4 - 2.0 mmol/L    POCT Base Excess, Venous 1.5 -2.0 - 3.0 mmol/L    POCT HCO3 Calculated, Venous 27.2 (H) 22.0 - 26.0 mmol/L    POCT Hemoglobin, Venous 13.8 12.0 - 16.0 g/dL    POCT Anion Gap, Venous 12.0 10.0 - 25.0 mmol/L    Patient Temperature 37.0 degrees Celsius    FiO2 44 %      Imaging  XR chest 2 views    Result Date: 4/11/2025  1.  New small left basilar pleural effusion and atelectasis. Superimposed consolidation not excluded. 2. Unchanged appearance of the right hemithorax with right apical chest tube and large pleural effusion with mild lung aeration.       MACRO: None   Signed by: King Alcantar 4/11/2025 1:09 PM Dictation workstation:   [id number]     Cardiology, Vascular, and Other Imaging  Thoracentesis    Result Date: 4/11/2025  Sandra Cueva MD     4/11/2025  8:44 PM Thoracentesis Performed by: Sandra Cueva MD Authorized by: Sandra Cueva MD  Consent:   Consent obtained:  Verbal   Consent given by:  Patient   Risks, benefits, and alternatives were discussed: yes    Alternatives discussed:  No treatment Universal protocol:   Patient identity confirmed:  Verbally with patient and arm band Sedation:   Sedation type:  None Anesthesia:   Anesthesia method:  None Procedure  details:   Indwelling catheter placed: Pleurx catheter was accessed to drain right-sided pleural effusion.  Post-procedure details:   Chest x-ray performed: yes    Procedure completion:  Tolerated Comments:    350 cc of pleural fluid was drained via right-sided indwelling Pleurx catheter using a Pleurx kit that all the patient could tolerate that prior to stopping due to right-sided pain        PAST HISTORIES AND ALLERGIES     Past Medical History  She has a past medical history of GERD (gastroesophageal reflux disease), HLD (hyperlipidemia), HTN (hypertension), Liver disease, Lung neoplasm, MS (multiple sclerosis) (Multi), and Pleural effusion.    Surgical History  She has a past surgical history that includes Dilation and curettage of uterus and Percutaneous chest drain insertion (Right, 10/10/2024).     Social History  She reports that she quit smoking about 42 years ago. Her smoking use included cigarettes. She started smoking about 44 years ago. She has a 1 pack-year smoking history. She has never used smokeless tobacco. She reports that she does not currently use alcohol. She reports that she does not use drugs.    Family History  No family history on file.    Allergies  Iodinated contrast media, Iodine, Shellfish containing products, and Oxymetazoline    MEDICATIONS     Scheduled Medications:  [START ON 4/12/2025] buPROPion XL, 300 mg, oral, Daily  enoxaparin, 40 mg, subcutaneous, q24h  [START ON 4/12/2025] hydroCHLOROthiazide, 12.5 mg, oral, Daily  [START ON 4/12/2025] letrozole, 2.5 mg, oral, Daily  [START ON 4/12/2025] pantoprazole, 40 mg, oral, Daily before breakfast   Or  [START ON 4/12/2025] pantoprazole, 40 mg, intravenous, Daily before breakfast  propranolol, 20 mg, oral, TID  rosuvastatin, 20 mg, oral, Nightly      Continuous Medications:     PRN Medications:  PRN medications: acetaminophen, guaiFENesin, melatonin, morphine, morphine, ondansetron, polyethylene glycol     REVIEW OF SYSTEMS     Review  of Systems    OBJECTIVE     Last Recorded Vitals  /65   Pulse 95   Temp 36.1 °C (96.9 °F) (Temporal)   Resp (!) 23   Wt 59 kg (130 lb)   SpO2 100%      Physical Exam:  Vital signs and nursing notes reviewed.   Constitutional: Pleasant and cooperative. Laying in bed in no acute distress.  Speaking in short sentences during conversation.  Skin: Warm and dry; no obvious lesions, rashes, pallor, or jaundice.   Eyes: EOMI. Anicteric sclera.   ENT: Mucous membranes moist; no obvious injury or deformity appreciated.   Head and Neck: Normocephalic, atraumatic. ROM preserved. Trachea midline. No appreciable JVD.   Respiratory: Nonlabored on 4 L nasal cannula. Lungs clear to auscultation bilaterally without obvious adventitious sounds. Chest rise is equal.  Cardiovascular: RRR. No gross murmur, gallop, or rub. Extremities are warm and well-perfused with good capillary refill (< 3 seconds). No chest wall tenderness.   GI: Abdomen soft, nontender, nondistended.  Status post thoracentesis with gauze dressing in right upper quadrant.  No obvious bleeding noted, did not remove.  No obvious organomegaly appreciated. Bowel sounds are present.  : No CVA tenderness.   MSK: No gross abnormalities appreciated. No limitations to AROM/PROM appreciated.   Extremities: No cyanosis, edema, or clubbing evident. Neurovascularly intact.   Neuro: A&Ox3. CN 2-12 grossly intact. Able to respond to questions appropriately and clearly. No acute focal neurologic deficits appreciated.  Psych: Appropriate mood and behavior.    ASSESSMENT AND PLAN   Assessment/Plan     70 y.o. female with PMHx s/f recurrent right pleural effusion status post multiple thoracentesis and right Pleurx placement, chronic hypoxic respiratory failure on 3-4 L nasal cannula, HTN, HLD, metastatic breast cancer, anxiety and depression presenting with shortness of breath.      recurrent right pleural effusion status post multiple thoracentesis and right Pleurx  placement  - ED provider perform thoracentesis with straw-colored pleural fluid of 350 cc but was stopped due to pleuritic right-sided pain  - IR consult for additional thoracentesis, fluid panel ordered  - Repeat CXR post thoracentesis pending  - Supportive care    Chronic hypoxic respiratory failure  - VBG largely unremarkable  - At her baseline supplemental oxygen    HTN  - Stable. Continue home hydrochlorothiazide and propranolol    HLD  - Continue home rosuvastatin    Metastatic breast cancer  - Follow-up outpatient as scheduled  - Continue home letrozole, Kisqali (advised to bring from home)    Anxiety and depression  - Continue home Wellbutrin    Diet: Cardiac  DVT Prophylaxis: SCDs, Lovenox   Code Status: Full Code   Case Discussed With: ED provider  Additional Sources Reviewed: ED note day of admission;     Anticipated Length of Stay (LOS): Patient will require one-to-two midnight stay for further evaluation and management, pending results of above and/or input from consultants.      Ira Chávez PA-C    Dragon dictation software was used to dictate this note and thus there may be minor errors in translation/transcription including garbled speech or misspellings. Please contact for clarification if needed.

## 2025-04-13 ENCOUNTER — APPOINTMENT (OUTPATIENT)
Dept: RADIOLOGY | Facility: HOSPITAL | Age: 70
DRG: 598 | End: 2025-04-13
Payer: COMMERCIAL

## 2025-04-13 VITALS
DIASTOLIC BLOOD PRESSURE: 76 MMHG | TEMPERATURE: 97.2 F | OXYGEN SATURATION: 97 % | HEART RATE: 78 BPM | SYSTOLIC BLOOD PRESSURE: 124 MMHG | RESPIRATION RATE: 12 BRPM

## 2025-04-13 VITALS
HEIGHT: 64 IN | RESPIRATION RATE: 16 BRPM | HEART RATE: 78 BPM | SYSTOLIC BLOOD PRESSURE: 134 MMHG | WEIGHT: 130 LBS | TEMPERATURE: 96.1 F | DIASTOLIC BLOOD PRESSURE: 76 MMHG | BODY MASS INDEX: 22.2 KG/M2 | OXYGEN SATURATION: 99 %

## 2025-04-13 LAB
ANION GAP SERPL CALC-SCNC: 20 MMOL/L (ref 10–20)
BACTERIA FLD CULT: NORMAL
BASOPHILS # BLD AUTO: 0.01 X10*3/UL (ref 0–0.1)
BASOPHILS NFR BLD AUTO: 0.1 %
BUN SERPL-MCNC: 29 MG/DL (ref 6–23)
CALCIUM SERPL-MCNC: 9.6 MG/DL (ref 8.6–10.3)
CHLORIDE SERPL-SCNC: 95 MMOL/L (ref 98–107)
CO2 SERPL-SCNC: 28 MMOL/L (ref 21–32)
CREAT SERPL-MCNC: 1.06 MG/DL (ref 0.5–1.05)
EGFRCR SERPLBLD CKD-EPI 2021: 57 ML/MIN/1.73M*2
EOSINOPHIL # BLD AUTO: 0 X10*3/UL (ref 0–0.7)
EOSINOPHIL NFR BLD AUTO: 0 %
ERYTHROCYTE [DISTWIDTH] IN BLOOD BY AUTOMATED COUNT: 14.6 % (ref 11.5–14.5)
GLUCOSE SERPL-MCNC: 128 MG/DL (ref 74–99)
GRAM STN SPEC: NORMAL
GRAM STN SPEC: NORMAL
HCT VFR BLD AUTO: 40 % (ref 36–46)
HGB BLD-MCNC: 13.7 G/DL (ref 12–16)
IMM GRANULOCYTES # BLD AUTO: 0.05 X10*3/UL (ref 0–0.7)
IMM GRANULOCYTES NFR BLD AUTO: 0.7 % (ref 0–0.9)
LYMPHOCYTES # BLD AUTO: 0.68 X10*3/UL (ref 1.2–4.8)
LYMPHOCYTES NFR BLD AUTO: 10.1 %
MAGNESIUM SERPL-MCNC: 1.83 MG/DL (ref 1.6–2.4)
MCH RBC QN AUTO: 34.2 PG (ref 26–34)
MCHC RBC AUTO-ENTMCNC: 34.3 G/DL (ref 32–36)
MCV RBC AUTO: 100 FL (ref 80–100)
MONOCYTES # BLD AUTO: 0.12 X10*3/UL (ref 0.1–1)
MONOCYTES NFR BLD AUTO: 1.8 %
NEUTROPHILS # BLD AUTO: 5.87 X10*3/UL (ref 1.2–7.7)
NEUTROPHILS NFR BLD AUTO: 87.3 %
NRBC BLD-RTO: 0 /100 WBCS (ref 0–0)
PLATELET # BLD AUTO: 392 X10*3/UL (ref 150–450)
POTASSIUM SERPL-SCNC: 3.5 MMOL/L (ref 3.5–5.3)
RBC # BLD AUTO: 4.01 X10*6/UL (ref 4–5.2)
SODIUM SERPL-SCNC: 139 MMOL/L (ref 136–145)
WBC # BLD AUTO: 6.7 X10*3/UL (ref 4.4–11.3)

## 2025-04-13 PROCEDURE — 71045 X-RAY EXAM CHEST 1 VIEW: CPT

## 2025-04-13 PROCEDURE — 83735 ASSAY OF MAGNESIUM: CPT | Performed by: INTERNAL MEDICINE

## 2025-04-13 PROCEDURE — 2500000001 HC RX 250 WO HCPCS SELF ADMINISTERED DRUGS (ALT 637 FOR MEDICARE OP): Performed by: INTERNAL MEDICINE

## 2025-04-13 PROCEDURE — 2500000004 HC RX 250 GENERAL PHARMACY W/ HCPCS (ALT 636 FOR OP/ED): Performed by: INTERNAL MEDICINE

## 2025-04-13 PROCEDURE — 99233 SBSQ HOSP IP/OBS HIGH 50: CPT | Performed by: INTERNAL MEDICINE

## 2025-04-13 PROCEDURE — 2500000005 HC RX 250 GENERAL PHARMACY W/O HCPCS

## 2025-04-13 PROCEDURE — 71250 CT THORAX DX C-: CPT

## 2025-04-13 PROCEDURE — 0W9B3ZZ DRAINAGE OF LEFT PLEURAL CAVITY, PERCUTANEOUS APPROACH: ICD-10-PCS | Performed by: RADIOLOGY

## 2025-04-13 PROCEDURE — 2500000002 HC RX 250 W HCPCS SELF ADMINISTERED DRUGS (ALT 637 FOR MEDICARE OP, ALT 636 FOR OP/ED)

## 2025-04-13 PROCEDURE — 80048 BASIC METABOLIC PNL TOTAL CA: CPT | Performed by: INTERNAL MEDICINE

## 2025-04-13 PROCEDURE — 97161 PT EVAL LOW COMPLEX 20 MIN: CPT | Mod: GP | Performed by: PHYSICAL THERAPIST

## 2025-04-13 PROCEDURE — 71250 CT THORAX DX C-: CPT | Performed by: STUDENT IN AN ORGANIZED HEALTH CARE EDUCATION/TRAINING PROGRAM

## 2025-04-13 PROCEDURE — 85025 COMPLETE CBC W/AUTO DIFF WBC: CPT | Performed by: INTERNAL MEDICINE

## 2025-04-13 PROCEDURE — 2500000001 HC RX 250 WO HCPCS SELF ADMINISTERED DRUGS (ALT 637 FOR MEDICARE OP)

## 2025-04-13 PROCEDURE — 36415 COLL VENOUS BLD VENIPUNCTURE: CPT | Performed by: INTERNAL MEDICINE

## 2025-04-13 PROCEDURE — 71045 X-RAY EXAM CHEST 1 VIEW: CPT | Performed by: RADIOLOGY

## 2025-04-13 PROCEDURE — 2500000004 HC RX 250 GENERAL PHARMACY W/ HCPCS (ALT 636 FOR OP/ED)

## 2025-04-13 PROCEDURE — 1200000002 HC GENERAL ROOM WITH TELEMETRY DAILY

## 2025-04-13 RX ORDER — KETOROLAC TROMETHAMINE 30 MG/ML
15 INJECTION, SOLUTION INTRAMUSCULAR; INTRAVENOUS ONCE
Status: COMPLETED | OUTPATIENT
Start: 2025-04-13 | End: 2025-04-13

## 2025-04-13 RX ORDER — ACETAMINOPHEN 325 MG/1
975 TABLET ORAL ONCE
Status: COMPLETED | OUTPATIENT
Start: 2025-04-13 | End: 2025-04-13

## 2025-04-13 RX ORDER — CARISOPRODOL 350 MG/1
350 TABLET ORAL ONCE
Status: COMPLETED | OUTPATIENT
Start: 2025-04-13 | End: 2025-04-13

## 2025-04-13 RX ADMIN — CARISOPRODOL 350 MG: 350 TABLET ORAL at 20:33

## 2025-04-13 RX ADMIN — HYDROCHLOROTHIAZIDE 12.5 MG: 12.5 CAPSULE ORAL at 07:31

## 2025-04-13 RX ADMIN — ACETAMINOPHEN 975 MG: 325 TABLET ORAL at 17:01

## 2025-04-13 RX ADMIN — ROSUVASTATIN CALCIUM 20 MG: 20 TABLET, FILM COATED ORAL at 20:33

## 2025-04-13 RX ADMIN — Medication 3 MG: at 23:02

## 2025-04-13 RX ADMIN — LETROZOLE 2.5 MG: 2.5 TABLET ORAL at 07:30

## 2025-04-13 RX ADMIN — HYDROMORPHONE HYDROCHLORIDE 0.4 MG: 0.5 INJECTION, SOLUTION INTRAMUSCULAR; INTRAVENOUS; SUBCUTANEOUS at 22:58

## 2025-04-13 RX ADMIN — PROPRANOLOL HYDROCHLORIDE 20 MG: 10 TABLET ORAL at 20:33

## 2025-04-13 RX ADMIN — PROPRANOLOL HYDROCHLORIDE 20 MG: 10 TABLET ORAL at 07:30

## 2025-04-13 RX ADMIN — PANTOPRAZOLE SODIUM 40 MG: 40 TABLET, DELAYED RELEASE ORAL at 05:55

## 2025-04-13 RX ADMIN — ENOXAPARIN SODIUM 40 MG: 40 INJECTION SUBCUTANEOUS at 20:33

## 2025-04-13 RX ADMIN — DEXAMETHASONE SODIUM PHOSPHATE 2 MG: 4 INJECTION, SOLUTION INTRAMUSCULAR; INTRAVENOUS at 07:31

## 2025-04-13 RX ADMIN — MORPHINE SULFATE 4 MG: 4 INJECTION, SOLUTION INTRAMUSCULAR; INTRAVENOUS at 17:00

## 2025-04-13 RX ADMIN — KETOROLAC TROMETHAMINE 15 MG: 30 INJECTION, SOLUTION INTRAMUSCULAR at 17:52

## 2025-04-13 RX ADMIN — PROPRANOLOL HYDROCHLORIDE 20 MG: 10 TABLET ORAL at 14:51

## 2025-04-13 RX ADMIN — DEXAMETHASONE SODIUM PHOSPHATE 2 MG: 4 INJECTION, SOLUTION INTRAMUSCULAR; INTRAVENOUS at 20:33

## 2025-04-13 RX ADMIN — HYDROMORPHONE HYDROCHLORIDE 0.4 MG: 0.5 INJECTION, SOLUTION INTRAMUSCULAR; INTRAVENOUS; SUBCUTANEOUS at 17:52

## 2025-04-13 RX ADMIN — BUPROPION HYDROCHLORIDE 300 MG: 150 TABLET, EXTENDED RELEASE ORAL at 07:31

## 2025-04-13 SDOH — ECONOMIC STABILITY: HOUSING INSECURITY: EVIDENCE OF SMOKING MATERIAL: 0

## 2025-04-13 SDOH — HEALTH STABILITY: MENTAL HEALTH: SMOKING IN HOME: 0

## 2025-04-13 ASSESSMENT — COGNITIVE AND FUNCTIONAL STATUS - GENERAL
MOVING TO AND FROM BED TO CHAIR: A LITTLE
CLIMB 3 TO 5 STEPS WITH RAILING: A LITTLE
HELP NEEDED FOR BATHING: A LITTLE
STANDING UP FROM CHAIR USING ARMS: A LITTLE
MOBILITY SCORE: 18
DRESSING REGULAR LOWER BODY CLOTHING: A LITTLE
MOBILITY SCORE: 22
WALKING IN HOSPITAL ROOM: A LITTLE
DAILY ACTIVITIY SCORE: 22
WALKING IN HOSPITAL ROOM: A LITTLE
CLIMB 3 TO 5 STEPS WITH RAILING: TOTAL

## 2025-04-13 ASSESSMENT — ENCOUNTER SYMPTOMS
PAIN LOCATION - PAIN DURATION: VARIES
SUBJECTIVE PAIN PROGRESSION: UNCHANGED
HIGHEST PAIN SEVERITY IN PAST 24 HOURS: 4/10
LOWEST PAIN SEVERITY IN PAST 24 HOURS: 1/10
FATIGUE: 1
PERSON REPORTING PAIN: PATIENT
PAIN LOCATION - PAIN FREQUENCY: INTERMITTENT
PAIN LOCATION - PAIN SEVERITY: 3/10
PAIN: 1
PAIN LOCATION - PAIN QUALITY: SHARP
PAIN SEVERITY GOAL: 0/10
PAIN LOCATION: CHEST
PAIN LOCATION - EXACERBATING FACTORS: LUNG CA
PAIN LOCATION - RELIEVING FACTORS: MEDICATION, REST

## 2025-04-13 ASSESSMENT — PAIN - FUNCTIONAL ASSESSMENT
PAIN_FUNCTIONAL_ASSESSMENT: 0-10

## 2025-04-13 ASSESSMENT — PAIN SCALES - GENERAL
PAINLEVEL_OUTOF10: 10 - WORST POSSIBLE PAIN
PAINLEVEL_OUTOF10: 2
PAINLEVEL_OUTOF10: 10 - WORST POSSIBLE PAIN
PAINLEVEL_OUTOF10: 8
PAINLEVEL_OUTOF10: 9
PAINLEVEL_OUTOF10: 0 - NO PAIN
PAINLEVEL_OUTOF10: 10 - WORST POSSIBLE PAIN

## 2025-04-13 ASSESSMENT — PAIN DESCRIPTION - ORIENTATION: ORIENTATION: RIGHT

## 2025-04-13 ASSESSMENT — PAIN DESCRIPTION - LOCATION: LOCATION: CHEST

## 2025-04-13 ASSESSMENT — ACTIVITIES OF DAILY LIVING (ADL): ENTERING_EXITING_HOME: MODERATE ASSIST

## 2025-04-13 NOTE — PROGRESS NOTES
Mildred Moyer is a 70 y.o. female on day 2 of admission presenting with Pleural effusion.    Subjective   History Of Present Illness (HPI):  Mildred Moyer is a 70 y.o. female with PMHx s/f recurrent right pleural effusion status post multiple thoracentesis and right Pleurx placement, chronic hypoxic respiratory failure on 3-4 L nasal cannula, HTN, HLD, metastatic breast cancer, anxiety and depression presenting with shortness of breath.  She states she get her pleural effusion drained weekly, last drained on Monday with her usual fluid output of 100 to 150 cc.  Today she has worsening short of breath with conversation and really winded with exertion. Denies f/c/n/v/d, cough, congestion, chest pain, palpitations.     ED Course:   Vitals on presentation: T 36.1 °C (96.9 °F)  HR 89  /65  RR 20  O2 99 % Supplemental oxygen  Labs:   CBC with WBC 10.3, Hgb 13.1, Plts 455.   CMP with glucose 107, Na 140, K 3.8, BUN 31, sCr 1.14, alk phos 70, ALT 14, AST 16, bilirubin 0.4. Magnesium 1.78.   . Trop 4.   VBG largely unremarkable  EKG: None  Imaging - agree with radiology interpretation(s):   CXR-new small left basilar pleural effusion atelectasis.  Unchanged appearance of right hemothorax with right apical chest tube and large pleural effusion with mild lung aeration.  Interventions: Dilaudid 0.2 mg x 2, 0.5 mg, ibuprofen 600 mg, admission for further management   4/12: Patient with some pleuritic chest pain and shortness of breath but relatively comfortable.  Will start some low-dose Decadron to see if this can help with her shortness of breath.  Give a dose of Lasix x 1.  Add mucolytic and aerosol.  Will need further thoracentesis.  Supportive care and oxygen.    4/13: Patient had Pleurx catheter placed by thoracic surgery Rose Shepherd DO that his who has been handling the orders for drainage out at the [facility].  They had cut down the drainage from twice a week to once a week  "because the amount was tapering down for some reason.  Appears patient may be not getting as effective drainage as needed.  Will do drainage today and consult IR for any suggestions regarding where to go from here regarding patient's findings.  Of note patient does have elevated BNP which is new we will get an echocardiogram to better define this finding.  Patient relatively comfortable but is concerned about pleuritic chest pain with drainage of the Pleurx catheter will medicate prior to procedure.  Will have patient seen by hematology oncology who is primarily handling her care in the outpatient setting.       Objective     Physical Exam    EXAM:    Constitutional: Lying in bed short of breath with movement    Head and facial: Nasal cannula on    Neck: Minimal accessory muscle use    Lungs: Decreased breath sounds bilaterally in the bases    Heart: Regular heart rate    Abdomen: Nontender    Pelvis/: No flank tenderness no urinary collection device    Extremities: No gross abnormalities    Neurologic: General Frailty    Dermatologic: Minimal subcutaneous tissue    Psychiatric/behavioral: Slightly anxious but appropriate and pleasant.      Last Recorded Vitals  Blood pressure 123/73, pulse 79, temperature 36.4 °C (97.5 °F), temperature source Temporal, resp. rate 16, height 1.626 m (5' 4\"), weight 59 kg (130 lb), SpO2 93%.  Intake/Output last 3 Shifts:  I/O last 3 completed shifts:  In: 1330 (22.6 mL/kg) [P.O.:1330]  Out: - (0 mL/kg)   Weight: 59 kg     Relevant Results    Current Facility-Administered Medications:     acetaminophen (Tylenol) tablet 650 mg, 650 mg, oral, q4h PRN, Ira Chávez PA-C, 650 mg at 04/12/25 2125    buPROPion XL (Wellbutrin XL) 24 hr tablet 300 mg, 300 mg, oral, Daily, Ira Chávez PA-C, 300 mg at 04/13/25 0731    dexAMETHasone (Decadron) injection 2 mg, 2 mg, intravenous, BID, Josh Rivera MD, 2 mg at 04/13/25 0731    enoxaparin (Lovenox) syringe 40 mg, 40 mg, subcutaneous, q24h, Ira " LAKIA Chávez, PA-C, 40 mg at 04/12/25 2121    guaiFENesin (Mucinex) 12 hr tablet 600 mg, 600 mg, oral, q12h PRN, Ira Chávez PA-C    hydroCHLOROthiazide (Microzide) capsule 12.5 mg, 12.5 mg, oral, Daily, Ira Chávez PA-C, 12.5 mg at 04/13/25 0731    letrozole (Femara) tablet 2.5 mg, 2.5 mg, oral, Daily, Ira Chávez PA-C, 2.5 mg at 04/13/25 0730    melatonin tablet 3 mg, 3 mg, oral, Nightly PRN, Ira Chávez PA-C    morphine injection 2 mg, 2 mg, intravenous, q4h PRN, Ira Chávez PA-C, 2 mg at 04/12/25 1435    morphine injection 4 mg, 4 mg, intravenous, q4h PRN, Ira Chávez PA-C, 4 mg at 04/11/25 2219    ondansetron (Zofran) injection 4 mg, 4 mg, intravenous, q6h PRN, Ira Chávez PA-C, 4 mg at 04/11/25 2219    pantoprazole (ProtoNix) EC tablet 40 mg, 40 mg, oral, Daily before breakfast, 40 mg at 04/13/25 0555 **OR** pantoprazole (Protonix) injection 40 mg, 40 mg, intravenous, Daily before breakfast, Ira Chávez PA-C    polyethylene glycol (Glycolax, Miralax) packet 17 g, 17 g, oral, Daily PRN, Ira Chávez PA-C    propranolol (Inderal) tablet 20 mg, 20 mg, oral, TID, Ira Chávez, PA-C, 20 mg at 04/13/25 1451    rosuvastatin (Crestor) tablet 20 mg, 20 mg, oral, Nightly, Ira Chávez, PA-C, 20 mg at 04/12/25 2121     Labs from the last few days have been reviewed.    X-ray-LUNGS:  Moderate left and large right pleural effusion seen.  Right chest tube  present.   ABDOMEN:  No remarkable upper abdominal findings.     BONES:  No acute osseous changes.  IMPRESSION:  Bilateral pleural effusions.   Scheduled medications  buPROPion XL, 300 mg, oral, Daily  dexAMETHasone, 2 mg, intravenous, BID  enoxaparin, 40 mg, subcutaneous, q24h  hydroCHLOROthiazide, 12.5 mg, oral, Daily  letrozole, 2.5 mg, oral, Daily  pantoprazole, 40 mg, oral, Daily before breakfast   Or  pantoprazole, 40 mg, intravenous, Daily before breakfast  propranolol, 20 mg, oral, TID  rosuvastatin, 20 mg, oral, Nightly      Continuous medications     PRN medications  PRN  medications: acetaminophen, guaiFENesin, melatonin, morphine, morphine, ondansetron, polyethylene glycol      Assessment/Plan     Malignant pleural effusions right greater than left-right sided Pleurx catheter  Pleuritic chest pain  Metastatic breast cancer  Chronic hypoxic respiratory failure  Worsening shortness of breath and weakness  Hypertension  Hyperlipidemia  Anxiety with depression  DVT prophylaxis-Lovenox 40 mg subcu daily    Will try and drain between 500 to 1000 mL today  Premedicate with 1500 mg of Tylenol p.o.  Give a dose of IV morphine prior to as well.  Short course of IV Decadron  Dose of Lasix 40 mg IV x 1  Hydrochlorothiazide 12.5 mg daily  Letrozole 2.5 mg daily  Crestor 20 mg a day  Propranolol 20 mg 3 times daily  Wellbutrin  mg daily  Protonix 40 mg daily  As needed Tylenol, guaifenesin, melatonin, morphine, MiraLAX  Increase activity as tolerated  Will check echocardiogram  Consult Dr. Guzman  May need to confer with  Check labs in a.m. Dr. Rose Shepherd, DO surgeon with the tube in but will consult IR for any suggestions as well.  Supportive care  See orders for complete         I spent 45 minutes in the professional and overall care of this patient.      Josh Rivera MD

## 2025-04-13 NOTE — PROGRESS NOTES
Physical Therapy    Physical Therapy Evaluation    Patient Name: Mildred Moyer  MRN: [id number]  Today's Date: 4/13/2025   Time Calculation  Start Time: 1247  Stop Time: 1310  Time Calculation (min): 23 min  2305/2305-A    Assessment/Plan   PT Assessment  PT Assessment Results: Decreased strength, Decreased endurance, Decreased mobility, Decreased safety awareness, Impaired judgement  Rehab Prognosis: Fair  Barriers to Discharge Home: Physical needs  Physical Needs: Intermittent mobility assistance needed, Other (Comment) (increased O2 requirement, reduced Endurance)  Evaluation/Treatment Tolerance: Treatment limited secondary to medical complications (Comment) (poor endurance)  Barriers to Participation: Coping skills, Insight into problems, Premorbid level of function  Assessment Comment: pt demos reduced activity tolerance/endurance with increased O2 needs, pt not tolerating/reluctant to push mobility at inpatient level (tolerated activity better with nursing staff ad asher). Resume Home PT at facility  End of Session Patient Position: Bed, 3 rail up, Alarm on  IP OR SWING BED PT PLAN  Inpatient or Swing Bed: Inpatient  PT Plan  PT Plan: PT Eval only, OT consult (DEFER TO OT FOR ENERGY CONSERVATION TECH)  PT Eval Only Reason: At baseline function  PT Frequency: PT eval only  PT Discharge Recommendations: Low intensity level of continued care  PT - OK to Discharge: Yes    Subjective     Current Problem:  1. Pleural effusion        2. Shortness of breath          General Visit Information:  General  Reason for Referral: SOB  Referred By: GRACE PORTILLO. PT FOR IMPAIRED MOBILITY  Past Medical History Relevant to Rehab: recurrent right pleural effusion status post multiple thoracentesis and right Pleurx placement, chronic hypoxic respiratory failure on 3-4 L nasal cannula, HTN, HLD, metastatic breast cancer, anxiety and depression  Family/Caregiver Present: No  Prior to Session Communication: Bedside nurse (approved  "PT)  Patient Position Received: Bed, 3 rail up, Alarm on  General Comment: raspy/halting speech d/t SOB but very talkative.    Home Living:  Home Living  Type of Home: Assisted living  Home Adaptive Equipment: Walker rolling or standard (ROLLATOR)  Home Layout: One level  Home Access: No concerns  Home Living Comments: [place], uses ROLLATOR to walk to dining room for meals (reduced jin for this over past week)    Prior Level of Function:  Prior Function Per Pt/Caregiver Report  Level of [place]: Needs assistance with ADLs, Needs assistance with homemaking  Receives Help From: Personal care attendant  Ambulatory Assistance: Needs assistance  Transfers: Assistive device  Gait: Assistive device (uses ROLLATOR at all times, carries portable O2 tank)  Prior Function Comments: pt states she was receiving Home PT once/wk and would like it increased to 2x/wk    Precautions:  Precautions  Medical Precautions: Fall precautions, Oxygen therapy device and L/min  Precautions Comment: Home O2 3L concentrator, 4-5L portable tank     Objective     Pain:  Pain Assessment  Pain Assessment: 0-10  0-10 (Numeric) Pain Score: 0 - No pain  Pain Type: Chronic pain  Pain Location: Arm (\"left side\" (whole body))  Pain Frequency: Intermittent (pt describes pain \"off and on\" related to MS)  Pain Onset: Other (Comment) (no c/o during PT session)  Clinical Progression: Not changed  Response to Interventions: Resting quietly    Cognition:  Cognition  Orientation Level: Oriented X4    General Assessments:  General Observation  General Observation: stood spontaneously on PT arrival, demo'd retro LOB without walker so had pt dangle while PT obtained walker for gait training, however, only jin amb very short distances   Activity Tolerance  Endurance: Decreased tolerance for upright activites  Activity Tolerance Comments: after amb only a few feet, states thats \"all she could do\", allowed resting time and encoruaged 2nd bout amb but " "pt declined           Coordination  Movements are Fluid and Coordinated: Yes     Dynamic Sitting Balance  Dynamic Sitting-Comments: good  Dynamic Standing Balance  Dynamic Standing-Comments: poor: retro LOB without support AD, fair- walker support to improve fwd COg    Functional Assessments:     Bed Mobility 1  Bed Mobility 1: Supine to sitting, Sitting to supine  Level of Assistance 1: Independent  Transfer 1  Transfer to 1: Bed  Technique 1: Sit to stand  Transfer Device 1: Walker (first attempt spontaneously stood with no AD, 2nd attempt with walker)  Transfer Level of Assistance 1: Close supervision  Ambulation/Gait Training 1  Device 1: Rolling walker  Assistance 1: Close supervision  Quality of Gait 1: Forward flexed posture  Comments/Distance (ft) 1: 10  Stairs  Stairs: No       Extremity/Trunk Assessments:        RLE   RLE : Within Functional Limits  LLE   LLE : Within Functional Limits (not much weaker than R side, pt reports MS on \"left side\")    Outcome Measures:     [facility] Basic Mobility  Turning from your back to your side while in a flat bed without using bedrails: None  Moving from lying on your back to sitting on the side of a flat bed without using bedrails: None  Moving to and from bed to chair (including a wheelchair): A little  Standing up from a chair using your arms (e.g. wheelchair or bedside chair): A little  To walk in hospital room: A little  Climbing 3-5 steps with railing: Total  Basic Mobility - Total Score: 18       Education Documentation  Mobility Training, taught by eKllen Taylor, PT at 4/13/2025  2:20 PM.  Learner: Patient  Readiness: Acceptance  Method: Explanation, Demonstration  Response: Verbalizes Understanding, Needs Reinforcement    Education Comments  No comments found.         "

## 2025-04-13 NOTE — CARE PLAN
Problem: Pain - Adult  Goal: Verbalizes/displays adequate comfort level or baseline comfort level  Outcome: Progressing  Flowsheets (Taken 4/13/2025 0702)  Verbalizes/displays adequate comfort level or baseline comfort level:   Assess pain using appropriate pain scale   Administer analgesics based on type and severity of pain and evaluate response   Implement non-pharmacological measures as appropriate and evaluate response   Consider cultural and social influences on pain and pain management     Problem: Safety - Adult  Goal: Free from fall injury  Outcome: Progressing     Problem: Discharge Planning  Goal: Discharge to home or other facility with appropriate resources  Outcome: Progressing     Problem: Chronic Conditions and Co-morbidities  Goal: Patient's chronic conditions and co-morbidity symptoms are monitored and maintained or improved  Outcome: Progressing     Problem: Nutrition  Goal: Nutrient intake appropriate for maintaining nutritional needs  Outcome: Progressing     Problem: Pain  Goal: Takes deep breaths with improved pain control throughout the shift  Outcome: Progressing  Goal: Turns in bed with improved pain control throughout the shift  Outcome: Progressing  Goal: Walks with improved pain control throughout the shift  Outcome: Progressing  Goal: Performs ADL's with improved pain control throughout shift  Outcome: Progressing  Goal: Participates in PT with improved pain control throughout the shift  Outcome: Progressing  Goal: Free from opioid side effects throughout the shift  Outcome: Progressing  Goal: Free from acute confusion related to pain meds throughout the shift  Outcome: Progressing     Problem: Fall/Injury  Goal: Not fall by end of shift  Outcome: Progressing  Goal: Be free from injury by end of the shift  Outcome: Progressing  Goal: Verbalize understanding of personal risk factors for fall in the hospital  Outcome: Progressing  Goal: Verbalize understanding of risk factor reduction  measures to prevent injury from fall in the home  Outcome: Progressing  Goal: Use assistive devices by end of the shift  Outcome: Progressing  Goal: Pace activities to prevent fatigue by end of the shift  Outcome: Progressing

## 2025-04-14 ENCOUNTER — APPOINTMENT (OUTPATIENT)
Dept: RADIOLOGY | Facility: HOSPITAL | Age: 70
DRG: 598 | End: 2025-04-14
Payer: COMMERCIAL

## 2025-04-14 ENCOUNTER — APPOINTMENT (OUTPATIENT)
Dept: CARDIOLOGY | Facility: HOSPITAL | Age: 70
DRG: 598 | End: 2025-04-14
Payer: COMMERCIAL

## 2025-04-14 LAB
ANION GAP SERPL CALC-SCNC: 13 MMOL/L (ref 10–20)
AORTIC VALVE MEAN GRADIENT: 3 MMHG
AORTIC VALVE PEAK VELOCITY: 1.24 M/S
AV PEAK GRADIENT: 6 MMHG
BASOPHILS # BLD AUTO: 0.01 X10*3/UL (ref 0–0.1)
BASOPHILS NFR BLD AUTO: 0.1 %
BUN SERPL-MCNC: 42 MG/DL (ref 6–23)
CALCIUM SERPL-MCNC: 9.3 MG/DL (ref 8.6–10.3)
CHLORIDE SERPL-SCNC: 95 MMOL/L (ref 98–107)
CO2 SERPL-SCNC: 30 MMOL/L (ref 21–32)
CREAT SERPL-MCNC: 1.23 MG/DL (ref 0.5–1.05)
EGFRCR SERPLBLD CKD-EPI 2021: 48 ML/MIN/1.73M*2
EJECTION FRACTION APICAL 4 CHAMBER: 84.1
EJECTION FRACTION: 60 %
EOSINOPHIL # BLD AUTO: 0 X10*3/UL (ref 0–0.7)
EOSINOPHIL NFR BLD AUTO: 0 %
ERYTHROCYTE [DISTWIDTH] IN BLOOD BY AUTOMATED COUNT: 14.8 % (ref 11.5–14.5)
GLUCOSE SERPL-MCNC: 160 MG/DL (ref 74–99)
HCT VFR BLD AUTO: 40.3 % (ref 36–46)
HGB BLD-MCNC: 13.5 G/DL (ref 12–16)
IMM GRANULOCYTES # BLD AUTO: 0.16 X10*3/UL (ref 0–0.7)
IMM GRANULOCYTES NFR BLD AUTO: 1.5 % (ref 0–0.9)
LEFT ATRIUM VOLUME AREA LENGTH INDEX BSA: 10.4 ML/M2
LV EJECTION FRACTION BIPLANE: 78 %
LYMPHOCYTES # BLD AUTO: 0.69 X10*3/UL (ref 1.2–4.8)
LYMPHOCYTES NFR BLD AUTO: 6.3 %
MAGNESIUM SERPL-MCNC: 1.84 MG/DL (ref 1.6–2.4)
MCH RBC QN AUTO: 33.8 PG (ref 26–34)
MCHC RBC AUTO-ENTMCNC: 33.5 G/DL (ref 32–36)
MCV RBC AUTO: 101 FL (ref 80–100)
MITRAL VALVE E/A RATIO: 0.7
MONOCYTES # BLD AUTO: 0.43 X10*3/UL (ref 0.1–1)
MONOCYTES NFR BLD AUTO: 3.9 %
NEUTROPHILS # BLD AUTO: 9.62 X10*3/UL (ref 1.2–7.7)
NEUTROPHILS NFR BLD AUTO: 88.2 %
NRBC BLD-RTO: 0 /100 WBCS (ref 0–0)
PLATELET # BLD AUTO: 414 X10*3/UL (ref 150–450)
POTASSIUM SERPL-SCNC: 3.6 MMOL/L (ref 3.5–5.3)
RBC # BLD AUTO: 4 X10*6/UL (ref 4–5.2)
RIGHT VENTRICLE FREE WALL PEAK S': 10.6 CM/S
SODIUM SERPL-SCNC: 134 MMOL/L (ref 136–145)
TRICUSPID ANNULAR PLANE SYSTOLIC EXCURSION: 1.9 CM
WBC # BLD AUTO: 10.9 X10*3/UL (ref 4.4–11.3)

## 2025-04-14 PROCEDURE — 1200000002 HC GENERAL ROOM WITH TELEMETRY DAILY

## 2025-04-14 PROCEDURE — 2500000005 HC RX 250 GENERAL PHARMACY W/O HCPCS

## 2025-04-14 PROCEDURE — 32555 ASPIRATE PLEURA W/ IMAGING: CPT

## 2025-04-14 PROCEDURE — 93306 TTE W/DOPPLER COMPLETE: CPT | Performed by: INTERNAL MEDICINE

## 2025-04-14 PROCEDURE — 2500000002 HC RX 250 W HCPCS SELF ADMINISTERED DRUGS (ALT 637 FOR MEDICARE OP, ALT 636 FOR OP/ED)

## 2025-04-14 PROCEDURE — 99221 1ST HOSP IP/OBS SF/LOW 40: CPT | Performed by: INTERNAL MEDICINE

## 2025-04-14 PROCEDURE — 2500000004 HC RX 250 GENERAL PHARMACY W/ HCPCS (ALT 636 FOR OP/ED)

## 2025-04-14 PROCEDURE — 80048 BASIC METABOLIC PNL TOTAL CA: CPT | Performed by: INTERNAL MEDICINE

## 2025-04-14 PROCEDURE — 83735 ASSAY OF MAGNESIUM: CPT | Performed by: INTERNAL MEDICINE

## 2025-04-14 PROCEDURE — 2500000001 HC RX 250 WO HCPCS SELF ADMINISTERED DRUGS (ALT 637 FOR MEDICARE OP)

## 2025-04-14 PROCEDURE — 2500000001 HC RX 250 WO HCPCS SELF ADMINISTERED DRUGS (ALT 637 FOR MEDICARE OP): Performed by: INTERNAL MEDICINE

## 2025-04-14 PROCEDURE — 36415 COLL VENOUS BLD VENIPUNCTURE: CPT | Performed by: INTERNAL MEDICINE

## 2025-04-14 PROCEDURE — C8929 TTE W OR WO FOL WCON,DOPPLER: HCPCS

## 2025-04-14 PROCEDURE — 99233 SBSQ HOSP IP/OBS HIGH 50: CPT | Performed by: INTERNAL MEDICINE

## 2025-04-14 PROCEDURE — 85025 COMPLETE CBC W/AUTO DIFF WBC: CPT | Performed by: INTERNAL MEDICINE

## 2025-04-14 PROCEDURE — 2500000004 HC RX 250 GENERAL PHARMACY W/ HCPCS (ALT 636 FOR OP/ED): Performed by: INTERNAL MEDICINE

## 2025-04-14 RX ORDER — BISACODYL 5 MG
15 TABLET, DELAYED RELEASE (ENTERIC COATED) ORAL ONCE
Status: COMPLETED | OUTPATIENT
Start: 2025-04-14 | End: 2025-04-14

## 2025-04-14 RX ADMIN — DEXAMETHASONE SODIUM PHOSPHATE 2 MG: 4 INJECTION, SOLUTION INTRAMUSCULAR; INTRAVENOUS at 08:34

## 2025-04-14 RX ADMIN — PANTOPRAZOLE SODIUM 40 MG: 40 TABLET, DELAYED RELEASE ORAL at 05:51

## 2025-04-14 RX ADMIN — HUMAN ALBUMIN MICROSPHERES AND PERFLUTREN 0.5 ML: 10; .22 INJECTION, SOLUTION INTRAVENOUS at 14:07

## 2025-04-14 RX ADMIN — PROPRANOLOL HYDROCHLORIDE 20 MG: 10 TABLET ORAL at 08:34

## 2025-04-14 RX ADMIN — BUPROPION HYDROCHLORIDE 300 MG: 150 TABLET, EXTENDED RELEASE ORAL at 08:34

## 2025-04-14 RX ADMIN — BISACODYL 15 MG: 5 TABLET, COATED ORAL at 10:38

## 2025-04-14 RX ADMIN — LETROZOLE 2.5 MG: 2.5 TABLET ORAL at 08:34

## 2025-04-14 RX ADMIN — DEXAMETHASONE SODIUM PHOSPHATE 2 MG: 4 INJECTION, SOLUTION INTRAMUSCULAR; INTRAVENOUS at 20:58

## 2025-04-14 RX ADMIN — PROPRANOLOL HYDROCHLORIDE 20 MG: 10 TABLET ORAL at 20:58

## 2025-04-14 RX ADMIN — PROPRANOLOL HYDROCHLORIDE 20 MG: 10 TABLET ORAL at 16:46

## 2025-04-14 RX ADMIN — Medication 3 MG: at 20:58

## 2025-04-14 RX ADMIN — HYDROCHLOROTHIAZIDE 12.5 MG: 12.5 CAPSULE ORAL at 08:34

## 2025-04-14 RX ADMIN — ENOXAPARIN SODIUM 40 MG: 40 INJECTION SUBCUTANEOUS at 20:58

## 2025-04-14 RX ADMIN — ROSUVASTATIN CALCIUM 20 MG: 20 TABLET, FILM COATED ORAL at 20:58

## 2025-04-14 ASSESSMENT — COGNITIVE AND FUNCTIONAL STATUS - GENERAL
WALKING IN HOSPITAL ROOM: A LITTLE
DRESSING REGULAR LOWER BODY CLOTHING: A LITTLE
CLIMB 3 TO 5 STEPS WITH RAILING: A LITTLE
STANDING UP FROM CHAIR USING ARMS: A LITTLE
DAILY ACTIVITIY SCORE: 22
MOBILITY SCORE: 21
HELP NEEDED FOR BATHING: A LITTLE

## 2025-04-14 ASSESSMENT — ENCOUNTER SYMPTOMS
FATIGUES EASILY: 1
DYSPNEA ON EXERTION: 1

## 2025-04-14 ASSESSMENT — PAIN SCALES - GENERAL
PAINLEVEL_OUTOF10: 0 - NO PAIN
PAINLEVEL_OUTOF10: 0 - NO PAIN

## 2025-04-14 ASSESSMENT — PAIN - FUNCTIONAL ASSESSMENT: PAIN_FUNCTIONAL_ASSESSMENT: 0-10

## 2025-04-14 NOTE — PROGRESS NOTES
Mildred Moyer is a 70 y.o. female on day 3 of admission presenting with Pleural effusion.    Subjective   History Of Present Illness (HPI):  Mildred Moyer is a 70 y.o. female with PMHx s/f recurrent right pleural effusion status post multiple thoracentesis and right Pleurx placement, chronic hypoxic respiratory failure on 3-4 L nasal cannula, HTN, HLD, metastatic breast cancer, anxiety and depression presenting with shortness of breath.  She states she get her pleural effusion drained weekly, last drained on Monday with her usual fluid output of 100 to 150 cc.  Today she has worsening short of breath with conversation and really winded with exertion. Denies f/c/n/v/d, cough, congestion, chest pain, palpitations.     ED Course:   Vitals on presentation: T 36.1 °C (96.9 °F)  HR 89  /65  RR 20  O2 99 % Supplemental oxygen  Labs:   CBC with WBC 10.3, Hgb 13.1, Plts 455.   CMP with glucose 107, Na 140, K 3.8, BUN 31, sCr 1.14, alk phos 70, ALT 14, AST 16, bilirubin 0.4. Magnesium 1.78.   . Trop 4.   VBG largely unremarkable  EKG: None  Imaging - agree with radiology interpretation(s):   CXR-new small left basilar pleural effusion atelectasis.  Unchanged appearance of right hemothorax with right apical chest tube and large pleural effusion with mild lung aeration.  Interventions: Dilaudid 0.2 mg x 2, 0.5 mg, ibuprofen 600 mg, admission for further management   4/12: Patient with some pleuritic chest pain and shortness of breath but relatively comfortable.  Will start some low-dose Decadron to see if this can help with her shortness of breath.  Give a dose of Lasix x 1.  Add mucolytic and aerosol.  Will need further thoracentesis.  Supportive care and oxygen.    4/13: Patient had Pleurx catheter placed by thoracic surgery Rose Shepherd DO that his who has been handling the orders for drainage out at the [facility].  They had cut down the drainage from twice a week to once a week  because the amount was tapering down for some reason.  Appears patient may be not getting as effective drainage as needed.  Will do drainage today and consult IR for any suggestions regarding where to go from here regarding patient's findings.  Of note patient does have elevated BNP which is new we will get an echocardiogram to better define this finding.  Patient relatively comfortable but is concerned about pleuritic chest pain with drainage of the Pleurx catheter will medicate prior to procedure.  Will have patient seen by hematology oncology who is primarily handling her care in the outpatient setting.    4/14: CT revealed large right sided pleural effusion with the right lung predominately collapses, moderate left pleural effusion, increased size of pulmonary metastasis, increases pleural metastases, new liver metastasis, and increased size of primary right breast mass. Seen this morning, patient states her pain is much less today.    Addendum: Patient was seen down in ultrasound did have about a liter taken off of the left lung currently is breathing easier.  Had discussions with patient and sister over the phone regarding possible plans for changing therapy per heme-onc's note.  Likely will change to Faslodex and Verzenio.  If patient has reaccumulation in the future could consider Pleurx on the left side.  Not sure what the plan will be with her right lower lung since it still trapped and looks to be chronically so.  May need to defer to CT surgery to see whether there would be any further utilization of that Pleurx.  Clinically patient is feeling better this afternoon after the procedure.       Objective     Physical Exam    EXAM:    Constitutional: Lying in bed short of breath with movement    Head and facial: Nasal cannula on    Neck: Minimal accessory muscle use    Lungs: Decreased breath sounds bilaterally in the bases right-sided pleuritic pain much better today.    Heart: Regular heart  "rate    Abdomen: Nontender    Pelvis/: No flank tenderness no urinary collection device    Extremities: No gross abnormalities    Neurologic: General Frailty. Alert and oriented to person, place, and time.     Dermatologic: Minimal subcutaneous tissue    Psychiatric/behavioral: Slightly anxious but appropriate and pleasant.      Last Recorded Vitals  Blood pressure 101/65, pulse 70, temperature 36.3 °C (97.3 °F), temperature source Temporal, resp. rate 17, height 1.626 m (5' 4\"), weight 59 kg (130 lb), SpO2 98%.  Intake/Output last 3 Shifts:  I/O last 3 completed shifts:  In: 360 (6.1 mL/kg) [P.O.:360]  Out: 250 (4.2 mL/kg) [Chest Tube:250]  Weight: 59 kg     Relevant Results    Current Facility-Administered Medications:     acetaminophen (Tylenol) tablet 650 mg, 650 mg, oral, q4h PRN, Ira Chávez PA-C, 650 mg at 04/12/25 2125    buPROPion XL (Wellbutrin XL) 24 hr tablet 300 mg, 300 mg, oral, Daily, AGSU ThomasC, 300 mg at 04/13/25 0731    dexAMETHasone (Decadron) injection 2 mg, 2 mg, intravenous, BID, Josh Rivera MD, 2 mg at 04/13/25 2033    enoxaparin (Lovenox) syringe 40 mg, 40 mg, subcutaneous, q24h, Ira Chávez PA-C, 40 mg at 04/13/25 2033    guaiFENesin (Mucinex) 12 hr tablet 600 mg, 600 mg, oral, q12h PRN, Ira Chávez PA-C    hydroCHLOROthiazide (Microzide) capsule 12.5 mg, 12.5 mg, oral, Daily, AGUS ThomasC, 12.5 mg at 04/13/25 0731    HYDROmorphone (Dilaudid) injection 0.4 mg, 0.4 mg, intravenous, q3h PRN, Josh Rivera MD, 0.4 mg at 04/13/25 2258    letrozole (Femara) tablet 2.5 mg, 2.5 mg, oral, Daily, Ira Chávez PA-C, 2.5 mg at 04/13/25 0730    melatonin tablet 3 mg, 3 mg, oral, Nightly PRN, Ira Chávez PA-C, 3 mg at 04/13/25 2302    morphine injection 2 mg, 2 mg, intravenous, q4h PRN, Ira Chávez PA-C, 2 mg at 04/12/25 1435    morphine injection 4 mg, 4 mg, intravenous, q4h PRN, Ira Chávez PA-C, 4 mg at 04/13/25 1700    ondansetron (Zofran) injection 4 mg, 4 mg, intravenous, q6h PRN, " Ira Chávez PA-C, 4 mg at 04/11/25 2219    pantoprazole (ProtoNix) EC tablet 40 mg, 40 mg, oral, Daily before breakfast, 40 mg at 04/14/25 0551 **OR** pantoprazole (Protonix) injection 40 mg, 40 mg, intravenous, Daily before breakfast, Ira Chávez PA-C    polyethylene glycol (Glycolax, Miralax) packet 17 g, 17 g, oral, Daily PRN, Ira Chávez PA-C    propranolol (Inderal) tablet 20 mg, 20 mg, oral, TID, Ira Chávez PA-C, 20 mg at 04/13/25 2033    rosuvastatin (Crestor) tablet 20 mg, 20 mg, oral, Nightly, Ira Chávez PA-C, 20 mg at 04/13/25 2033     Labs from the last few days have been reviewed.    Scheduled medications  buPROPion XL, 300 mg, oral, Daily  dexAMETHasone, 2 mg, intravenous, BID  enoxaparin, 40 mg, subcutaneous, q24h  hydroCHLOROthiazide, 12.5 mg, oral, Daily  letrozole, 2.5 mg, oral, Daily  pantoprazole, 40 mg, oral, Daily before breakfast   Or  pantoprazole, 40 mg, intravenous, Daily before breakfast  propranolol, 20 mg, oral, TID  rosuvastatin, 20 mg, oral, Nightly      Continuous medications     PRN medications  PRN medications: acetaminophen, guaiFENesin, HYDROmorphone, melatonin, morphine, morphine, ondansetron, polyethylene glycol      Assessment/Plan     Malignant pleural effusions right greater than left-right sided Pleurx catheter  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  Pleuritic chest pain  Metastatic breast cancer involving liver, both lungs possibly bony involvement as well  Chronic hypoxic respiratory failure  Worsening shortness of breath and weakness somewhat improved today  Acute kidney injury  Hypertension  Hyperlipidemia  Anxiety with depression  DVT prophylaxis-Lovenox 40 mg subcu daily    Short course of IV Decadron  Dose of Lasix 40 mg IV x 1  Hydrochlorothiazide 12.5 mg daily  Letrozole 2.5 mg daily  Crestor 20 mg a day  Propranolol 20 mg 3 times daily  Wellbutrin  mg daily  Protonix 40 mg daily  As needed Tylenol, guaifenesin, melatonin, morphine,  MiraLAX  Increase activity as tolerated  Will check echocardiogram  Consult Dr. Guzman  Check labs in a.m.   Dr. Rose Shepherd, DO surgeon put the Pleurx catheter in in will consult IR for any suggestions as well.  Status post thoracentesis left lung 1000 cc by IR.  Supportive care/palliative care  Maximize cardiovascular parameters  Avoid nephrotoxins  See orders for complete plan         Karen LOZOYA Student  Shared visit with student  Patient seen and examined  Note amended and addended  See my orders for complete plan

## 2025-04-14 NOTE — CARE PLAN
The patient's goals for the shift include      The clinical goals for the shift include pt will breathe easy      Problem: Pain - Adult  Goal: Verbalizes/displays adequate comfort level or baseline comfort level  Outcome: Progressing     Problem: Safety - Adult  Goal: Free from fall injury  Outcome: Progressing     Problem: Discharge Planning  Goal: Discharge to home or other facility with appropriate resources  Outcome: Progressing     Problem: Chronic Conditions and Co-morbidities  Goal: Patient's chronic conditions and co-morbidity symptoms are monitored and maintained or improved  Outcome: Progressing     Problem: Nutrition  Goal: Nutrient intake appropriate for maintaining nutritional needs  Outcome: Progressing     Problem: Pain  Goal: Takes deep breaths with improved pain control throughout the shift  Outcome: Progressing  Goal: Turns in bed with improved pain control throughout the shift  Outcome: Progressing  Goal: Walks with improved pain control throughout the shift  Outcome: Progressing  Goal: Performs ADL's with improved pain control throughout shift  Outcome: Progressing  Goal: Participates in PT with improved pain control throughout the shift  Outcome: Progressing  Goal: Free from opioid side effects throughout the shift  Outcome: Progressing  Goal: Free from acute confusion related to pain meds throughout the shift  Outcome: Progressing     Problem: Fall/Injury  Goal: Not fall by end of shift  Outcome: Progressing  Goal: Be free from injury by end of the shift  Outcome: Progressing  Goal: Verbalize understanding of personal risk factors for fall in the hospital  Outcome: Progressing  Goal: Verbalize understanding of risk factor reduction measures to prevent injury from fall in the home  Outcome: Progressing  Goal: Use assistive devices by end of the shift  Outcome: Progressing  Goal: Pace activities to prevent fatigue by end of the shift  Outcome: Progressing

## 2025-04-14 NOTE — CARE PLAN
Problem: Pain - Adult  Goal: Verbalizes/displays adequate comfort level or baseline comfort level  Outcome: Progressing  Flowsheets (Taken 4/14/2025 8960)  Verbalizes/displays adequate comfort level or baseline comfort level:   Assess pain using appropriate pain scale   Implement non-pharmacological measures as appropriate and evaluate response   Administer analgesics based on type and severity of pain and evaluate response   Consider cultural and social influences on pain and pain management     Problem: Safety - Adult  Goal: Free from fall injury  Outcome: Progressing     Problem: Discharge Planning  Goal: Discharge to home or other facility with appropriate resources  Outcome: Progressing     Problem: Chronic Conditions and Co-morbidities  Goal: Patient's chronic conditions and co-morbidity symptoms are monitored and maintained or improved  Outcome: Progressing     Problem: Nutrition  Goal: Nutrient intake appropriate for maintaining nutritional needs  Outcome: Progressing     Problem: Pain  Goal: Takes deep breaths with improved pain control throughout the shift  Outcome: Progressing  Goal: Turns in bed with improved pain control throughout the shift  Outcome: Progressing  Goal: Walks with improved pain control throughout the shift  Outcome: Progressing  Goal: Performs ADL's with improved pain control throughout shift  Outcome: Progressing  Goal: Participates in PT with improved pain control throughout the shift  Outcome: Progressing  Goal: Free from opioid side effects throughout the shift  Outcome: Progressing  Goal: Free from acute confusion related to pain meds throughout the shift  Outcome: Progressing     Problem: Fall/Injury  Goal: Not fall by end of shift  Outcome: Progressing  Goal: Be free from injury by end of the shift  Outcome: Progressing  Goal: Verbalize understanding of personal risk factors for fall in the hospital  Outcome: Progressing  Goal: Verbalize understanding of risk factor reduction  measures to prevent injury from fall in the home  Outcome: Progressing  Goal: Use assistive devices by end of the shift  Outcome: Progressing  Goal: Pace activities to prevent fatigue by end of the shift  Outcome: Progressing

## 2025-04-14 NOTE — PROGRESS NOTES
Occupational Therapy                 Therapy Communication Note    Patient Name: Mildred Moyer  MRN: [id number]  Department: [facility] [[email or web address]]  Room: 2305/2305-A  Today's Date: 4/14/2025     Discipline: Occupational Therapy    OT Missed Visit: Yes     Missed Visit Reason: Missed Visit Reason: Patient refused, Patient in a medical procedure    Missed Time: Attempted OT eval twice today. Checked with pt at 1110. She had just received meal tray but had not started to eat yet. OT encouraged and offered to assist pt up to chair for meal. Pt declined therapy eval and mobilization at this time. Insisted on eating in the bed despite education and encouragement. OT checked on pt now, and she is off floor for testing      Comment:

## 2025-04-14 NOTE — SIGNIFICANT EVENT
Patient had around 250 cc removed from Pleurx catheter today.  Developed severe pleuritic chest pain afterwards despite premedication with some morphine and high-dose Tylenol.  She did get some post procedure IV Toradol and a dose of IV Dilaudid.  Pain is improving slightly but the severity of the pain is still significant.  Follow-up chest x-ray notes possible infiltrate on top of effusion will get CAT scan of the chest without contrast since she has contrast allergy.  Will also get upper and lower extremity duplexes.    Will sign out the CAT scan to night coverage since the CAT scan is being done tonight.

## 2025-04-14 NOTE — CONSULTS
Reason For Consult  Metastatic breast cancer, bilateral pleural effusion    History Of Present Illness  Mildred Moyer is a 70 y.o. female with PMHx s/f recurrent right pleural effusion status post multiple thoracentesis and right Pleurx placement, chronic hypoxic respiratory failure on 3-4 L nasal cannula, HTN, HLD, metastatic breast cancer, anxiety and depression presenting with shortness of breath.  She states she get her pleural effusion drained weekly, last drained on Monday with her usual fluid output of 100 to 150 cc.  Today she has worsening short of breath with conversation and really winded with exertion. Denies f/c/n/v/d, cough, congestion, chest pain, palpitations.     ED Course:   Vitals on presentation: T 36.1 °C (96.9 °F)  HR 89  /65  RR 20  O2 99 % Supplemental oxygen  Labs:   CBC with WBC 10.3, Hgb 13.1, Plts 455.   CMP with glucose 107, Na 140, K 3.8, BUN 31, sCr 1.14, alk phos 70, ALT 14, AST 16, bilirubin 0.4. Magnesium 1.78.   . Trop 4.   VBG largely unremarkable  EKG: None  Imaging - agree with radiology interpretation(s):   CXR-new small left basilar pleural effusion atelectasis.  Unchanged appearance of right hemothorax with right apical chest tube and large pleural effusion with mild lung aeration.  Interventions: Dilaudid 0.2 mg x 2, 0.5 mg, ibuprofen 600 mg, admission for further management  Patient admitted in the hospital.  CT chest, large right pleural effusion, Pleurx catheter in place, left pleural effusion, increased size of primary right breast mass and increased right axillary and supraclavicular lymph node consistent with progression of disease.  New liver metastatic disease also found.  Current treatment, letrozole and Kisqali  Medical oncology consultation requested.  Past Medical History  She has a past medical history of GERD (gastroesophageal reflux disease), HLD (hyperlipidemia), HTN (hypertension), Liver disease, Lung neoplasm, MS (multiple sclerosis)  "(Multi), and Pleural effusion.    Surgical History  She has a past surgical history that includes Dilation and curettage of uterus and Percutaneous chest drain insertion (Right, 10/10/2024).     Social History  She reports that she quit smoking about 42 years ago. Her smoking use included cigarettes. She started smoking about 44 years ago. She has a 1 pack-year smoking history. She has never used smokeless tobacco. She reports that she does not currently use alcohol. She reports that she does not use drugs.    Family History  No family history on file.     Allergies  Iodinated contrast media, Iodine, Shellfish containing products, and Oxymetazoline    Review of Systems  Pleasant weakness some shortness of breath still seem to be better no chest pain patient very anxious, no nausea vomiting appetite is fair     Physical Exam  Constitutional: awake/alert/oriented x3, no distress,  very anxious, sitting on chair   Eyes: PERRL, EOMI, clear sclera   Head/Neck: Neck supple, no cervical lymphadenopathy   Respiratory/Thorax: Fair air movement on left side,  decreased breath sounds of right side status post right Pleurx catheter placement   Cardiovascular: Regular, rate and rhythm,  normal  S 1and S 2   Gastrointestinal: Nondistended, soft, non-tender,   no masses palpable, no organomegaly, +BS,   Extremities: normal extremities, no cyanosis edema,   Neurological: alert and oriented x3,   Breast: Right breast examination did show a mass  measuring 2x3cm on 12 o'clock position     Left breast examination within normal limits   Lymphatic: Mild right axillary lymph node palpable        Last Recorded Vitals  Blood pressure 127/80, pulse 70, temperature 35.6 °C (96 °F), temperature source Temporal, resp. rate 17, height 1.626 m (5' 4\"), weight 59 kg (130 lb), SpO2 99%.    Relevant Results   Latest Reference Range & Units Most Recent   WBC 4.4 - 11.3 x10*3/uL 10.9  4/14/25 06:45   nRBC 0.0 - 0.0 /100 WBCs 0.0  4/14/25 06:45   RBC " 4.00 - 5.20 x10*6/uL 4.00  4/14/25 06:45   HEMOGLOBIN 12.0 - 16.0 g/dL 13.5  4/14/25 06:45   HEMATOCRIT 36.0 - 46.0 % 40.3  4/14/25 06:45   MCV 80 - 100 fL 101 (H)  4/14/25 06:45   MCH 26.0 - 34.0 pg 33.8  4/14/25 06:45   MCHC 32.0 - 36.0 g/dL 33.5  4/14/25 06:45   RED CELL DISTRIBUTION WIDTH 11.5 - 14.5 % 14.8 (H)  4/14/25 06:45   Platelets 150 - 450 x10*3/uL 414  4/14/25 06:45   CT chest,1. Large right pleural effusion with foci of air throughout the right  pleural space and a PleurX catheter in place. As before, the right  lung is predominantly collapsed with minimal aeration of the right  upper lobe.  2. Moderate left pleural effusion, increased compared to prior CT  from 09/10/2024.  3. Interval worsening of oncologic findings including increased size  of the primary right breast mass, increased right axillary and  supraclavicular metastatic lymphadenopathy, increased pleural  metastases, increased size of pulmonary metastases, and new liver  metastases.  4. Slight increased sclerosis of the T2 vertebral body is nonspecific  but likely represents evolving osseous metastatic disease.     Assessment/Plan     #1 metastatic breast cancer with bilateral pulmonary effusion, current treatment letrozole /kisqali    2.  Breast cancer there is a progressive on the basis of CAT scan study, right breast mass increased in size patient has left axillary lymphadenopathy increasing right pleural effusion.    Pleurx catheter in place    Continue current supportive care, thoracentesis, CT result discussed with the patient we will reevaluate as outpatient we will start second line palliative therapy most probably Faslodex and Verzenio and repeat NGS for breast cancer  Discussed with patient  I spent 45 minutes in the professional and overall care of this patient.      Anatoly Guzman MD

## 2025-04-14 NOTE — PROGRESS NOTES
Social work consult placed for positive medical risk screen. SW reviewed pt's chart and communicated with TCC. No SW needs foreseen at this time. SW signing off; available upon request.    Herman Hunt, MSW, LSW ([id number])   Care Transitions

## 2025-04-14 NOTE — POST-PROCEDURE NOTE
Interventional Radiology Brief Postprocedure Note    Attending: Arnol Lester MD      Assistant: none    Diagnosis: malignant pleural effusion    Description of procedure: thoracentesis, left     Anesthesia:  Local    Complications: None    Estimated Blood Loss: none    No specimens collected      See detailed result report with images in PACS.    The patient tolerated the procedure well without incident or complication.

## 2025-04-14 NOTE — CARE PLAN
The patient's goals for the shift include      The clinical goals for the shift include to remain free from falls      Problem: Pain - Adult  Goal: Verbalizes/displays adequate comfort level or baseline comfort level  Outcome: Progressing     Problem: Safety - Adult  Goal: Free from fall injury  Outcome: Progressing     Problem: Discharge Planning  Goal: Discharge to home or other facility with appropriate resources  Outcome: Progressing     Problem: Chronic Conditions and Co-morbidities  Goal: Patient's chronic conditions and co-morbidity symptoms are monitored and maintained or improved  Outcome: Progressing     Problem: Nutrition  Goal: Nutrient intake appropriate for maintaining nutritional needs  Outcome: Progressing     Problem: Pain  Goal: Takes deep breaths with improved pain control throughout the shift  Outcome: Progressing  Goal: Turns in bed with improved pain control throughout the shift  Outcome: Progressing  Goal: Walks with improved pain control throughout the shift  Outcome: Progressing  Goal: Performs ADL's with improved pain control throughout shift  Outcome: Progressing  Goal: Participates in PT with improved pain control throughout the shift  Outcome: Progressing  Goal: Free from opioid side effects throughout the shift  Outcome: Progressing  Goal: Free from acute confusion related to pain meds throughout the shift  Outcome: Progressing     Problem: Fall/Injury  Goal: Not fall by end of shift  Outcome: Progressing  Goal: Be free from injury by end of the shift  Outcome: Progressing  Goal: Verbalize understanding of personal risk factors for fall in the hospital  Outcome: Progressing  Goal: Verbalize understanding of risk factor reduction measures to prevent injury from fall in the home  Outcome: Progressing  Goal: Use assistive devices by end of the shift  Outcome: Progressing  Goal: Pace activities to prevent fatigue by end of the shift  Outcome: Progressing

## 2025-04-15 ENCOUNTER — DOCUMENTATION (OUTPATIENT)
Dept: HOME HEALTH SERVICES | Facility: HOME HEALTH | Age: 70
End: 2025-04-15
Payer: COMMERCIAL

## 2025-04-15 ENCOUNTER — HOME CARE VISIT (OUTPATIENT)
Dept: HOME HEALTH SERVICES | Facility: HOME HEALTH | Age: 70
End: 2025-04-15
Payer: COMMERCIAL

## 2025-04-15 ENCOUNTER — PHARMACY VISIT (OUTPATIENT)
Dept: PHARMACY | Facility: CLINIC | Age: 70
End: 2025-04-15
Payer: COMMERCIAL

## 2025-04-15 VITALS
RESPIRATION RATE: 16 BRPM | BODY MASS INDEX: 22.2 KG/M2 | HEART RATE: 87 BPM | TEMPERATURE: 97.1 F | OXYGEN SATURATION: 97 % | DIASTOLIC BLOOD PRESSURE: 79 MMHG | HEIGHT: 64 IN | WEIGHT: 130 LBS | SYSTOLIC BLOOD PRESSURE: 113 MMHG

## 2025-04-15 LAB
ANION GAP SERPL CALC-SCNC: 14 MMOL/L (ref 10–20)
BACTERIA FLD CULT: NORMAL
BASOPHILS # BLD AUTO: 0 X10*3/UL (ref 0–0.1)
BASOPHILS NFR BLD AUTO: 0 %
BUN SERPL-MCNC: 43 MG/DL (ref 6–23)
CALCIUM SERPL-MCNC: 9 MG/DL (ref 8.6–10.3)
CHLORIDE SERPL-SCNC: 95 MMOL/L (ref 98–107)
CO2 SERPL-SCNC: 27 MMOL/L (ref 21–32)
CREAT SERPL-MCNC: 1.03 MG/DL (ref 0.5–1.05)
EGFRCR SERPLBLD CKD-EPI 2021: 59 ML/MIN/1.73M*2
EOSINOPHIL # BLD AUTO: 0 X10*3/UL (ref 0–0.7)
EOSINOPHIL NFR BLD AUTO: 0 %
ERYTHROCYTE [DISTWIDTH] IN BLOOD BY AUTOMATED COUNT: 15 % (ref 11.5–14.5)
GLUCOSE SERPL-MCNC: 107 MG/DL (ref 74–99)
GRAM STN SPEC: NORMAL
GRAM STN SPEC: NORMAL
HCT VFR BLD AUTO: 36.9 % (ref 36–46)
HGB BLD-MCNC: 13 G/DL (ref 12–16)
IMM GRANULOCYTES # BLD AUTO: 0.06 X10*3/UL (ref 0–0.7)
IMM GRANULOCYTES NFR BLD AUTO: 0.6 % (ref 0–0.9)
LYMPHOCYTES # BLD AUTO: 0.83 X10*3/UL (ref 1.2–4.8)
LYMPHOCYTES NFR BLD AUTO: 8.8 %
MAGNESIUM SERPL-MCNC: 1.74 MG/DL (ref 1.6–2.4)
MCH RBC QN AUTO: 35.4 PG (ref 26–34)
MCHC RBC AUTO-ENTMCNC: 35.2 G/DL (ref 32–36)
MCV RBC AUTO: 101 FL (ref 80–100)
MONOCYTES # BLD AUTO: 0.44 X10*3/UL (ref 0.1–1)
MONOCYTES NFR BLD AUTO: 4.7 %
NEUTROPHILS # BLD AUTO: 8.12 X10*3/UL (ref 1.2–7.7)
NEUTROPHILS NFR BLD AUTO: 85.9 %
NRBC BLD-RTO: 0 /100 WBCS (ref 0–0)
PLATELET # BLD AUTO: 347 X10*3/UL (ref 150–450)
POTASSIUM SERPL-SCNC: 3.4 MMOL/L (ref 3.5–5.3)
RBC # BLD AUTO: 3.67 X10*6/UL (ref 4–5.2)
SODIUM SERPL-SCNC: 133 MMOL/L (ref 136–145)
WBC # BLD AUTO: 9.5 X10*3/UL (ref 4.4–11.3)

## 2025-04-15 PROCEDURE — 99232 SBSQ HOSP IP/OBS MODERATE 35: CPT | Performed by: INTERNAL MEDICINE

## 2025-04-15 PROCEDURE — 85025 COMPLETE CBC W/AUTO DIFF WBC: CPT | Performed by: INTERNAL MEDICINE

## 2025-04-15 PROCEDURE — 80048 BASIC METABOLIC PNL TOTAL CA: CPT | Performed by: INTERNAL MEDICINE

## 2025-04-15 PROCEDURE — 2500000001 HC RX 250 WO HCPCS SELF ADMINISTERED DRUGS (ALT 637 FOR MEDICARE OP): Performed by: INTERNAL MEDICINE

## 2025-04-15 PROCEDURE — 94761 N-INVAS EAR/PLS OXIMETRY MLT: CPT

## 2025-04-15 PROCEDURE — 2500000001 HC RX 250 WO HCPCS SELF ADMINISTERED DRUGS (ALT 637 FOR MEDICARE OP)

## 2025-04-15 PROCEDURE — 83735 ASSAY OF MAGNESIUM: CPT | Performed by: INTERNAL MEDICINE

## 2025-04-15 PROCEDURE — 36415 COLL VENOUS BLD VENIPUNCTURE: CPT | Performed by: INTERNAL MEDICINE

## 2025-04-15 PROCEDURE — 2500000004 HC RX 250 GENERAL PHARMACY W/ HCPCS (ALT 636 FOR OP/ED)

## 2025-04-15 PROCEDURE — 99239 HOSP IP/OBS DSCHRG MGMT >30: CPT | Performed by: INTERNAL MEDICINE

## 2025-04-15 PROCEDURE — 97165 OT EVAL LOW COMPLEX 30 MIN: CPT | Mod: GO | Performed by: OCCUPATIONAL THERAPIST

## 2025-04-15 PROCEDURE — RXMED WILLOW AMBULATORY MEDICATION CHARGE

## 2025-04-15 PROCEDURE — 2500000004 HC RX 250 GENERAL PHARMACY W/ HCPCS (ALT 636 FOR OP/ED): Performed by: INTERNAL MEDICINE

## 2025-04-15 RX ORDER — CYCLOBENZAPRINE HCL 10 MG
10 TABLET ORAL EVERY 12 HOURS
Status: DISCONTINUED | OUTPATIENT
Start: 2025-04-15 | End: 2025-04-15 | Stop reason: HOSPADM

## 2025-04-15 RX ORDER — CYCLOBENZAPRINE HCL 10 MG
10 TABLET ORAL 3 TIMES DAILY PRN
Qty: 30 TABLET | Refills: 1 | Status: SHIPPED | OUTPATIENT
Start: 2025-04-15

## 2025-04-15 RX ORDER — OXYCODONE AND ACETAMINOPHEN 5; 325 MG/1; MG/1
1 TABLET ORAL EVERY 6 HOURS PRN
Qty: 30 TABLET | Refills: 0 | Status: SHIPPED | OUTPATIENT
Start: 2025-04-15

## 2025-04-15 RX ORDER — DULOXETIN HYDROCHLORIDE 30 MG/1
60 CAPSULE, DELAYED RELEASE ORAL EVERY 24 HOURS
Status: DISCONTINUED | OUTPATIENT
Start: 2025-04-15 | End: 2025-04-15 | Stop reason: HOSPADM

## 2025-04-15 RX ORDER — GABAPENTIN 100 MG/1
200 CAPSULE ORAL 3 TIMES DAILY
Status: DISCONTINUED | OUTPATIENT
Start: 2025-04-15 | End: 2025-04-15 | Stop reason: HOSPADM

## 2025-04-15 RX ORDER — CARBAMAZEPINE 100 MG/1
100 TABLET, CHEWABLE ORAL EVERY 6 HOURS
Status: DISCONTINUED | OUTPATIENT
Start: 2025-04-15 | End: 2025-04-15 | Stop reason: HOSPADM

## 2025-04-15 RX ADMIN — BUPROPION HYDROCHLORIDE 300 MG: 150 TABLET, EXTENDED RELEASE ORAL at 09:04

## 2025-04-15 RX ADMIN — PANTOPRAZOLE SODIUM 40 MG: 40 TABLET, DELAYED RELEASE ORAL at 05:07

## 2025-04-15 RX ADMIN — DULOXETINE HYDROCHLORIDE 60 MG: 30 CAPSULE, DELAYED RELEASE ORAL at 11:42

## 2025-04-15 RX ADMIN — LETROZOLE 2.5 MG: 2.5 TABLET ORAL at 09:04

## 2025-04-15 RX ADMIN — CYCLOBENZAPRINE 10 MG: 10 TABLET, FILM COATED ORAL at 11:42

## 2025-04-15 RX ADMIN — DEXAMETHASONE SODIUM PHOSPHATE 2 MG: 4 INJECTION, SOLUTION INTRAMUSCULAR; INTRAVENOUS at 09:05

## 2025-04-15 RX ADMIN — PROPRANOLOL HYDROCHLORIDE 20 MG: 10 TABLET ORAL at 09:04

## 2025-04-15 ASSESSMENT — COGNITIVE AND FUNCTIONAL STATUS - GENERAL
DAILY ACTIVITIY SCORE: 24
CLIMB 3 TO 5 STEPS WITH RAILING: A LITTLE
DRESSING REGULAR UPPER BODY CLOTHING: A LITTLE
DAILY ACTIVITIY SCORE: 20
WALKING IN HOSPITAL ROOM: A LITTLE
DRESSING REGULAR LOWER BODY CLOTHING: A LITTLE
MOBILITY SCORE: 22
TOILETING: A LITTLE
HELP NEEDED FOR BATHING: A LITTLE

## 2025-04-15 ASSESSMENT — ENCOUNTER SYMPTOMS
APPETITE LEVEL: GOOD
CHANGE IN APPETITE: UNCHANGED
MUSCLE WEAKNESS: 1

## 2025-04-15 ASSESSMENT — PAIN DESCRIPTION - DESCRIPTORS
DESCRIPTORS: DISCOMFORT
DESCRIPTORS: TIGHTNESS

## 2025-04-15 ASSESSMENT — ACTIVITIES OF DAILY LIVING (ADL)
AMBULATION ASSISTANCE: STAND BY ASSIST
AMBULATION ASSISTANCE: 1
BATHING_ASSISTANCE: MODERATE
OASIS_M1830: 03

## 2025-04-15 ASSESSMENT — PAIN - FUNCTIONAL ASSESSMENT
PAIN_FUNCTIONAL_ASSESSMENT: 0-10
PAIN_FUNCTIONAL_ASSESSMENT: 0-10

## 2025-04-15 ASSESSMENT — PAIN SCALES - GENERAL
PAINLEVEL_OUTOF10: 2
PAINLEVEL_OUTOF10: 2

## 2025-04-15 NOTE — NURSING NOTE
Discharge instructions reviewed with patient. Medication list reviewed with patient including new prescriptions. Patient understands how to followup appropriately. All questions and concerns answered at this time.  IV removed from patient.   Patient safely discharged in wheelchair with all belongings.

## 2025-04-15 NOTE — PROGRESS NOTES
04/15/25 0946   Discharge Planning   Living Arrangements Other (Comment)   Assistance Needed minimal   Type of Residence Assisted living   Home or Post Acute Services In home services   Type of Post Acute Facility Services Assisted living   Type of Home Care Services Home OT;Home PT;Home nursing visits   Expected Discharge Disposition Home Health   Patient Choice   Provider Choice list and CMS website (https://medicare.gov/care-compare#search) for post-acute Quality and Resource Measure Data were provided and reviewed with: Patient   Patient / Family choosing to utilize agency / facility established prior to hospitalization Yes   Stroke Family Assessment   Stroke Family Assessment Needed No   Intensity of Service   Intensity of Service 0-30 min     I spoke with patient to introduce discharge planning and explain role of TCC. Pt states she lives at [facility].  She utilizes a rollator and has a WC also in case she needs. She tells me that she has MS and tries to use her rollator as much as she can over the WC.  Pt states she is independent with ADL/showers.  She does wear 4L NC (stationary/portable concentrator through [employer]).  She denies any falls. She also has a pleurex (which is capped) and is active with [facility].  She said she was getting therapies but now only a RN for catheter care.  PT/OT recs are for [facility] and she is agreeable to have therapy restart with [facility] as well.  Will make provider aware to send orders to [facility] on discharge.      1526  Pt has been dischrged.  Medicare message explained to pt, signed, given to pt, copy placed in chart, and entered in careport.    no chest pain, no cough, and no shortness of breath.

## 2025-04-15 NOTE — PROGRESS NOTES
Occupational Therapy    Evaluation    Patient Name: Mildred Moyer  MRN: [id number]  Department:   Room: [address] [place] [address]  Today's Date: 4/15/2025  Time Calculation  Start Time: 1343  Stop Time: 1400  Time Calculation (min): 17 min    Assessment  IP OT Assessment  OT Assessment: OT assessment completed; pt benefits from skilled OT intervention for education on energy conservation and work simplification to promote independence and quality of living. SOB is pt most limiting factor increasing her need for assistance to maintain safety during ADLs and functionl mobility  Prognosis: Good  Barriers to Discharge Home: No anticipated barriers  Evaluation/Treatment Tolerance: Patient tolerated treatment well  Medical Staff Made Aware: Yes  End of Session Communication: Bedside nurse  End of Session Patient Position: Bed, 3 rail up, Alarm on  Plan:  Treatment Interventions: ADL retraining, Endurance training, Patient/family training, Equipment evaluation/education, Compensatory technique education  OT Frequency: 2 times per week  OT Discharge Recommendations: Low intensity level of continued care  OT Recommended Transfer Status: Assist of 1  OT - OK to Discharge: Yes    Subjective   Current Problem:  1. Pleural effusion        2. Shortness of breath        3. Dyspnea on exertion  Transthoracic Echo (TTE) Complete    Transthoracic Echo (TTE) Complete      4. Large pleural effusion  oxyCODONE-acetaminophen (Percocet) 5-325 mg tablet    cyclobenzaprine (Flexeril) 10 mg tablet    Referral to Home Health        General:  General  Reason for Referral: OT for ADL assessment  Referred By: GRACE PORTILLO. PT FOR IMPAIRED MOBILITY  Past Medical History Relevant to Rehab: 70 YOF presented from SNF with c/o SOB; admitted for recurrent right pleural effusion status post multiple thoracentesis and right Pleurx placement, chronic hypoxic respiratory failure on 3-4 L nasal cannula, HTN, HLD, metastatic breast cancer, anxiety and  depression  Family/Caregiver Present: No  Prior to Session Communication: Bedside nurse  Patient Position Received: Bed, 3 rail up, Alarm on (O2; bandages from procedures)  Preferred Learning Style: verbal, visual  General Comment: OT orders received and chart reviewed. Pt educated on reason for OT consultation and acute services. Pt agreeable to participate.  Precautions:  Hearing/Visual Limitations: WFL  UE Weight Bearing Status: Weight Bearing as Tolerated  LE Weight Bearing Status: Weight Bearing as Tolerated  Medical Precautions: Fall precautions, Oxygen therapy device and L/min     Date/Time Vitals Session Patient Position Pulse Resp SpO2 BP MAP (mmHg)    04/15/25 1554 --  --  --  --  97 %  --  --                Pain:  Pain Assessment  Pain Assessment: 0-10  0-10 (Numeric) Pain Score: 2  Pain Type: Acute pain  Pain Location: Rib cage  Pain Orientation: Right  Pain Descriptors: Discomfort  Pain Frequency: Intermittent  Pain Onset:  (with certain movements)  Clinical Progression: Not changed  Patient's Stated Pain Goal: No pain  Pain Interventions: Repositioned  Response to Interventions: Content/relaxed    Objective   Cognition:  Overall Cognitive Status: Within Functional Limits  Orientation Level: Oriented X4  Insight: Within function limits  Impulsive: Mildly  Processing Speed: Within funtional limits     Confusion Assessment Method (CAM)  Acute Onset and Fluctuating Course (1A): No  Gonzalez Agitation Sedation Scale  Gonzalez Agitation Sedation Scale (RASS): Alert and calm  Home Living:  Type of Home: Assisted living  Lives With: Alone  Home Adaptive Equipment: Walker rolling or standard  Home Layout: One level  Home Access: No concerns  Bathroom Shower/Tub: Walk-in shower  Bathroom Toilet: Handicapped height  Bathroom Equipment: Grab bars in shower, Grab bars around toilet, Built-in shower seat   Prior Function:  Level of [place]: Needs assistance with ADLs, Needs assistance with homemaking  "(intermittent assistance with ADLs)  Receives Help From:  (nursing staff)  ADL Assistance:  (\"depends on the day\")  Ambulatory Assistance:  (uses rollator at baseline)  Transfers: Assistive device  Gait: Assistive device  Prior Function Comments: pt on continuous O2  IADL History:  IADL Comments: Pt resides in assisted living  ADL:  Eating Assistance: Independent  Grooming Assistance: Stand by  Bathing Assistance: Moderate  UE Dressing Assistance: Minimal  LE Dressing Assistance: Minimal  Toileting Assistance with Device: Minimal  Functional Assistance: Minimal  ADL Comments: Pt participation limited by SOB and discomfort in her sides from procedures to remove fluids. Pt reponds well to cues and initiated rest breaks as needed during functional tasks and mobility within her room. Education provided for energy conservation; delegation of task; positive mindset and gratitude. Pt responsive to all education.  Activity Tolerance:  Endurance: Tolerates 10 - 20 min exercise with multiple rests  Activity Tolerance Comments: pt states she is able to tolerate more each day  Bed Mobility/Transfers: Bed Mobility  Bed Mobility: Yes  Bed Mobility 1  Bed Mobility 1: Supine to sitting, Sitting to supine  Level of Assistance 1: Independent    Transfers  Transfer: Yes  Transfer 1  Technique 1: Sit to stand, Stand to sit  Transfer Device 1: Walker  Transfer Level of Assistance 1: Distant supervision, Close supervision  Trials/Comments 1: pt demonstrates improvment since admission      Functional Mobility:  Functional Mobility  Functional Mobility Performed: Yes  Functional Mobility 1  Surface 1: Level tile  Device 1: Rolling walker  Assistance 1: Close supervision  Comments 1: continued education for pacing and breathing technique  Sitting Balance:  Static Sitting Balance  Static Sitting-Level of Assistance: Independent  Dynamic Sitting Balance  Dynamic Sitting-Level of Assistance: Independent  Standing Balance:  Static Standing " Balance  Static Standing-Level of Assistance:  (supervision)  Dynamic Standing Balance  Dynamic Standing-Level of Assistance: Close supervision  Modalities:  Modalities Used: No  IADL's:   IADL Comments: Pt resides in assisted living  Vision: Vision - Basic Assessment  Current Vision: No visual deficits  Sensation:  Light Touch: No apparent deficits  Sharp/Dull: No apparent deficits  Stereognosis: No apparent deficits  Proprioception: No apparent deficits  Strength:  Strength Comments: BUE appear WFL as observed during functional activities; not formally tested due to report of pain R/L sides from procedures  Perception:  Inattention/Neglect: Appears intact  Initiation: Appears intact  Motor Planning: Appears intact  Perseveration: Not present  Coordination:  Movements are Fluid and Coordinated: Yes  Finger to Nose: Intact  Rapid Alternating Movements: Intact  Finger to Target: Intact   Hand Function:  Hand Function  Gross Grasp: Functional  Coordination: Functional  Extremities: RUE   RUE : Within Functional Limits, LUE   LUE: Within Functional Limits, RLE   RLE : Within Functional Limits, and LLE   LLE : Within Functional Limits      Outcome Measures: [facility] Daily Activity  Putting on and taking off regular lower body clothing: A little  Bathing (including washing, rinsing, drying): A little  Putting on and taking off regular upper body clothing: A little  Toileting, which includes using toilet, bedpan or urinal: A little  Taking care of personal grooming such as brushing teeth: None  Eating Meals: None  Daily Activity - Total Score: 20      Education Documentation  Handouts, taught by Montse Hutchins OT at 4/15/2025  4:48 PM.  Learner: Patient  Readiness: Acceptance  Method: Explanation  Response: Verbalizes Understanding    Body Mechanics, taught by Montse Hutchins OT at 4/15/2025  4:48 PM.  Learner: Patient  Readiness: Acceptance  Method: Explanation  Response: Verbalizes Understanding    Home Exercise Program,  taught by Montse Hutchins OT at 4/15/2025  4:48 PM.  Learner: Patient  Readiness: Acceptance  Method: Explanation  Response: Verbalizes Understanding    ADL Training, taught by Montse Hutchins OT at 4/15/2025  4:48 PM.  Learner: Patient  Readiness: Acceptance  Method: Explanation  Response: Verbalizes Understanding    Education Comments  No comments found.      Goals:   Encounter Problems       Encounter Problems (Active)       ADLs       Energy Conservation OT GOAL 1       Start:  04/15/25    Expected End:  04/22/25       Pt will demo and/or verbalize 2-3 energy conservation techniques to incorporate into functional mobility or ADL to improve performance and increase independence during this LOS.             EXERCISE/STRENGTHENING       Patient will complete BUE exercises in order to improve strength and activity for ADL performance.        Start:  04/15/25    Expected End:  04/22/25

## 2025-04-15 NOTE — PROGRESS NOTES
Mildred Moyer is a 70 y.o. female on day 4 of admission presenting with Pleural effusion.    Subjective   History Of Present Illness (HPI):  Mildred Moyer is a 70 y.o. female with PMHx s/f recurrent right pleural effusion status post multiple thoracentesis and right Pleurx placement, chronic hypoxic respiratory failure on 3-4 L nasal cannula, HTN, HLD, metastatic breast cancer, anxiety and depression presenting with shortness of breath.  She states she get her pleural effusion drained weekly, last drained on Monday with her usual fluid output of 100 to 150 cc.  Today she has worsening short of breath with conversation and really winded with exertion. Denies f/c/n/v/d, cough, congestion, chest pain, palpitations.     ED Course:   Vitals on presentation: T 36.1 °C (96.9 °F)  HR 89  /65  RR 20  O2 99 % Supplemental oxygen  Labs:   CBC with WBC 10.3, Hgb 13.1, Plts 455.   CMP with glucose 107, Na 140, K 3.8, BUN 31, sCr 1.14, alk phos 70, ALT 14, AST 16, bilirubin 0.4. Magnesium 1.78.   . Trop 4.   VBG largely unremarkable  EKG: None  Imaging - agree with radiology interpretation(s):   CXR-new small left basilar pleural effusion atelectasis.  Unchanged appearance of right hemothorax with right apical chest tube and large pleural effusion with mild lung aeration.  Interventions: Dilaudid 0.2 mg x 2, 0.5 mg, ibuprofen 600 mg, admission for further management   4/12: Patient with some pleuritic chest pain and shortness of breath but relatively comfortable.  Will start some low-dose Decadron to see if this can help with her shortness of breath.  Give a dose of Lasix x 1.  Add mucolytic and aerosol.  Will need further thoracentesis.  Supportive care and oxygen.    4/13: Patient had Pleurx catheter placed by thoracic surgery Rose Shepherd DO that his who has been handling the orders for drainage out at the [facility].  They had cut down the drainage from twice a week to once a week  because the amount was tapering down for some reason.  Appears patient may be not getting as effective drainage as needed.  Will do drainage today and consult IR for any suggestions regarding where to go from here regarding patient's findings.  Of note patient does have elevated BNP which is new we will get an echocardiogram to better define this finding.  Patient relatively comfortable but is concerned about pleuritic chest pain with drainage of the Pleurx catheter will medicate prior to procedure.  Will have patient seen by hematology oncology who is primarily handling her care in the outpatient setting.    4/14: CT revealed large right sided pleural effusion with the right lung predominately collapses, moderate left pleural effusion, increased size of pulmonary metastasis, increases pleural metastases, new liver metastasis, and increased size of primary right breast mass. Seen this morning, patient states her pain is much less today.    Addendum: Patient was seen down in ultrasound did have about a liter taken off of the left lung currently is breathing easier.  Had discussions with patient and sister over the phone regarding possible plans for changing therapy per heme-onc's note.  Likely will change to Faslodex and Verzenio.  If patient has reaccumulation in the future could consider Pleurx on the left side.  Not sure what the plan will be with her right lower lung since it still trapped and looks to be chronically so.  May need to defer to CT surgery to see whether there would be any further utilization of that Pleurx.  Clinically patient is feeling better this afternoon after the procedure.    4/15: No acute events overnight. TTE revealed LVEF 60%, moderate pleural effusion.  Patient clinically doing much better after thoracentesis on the left.  Did reach out to her thoracic surgeon Dr. Rose Shepherd, who will follow-up in outpatient for possible removing the right sided drainage catheter.  Spoke to  "heme-onc who will likely be changing her outpatient chemotherapy.  Patient understands plan.  And is in agreement with plan see after visit summary for complete plan.    35 minutes spent in the care of this patient.     Objective     Physical Exam    EXAM:    Constitutional: Lying in bed short of breath with movement    Head and facial: Nasal cannula on    Neck: Minimal accessory muscle use    Lungs: Decreased breath sounds bilaterally in the bases right-sided pleuritic pain much better today.    Heart: Regular heart rate    Abdomen: Nontender    Pelvis/: No flank tenderness no urinary collection device    Extremities: No gross abnormalities    Neurologic: General Frailty. Alert and oriented to person, place, and time.     Dermatologic: Minimal subcutaneous tissue    Psychiatric/behavioral: Slightly anxious but appropriate and pleasant.      Last Recorded Vitals  Blood pressure 123/74, pulse 71, temperature 36.1 °C (96.9 °F), temperature source Temporal, resp. rate 17, height 1.626 m (5' 4\"), weight 59 kg (130 lb), SpO2 97%.  Intake/Output last 3 Shifts:  I/O last 3 completed shifts:  In: 100 (1.7 mL/kg) [P.O.:100]  Out: - (0 mL/kg)   Weight: 59 kg     Relevant Results    Current Facility-Administered Medications:     acetaminophen (Tylenol) tablet 650 mg, 650 mg, oral, q4h PRN, Ira Chávez PA-C, 650 mg at 04/12/25 2125    buPROPion XL (Wellbutrin XL) 24 hr tablet 300 mg, 300 mg, oral, Daily, Ira Chávez PA-C, 300 mg at 04/14/25 0834    dexAMETHasone (Decadron) injection 2 mg, 2 mg, intravenous, BID, Josh Rivera MD, 2 mg at 04/14/25 2058    enoxaparin (Lovenox) syringe 40 mg, 40 mg, subcutaneous, q24h, Iar Chávez PA-C, 40 mg at 04/14/25 2058    guaiFENesin (Mucinex) 12 hr tablet 600 mg, 600 mg, oral, q12h PRN, Ira Chávez PA-C    [Held by provider] hydroCHLOROthiazide (Microzide) capsule 12.5 mg, 12.5 mg, oral, Daily, Ira Chávez PA-C, 12.5 mg at 04/14/25 0834    HYDROmorphone (Dilaudid) injection 0.4 mg, 0.4 " mg, intravenous, q3h PRN, Josh Rivera MD, 0.4 mg at 04/13/25 2258    letrozole (Femara) tablet 2.5 mg, 2.5 mg, oral, Daily, Ira LAKIA Chávez PA-C, 2.5 mg at 04/14/25 0834    melatonin tablet 3 mg, 3 mg, oral, Nightly PRN, Ira LAKIA Chávez, PA-C, 3 mg at 04/14/25 2058    morphine injection 2 mg, 2 mg, intravenous, q4h PRN, Ira LAKIA Chávez, PA-C, 2 mg at 04/12/25 1435    morphine injection 4 mg, 4 mg, intravenous, q4h PRN, Ira LAKIA Chávez PA-C, 4 mg at 04/13/25 1700    ondansetron (Zofran) injection 4 mg, 4 mg, intravenous, q6h PRN, Ira Chávez PA-C, 4 mg at 04/11/25 2219    pantoprazole (ProtoNix) EC tablet 40 mg, 40 mg, oral, Daily before breakfast, 40 mg at 04/15/25 0507 **OR** pantoprazole (Protonix) injection 40 mg, 40 mg, intravenous, Daily before breakfast, Ira Chávez PA-C    polyethylene glycol (Glycolax, Miralax) packet 17 g, 17 g, oral, Daily PRN, Ira Chávez PA-C    propranolol (Inderal) tablet 20 mg, 20 mg, oral, TID, Ira LAKIA Chávez, PA-C, 20 mg at 04/14/25 2058    rosuvastatin (Crestor) tablet 20 mg, 20 mg, oral, Nightly, Ira LAKIA Chávez, PA-C, 20 mg at 04/14/25 2058     Labs from the last few days have been reviewed.    Scheduled medications  buPROPion XL, 300 mg, oral, Daily  dexAMETHasone, 2 mg, intravenous, BID  enoxaparin, 40 mg, subcutaneous, q24h  [Held by provider] hydroCHLOROthiazide, 12.5 mg, oral, Daily  letrozole, 2.5 mg, oral, Daily  pantoprazole, 40 mg, oral, Daily before breakfast   Or  pantoprazole, 40 mg, intravenous, Daily before breakfast  propranolol, 20 mg, oral, TID  rosuvastatin, 20 mg, oral, Nightly      Continuous medications     PRN medications  PRN medications: acetaminophen, guaiFENesin, HYDROmorphone, melatonin, morphine, morphine, ondansetron, polyethylene glycol      Assessment/Plan     Malignant pleural effusions right greater than left-right sided Pleurx catheter  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         Karen LOZOYA Student  Shared visit with student  Patient seen and examined  Note amended and addended  See my orders for complete plan

## 2025-04-15 NOTE — PROGRESS NOTES
"Mildred Moyer is a 70 y.o. female on day 4 of admission presenting with Pleural effusion.  Metastatic breast cancer, progressive, bilateral pleural effusion  Subjective   Patient feeling better after thoracentesis, still has some shortness of breath       Objective     Physical Exam  Head/Neck: Neck supple, no cervical lymphadenopathy   Respiratory/Thorax: Fair air movement on left side,  decreased breath sounds of right side status post right Pleurx catheter placement   Cardiovascular: Regular, rate and rhythm,  normal  S 1and S 2   Gastrointestinal: Nondistended, soft, non-tender,   no masses palpable, no organomegaly, +BS,   Extremities: normal extremities, no cyanosis edema,     Last Recorded Vitals  Blood pressure 113/79, pulse 87, temperature 36.2 °C (97.1 °F), temperature source Temporal, resp. rate 16, height 1.626 m (5' 4\"), weight 59 kg (130 lb), SpO2 96%.  Intake/Output last 3 Shifts:  I/O last 3 completed shifts:  In: 100 (1.7 mL/kg) [P.O.:100]  Out: - (0 mL/kg)   Weight: 59 kg     Relevant Results    CA 27-29 101           Assessment/Plan   #1 metastatic breast cancer with bilateral pulmonary effusion, current treatment letrozole /kisqali     2.  Breast cancer there is a progressive on the basis of CAT scan study, right breast mass increased in size patient has left axillary lymphadenopathy increasing right pleural effusion.    Patient has progressive metastatic breast cancer I will stop letrozole and kisqali and reevaluation next week in office as outpatient.  Discussed with patient  I spent 30 minutes in the professional and overall care of this patient.      Anatoly Guzman MD      "

## 2025-04-15 NOTE — HH CARE COORDINATION
Home Care received a Referral to Resume Care for Nursing, Physical Therapy, and Occupational Therapy. We have processed the referral for a Resumption of Care on 4/17-4/18/25.     If you have any questions or concerns, please feel free to contact us at [phone]. Follow the prompts, enter your five digit zip code, and you will be directed to your care team on E[facility] 3.

## 2025-04-15 NOTE — DISCHARGE SUMMARY
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  Also if shortness of breath recurs likely will need thoracentesis done on the left side.  Anticipate CT surgery will remove the right sided catheter.    Discharge Meds     Medication List      START taking these medications     oxyCODONE-acetaminophen 5-325 mg tablet; Commonly known as: Percocet;   Take 1 tablet by mouth every 6 hours if needed for moderate pain (4 - 6)   or severe pain (7 - 10). May take 2 tabs by mouth every 6 hours as needed   for severe pain.     CHANGE how you take these medications     * cyclobenzaprine 10 mg tablet; Commonly known as: Flexeril; What   changed: Another medication with the same name was added. Make sure you   understand how and when to take each.   * cyclobenzaprine 10 mg tablet; Commonly known as: Flexeril; Take 1   tablet (10 mg) by mouth 3 times a day as needed for muscle spasms (Muscle   spasm or sudden pains and cramping).; What changed: You were already   taking a medication with the same name, and this prescription was added.   Make sure you understand how and when to take each.   hydroCHLOROthiazide 12.5 mg capsule; Commonly known as: Microzide; What   changed: Another medication with the same name was removed. Continue   taking this medication, and follow the directions you see here.   potassium chloride CR 20 mEq ER tablet; Commonly known as: Klor-Con M20;   What changed:  Another medication with the same name was removed. Continue   taking this medication, and follow the directions you see here.  * This list has 2 medication(s) that are the same as other medications   prescribed for you. Read the directions carefully, and ask your doctor or   other care provider to review them with you.     CONTINUE taking these medications     acetaminophen 650 mg ER tablet; Commonly known as: Tylenol 8 HOUR   buPROPion  mg 24 hr tablet; Commonly known as: Wellbutrin XL   * cyanocobalamin 1,000 mcg tablet; Commonly known as: Vitamin B-12   * Vitamin B-12 1,000 mcg tablet; Generic drug: cyanocobalamin   dextromethorphan-quinidine 20-10 mg capsule; Commonly known as: NUEDEXTA   DULoxetine 60 mg DR capsule; Commonly known as: Cymbalta   ferrous sulfate 325 (65 Fe) mg tablet   hydrOXYzine HCL 25 mg tablet; Commonly known as: Atarax   melatonin 10 mg tablet extended release   methylPREDNISolone 4 mg tablet; Commonly known as: Medrol   omeprazole 20 mg DR capsule; Commonly known as: PriLOSEC   oxyCODONE 5 mg immediate release tablet; Commonly known as: Roxicodone;   Take 1 tablet (5 mg) by mouth every 6 hours if needed for severe pain (7 -   10) for up to 5 doses.   oxygen DME/Hospice therapy; Commonly known as: O2   propranolol 20 mg tablet; Commonly known as: Inderal   rosuvastatin 20 mg tablet; Commonly known as: Crestor   Ultra-Light Rollator misc; Generic drug: walker  * This list has 2 medication(s) that are the same as other medications   prescribed for you. Read the directions carefully, and ask your doctor or   other care provider to review them with you.     STOP taking these medications     azithromycin 250 mg tablet; Commonly known as: Zithromax   carBAMazepine 100 mg chewable tablet; Commonly known as: TEGretol   gabapentin 300 mg capsule; Commonly known as: Neurontin   ibuprofen 800 mg tablet   Kisqali 600 mg/day (200 mg x 3) tablet therapy pack; Generic drug:   ribociclib   letrozole  2.5 mg tablet; Commonly known as: Femara       Test Results Pending At Discharge  Pending Labs       No current pending labs.            Hospital Course   History Of Present Illness (HPI):  Mildred Moyer is a 70 y.o. female with PMHx s/f recurrent right pleural effusion status post multiple thoracentesis and right Pleurx placement, chronic hypoxic respiratory failure on 3-4 L nasal cannula, HTN, HLD, metastatic breast cancer, anxiety and depression presenting with shortness of breath.  She states she get her pleural effusion drained weekly, last drained on Monday with her usual fluid output of 100 to 150 cc.  Today she has worsening short of breath with conversation and really winded with exertion. Denies f/c/n/v/d, cough, congestion, chest pain, palpitations.     ED Course:   Vitals on presentation: T 36.1 °C (96.9 °F)  HR 89  /65  RR 20  O2 99 % Supplemental oxygen  Labs:   CBC with WBC 10.3, Hgb 13.1, Plts 455.   CMP with glucose 107, Na 140, K 3.8, BUN 31, sCr 1.14, alk phos 70, ALT 14, AST 16, bilirubin 0.4. Magnesium 1.78.   . Trop 4.   VBG largely unremarkable  EKG: None  Imaging - agree with radiology interpretation(s):   CXR-new small left basilar pleural effusion atelectasis.  Unchanged appearance of right hemothorax with right apical chest tube and large pleural effusion with mild lung aeration.  Interventions: Dilaudid 0.2 mg x 2, 0.5 mg, ibuprofen 600 mg, admission for further management   4/12: Patient with some pleuritic chest pain and shortness of breath but relatively comfortable.  Will start some low-dose Decadron to see if this can help with her shortness of breath.  Give a dose of Lasix x 1.  Add mucolytic and aerosol.  Will need further thoracentesis.  Supportive care and oxygen.     4/13: Patient had Pleurx catheter placed by thoracic surgery Rose Shepherd, DO that his who has been handling the orders for drainage out at the [facility].  They had cut down  the drainage from twice a week to once a week because the amount was tapering down for some reason.  Appears patient may be not getting as effective drainage as needed.  Will do drainage today and consult IR for any suggestions regarding where to go from here regarding patient's findings.  Of note patient does have elevated BNP which is new we will get an echocardiogram to better define this finding.  Patient relatively comfortable but is concerned about pleuritic chest pain with drainage of the Pleurx catheter will medicate prior to procedure.  Will have patient seen by hematology oncology who is primarily handling her care in the outpatient setting.     4/14: CT revealed large right sided pleural effusion with the right lung predominately collapses, moderate left pleural effusion, increased size of pulmonary metastasis, increases pleural metastases, new liver metastasis, and increased size of primary right breast mass. Seen this morning, patient states her pain is much less today.     Addendum: Patient was seen down in ultrasound did have about a liter taken off of the left lung currently is breathing easier.  Had discussions with patient and sister over the phone regarding possible plans for changing therapy per heme-onc's note.  Likely will change to Faslodex and Verzenio.  If patient has reaccumulation in the future could consider Pleurx on the left side.  Not sure what the plan will be with her right lower lung since it still trapped and looks to be chronically so.  May need to defer to CT surgery to see whether there would be any further utilization of that Pleurx.  Clinically patient is feeling better this afternoon after the procedure.     4/15: No acute events overnight. TTE revealed LVEF 60%, moderate pleural effusion.  Patient clinically doing much better after thoracentesis on the left.  Did reach out to her thoracic surgeon Dr. Rose Shepherd, who will follow-up in outpatient for possible removing  the right sided drainage catheter.  Spoke to heme-onc who will likely be changing her outpatient chemotherapy.  Patient understands plan.  And is in agreement with plan see after visit summary for complete plan.     35 minutes spent in the care of this patient.          Pertinent Physical Exam At Time of Discharge  Physical Exam  See today's progress note for more physical exam findings patient breathing much easier back to baseline.  Outpatient Follow-Up  Future Appointments   Date Time Provider Department Center   4/17/2025 To Be Determined Ibis Brown RN [facility]   4/24/2025 To Be Determined Ibis Brown RN [facility]   4/24/2025  3:30 PM Anatoly Guzman MD [id number] [place]   5/2/2025  8:45 AM POR CT 1 [facility]   6/5/2025 10:00 AM Anatoly Guzman MD [id number] Charles Rivera MD

## 2025-04-15 NOTE — CARE PLAN
The patient's goals for the shift include  safety and breathing easy     The clinical goals for the shift include maintain patient safety, decrease in anxiety and feeling sob.

## 2025-04-17 ENCOUNTER — SPECIALTY PHARMACY (OUTPATIENT)
Dept: PHARMACY | Facility: CLINIC | Age: 70
End: 2025-04-17

## 2025-04-18 ENCOUNTER — HOME CARE VISIT (OUTPATIENT)
Dept: HOME HEALTH SERVICES | Facility: HOME HEALTH | Age: 70
End: 2025-04-18
Payer: COMMERCIAL

## 2025-04-18 VITALS
DIASTOLIC BLOOD PRESSURE: 60 MMHG | HEART RATE: 82 BPM | RESPIRATION RATE: 14 BRPM | TEMPERATURE: 97.5 F | OXYGEN SATURATION: 99 % | SYSTOLIC BLOOD PRESSURE: 114 MMHG

## 2025-04-18 PROCEDURE — G0179 MD RECERTIFICATION HHA PT: HCPCS | Performed by: STUDENT IN AN ORGANIZED HEALTH CARE EDUCATION/TRAINING PROGRAM

## 2025-04-18 PROCEDURE — 400014 HH F/U

## 2025-04-18 SDOH — ECONOMIC STABILITY: HOUSING INSECURITY: EVIDENCE OF SMOKING MATERIAL: 0

## 2025-04-18 SDOH — HEALTH STABILITY: MENTAL HEALTH: SMOKING IN HOME: 0

## 2025-04-18 ASSESSMENT — ENCOUNTER SYMPTOMS
DYSPNEA ACTIVITY LEVEL: AFTER AMBULATING 10 - 20 FT
PAIN LOCATION - PAIN SEVERITY: 3/10
PAIN LOCATION - PAIN DURATION: VARIES
APPETITE LEVEL: FAIR
SHORTNESS OF BREATH: 1
PAIN LOCATION - PAIN FREQUENCY: INTERMITTENT
SUBJECTIVE PAIN PROGRESSION: UNCHANGED
PAIN LOCATION - RELIEVING FACTORS: MEDICATION, REST
FATIGUES EASILY: 1
DYSPNEA ON EXERTION: 1
PAIN LOCATION: CHEST
PAIN LOCATION - PAIN QUALITY: SHARP
PAIN: 1
LOWEST PAIN SEVERITY IN PAST 24 HOURS: 2/10
CHANGE IN APPETITE: DECREASED
PAIN LOCATION - EXACERBATING FACTORS: LUNG CA
MUSCLE WEAKNESS: 1
HIGHEST PAIN SEVERITY IN PAST 24 HOURS: 5/10
FORGETFULNESS: 1
FATIGUE: 1
PERSON REPORTING PAIN: PATIENT

## 2025-04-18 ASSESSMENT — ACTIVITIES OF DAILY LIVING (ADL)
AMBULATION ASSISTANCE: STAND BY ASSIST
CURRENT_FUNCTION: ONE PERSON
CURRENT_FUNCTION: STAND BY ASSIST
ENTERING_EXITING_HOME: MODERATE ASSIST
AMBULATION ASSISTANCE: ONE PERSON

## 2025-04-20 ASSESSMENT — ACTIVITIES OF DAILY LIVING (ADL): OASIS_M1830: 03

## 2025-04-21 ENCOUNTER — HOME CARE VISIT (OUTPATIENT)
Dept: HOME HEALTH SERVICES | Facility: HOME HEALTH | Age: 70
End: 2025-04-21
Payer: COMMERCIAL

## 2025-04-21 VITALS — OXYGEN SATURATION: 97 % | HEART RATE: 97 BPM | RESPIRATION RATE: 16 BRPM

## 2025-04-21 PROCEDURE — G0151 HHCP-SERV OF PT,EA 15 MIN: HCPCS

## 2025-04-21 SDOH — HEALTH STABILITY: MENTAL HEALTH: SMOKING IN HOME: 0

## 2025-04-21 SDOH — ECONOMIC STABILITY: HOUSING INSECURITY: EVIDENCE OF SMOKING MATERIAL: 0

## 2025-04-21 SDOH — HEALTH STABILITY: PHYSICAL HEALTH: EXERCISE TYPE: GENTLE SUPINE EXS, SEE BELOW

## 2025-04-21 SDOH — HEALTH STABILITY: PHYSICAL HEALTH
EXERCISE COMMENTS: SUPINE ANKLE PUMPS, QUAD AND GLUT SETS, HEEL SLIDES, KNEE TO CHEST, SLR X 10 REPS  PT REPORTS EXTREME FATIGUE TODAY WITH HER MS AND LEFT SIDED PAIN

## 2025-04-21 ASSESSMENT — ENCOUNTER SYMPTOMS
PAIN LOCATION - PAIN FREQUENCY: FREQUENT
HIGHEST PAIN SEVERITY IN PAST 24 HOURS: 7/10
LOWEST PAIN SEVERITY IN PAST 24 HOURS: 5/10
PAIN LOCATION - PAIN SEVERITY: 7/10
PAIN LOCATION - EXACERBATING FACTORS: NOTHING
SUBJECTIVE PAIN PROGRESSION: WAXING AND WANING
PAIN: 1
PAIN SEVERITY GOAL: 3/10
PAIN LOCATION: LEFT LEG
PAIN LOCATION - EXACERBATING FACTORS: NOTHING
MUSCLE WEAKNESS: 1
PAIN LOCATION - PAIN SEVERITY: 7/10
PERSON REPORTING PAIN: PATIENT
PAIN LOCATION - RELIEVING FACTORS: NOT MUCH
PAIN LOCATION - PAIN DURATION: LONG
PAIN LOCATION - PAIN DURATION: LONG
PAIN LOCATION - PAIN FREQUENCY: FREQUENT
PAIN LOCATION: LEFT ARM

## 2025-04-21 ASSESSMENT — GAIT ASSESSMENTS
STEP CONTINUITY: 1 - STEPS APPEAR CONTINUOUS
STEP SYMMETRY: 1 - RIGHT AND LEFT STEP LENGTH APPEAR EQUAL
TRUNK SCORE: 0
BALANCE AND GAIT SCORE: 19
PATH SCORE: 1
WALKING STANCE: 0 - HEELS APART
PATH: 1 - MILD/MODERATE DEVIATION OR USES WALKING AID
INITIATION OF GAIT IMMEDIATELY AFTER GO: 1 - NO HESITANCY
GAIT SCORE: 8
TRUNK: 0 - MARKED SWAY OR USES WALKING AID

## 2025-04-21 ASSESSMENT — BALANCE ASSESSMENTS
STANDING BALANCE: 1 - STEADY BUT WIDE STANCE AND USES CANE OR OTHER SUPPORT
NUDGED: 2 - STEADY
SITTING DOWN: 1 - USES ARMS OR NOT SMOOTH MOTION
SITTING BALANCE: 1 - STEADY, SAFE
IMMEDIATE STANDING BALANCE FIRST 5 SECONDS: 1 - STEADY BUT USES WALKER OR OTHER SUPPORT
NUDGED SCORE: 2
ATTEMPTS TO ARISE: 2 - ABLE TO RISE, ONE ATTEMPT
ARISING SCORE: 1
TURNING 360 DEGREES STEPS: 1 - CONTINUOUS STEPS
ARISES: 1 - ABLE, USES ARMS TO HELP
EYES CLOSED AT MAXIMUM POSITION NUDGED: 0 - UNSTEADY
BALANCE SCORE: 11

## 2025-04-21 ASSESSMENT — ACTIVITIES OF DAILY LIVING (ADL)
CURRENT_FUNCTION: INDEPENDENT
AMBULATION_DISTANCE/DURATION_TOLERATED: 100
PHYSICAL TRANSFERS ASSESSED: 1
AMBULATION ASSISTANCE ON FLAT SURFACES: 1
AMBULATION ASSISTANCE: 1
AMBULATION ASSISTANCE: STAND BY ASSIST

## 2025-04-21 NOTE — CASE COMMUNICATION
Pt has resided at [facility] for approx 2 years.  Pt has been using rollator walker for 1 year with portable O2 tank.  Pt reports hospital OKed 4 Liters.  PT did complete O2 teaching in old  folder, as well as emergency evac plan. PMH: Pleural effusion with Pleurx, Metastatic breast CA, CRF, O2 dependent and MS with left sided pain and weakness. Pt feels exhausted by her MS.  She generally sleeps later in the am and goes  to lunch at 11.30 and dinner at 4.30.  Pt agreed to PT but may only want 1x a week.  Pt agreeable to try some exs for left LE weakness but concerned with her pain levels and fatigue.  Skilled PT for reinforcement of O2 teaching and safety, HEP for LE weakness, balance exs for fall risk, and energy conservation/pacing education

## 2025-04-23 ENCOUNTER — TELEPHONE (OUTPATIENT)
Dept: SURGERY | Facility: CLINIC | Age: 70
End: 2025-04-23

## 2025-04-23 ENCOUNTER — OFFICE VISIT (OUTPATIENT)
Dept: HEMATOLOGY/ONCOLOGY | Facility: CLINIC | Age: 70
End: 2025-04-23
Payer: COMMERCIAL

## 2025-04-23 ENCOUNTER — HOME CARE VISIT (OUTPATIENT)
Dept: HOME HEALTH SERVICES | Facility: HOME HEALTH | Age: 70
End: 2025-04-23
Payer: COMMERCIAL

## 2025-04-23 VITALS
HEIGHT: 64 IN | WEIGHT: 125.99 LBS | HEART RATE: 78 BPM | BODY MASS INDEX: 21.51 KG/M2 | SYSTOLIC BLOOD PRESSURE: 100 MMHG | DIASTOLIC BLOOD PRESSURE: 64 MMHG | OXYGEN SATURATION: 100 % | TEMPERATURE: 96.1 F | RESPIRATION RATE: 16 BRPM

## 2025-04-23 DIAGNOSIS — Z17.0 MALIGNANT NEOPLASM OF CENTRAL PORTION OF RIGHT BREAST IN FEMALE, ESTROGEN RECEPTOR POSITIVE: Primary | ICD-10-CM

## 2025-04-23 DIAGNOSIS — C50.111 MALIGNANT NEOPLASM OF CENTRAL PORTION OF RIGHT BREAST IN FEMALE, ESTROGEN RECEPTOR POSITIVE: Primary | ICD-10-CM

## 2025-04-23 PROCEDURE — 1159F MED LIST DOCD IN RCRD: CPT | Performed by: INTERNAL MEDICINE

## 2025-04-23 PROCEDURE — 99214 OFFICE O/P EST MOD 30 MIN: CPT | Performed by: INTERNAL MEDICINE

## 2025-04-23 PROCEDURE — 3078F DIAST BP <80 MM HG: CPT | Performed by: INTERNAL MEDICINE

## 2025-04-23 PROCEDURE — 1160F RVW MEDS BY RX/DR IN RCRD: CPT | Performed by: INTERNAL MEDICINE

## 2025-04-23 PROCEDURE — 3074F SYST BP LT 130 MM HG: CPT | Performed by: INTERNAL MEDICINE

## 2025-04-23 PROCEDURE — 3008F BODY MASS INDEX DOCD: CPT | Performed by: INTERNAL MEDICINE

## 2025-04-23 PROCEDURE — 1125F AMNT PAIN NOTED PAIN PRSNT: CPT | Performed by: INTERNAL MEDICINE

## 2025-04-23 PROCEDURE — 1157F ADVNC CARE PLAN IN RCRD: CPT | Performed by: INTERNAL MEDICINE

## 2025-04-23 PROCEDURE — 1111F DSCHRG MED/CURRENT MED MERGE: CPT | Performed by: INTERNAL MEDICINE

## 2025-04-23 PROCEDURE — G0152 HHCP-SERV OF OT,EA 15 MIN: HCPCS

## 2025-04-23 ASSESSMENT — ACTIVITIES OF DAILY LIVING (ADL)
ADLS_COMMENTS: 5    TO PROPEL WHEELCHAIR INDEPENDENTLY, THE PATIENT MUST BE ABLE TO GO AROUND CORNERS, TURN AROUND, MANOEUVRE THE CHAIR TO A TABLE, BED, TOILET, ETC. THE PATIENT MUST BE ABLE TO PUSH A CHAIR AT LEAST 50 METRES AND NEGOTIATE KERB.       STAIR CLIMBI
ADLS_COMMENTS: PLUG IN THE RAZOR WITHOUT HELP, AS WELL AS RETRIEVE IT FROM THE DRAWER OR CABINET. A FEMALE PATIENT MUST APPLY HER OWN MAKE-UP, IF USED, BUT NEED NOT BRAID OR STYLE HER HAIR.     FEEDING    0    DEPENDENT IN ALL ASPECTS AND NEEDS TO BE FED, NASOGAST
WASHING_HAIR_CURRENT_FUNCTION: SUPERVISION
ADLS_COMMENTS: E TO WALK OR USE WHEELCHAIR INDEPENDENTLY.
ADLS_COMMENTS: THE PATIENT MAY USE A BATHTUB, A SHOWER, OR TAKE A COMPLETE SPONGE BATH. THE PATIENT MUST BE ABLE TO DO ALL THE STEPS OF WHICHEVER METHOD IS EMPLOYED WITHOUT ANOTHER PERSON BEING PRESENT.     DRESSING   0     THE PATIENT IS DEPENDENT IN ALL ASPECTS
ADLS_COMMENTS: LE TO CONDUCT OWN PERSONAL HYGIENE BUT REQUIRES MIN ASSIST BEFORE AND/OR AFTER THE OPERATION.   5    THE PATIENT CAN WASH HIS/HER HANDS AND FACE, COMB HAIR, CLEAN TEETH AND SHAVE. A MALE PATIENT MAY USE ANY KIND OF RAZOR BUT MUST INSERT THE BLADE, OR
ADLS_COMMENTS: EVERE DEPENDENCE     21 - 60  **MODERATE DEPENDENCE     61 - 90  SLIGHT DEPENDENCE      91 - 99  INDEPENDENCE        100    SCORE PREDICTION    LESS THAN 40    UNLIKELY TO GO HOME - DEPENDENT IN MOBILITY - DEPENDENT IN SELF CARE   60    PIVOTAL SCORE
ADLS_COMMENTS: ISION AND ASSISTANCE.   8    GENERALLY NO ASSISTANCE IS REQUIRED. AT TIMES SUP IS REQUIRED FOR SAFETY DUE TO MORNING STIFFNESS, SOB, ETC  10   THE PATIENT IS ABLE TO GO UP/DOWN A FLIGHT OF STAIRS SAFELY WITHOUT HELP OR SUPERVISION,USE HAND RAILS, CAN
ADLS_COMMENTS: ET PAPER WITHOUT HELP. IF NECESSARY, THE PATIENT MAY USE A BED PAN OR COMMODE OR URINAL AT NIGHT, BUT MUST BE ABLE TO EMPTY IT AND CLEAN IT.     BOWEL CONTROL   0   THE PATIENT IS BOWEL INCONTINENT.   2   THE PATIENT NEEDS HELP TO ASSUME APPROPRIATE
ADLS_COMMENTS: OF DRESSING AND IS UNABLE TO PARTICIPATE IN THE ACTIVITY.   2     THE PATIENT IS ABLE TO PARTICIPATE TO SOME DEGREE, BUT IS DEPENDENT IN ALL ASPECTS OF DRESSING.   5     ASSISTANCE IS NEEDED IN PUTTING ON, AND/OR REMOVING ANY CLOTHING.   8     ONLY M
ADLS_COMMENTS: THE TRANSFER.   8    THE TRANSFER REQUIRES THE ASSISTANCE OF ONE OTHER PERSON. ASSISTANCE MAY BE REQUIRED IN ANY ASPECT OF THE TRANSFER.   12  THE PRESENCE OF ANOTHER PERSON IS REQUIRED EITHER AS A CONFIDENCE MEASURE, OR TO PROVIDE SUPERVISION FOR S
ADLS_COMMENTS: MILK/SUGAR INTO TEA, SALT, PEPPER, SPREADING BUTTER, TURNING A PLATE OR OTHER “SET UP” ACTIVITIES.   8    INDEP WITH PREPARED TRAY, MAY NEED MEAT CUT, MILK CARTON/JAR LID OPENED. ANOTHER PERSON IS NOT REQUIRED  10   THE PATIENT CAN FEED SELF FROM A
ADLS_COMMENTS: NCE OR SAFETY IN HAZARDOUS SITUATIONS.   15   THE PATIENT MUST BE ABLE TO WEAR BRACES IF REQUIRED, LOCK AND UNLOCK THESE BRACES ASSUME STANDING POSITION, SIT DOWN, AND PLACE THE NECESSARY AIDS INTO POSITION FOR USE. THE PATIENT MUST BE ABLE TO CRUTCH
ADLS_COMMENTS: AIR, TRANSFER BACK INTO IT SAFELY AND/OR GRASP AID AND STAND. THE PATIENT MUST BE INDEPENDENT IN ALL PHASES OF THIS ACTIVITY.     AMBULATION    0    DEPENDENT IN AMBULATION  3    CONSTANT PRESENCE OF ONE OR MORE ASSISTANT IS REQUIRED DURING AMBULATIO
ADLS_COMMENTS: BLADDER CONTROL   0    THE PATIENT IS DEPENDENT IN BLADDER MANAGEMENT, IS INCONTINENT, OR HAS INDWELLING CATHETER.   2    THE PATIENT IS INCONTINENT BUT IS ABLE TO ASSIST WITH THE APPLICATION OF AN INTERNAL OR EXTERNAL DEVICE.   5    THE PATIENT IS
ADLS_COMMENTS: POSITION, AND WITH BOWEL MOVEMENT FACILITATORY TECHNIQUES.   5   THE PATIENT CAN ASSUME APPROPRIATE POSITION, BUT CANNOT USE FACILITATORY TECHNIQUES OR CLEAN SELF WITHOUT ASSISTANCE AND HAS FREQUENT ACCIDENTS.   8    ASSISTANCE IS REQUIRED WITH INCON
ADLS_COMMENTS: N.   8    ASSISTANCE IS REQUIRED WITH REACHING AIDS AND/OR THEIR MANIPULATION. ONE PERSON IS REQUIRED TO OFFER ASSISTANCE.   12   THE PATIENT IS INDEPENDENT IN AMBULATION BUT UNABLE TO WALK 50 METRES WITHOUT HELP, OR SUPERVISION IS NEEDED FOR CONFIDE
ADLS_COMMENTS: TINENCE AIDS SUCH AS PAD, ETC. THE PATIENT MAY REQUIRE SUPERVISION WITH THE USE OF SUPPOSITORY OR ENEMA AND HAS OCCASIONAL ACCIDENTS.   10   THE PATIENT CAN CONTROL BOWELS AND HAS NO ACCIDENTS, CAN USE SUPPOSITORY, OR TAKE AN ENEMA WHEN NECESSARY.
FEEDING_WITHIN_DEFINED_LIMITS: 1
GROOMING_WITHIN_DEFINED_LIMITS: 1
ADLS_COMMENTS: INIMAL ASSISTANCE IS REQUIRED WITH FASTENING CLOTHING SUCH AS BUTTONS, ZIPS, BRA, SHOES, ETC.   10   THE PATIENT IS ABLE TO PUT ON, REMOVE, CORSET, BRACES, AS PRESCRIBED.     PERSONAL HYGIENE (GROOMING)   0    THE PATIENT IS UNABLE TO ATTEND TO PERSO
ADLS_COMMENTS: GENERALLY DRY BY DAY, BUT NOT AT NIGHT AND NEEDS SOME ASSISTANCE WITH THE DEVICES.   8    GENERALLY DRY BY DAY AND NIGHT, MAY HAVE OCCAS ACCIDENT OR NEED MIN ASSIST WITH INTERNAL OR EXTERNAL DEVICES.   10   THE PATIENT IS ABLE TO CONTROL BLADDER DAY
ADLS_COMMENTS: ANCE IS REQUIRED TO MANIPULATE CHAIR TO TABLE, BED, ETC.   4    THE PATIENT CAN PROPEL SELF FOR A REASONABLE DURATION OVER REGULARLY ENCOUNTERED TERRAIN. MINIMAL ASSISTANCE MAY STILL BE REQUIRED IN “TIGHT CORNERS” OR TO NEGOTIATE A KERB 100MM HIGH.
WASHING_LB_CURRENT_FUNCTION: SUPERVISION
ADLS_COMMENTS: EMENT OF CLOTHING, TRANSFERRING, OR WASHING HANDS.   8    SUP MAY BE REQUIRED FOR SAFETY WITH NORMAL TOILET. BSC MAY BE USED AT NIGHT, ASSIST FOR EMPTYING AND CLEANING.   10   THE PATIENT IS ABLE TO GET ON/OFF THE TOILET, FASTEN CLOTHING AND USE TOIL
ADLS_COMMENTS: CHAIR/BED TRANSFERS  0    UNABLE TO PARTICIPATE IN A TRANSFER. TWO ATTENDANTS ARE REQUIRED TO TRANSFER THE PATIENT WITH OR WITHOUT A MECHANICAL DEVICE.   3    ABLE TO PARTICIPATE BUT MAXIMUM ASSISTANCE OF ONE OTHER PERSON IS REQUIRE IN ALL ASPECTS OF
ADLS_COMMENTS: AND NIGHT, AND/OR IS INDEPENDENT WITH INTERNAL OR EXTERNAL DEVICES.     BATHING   0    TOTAL DEPENDENCE IN BATHING SELF.   1    ASSISTANCE IS REQUIRED IN ALL ASPECTS OF BATHING, BUT PATIENT IS ABLE TO MAKE SOME CONTRIBUTION.   3    ASSISTANCE IS REQ
ADLS_COMMENTS: AFETY.   15  THE PATIENT CAN SAFELY APPROACH THE BED WALKING OR IN A WHEELCHAIR, LOCK BRAKES, LIFT FOOTRESTS, OR POSITION WALKING AID, MOVE SAFELY TO BED, LIE DOWN, COME TO A SITTING POSITION ON THE SIDE OF THE BED, CHANGE THE POSITION OF THE WHEELCH
ADLS_COMMENTS: NAL HYGIENE AND IS DEPENDENT IN ALL ASPECTS.   1    ASSISTANCE IS REQUIRED IN ALL STEPS OF PERSONAL HYGIENE, BUT PATIENT ABLE TO MAKE SOME CONTRIBUTION.   3    SOME ASSISTANCE IS REQUIRED IN ONE OR MORE STEPS OF PERSONAL HYGIENE.   4    PATIENT IS AB
ADLS_COMMENTS: ES, CANES, OR A WALKARETTE, AND WALK 50 METRES WITHOUT HELP OR SUPERVISION.     AMBULATION/WHEELCHAIR * (IF UNABLE TO WALK) ONLY USE THIS ITEM IF THE PATIENT IS RATED “0” FOR AMBULATION, AND THEN ONLY IF THE PATIENT HAS BEEN TRAINED IN WHEELCHAIR MAN
ADLS_COMMENTS: WHERE PATIENTS MOVE FROM DEPENDENCY TO ASSISTED INDEPENDENCE.   **60 - 80    IF LIVING ALONE WILL PROBABLY NEED A NUMBER OF COMMUNITY SERVICES TO COPE.   MORE THAN 85     LIKELY TO BE DISCHARGED TO COMMUNITY LIVING - INDEPENDENT IN TRANSFERS AND ABL
ADLS_COMMENTS: RIC NEEDS TO BE ADMINISTERED.   2    CAN MANIPULATE AN EATING DEVICE, USUALLY A SPOON, BUT SOMEONE MUST PROVIDE ACTIVE ASSISTANCE DURING THE MEAL.   5    ABLE TO FEED SELF WITH SUPERVISION. ASSISTANCE IS REQUIRED WITH ASSOCIATED TASKS SUCH AS PUTTING
WASHING_UPB_CURRENT_FUNCTION: SUPERVISION
ADLS_COMMENTS: AGEMENT.   0    DEPENDENT IN WHEELCHAIR AMBULATION.   1    PATIENT CAN PROPEL SELF SHORT DISTANCES ON FLAT SURFACE, BUT ASSISTANCE IS REQUIRED FOR ALL OTHER STEPS OF WHEELCHAIR MANAGEMENT.  3    PRESENCE OF ONE PERSON IS NECESSARY AND CONSTANT ASSIST
ADLS_COMMENTS: UIRED WITH EITHER TRANSFER TO SHOWER/BATH OR WITH WASHING OR DRYING; INCLUDING INABILITY TO COMPLETE A TASK BECAUSE OF CONDITION OR DISEASE, ETC.   4    SUPERVISION IS REQUIRED FOR SAFETY IN ADJUSTING THE WATER TEMPERATURE, OR IN THE TRANSFER.   5
ADLS_COMMENTS: NG   0    THE PATIENT IS UNABLE TO CLIMB STAIRS.   2    ASSISTANCE IS REQUIRED IN ALL ASPECTS OF CHAIR CLIMBING, INCLUDING ASSISTANCE WITH WALKING AIDS.   5    THE PATIENT IS ABLE TO ASCEND/DESCEND BUT IS UNABLE TO CARRY WALKING AIDS AND NEEDS SUPERV

## 2025-04-23 ASSESSMENT — ENCOUNTER SYMPTOMS
PERSON REPORTING PAIN: PATIENT
PAIN LOCATION: LEFT LEG
SHORTNESS OF BREATH: 1
PAIN LOCATION - PAIN SEVERITY: 3/10
PAIN LOCATION - PAIN DURATION: CHRONIC
PAIN LOCATION - PAIN FREQUENCY: FREQUENT
PAIN: 1
PAIN LOCATION - RELIEVING FACTORS: REST
PAIN LOCATION - EXACERBATING FACTORS: ACTIVITY
PAIN LOCATION - PAIN QUALITY: ACHING

## 2025-04-23 ASSESSMENT — PAIN SCALES - GENERAL: PAINLEVEL_OUTOF10: 4

## 2025-04-23 NOTE — TELEPHONE ENCOUNTER
I talked with Dr. Guzman (medical oncologist) about this patient needing a biopsy.  Reviewed the CT and suggested that he order an ultrasound-guided core needle biopsy of the right breast mass.  If he orders the testing, this will get done faster than if she has to have an appointment through my office and then get the biopsy.  He stated he would order the ultrasound-guided biopsy and that he can get his genomic information based off of the core needle biopsy.  No need for an appointment in my office.      Peggy from [facility] called stating that she need another stat breast biopsy.  She stated that they need a new right breast biopsy for NGS testing.  Its being requested by Dr. Guzman.

## 2025-04-23 NOTE — PROGRESS NOTES
Mildred Moyer   Female,   1955  MRN: [id number]    Patient Visit Information:   Visit Type: Follow Up Visit      Cancer History:   Treatment Synopsis:    1.  invasive ductal carcinoma right breast, stage IV, right pleural effusion, multiple hepatic lesions     Patient is a 70-year-old female with history of MS, chair bound who was admitted in the hospital because of shortness of breath.        March 10, 2023 CAT scan of chest did show right pleural effusion, right breast mass 12 o'clock position, multiple hepatic lesion and multiple pulmonary nodule.      March 11, 2023, status post paracentesis, cytology did show atypical cells      March 13, 2023, ultrasound-guided biopsy of right breast mass done pathology positive for invasive ductal carcinoma, grade 2, ER 95%, TN 60%, HER2 negative  , Ultrasound biopsy of right axillary lymph node done and negative for malignancy.     4/25/23 , PET scan did show abnormal metabolic activity of right breast, pulmonary nodules, mediastinal lymph node, hepatic lesion.     Current treatment, letrozole, Kisqali      8/11/23 , CT of chest abdominal pelvis, partial response, right breast mass, pulmonary nodules decreased in size   5/2/24 , PET , excellent partial response, left breast mass decreased in size, pulmonary nodules and adrenal nodule decreased in size hepatic nodule resolved  History of right pleural effusion and recurrent thoracentesis, large loculated  Thoracic surgical evaluation  10/10/24 ,  Thoracoscopy; Right PleurX Placement, with pleural biopsies with Dr Shepherd  Findings: Completely trapped lung, diffusely thickened parietal and visceral pleura   4/13/25 , CT chest, Interval worsening of oncologic findings including increased size of the primary right breast mass, increased right axillary and supraclavicular metastatic lymphadenopathy, increased pleural metastases, increased size of pulmonary metastases,  and new liver metastases.  Will large right pleural effusion and moderate left pleural effusion  4/14/25 , status post left thoracentesis      History of Present Illness:        ID Statement:    OLINDA GUEVARA is a 68 year old Female        Chief Complaint: Right breast cancer   Interval History:    4/23/25  Has a history of metastatic breast cancer taking letrozole and Kisqali.    Patient history of recurrent right pleural effusion status post multiple thoracentesis and was diagnosed with loculated pleural effusion, thoracic surgical evaluation and on 10/10/24  s/pThoracoscopy; Right PleurX Placement, with pleural biopsies with Dr Shepherd  Findings: Completely trapped lung, diffusely thickened parietal and visceral pleura      Patient admitted in the hospital with shortness of breath CAT scan of the chest did show bilateral pleural effusion and increased size of pulmonary nodules in the right breast mass consistent with progression of disease.    Patient is complaining of weakness fatigue still has some shortness of breath feeling somewhat better after thoracentesis      HPI  Patient is a 68-year-old female with history of MS, chair bound who was admitted in the hospital because of shortness of breath.      March 10, 2023 CAT scan of chest did show right pleural effusion, right breast mass 12 o'clock position, multiple hepatic lesion.      March 11, 2023, status post paracentesis, cytology did show atypical cells      March 13, 2023, ultrasound-guided biopsy of right breast mass done pathology positive for invasive ductal carcinoma, grade 2, ER 95%, CA 60%, HER2 negative , Ultrasound biopsy of right axillary lymph node done and negative for malignancy.     Medical oncology consultation requested, patient is very anxious about her diagnosis.        Pathology report discussed with the patient.      Patient is sitting on chair, very anxious, no headache and dizziness, no shortness of breath, no nausea vomiting, no  abdominal pain, generalized weakness and fatigue.        Review of Systems:   Review of Systems:    No headache or dizziness      No fever      some shortness of breath which is increasing        No breast tenderness      No nausea vomiting      No abdominal pain      Generalized weakness      All body pain, arthritis      Patient not active difficult to ambulate due to Morphine Sulfate        Allergies and Intolerances:       Allergies:         contrast (specific type unknown): Contrast, Anaphylaxis,  Active         iodine: Drug, Anaphylaxis, Active         Shellfish: Food, Anaphylaxis, Active     Outpatient Medication Profile:  * Patient Currently Takes Medications as of 07-Sep-2023 11:20 documented in Structured Notes         disability placard : valid for 5 years         exp: 9/2028, Start Date: 07-Sep-2023         Kisqali (200 mg daily dose) oral tablet: Last Dose Taken:  , 3 tab(s)  orally once a day  daily for 21 days then 7 days off , Start Date: 16-Aug-2023         letrozole 2.5 mg oral tablet: Last Dose Taken:  , 1 tab(s) orally once  a day , Start Date: 31-Jul-2023         portable home oxygen concentrator 2L/min continuously via nasal canula : Last Dose Taken:  , Start Date: 27-Apr-2023         image guided biopsy of liver lesion : Last Dose Taken:  , Start Date:  30-Mar-2023         K-Tab 20 mEq oral tablet, extended release: Last Dose Taken:  , 2 tab(s)  orally once a day , Start Date: 14-Mar-2023         amoxicillin-clavulanate 875 mg-125 mg oral tablet: Last Dose Taken:   , 1 tab(s) orally 2 times a day , Start Date: 14-Mar-2023         buPROPion 300 mg/24 hours (XL) oral tablet, extended release: Last Dose  Taken:  , 1 tab(s) orally every 24 hours         carBAMazepine 100 mg oral tablet, chewable: Last Dose Taken:  , 1 tab  by mouth 3 times daily. Then 3 tabs by mouth once every evening         cyclobenzaprine 10 mg oral tablet: Last Dose Taken:  , 1 tab(s) orally  2 times a day          hydroCHLOROthiazide 12.5 mg oral capsule: Last Dose Taken:  , 1 cap(s)  orally once a day         hydrOXYzine hydrochloride 25 mg oral tablet: Last Dose Taken:  , 1 tab(s)  orally once a day         hydrOXYzine hydrochloride 50 mg oral tablet: Last Dose Taken:  , 1 tab(s)  orally once a day (at bedtime)         ibuprofen 800 mg oral tablet: Last Dose Taken:  , 1 tab(s) orally 2 times  a day         MethylPREDNISolone Dose Pack 4 mg oral tablet: Last Dose Taken:  , Day  3: 1 tab by mouth four times a day         Day 4: 1 tab by mouth three times a day          Day 5: 1 tab by mouth twice a day         day 6: 1 tab by mouth once          rosuvastatin 20 mg oral tablet: Last Dose Taken:  , 1 tab(s) orally once  a day         Vitamin B12 1000 mcg oral tablet: Last Dose Taken:  , 1 tab(s) orally  once a day         azithromycin 250 mg oral tablet: Last Dose Taken:  , 3 tabs by mouth  once every evening          melatonin 10 mg oral tablet, extended release: Last Dose Taken:  , 1  tab(s) orally once a day (at bedtime)         dextromethorphan-quinidine 20 mg-10 mg oral capsule: Last Dose Taken:   , 1 cap(s) orally every 12 hours         omeprazole 20 mg oral delayed release capsule: Last Dose Taken:  , 1  cap(s) orally once a day             Medical History:         Hypoxia: ICD-10: R09.02, Status: Active         Pleural effusion on right: ICD-10: J90, Status: Active         Infiltrating ductal carcinoma of right breast, stage 4: ICD-10:  C50.911, Status: Active         MS (multiple sclerosis): ICD-10: G35, Status: Active     Family History: No Family History items are recorded  in the problem list.      Social History:   Social Substance History:  ·  Smoking Status never smoker (1)   ·  Alcohol Use denies   ·  Drug Use denies            Vitals and Measurements:   Vitals: Temp: 36.5  HR: NA  RR: 16  BP: 103/63  SPO2%:   NA   Measurements: HT(cm): 162.4  WT(kg): 53.8  BSA: 1.55   BMI:  20.3   Second Set of Vitals/Vitals  Comment: unable to get  patient's pulse ox(2)   Last 3 Weights & Heights: Date:                           Weight/Scale Type:                    Height:   07-Sep-2023 10:40                53.8  kg                     162.4  cm  08-Jun-2023 14:55                63.2  kg                     162.4  cm  27-Apr-2023 11:20                69.5  kg                     162.4  cm      Physical Exam:      Constitutional: awake/alert/oriented x3, no distress,  very anxious, sitting on chair   Eyes: PERRL, EOMI, clear sclera   Head/Neck: Neck supple, no cervical lymphadenopathy   Respiratory/Thorax: Decreased movement on left side,  decreased breath sounds of right side status post right Pleurx catheter placement   Cardiovascular: Regular, rate and rhythm,  normal  S 1and S 2   Gastrointestinal: Nondistended, soft, non-tender,   no masses palpable, no organomegaly, +BS,   Extremities: normal extremities, no cyanosis edema,   Neurological: alert and oriented x3,   Breast: Right breast examination did show a mass  measuring 2x3cm on 12 o'clock position     Left breast examination within normal limits   Lymphatic: No significant lymphadenopathy   Psychological: Patient is very anxious   Skin: Warm and dry, no lesions, no rashes         Lab Results:                          Radiology report  4/13/25 , CT chest,Large right pleural effusion with foci of air throughout the right  pleural space and a PleurX catheter in place. As before, the right  lung is predominantly collapsed with minimal aeration of the right  upper lobe.  2. Moderate left pleural effusion, increased compared to prior CT  from 09/10/2024.  3. Interval worsening of oncologic findings including increased size  of the primary right breast mass, increased right axillary and  supraclavicular metastatic lymphadenopathy, increased pleural  metastases, increased size of pulmonary metastases, and new liver  metastases.     ·  Results      , Surgical Pathology Exam:  [id number]  Order: [id number]   Collected 10/10/2024 11:25       Status: Final result       Visible to patient: Yes (not seen)       Dx: Pleural effusion; Secondary malignant...    0 Result Notes       Component  Resulting Agency   FINAL DIAGNOSIS    A.  Lung, right pleural biopsy:  - Acute fibrinous pleuritis with atypia.  See note     Note:  Microscopic examination of H&E sections demonstrates acute fibrinous pleuritis with scattered cellular atypia.  AE1/AE3, CK7 and calretinin highlight some of the atypical cells, consistent with reactive mesothelial cells.  Additionally there is lymphoplasmacytic inflammation.  TRPS 1 highlights inflammatory cells.. WT1 is not contributory.  Congo red is negative for amyloid.           Assessment and Plan:      Assessment and Plan:   Assessment:    1 invasive ductal carcinoma right breast, stage IV, right pleural effusion, multiple hepatic lesions  ER positive, OK positive, HER2 negative  Current treatment with letrozole started in April 2023     Patient is a 68-year-old female with history of MS, chair bound who was admitted in the hospital because of shortness of breath.      March 10, 2023 CAT scan of chest did show right pleural effusion, right breast mass 12 o'clock position, multiple hepatic lesion.      March 11, 2023, status post paracentesis, cytology did show atypical cells      March 13, 2023, ultrasound-guided biopsy of right breast mass done pathology positive for invasive ductal carcinoma, grade 2, ER 95%, OK 60%, HER2 negative , Ultrasound biopsy of right axillary lymph node done and negative for malignancy.     Current treatment, letrozole 2.5 mg p.o. daily, kisqali     8/11/23 , CT of CAP , partial response   5/2/24 , PET , excellent partial response, left breast mass decreased in size, pulmonary nodules and adrenal nodule decreased in size hepatic nodule resolved  History of right pleural effusion and recurrent thoracentesis, large loculated  Thoracic surgical  evaluation  10/10/24 ,  Thoracoscopy; Right PleurX Placement, with pleural biopsies with Dr Shepherd  Findings: Completely trapped lung, diffusely thickened parietal and visceral pleura   History of Present Illness:   4/13/25 , CT chest, Interval worsening of oncologic findings including increased size of the primary right breast mass, increased right axillary and supraclavicular metastatic lymphadenopathy, increased pleural metastases, increased size of pulmonary metastases, and new liver metastases.  Will large right pleural effusion and moderate left pleural effusion  4/14/25 , status post left thoracentesis      4/23/25     Metastatic breast cancer, ER positive TN positive,  Currently patient is taking letrozole 2.5 mg p.o. daily  and Kisqali .  Her disease is progressive visit documented by recent CAT scan study.  CAT scan result discussed with patient in detail.    I will schedule for right breast mass biopsy for NGS then finalize regarding systemic therapy.    Plan discussed with patient    Follow-up after right breast biopsy                 Time spent 30 minutes.  .

## 2025-04-23 NOTE — PATIENT INSTRUCTIONS
Follow up visit with Dr. Guzman for history of metastatic breast cancer.     Treatment on hold due to progressive disease.     You will be referred to Dr. Kendrick for right breast biopsy to be sent for genomic testing to help determine treatment options.     Follow up with Dr. Guzman after biopsy.     Call the office at [phone] with questions or needs.  You may also contact your nurse or doctor with non-urgent issues by sending a [employer] message.

## 2025-04-24 ENCOUNTER — HOME CARE VISIT (OUTPATIENT)
Dept: HOME HEALTH SERVICES | Facility: HOME HEALTH | Age: 70
End: 2025-04-24
Payer: COMMERCIAL

## 2025-04-24 ENCOUNTER — APPOINTMENT (OUTPATIENT)
Dept: HEMATOLOGY/ONCOLOGY | Facility: CLINIC | Age: 70
End: 2025-04-24
Payer: COMMERCIAL

## 2025-04-24 VITALS
DIASTOLIC BLOOD PRESSURE: 62 MMHG | TEMPERATURE: 94.6 F | OXYGEN SATURATION: 99 % | SYSTOLIC BLOOD PRESSURE: 110 MMHG | HEART RATE: 66 BPM | RESPIRATION RATE: 16 BRPM

## 2025-04-24 DIAGNOSIS — Z17.0 MALIGNANT NEOPLASM OF CENTRAL PORTION OF RIGHT BREAST IN FEMALE, ESTROGEN RECEPTOR POSITIVE: Primary | ICD-10-CM

## 2025-04-24 DIAGNOSIS — C50.111 MALIGNANT NEOPLASM OF CENTRAL PORTION OF RIGHT BREAST IN FEMALE, ESTROGEN RECEPTOR POSITIVE: Primary | ICD-10-CM

## 2025-04-24 PROCEDURE — G0300 HHS/HOSPICE OF LPN EA 15 MIN: HCPCS

## 2025-04-24 SDOH — ECONOMIC STABILITY: GENERAL

## 2025-04-24 ASSESSMENT — ENCOUNTER SYMPTOMS
STOOL FREQUENCY: DAILY
DENIES PAIN: 1
PERSON REPORTING PAIN: PATIENT
APPETITE LEVEL: GOOD
LAST BOWEL MOVEMENT: 67319
CHANGE IN APPETITE: UNCHANGED
BOWEL PATTERN NORMAL: 1

## 2025-04-24 ASSESSMENT — PAIN SCALES - PAIN ASSESSMENT IN ADVANCED DEMENTIA (PAINAD)
NEGVOCALIZATION: 0
NEGVOCALIZATION: 0 - NONE.
FACIALEXPRESSION: 0 - SMILING OR INEXPRESSIVE.
BODYLANGUAGE: 0 - RELAXED.
CONSOLABILITY: 0
BODYLANGUAGE: 0
CONSOLABILITY: 0 - NO NEED TO CONSOLE.
FACIALEXPRESSION: 0
TOTALSCORE: 0
BREATHING: 0

## 2025-04-24 ASSESSMENT — ACTIVITIES OF DAILY LIVING (ADL): MONEY MANAGEMENT (EXPENSES/BILLS): INDEPENDENT

## 2025-04-25 ENCOUNTER — APPOINTMENT (OUTPATIENT)
Dept: SURGERY | Facility: CLINIC | Age: 70
End: 2025-04-25
Payer: COMMERCIAL

## 2025-04-25 ENCOUNTER — TELEPHONE (OUTPATIENT)
Dept: RADIOLOGY | Facility: HOSPITAL | Age: 70
End: 2025-04-25

## 2025-04-25 DIAGNOSIS — N63.10 BREAST MASS, RIGHT: Primary | ICD-10-CM

## 2025-04-25 NOTE — TELEPHONE ENCOUNTER
This RN Breast Care Coordinator contacted patient for follow up scheduling, however brother Danny states he assists patient with scheduling and transport to visits as she is currently in assisted living. POA paperwork scanned per Dr. Kramer's office. This RN followed up with Danny for scheduling, per their scheduling needs selected 5/2 10:15AM for biopsy. Directions and what to expect before, during, and after the visit reviewed. Danny denied questions before concluding our call.

## 2025-04-28 ENCOUNTER — HOME CARE VISIT (OUTPATIENT)
Dept: HOME HEALTH SERVICES | Facility: HOME HEALTH | Age: 70
End: 2025-04-28
Payer: COMMERCIAL

## 2025-04-28 VITALS
OXYGEN SATURATION: 94 % | DIASTOLIC BLOOD PRESSURE: 68 MMHG | TEMPERATURE: 96.9 F | SYSTOLIC BLOOD PRESSURE: 98 MMHG | HEART RATE: 84 BPM

## 2025-04-28 PROCEDURE — G0152 HHCP-SERV OF OT,EA 15 MIN: HCPCS

## 2025-04-28 ASSESSMENT — ACTIVITIES OF DAILY LIVING (ADL)
LAUNDRY: NEEDS ASSISTANCE
LAUNDRY ASSESSED: 1

## 2025-04-28 ASSESSMENT — ENCOUNTER SYMPTOMS
PERSON REPORTING PAIN: PATIENT
DENIES PAIN: 1

## 2025-04-29 ENCOUNTER — HOSPITAL ENCOUNTER (OUTPATIENT)
Dept: RADIOLOGY | Facility: CLINIC | Age: 70
Discharge: HOME | End: 2025-04-29
Payer: COMMERCIAL

## 2025-04-29 ENCOUNTER — HOME CARE VISIT (OUTPATIENT)
Dept: HOME HEALTH SERVICES | Facility: HOME HEALTH | Age: 70
End: 2025-04-29
Payer: COMMERCIAL

## 2025-04-29 ENCOUNTER — OFFICE VISIT (OUTPATIENT)
Dept: SURGERY | Facility: CLINIC | Age: 70
End: 2025-04-29
Payer: COMMERCIAL

## 2025-04-29 VITALS
HEIGHT: 64 IN | OXYGEN SATURATION: 97 % | WEIGHT: 120 LBS | SYSTOLIC BLOOD PRESSURE: 97 MMHG | BODY MASS INDEX: 20.49 KG/M2 | DIASTOLIC BLOOD PRESSURE: 61 MMHG | HEART RATE: 84 BPM | TEMPERATURE: 97 F

## 2025-04-29 VITALS
HEART RATE: 75 BPM | TEMPERATURE: 97 F | OXYGEN SATURATION: 100 % | DIASTOLIC BLOOD PRESSURE: 64 MMHG | SYSTOLIC BLOOD PRESSURE: 116 MMHG

## 2025-04-29 DIAGNOSIS — J90 PLEURAL EFFUSION: ICD-10-CM

## 2025-04-29 PROCEDURE — 3008F BODY MASS INDEX DOCD: CPT | Performed by: STUDENT IN AN ORGANIZED HEALTH CARE EDUCATION/TRAINING PROGRAM

## 2025-04-29 PROCEDURE — G0300 HHS/HOSPICE OF LPN EA 15 MIN: HCPCS

## 2025-04-29 PROCEDURE — 3078F DIAST BP <80 MM HG: CPT | Performed by: STUDENT IN AN ORGANIZED HEALTH CARE EDUCATION/TRAINING PROGRAM

## 2025-04-29 PROCEDURE — 1111F DSCHRG MED/CURRENT MED MERGE: CPT | Performed by: STUDENT IN AN ORGANIZED HEALTH CARE EDUCATION/TRAINING PROGRAM

## 2025-04-29 PROCEDURE — 99213 OFFICE O/P EST LOW 20 MIN: CPT | Mod: 25 | Performed by: STUDENT IN AN ORGANIZED HEALTH CARE EDUCATION/TRAINING PROGRAM

## 2025-04-29 PROCEDURE — 32552 REMOVE LUNG CATHETER: CPT | Performed by: STUDENT IN AN ORGANIZED HEALTH CARE EDUCATION/TRAINING PROGRAM

## 2025-04-29 PROCEDURE — 1159F MED LIST DOCD IN RCRD: CPT | Performed by: STUDENT IN AN ORGANIZED HEALTH CARE EDUCATION/TRAINING PROGRAM

## 2025-04-29 PROCEDURE — 1157F ADVNC CARE PLAN IN RCRD: CPT | Performed by: STUDENT IN AN ORGANIZED HEALTH CARE EDUCATION/TRAINING PROGRAM

## 2025-04-29 PROCEDURE — 71046 X-RAY EXAM CHEST 2 VIEWS: CPT | Performed by: RADIOLOGY

## 2025-04-29 PROCEDURE — 3074F SYST BP LT 130 MM HG: CPT | Performed by: STUDENT IN AN ORGANIZED HEALTH CARE EDUCATION/TRAINING PROGRAM

## 2025-04-29 PROCEDURE — 71046 X-RAY EXAM CHEST 2 VIEWS: CPT

## 2025-04-29 PROCEDURE — 99213 OFFICE O/P EST LOW 20 MIN: CPT | Performed by: STUDENT IN AN ORGANIZED HEALTH CARE EDUCATION/TRAINING PROGRAM

## 2025-04-29 SDOH — HEALTH STABILITY: MENTAL HEALTH: SMOKING IN HOME: 0

## 2025-04-29 SDOH — ECONOMIC STABILITY: HOUSING INSECURITY: EVIDENCE OF SMOKING MATERIAL: 0

## 2025-04-29 ASSESSMENT — ENCOUNTER SYMPTOMS
APPETITE LEVEL: GOOD
PAIN: 1
LOWEST PAIN SEVERITY IN PAST 24 HOURS: 0/10
PAIN SEVERITY GOAL: 0/10
PAIN LOCATION: BACK
HIGHEST PAIN SEVERITY IN PAST 24 HOURS: 6/10
SUBJECTIVE PAIN PROGRESSION: UNCHANGED
CHANGE IN APPETITE: UNCHANGED
SHORTNESS OF BREATH: 1

## 2025-04-30 ENCOUNTER — HOME CARE VISIT (OUTPATIENT)
Dept: HOME HEALTH SERVICES | Facility: HOME HEALTH | Age: 70
End: 2025-04-30
Payer: COMMERCIAL

## 2025-04-30 VITALS — RESPIRATION RATE: 16 BRPM

## 2025-04-30 LAB — TEST COMMENT - SURGICAL SENDOUT REQUEST: NORMAL

## 2025-04-30 PROCEDURE — G0151 HHCP-SERV OF PT,EA 15 MIN: HCPCS

## 2025-04-30 SDOH — HEALTH STABILITY: PHYSICAL HEALTH: EXERCISE COMMENTS: SITTING HIP FLEXION, KNEE EXT, ANKLE PUMPS X 15 REPS

## 2025-04-30 SDOH — HEALTH STABILITY: PHYSICAL HEALTH: EXERCISE TYPE: SEE THER EX COMMENTS

## 2025-04-30 SDOH — HEALTH STABILITY: MENTAL HEALTH: SMOKING IN HOME: 0

## 2025-04-30 SDOH — ECONOMIC STABILITY: HOUSING INSECURITY: EVIDENCE OF SMOKING MATERIAL: 0

## 2025-04-30 ASSESSMENT — GAIT ASSESSMENTS
STEP CONTINUITY: 1 - STEPS APPEAR CONTINUOUS
WALKING STANCE: 0 - HEELS APART
TRUNK: 0 - MARKED SWAY OR USES WALKING AID
PATH SCORE: 1
TRUNK SCORE: 0
STEP SYMMETRY: 1 - RIGHT AND LEFT STEP LENGTH APPEAR EQUAL
INITIATION OF GAIT IMMEDIATELY AFTER GO: 1 - NO HESITANCY
PATH: 1 - MILD/MODERATE DEVIATION OR USES WALKING AID
BALANCE AND GAIT SCORE: 21
GAIT SCORE: 8

## 2025-04-30 ASSESSMENT — ENCOUNTER SYMPTOMS
PERSON REPORTING PAIN: PATIENT
MUSCLE WEAKNESS: 1
PAIN SEVERITY GOAL: 0/10
PAIN LOCATION - PAIN FREQUENCY: INTERMITTENT
PAIN LOCATION - PAIN SEVERITY: 1/10
PAIN LOCATION - PAIN DURATION: VARIES
HIGHEST PAIN SEVERITY IN PAST 24 HOURS: 1/10
LOWEST PAIN SEVERITY IN PAST 24 HOURS: 0/10
PAIN LOCATION: LEFT LEG
PAIN LOCATION - PAIN QUALITY: SORE
PAIN LOCATION - RELIEVING FACTORS: REST, MEDS
SUBJECTIVE PAIN PROGRESSION: WAXING AND WANING
PAIN LOCATION - EXACERBATING FACTORS: MVT
PAIN: 1

## 2025-04-30 ASSESSMENT — BALANCE ASSESSMENTS
EYES CLOSED AT MAXIMUM POSITION NUDGED: 1 - STEADY
IMMEDIATE STANDING BALANCE FIRST 5 SECONDS: 2 - STEADY WITHOUT WALKER OR OTHER SUPPORT
BALANCE SCORE: 13
TURNING 360 DEGREES STEPS: 1 - CONTINUOUS STEPS
ATTEMPTS TO ARISE: 2 - ABLE TO RISE, ONE ATTEMPT
ARISING SCORE: 1
NUDGED: 2 - STEADY
SITTING BALANCE: 1 - STEADY, SAFE
SITTING DOWN: 1 - USES ARMS OR NOT SMOOTH MOTION
STANDING BALANCE: 1 - STEADY BUT WIDE STANCE AND USES CANE OR OTHER SUPPORT
NUDGED SCORE: 2
ARISES: 1 - ABLE, USES ARMS TO HELP

## 2025-04-30 ASSESSMENT — ACTIVITIES OF DAILY LIVING (ADL)
AMBULATION_DISTANCE/DURATION_TOLERATED: 200 FEET
AMBULATION ASSISTANCE ON FLAT SURFACES: 1

## 2025-05-01 NOTE — H&P
Chief complaint:  PleurX removal     History Of Present Illness  Mildred Moyer is a 70 y.o. female, former smoker (minimal, couple year hx), with recurrent right pleural effusion s/p Rt VATS, pleural biopsy with PleurX catheter placement on 10/10/14 at [facility] [place]. Final pathology showed atypia and pleuritis - intra-op the lung was trapped with thick pleural rind.      She has had pain with drainage/vacuum pressure type of pain. Minimal drainage (though did get drained this morning for 150mL). However, she has no improvement of sxs with drainage. No new cough/congestion. On 4L O2 now at baseline.      By way of review: I saw patient in fall 2024 for a recurrent right pleural effusion - likely malignant, in the setting of metastatic breast cancer. Patient was referred by Dr. Guzman.   Patient was diagnosed with breast CA in the spring of 2023, at that time had pleural effusion showing atypical cells. PET scan showed avidity and thickening along with bilateral pulmonary nodules concerning for metastasis. She had a thoracentesis months ago and drained 2.2L. On most recent thora 9/18 had less output (400mL) but notable pain. During thora prior on 9/10 was seen in ED and had CT chest showing loculated effusion along with nodules and pleural thickening. She presents today for discuss. Patient is on 2-3L NC at baseline. This has not changed despite previous thora procedures but has also not increased recently.      No history of MI or CVA. Could walk a mile or climb 2 flights of stairs: no     Past Medical History  She has a past medical history of GERD (gastroesophageal reflux disease), HLD (hyperlipidemia), HTN (hypertension), Liver disease, Lung neoplasm, MS (multiple sclerosis) (Multi), and Pleural effusion.     Surgical History  She has a past surgical history that includes Dilation and curettage of uterus and Percutaneous chest drain insertion (Right, 10/10/2024).     Social History  She reports that she quit smoking  about 41 years ago. Her smoking use included cigarettes. She started smoking about 43 years ago. She has a 1 pack-year smoking history. She has never used smokeless tobacco. She reports that she does not currently use alcohol. She reports that she does not use drugs.     Family History  Family history of cancer: No  Family History   No family history on file.        Medications    Current Medications      Current Outpatient Medications:     acetaminophen (Tylenol 8 HOUR) 650 mg ER tablet, Take by mouth every 8 hours., Disp: , Rfl:     azithromycin (Zithromax) 250 mg tablet, Take 3 tablets (750 mg) by mouth once daily., Disp: , Rfl:     buPROPion XL (Wellbutrin XL) 300 mg 24 hr tablet, Take 1 tablet (300 mg) by mouth once every 24 hours., Disp: , Rfl:     carBAMazepine (TEGretol) 100 mg chewable tablet, Chew 1 tablet (100 mg) every 6 hours., Disp: , Rfl:     cyanocobalamin (Vitamin B-12) 1,000 mcg tablet, Take 1 tablet (1,000 mcg) by mouth once daily., Disp: , Rfl:     cyanocobalamin (Vitamin B-12) 1,000 mcg tablet, Take 1 tablet (1,000 mcg) by mouth once daily., Disp: , Rfl:     cyclobenzaprine (Flexeril) 10 mg tablet, Take 1 tablet (10 mg) by mouth every 12 hours., Disp: , Rfl:     dextromethorphan-quinidine (NUEDEXTA) 20-10 mg capsule, Take by mouth every 12 hours., Disp: , Rfl:     ferrous sulfate, 325 mg ferrous sulfate, tablet, 1 tablet Orally one time a day for 30 days, Disp: , Rfl:     gabapentin (Neurontin) 300 mg capsule, TAKE 1 CAPSULE BY MOUTH THREE TIMES DAILY for 30, Disp: , Rfl:     hydroCHLOROthiazide (HYDRODiuril) 50 mg tablet, Take 1 tablet (50 mg) by mouth once daily., Disp: , Rfl:     hydroCHLOROthiazide (Microzide) 12.5 mg capsule, Take 1 capsule (12.5 mg) by mouth once daily., Disp: , Rfl:     hydrOXYzine HCL (Atarax) 25 mg tablet, Take 1 tablet (25 mg) by mouth once daily., Disp: , Rfl:     ibuprofen 800 mg tablet, Take 1 tablet (800 mg) by mouth 2 times a day., Disp: , Rfl:     letrozole  (Femara) 2.5 mg tablet, Take 1 tablet (2.5 mg total) by mouth once daily., Disp: 90 tablet, Rfl: 3    melatonin 10 mg tablet extended release, Take 10 mg by mouth once daily at bedtime., Disp: , Rfl:     methylPREDNISolone (Medrol) 4 mg tablet, Take 1 tablet (4 mg) by mouth., Disp: , Rfl:     omeprazole (PriLOSEC) 20 mg DR capsule, Take 1 capsule (20 mg) by mouth once daily., Disp: , Rfl:     oxyCODONE (Roxicodone) 5 mg immediate release tablet, Take 1 tablet (5 mg) by mouth every 6 hours if needed for severe pain (7 - 10) for up to 5 doses., Disp: 5 tablet, Rfl: 0    oxygen DME/Hospice (O2) therapy, Inhale 3 L/min continuously. Indications: heart failure with reduced ejection fraction, Disp: , Rfl:     potassium chloride 40 mEq/15 mL liquid, Take 15 mL (40 mEq) by mouth 1 time., Disp: , Rfl:     potassium chloride CR 20 mEq ER tablet, once every 24 hours., Disp: , Rfl:     propranolol (Inderal) 20 mg tablet, every 8 hours., Disp: , Rfl:     rosuvastatin (Crestor) 20 mg tablet, Take 1 tablet (20 mg) by mouth once daily., Disp: , Rfl:     walker (Ultra-Light Rollator) misc, as directed, Disp: , Rfl:     DULoxetine (Cymbalta) 60 mg DR capsule, Take 1 capsule (60 mg) by mouth once every 24 hours., Disp: , Rfl:     ribociclib (Kisqali) 3 x 200 mg tablet therapy pack, TAKE THREE (3) TABLETS BY MOUTH ONCE DAILY FOR 21 DAYS THEN 7 DAYS OFF, Disp: 63 each, Rfl: 2        Allergies  Iodinated contrast media, Iodine, Shellfish containing products, and Oxymetazoline     Review of Systems:  Review of Systems   Constitutional: No fevers, chills, unexpected weight change  HENT: No sore throat, congestion, or nasal drainage  Eyes: No visual changes or eye itching  Respiratory: see HPI. No cough, worsening dyspnea, wheezing  Cardiac: No chest pain, palpitations, or lower extremity edema  Gastrointestinal: No nausea, vomiting, diarrhea. No abdominal pain  Genitourinary: No dysuria or hematuria  Musculoskeletal: No back pain. No  "significant myalgias or arthralgias  Neurologic: No headaches, dizziness, or seizures.  Hematologic: No easy bleeding or bruising.  Psychiatric: No anxiety or depression.     Physical Exam:  Physical Exam  /65   Pulse 67   Temp 36.1 °C (97 °F) (Temporal)   Ht 1.626 m (5' 4\")   Wt 64.4 kg (142 lb)   SpO2 96%   BMI 24.37 kg/m²   Constitutional:       General: Patient is not in acute distress.     Appearance: Normal appearance; not ill-appearing.   HENT:      Head: Normocephalic.      Nose: No congestion or rhinorrhea.   Cardiovascular:      Rate and Rhythm: Normal rate and regular rhythm.      Pulses: Normal pulses.   Pulmonary:      Effort: Pulmonary effort is normal. No respiratory distress.  No conversational dyspnea     Right PleurX cath site clean/dry     Breath sounds: No stridor. No wheezing.   Abdominal:      General: There is no distension.      Palpations: Abdomen is soft.      Tenderness: There is no abdominal tenderness.   Musculoskeletal:         General: No swelling, tenderness or deformity. Normal range of motion.      Cervical back: Normal range of motion. No rigidity.   Lymphadenopathy:      Cervical: No cervical adenopathy.   Skin:     General: Skin is warm and dry.   Neurological:      General: No focal deficit present.      Mental Status: Patient is alert and oriented to person, place, and time.   Psychiatric:         Mood and Affect: Mood normal.      Relevant Results     Pathology:  Date of Procedure:  3/11/2023       Date Reported: 3/15/2023   Date Received:  3/13/2023   Date of Birth / Sex 1955 (Age: 68) / F   Race: WHITE   Submitting Physician: ANDREA LEE MD   Attending Physician: MD KAELYN MAO MD            Other External #          FINAL CYTOLOGICAL INTERPRETATION     A.  PLEURAL FLUID - RIGHT SIDE WITH CELL BLOCK:        ATYPICAL CELLS ARE PRESENT, SEE COMMENT.       Procedures:   PleurX removal:  The patient's Pleurx bandage " "was removed and site was cleansed.  Local anesthetic (1% lidocaine) was injected and the suture was cut.  The cuff of the tube was carefully dissected out utilizing a hemostat.  The tube was removed during exhalation.  Occlusive bandage placed.  Recommendation to keep dressing in place x48 hours.  Patient tolerated procedure well.      Imaging:     Pulmonary Functions Testing Results:     No results found for: \"FEV1\", \"FVC\", \"[id number]\", \"TLC\", \"DLCO\"     CXR personally reviewed     Assessment/Plan  Problem List Items Addressed This Visit               ICD-10-CM     Pleural effusion J90     Relevant Orders     XR chest 2 views (Completed)         Ms. Moyer is a pleasant 70 year old female who presents with a recurrent right pleural effusion now s/p Rt VATS, pleural biopsy with Ple[place]X catheter [place]10[place] [facility] [place] pathology showed atypia and pleuritis - intra-op the lung was trapped with thick pleural rind. She continues to follow with Dr. Guzman for her h/o breast cancer. Her PleurX has been putting out minimal and she has no relief of sxs. Drain was removed today without issue. She can see me prn        Rose Shepherd, DO  Thoracic & Esophageal Surgery   "

## 2025-05-02 ENCOUNTER — HOSPITAL ENCOUNTER (OUTPATIENT)
Dept: RADIOLOGY | Facility: HOSPITAL | Age: 70
Discharge: HOME | End: 2025-05-02
Payer: COMMERCIAL

## 2025-05-02 ENCOUNTER — APPOINTMENT (OUTPATIENT)
Dept: RADIOLOGY | Facility: HOSPITAL | Age: 70
End: 2025-05-02
Payer: COMMERCIAL

## 2025-05-02 DIAGNOSIS — Z17.0 MALIGNANT NEOPLASM OF CENTRAL PORTION OF RIGHT BREAST IN FEMALE, ESTROGEN RECEPTOR POSITIVE: ICD-10-CM

## 2025-05-02 DIAGNOSIS — C50.111 MALIGNANT NEOPLASM OF CENTRAL PORTION OF RIGHT BREAST IN FEMALE, ESTROGEN RECEPTOR POSITIVE: ICD-10-CM

## 2025-05-02 DIAGNOSIS — N63.10 BREAST MASS, RIGHT: ICD-10-CM

## 2025-05-02 PROCEDURE — 19083 BX BREAST 1ST LESION US IMAG: CPT | Mod: RT

## 2025-05-07 ENCOUNTER — HOME CARE VISIT (OUTPATIENT)
Dept: HOME HEALTH SERVICES | Facility: HOME HEALTH | Age: 70
End: 2025-05-07
Payer: COMMERCIAL

## 2025-05-07 VITALS
RESPIRATION RATE: 12 BRPM | TEMPERATURE: 96.8 F | SYSTOLIC BLOOD PRESSURE: 120 MMHG | DIASTOLIC BLOOD PRESSURE: 68 MMHG | HEART RATE: 86 BPM | OXYGEN SATURATION: 97 %

## 2025-05-07 VITALS — RESPIRATION RATE: 16 BRPM

## 2025-05-07 PROCEDURE — G0151 HHCP-SERV OF PT,EA 15 MIN: HCPCS

## 2025-05-07 PROCEDURE — G0299 HHS/HOSPICE OF RN EA 15 MIN: HCPCS

## 2025-05-07 SDOH — ECONOMIC STABILITY: HOUSING INSECURITY: EVIDENCE OF SMOKING MATERIAL: 0

## 2025-05-07 SDOH — HEALTH STABILITY: PHYSICAL HEALTH: EXERCISE TYPE: SEE COMMENTS BELOW

## 2025-05-07 SDOH — HEALTH STABILITY: MENTAL HEALTH: SMOKING IN HOME: 0

## 2025-05-07 SDOH — HEALTH STABILITY: PHYSICAL HEALTH
EXERCISE COMMENTS: STANDING HEEL RAISES, MINI SQUATS, HIP ABD, HAMSTRING CURLS, MARCHING X 15 REPS  UE SHOULDER FLEXION, ABD, ELBOW FLEXION X 15 REPS

## 2025-05-07 ASSESSMENT — ENCOUNTER SYMPTOMS
FATIGUE: 1
SHORTNESS OF BREATH: 1
LOWEST PAIN SEVERITY IN PAST 24 HOURS: 0/10
PAIN LOCATION - PAIN FREQUENCY: INTERMITTENT
PAIN LOCATION - EXACERBATING FACTORS: NOTHING
APPETITE LEVEL: GOOD
PAIN LOCATION - RELIEVING FACTORS: REST, MEDS
MUSCLE WEAKNESS: 1
PAIN LOCATION - PAIN SEVERITY: 1/10
DYSPNEA ACTIVITY LEVEL: AFTER AMBULATING 10 - 20 FT
HIGHEST PAIN SEVERITY IN PAST 24 HOURS: 1/10
PAIN SEVERITY GOAL: 0/10
SUBJECTIVE PAIN PROGRESSION: GRADUALLY IMPROVING
LOWEST PAIN SEVERITY IN PAST 24 HOURS: 2/10
PAIN: 1
PERSON REPORTING PAIN: PATIENT
PAIN LOCATION: LEFT LEG
PAIN: 1
PAIN LOCATION - EXACERBATING FACTORS: MULTIPLE SCLEROSIS
PAIN LOCATION - PAIN QUALITY: ACHING THROBBING
SUBJECTIVE PAIN PROGRESSION: UNCHANGED
PAIN LOCATION: GENERALIZED
FATIGUES EASILY: 1
PAIN LOCATION - PAIN QUALITY: SORE
PERSON REPORTING PAIN: PATIENT
PAIN LOCATION - PAIN SEVERITY: 4/10
PAIN LOCATION - PAIN DURATION: VARIES
PAIN LOCATION - RELIEVING FACTORS: MEDICATION, REST
HIGHEST PAIN SEVERITY IN PAST 24 HOURS: 5/10
PAIN LOCATION - PAIN DURATION: VARIES
DYSPNEA ON EXERTION: 1
PAIN SEVERITY GOAL: 0/10
PAIN LOCATION - PAIN FREQUENCY: INTERMITTENT
CHANGE IN APPETITE: UNCHANGED
MUSCLE WEAKNESS: 1

## 2025-05-07 ASSESSMENT — ACTIVITIES OF DAILY LIVING (ADL)
CURRENT_FUNCTION: INDEPENDENT
HOME_HEALTH_OASIS: 00
AMBULATION ASSISTANCE: STAND BY ASSIST
AMBULATION ASSISTANCE ON FLAT SURFACES: 1
PHYSICAL TRANSFERS ASSESSED: 1
AMBULATION ASSISTANCE: 1
AMBULATION ASSISTANCE: 1
AMBULATION ASSISTANCE: INDEPENDENT
OASIS_M1830: 01

## 2025-05-07 ASSESSMENT — BALANCE ASSESSMENTS
STANDING BALANCE: 1 - STEADY BUT WIDE STANCE AND USES CANE OR OTHER SUPPORT
NUDGED SCORE: 2
ATTEMPTS TO ARISE: 2 - ABLE TO RISE, ONE ATTEMPT
ARISING SCORE: 1
EYES CLOSED AT MAXIMUM POSITION NUDGED: 1 - STEADY
IMMEDIATE STANDING BALANCE FIRST 5 SECONDS: 2 - STEADY WITHOUT WALKER OR OTHER SUPPORT
ARISES: 1 - ABLE, USES ARMS TO HELP
TURNING 360 DEGREES STEPS: 1 - CONTINUOUS STEPS
NUDGED: 2 - STEADY
SITTING BALANCE: 1 - STEADY, SAFE
SITTING DOWN: 1 - USES ARMS OR NOT SMOOTH MOTION
BALANCE SCORE: 13

## 2025-05-07 ASSESSMENT — GAIT ASSESSMENTS
TRUNK: 0 - MARKED SWAY OR USES WALKING AID
TRUNK SCORE: 0
PATH SCORE: 1
GAIT SCORE: 9
STEP CONTINUITY: 1 - STEPS APPEAR CONTINUOUS
STEP SYMMETRY: 1 - RIGHT AND LEFT STEP LENGTH APPEAR EQUAL
INITIATION OF GAIT IMMEDIATELY AFTER GO: 1 - NO HESITANCY
BALANCE AND GAIT SCORE: 22
PATH: 1 - MILD/MODERATE DEVIATION OR USES WALKING AID
WALKING STANCE: 1 - HEELS ALMOST TOUCHING WHILE WALKING

## 2025-05-08 LAB
LAB AP ASR DISCLAIMER: NORMAL
LAB AP BLOCK FOR ADDITIONAL STUDIES: NORMAL
LABORATORY COMMENT REPORT: NORMAL
PATH REPORT.FINAL DX SPEC: NORMAL
PATH REPORT.GROSS SPEC: NORMAL
PATH REPORT.RELEVANT HX SPEC: NORMAL
PATH REPORT.TOTAL CANCER: NORMAL
PATHOLOGY SYNOPTIC REPORT: NORMAL

## 2025-05-29 ENCOUNTER — HOSPITAL ENCOUNTER (EMERGENCY)
Facility: HOSPITAL | Age: 70
Discharge: HOME | End: 2025-05-29
Attending: EMERGENCY MEDICINE
Payer: COMMERCIAL

## 2025-05-29 ENCOUNTER — APPOINTMENT (OUTPATIENT)
Dept: RADIOLOGY | Facility: HOSPITAL | Age: 70
End: 2025-05-29
Payer: COMMERCIAL

## 2025-05-29 ENCOUNTER — APPOINTMENT (OUTPATIENT)
Dept: CARDIOLOGY | Facility: HOSPITAL | Age: 70
End: 2025-05-29
Payer: COMMERCIAL

## 2025-05-29 VITALS
WEIGHT: 129 LBS | OXYGEN SATURATION: 98 % | TEMPERATURE: 96.1 F | HEIGHT: 64 IN | SYSTOLIC BLOOD PRESSURE: 126 MMHG | RESPIRATION RATE: 18 BRPM | HEART RATE: 97 BPM | BODY MASS INDEX: 22.02 KG/M2 | DIASTOLIC BLOOD PRESSURE: 85 MMHG

## 2025-05-29 DIAGNOSIS — E87.6 HYPOKALEMIA: ICD-10-CM

## 2025-05-29 DIAGNOSIS — J90 PLEURAL EFFUSION: Primary | ICD-10-CM

## 2025-05-29 LAB
ALBUMIN SERPL BCP-MCNC: 3.4 G/DL (ref 3.4–5)
ALP SERPL-CCNC: 58 U/L (ref 33–136)
ALT SERPL W P-5'-P-CCNC: 12 U/L (ref 7–45)
ANION GAP SERPL CALC-SCNC: 13 MMOL/L (ref 10–20)
AST SERPL W P-5'-P-CCNC: 22 U/L (ref 9–39)
BASOPHILS # BLD AUTO: 0.03 X10*3/UL (ref 0–0.1)
BASOPHILS NFR BLD AUTO: 0.2 %
BILIRUB SERPL-MCNC: 0.5 MG/DL (ref 0–1.2)
BUN SERPL-MCNC: 26 MG/DL (ref 6–23)
CALCIUM SERPL-MCNC: 9.1 MG/DL (ref 8.6–10.3)
CHLORIDE SERPL-SCNC: 88 MMOL/L (ref 98–107)
CLARITY FLD: ABNORMAL
CO2 SERPL-SCNC: 33 MMOL/L (ref 21–32)
COLOR FLD: ABNORMAL
CREAT SERPL-MCNC: 0.98 MG/DL (ref 0.5–1.05)
EGFRCR SERPLBLD CKD-EPI 2021: 63 ML/MIN/1.73M*2
ELECTRONICALLY SIGNED BY: NORMAL
EOSINOPHIL # BLD AUTO: 0.07 X10*3/UL (ref 0–0.7)
EOSINOPHIL NFR BLD AUTO: 0.5 %
EOSINOPHIL NFR FLD MANUAL: 2 %
ERYTHROCYTE [DISTWIDTH] IN BLOOD BY AUTOMATED COUNT: 13.5 % (ref 11.5–14.5)
FOCUSED SOLID TUMOR DNA RESULTS: NORMAL
GLUCOSE FLD-MCNC: 99 MG/DL
GLUCOSE SERPL-MCNC: 163 MG/DL (ref 74–99)
HCT VFR BLD AUTO: 43.7 % (ref 36–46)
HGB BLD-MCNC: 14.7 G/DL (ref 12–16)
IMM GRANULOCYTES # BLD AUTO: 0.12 X10*3/UL (ref 0–0.7)
IMM GRANULOCYTES NFR BLD AUTO: 0.8 % (ref 0–0.9)
INR PPP: 1.1 (ref 0.9–1.1)
LDH FLD L TO P-CCNC: 477 U/L
LYMPHOCYTES # BLD AUTO: 0.84 X10*3/UL (ref 1.2–4.8)
LYMPHOCYTES NFR BLD AUTO: 5.5 %
LYMPHOCYTES NFR FLD MANUAL: 68 %
MCH RBC QN AUTO: 31.9 PG (ref 26–34)
MCHC RBC AUTO-ENTMCNC: 33.6 G/DL (ref 32–36)
MCV RBC AUTO: 95 FL (ref 80–100)
MONOCYTES # BLD AUTO: 0.97 X10*3/UL (ref 0.1–1)
MONOCYTES NFR BLD AUTO: 6.3 %
MONOS+MACROS NFR FLD MANUAL: 16 %
NEUTROPHILS # BLD AUTO: 13.32 X10*3/UL (ref 1.2–7.7)
NEUTROPHILS NFR BLD AUTO: 86.7 %
NEUTROPHILS NFR FLD MANUAL: 14 %
NRBC BLD-RTO: 0 /100 WBCS (ref 0–0)
PLATELET # BLD AUTO: 569 X10*3/UL (ref 150–450)
POTASSIUM SERPL-SCNC: 2.6 MMOL/L (ref 3.5–5.3)
POTASSIUM SERPL-SCNC: 3.1 MMOL/L (ref 3.5–5.3)
PROT FLD-MCNC: 3.8 G/DL
PROT SERPL-MCNC: 6.8 G/DL (ref 6.4–8.2)
PROTHROMBIN TIME: 12.3 SECONDS (ref 9.8–12.4)
RBC # BLD AUTO: 4.61 X10*6/UL (ref 4–5.2)
RBC # FLD AUTO: ABNORMAL /UL
SODIUM SERPL-SCNC: 131 MMOL/L (ref 136–145)
TOTAL CELLS COUNTED FLD: 100
WBC # BLD AUTO: 15.4 X10*3/UL (ref 4.4–11.3)
WBC # FLD AUTO: 1502 /UL

## 2025-05-29 PROCEDURE — 87070 CULTURE OTHR SPECIMN AEROBIC: CPT | Mod: PORLAB | Performed by: EMERGENCY MEDICINE

## 2025-05-29 PROCEDURE — G0452 MOLECULAR PATHOLOGY INTERPR: HCPCS | Performed by: INTERNAL MEDICINE

## 2025-05-29 PROCEDURE — 81458 SO GSAP DNA CPY NMBR&MCRSTL: CPT | Performed by: INTERNAL MEDICINE

## 2025-05-29 PROCEDURE — 85610 PROTHROMBIN TIME: CPT | Performed by: EMERGENCY MEDICINE

## 2025-05-29 PROCEDURE — 89051 BODY FLUID CELL COUNT: CPT | Performed by: EMERGENCY MEDICINE

## 2025-05-29 PROCEDURE — 2500000002 HC RX 250 W HCPCS SELF ADMINISTERED DRUGS (ALT 637 FOR MEDICARE OP, ALT 636 FOR OP/ED): Performed by: EMERGENCY MEDICINE

## 2025-05-29 PROCEDURE — 96366 THER/PROPH/DIAG IV INF ADDON: CPT

## 2025-05-29 PROCEDURE — 71045 X-RAY EXAM CHEST 1 VIEW: CPT | Performed by: RADIOLOGY

## 2025-05-29 PROCEDURE — 83615 LACTATE (LD) (LDH) ENZYME: CPT | Mod: PORLAB | Performed by: EMERGENCY MEDICINE

## 2025-05-29 PROCEDURE — 36415 COLL VENOUS BLD VENIPUNCTURE: CPT | Performed by: EMERGENCY MEDICINE

## 2025-05-29 PROCEDURE — 96365 THER/PROPH/DIAG IV INF INIT: CPT

## 2025-05-29 PROCEDURE — 82945 GLUCOSE OTHER FLUID: CPT | Mod: PORLAB | Performed by: EMERGENCY MEDICINE

## 2025-05-29 PROCEDURE — 80053 COMPREHEN METABOLIC PANEL: CPT | Performed by: EMERGENCY MEDICINE

## 2025-05-29 PROCEDURE — 84157 ASSAY OF PROTEIN OTHER: CPT | Mod: PORLAB | Performed by: EMERGENCY MEDICINE

## 2025-05-29 PROCEDURE — 32555 ASPIRATE PLEURA W/ IMAGING: CPT

## 2025-05-29 PROCEDURE — 99285 EMERGENCY DEPT VISIT HI MDM: CPT | Mod: 25 | Performed by: EMERGENCY MEDICINE

## 2025-05-29 PROCEDURE — 2500000004 HC RX 250 GENERAL PHARMACY W/ HCPCS (ALT 636 FOR OP/ED): Mod: JZ | Performed by: EMERGENCY MEDICINE

## 2025-05-29 PROCEDURE — 85025 COMPLETE CBC W/AUTO DIFF WBC: CPT | Performed by: EMERGENCY MEDICINE

## 2025-05-29 PROCEDURE — 71045 X-RAY EXAM CHEST 1 VIEW: CPT

## 2025-05-29 PROCEDURE — 93005 ELECTROCARDIOGRAM TRACING: CPT

## 2025-05-29 PROCEDURE — 84132 ASSAY OF SERUM POTASSIUM: CPT | Performed by: EMERGENCY MEDICINE

## 2025-05-29 RX ORDER — POTASSIUM CHLORIDE 14.9 MG/ML
20 INJECTION INTRAVENOUS
Status: COMPLETED | OUTPATIENT
Start: 2025-05-29 | End: 2025-05-29

## 2025-05-29 RX ORDER — POTASSIUM CHLORIDE 750 MG/1
20 TABLET, FILM COATED, EXTENDED RELEASE ORAL ONCE
Status: COMPLETED | OUTPATIENT
Start: 2025-05-29 | End: 2025-05-29

## 2025-05-29 RX ORDER — POTASSIUM CHLORIDE 750 MG/1
20 TABLET, FILM COATED, EXTENDED RELEASE ORAL DAILY
Status: DISCONTINUED | OUTPATIENT
Start: 2025-05-29 | End: 2025-05-29

## 2025-05-29 RX ADMIN — POTASSIUM CHLORIDE 20 MEQ: 14.9 INJECTION, SOLUTION INTRAVENOUS at 17:15

## 2025-05-29 RX ADMIN — POTASSIUM CHLORIDE 20 MEQ: 14.9 INJECTION, SOLUTION INTRAVENOUS at 13:11

## 2025-05-29 RX ADMIN — POTASSIUM CHLORIDE 20 MEQ: 750 TABLET, FILM COATED, EXTENDED RELEASE ORAL at 13:13

## 2025-05-29 RX ADMIN — POTASSIUM CHLORIDE 20 MEQ: 750 TABLET, FILM COATED, EXTENDED RELEASE ORAL at 16:29

## 2025-05-29 ASSESSMENT — COLUMBIA-SUICIDE SEVERITY RATING SCALE - C-SSRS
1. IN THE PAST MONTH, HAVE YOU WISHED YOU WERE DEAD OR WISHED YOU COULD GO TO SLEEP AND NOT WAKE UP?: NO
6. HAVE YOU EVER DONE ANYTHING, STARTED TO DO ANYTHING, OR PREPARED TO DO ANYTHING TO END YOUR LIFE?: NO
2. HAVE YOU ACTUALLY HAD ANY THOUGHTS OF KILLING YOURSELF?: NO

## 2025-05-29 ASSESSMENT — PAIN - FUNCTIONAL ASSESSMENT: PAIN_FUNCTIONAL_ASSESSMENT: 0-10

## 2025-05-29 ASSESSMENT — PAIN SCALES - GENERAL: PAINLEVEL_OUTOF10: 0 - NO PAIN

## 2025-05-29 NOTE — ED PROVIDER NOTES
HPI   Chief Complaint   Patient presents with    Shortness of Breath       Patient presents to the emergency department secondary to shortness of breath.  Patient has had increased shortness of breath since yesterday.  No cough or sputum production.  No chest pain.  No extremity pain or swelling.  Patient does have a history of lung cancer.  She is currently not in treatment.  She was on oral chemotherapy medication.  Patient did have a Pleurx drain in the right chest until it was removed about a month ago due to decreased output.  She has an appointment next week with her oncologist to discuss next steps in treatment.                          Abundio Coma Scale Score: 15                  Patient History   Medical History[1]  Surgical History[2]  Family History[3]  Social History[4]    Physical Exam   ED Triage Vitals   Temperature Heart Rate Respirations BP   05/29/25 1042 05/29/25 1042 05/29/25 1042 05/29/25 1042   35.6 °C (96.1 °F) 93 14 (!) 124/92      Pulse Ox Temp Source Heart Rate Source Patient Position   05/29/25 1048 05/29/25 1042 05/29/25 1042 --   99 % Tympanic Monitor       BP Location FiO2 (%)     05/29/25 1042 --     Left arm        Physical Exam  Vitals and nursing note reviewed.   Constitutional:       Appearance: Normal appearance. She is not ill-appearing.      Comments: Thin chronically ill-appearing female.   HENT:      Head: Normocephalic.      Right Ear: Tympanic membrane normal.      Left Ear: Tympanic membrane normal.      Nose: Nose normal.      Mouth/Throat:      Mouth: Mucous membranes are moist.   Eyes:      Extraocular Movements: Extraocular movements intact.      Pupils: Pupils are equal, round, and reactive to light.   Cardiovascular:      Rate and Rhythm: Normal rate and regular rhythm.      Pulses: Normal pulses.      Heart sounds: Normal heart sounds.   Pulmonary:      Effort: Pulmonary effort is normal.      Breath sounds: Examination of the right-middle field reveals decreased  breath sounds. Examination of the right-lower field reveals decreased breath sounds. Decreased breath sounds present. No wheezing, rhonchi or rales.   Chest:      Chest wall: No tenderness.   Abdominal:      General: Abdomen is flat. Bowel sounds are normal.      Palpations: Abdomen is soft.      Tenderness: There is no abdominal tenderness. There is no guarding or rebound.   Musculoskeletal:         General: Normal range of motion.      Cervical back: Normal range of motion.      Right lower leg: No tenderness. No edema.      Left lower leg: No tenderness. No edema.   Skin:     General: Skin is warm and dry.      Capillary Refill: Capillary refill takes less than 2 seconds.   Neurological:      General: No focal deficit present.      Mental Status: She is alert and oriented to person, place, and time.   Psychiatric:         Mood and Affect: Mood normal.         Behavior: Behavior normal.       Labs Reviewed   CBC WITH AUTO DIFFERENTIAL   COMPREHENSIVE METABOLIC PANEL   PROTIME-INR     Pain Management Panel           No data to display              XR chest 1 view    (Results Pending)     ED Course & MDM   Diagnoses as of 05/29/25 1819   Pleural effusion   Hypokalemia       Medical Decision Making  Patient presents to the emergency department secondary to shortness of breath.  Patient is evaluated for recurrent pleural effusion, pneumonia, coronary disease or other acute cause.  Patient is evaluated using EKG chest x-ray laboratory workup.  Chest x-ray does show recurrent large right-sided pleural effusion.  INR is normal at 1.1.  Patient does have mild leukocytosis with a white count of 15.4.  EKG was performed at 10:50 AM.  It is sinus rhythm rate of 92.  No acute ST elevation.  FL interval is 121 and QTc is 494.  Potassium is quite low at 2.6.  Patient reports that she has not been taking her potassium as she should.  She states that the pills get stuck in her dentures so she at times just does not take them.   Patient was discussed with interventional radiology and taken to interventional.  Patient had left-sided thoracentesis performed which improved her symptoms significantly.  She currently feels well enough to go home.  Patient is pending potassium replacement and then will be discharged.  Patient will return for new or worsening symptoms.  She will follow-up with her oncologist or primary care physician.        Procedure  Procedures       Alley Ruth MD  25 [phone]         [1]   Past Medical History:  Diagnosis Date    GERD (gastroesophageal reflux disease)     HLD (hyperlipidemia)     HTN (hypertension)     Liver disease     Lung neoplasm     MS (multiple sclerosis) (Multi)     Pleural effusion    [2]   Past Surgical History:  Procedure Laterality Date    DILATION AND CURETTAGE OF UTERUS      PERCUTANEOUS CHEST DRAIN INSERTION Right 10/10/2024    Thoracoscopy; Right PleurX Placement, with pleural biopsies   [3] No family history on file.  [4]   Social History  Tobacco Use    Smoking status: Former     Current packs/day: 0.00     Average packs/day: 0.5 packs/day for 2.0 years (1.0 ttl pk-yrs)     Types: Cigarettes     Start date:      Quit date:      Years since quittin.4    Smokeless tobacco: Never   Vaping Use    Vaping status: Never Used   Substance Use Topics    Alcohol use: Not Currently    Drug use: Never        Alley Ruth MD  25 150

## 2025-05-29 NOTE — POST-PROCEDURE NOTE
Interventional Radiology Brief Postprocedure Note    Attending: Arnol Lester MD      Assistant: none    Diagnosis: pleural effusion    Description of procedure: left thora     Anesthesia:  Local    Complications: None    Estimated Blood Loss: none      specimens collected  1100 ml out    See detailed result report with images in PACS.    The patient tolerated the procedure well without incident or complication.

## 2025-05-29 NOTE — ED TRIAGE NOTES
Patient presents to the ED due to shortness of breath.  Patient has a history of lung cancer.  Patient had recently had fluid removed on her left lung

## 2025-06-01 LAB
ATRIAL RATE: 92 BPM
BACTERIA FLD CULT: NORMAL
GRAM STN SPEC: NORMAL
GRAM STN SPEC: NORMAL
P AXIS: 29 DEGREES
PR INTERVAL: 121 MS
Q ONSET: 253 MS
QRS COUNT: 15 BEATS
QRS DURATION: 95 MS
QT INTERVAL: 399 MS
QTC CALCULATION(BAZETT): 494 MS
QTC FREDERICIA: 460 MS
R AXIS: -4 DEGREES
T AXIS: 78 DEGREES
T OFFSET: 453 MS
VENTRICULAR RATE: 92 BPM

## 2025-06-05 ENCOUNTER — ONCOLOGY MEDICATION OUTREACH (OUTPATIENT)
Dept: HEMATOLOGY/ONCOLOGY | Facility: CLINIC | Age: 70
End: 2025-06-05
Payer: COMMERCIAL

## 2025-06-05 ENCOUNTER — APPOINTMENT (OUTPATIENT)
Dept: RADIOLOGY | Facility: HOSPITAL | Age: 70
DRG: 186 | End: 2025-06-05
Payer: COMMERCIAL

## 2025-06-05 ENCOUNTER — OFFICE VISIT (OUTPATIENT)
Dept: HEMATOLOGY/ONCOLOGY | Facility: CLINIC | Age: 70
End: 2025-06-05
Payer: COMMERCIAL

## 2025-06-05 ENCOUNTER — HOSPITAL ENCOUNTER (INPATIENT)
Facility: HOSPITAL | Age: 70
End: 2025-06-05
Attending: EMERGENCY MEDICINE | Admitting: INTERNAL MEDICINE
Payer: COMMERCIAL

## 2025-06-05 ENCOUNTER — SPECIALTY PHARMACY (OUTPATIENT)
Dept: HEMATOLOGY/ONCOLOGY | Facility: CLINIC | Age: 70
End: 2025-06-05
Payer: COMMERCIAL

## 2025-06-05 VITALS
SYSTOLIC BLOOD PRESSURE: 112 MMHG | DIASTOLIC BLOOD PRESSURE: 75 MMHG | HEIGHT: 64 IN | RESPIRATION RATE: 16 BRPM | TEMPERATURE: 96.4 F | BODY MASS INDEX: 22.1 KG/M2 | HEART RATE: 109 BPM | OXYGEN SATURATION: 95 %

## 2025-06-05 DIAGNOSIS — Z17.0 MALIGNANT NEOPLASM OF LOWER-OUTER QUADRANT OF RIGHT BREAST OF FEMALE, ESTROGEN RECEPTOR POSITIVE: ICD-10-CM

## 2025-06-05 DIAGNOSIS — C50.511 MALIGNANT NEOPLASM OF LOWER-OUTER QUADRANT OF RIGHT BREAST OF FEMALE, ESTROGEN RECEPTOR POSITIVE: ICD-10-CM

## 2025-06-05 DIAGNOSIS — Z17.0 MALIGNANT NEOPLASM OF CENTRAL PORTION OF RIGHT BREAST IN FEMALE, ESTROGEN RECEPTOR POSITIVE: Primary | ICD-10-CM

## 2025-06-05 DIAGNOSIS — C50.111 MALIGNANT NEOPLASM OF CENTRAL PORTION OF RIGHT BREAST IN FEMALE, ESTROGEN RECEPTOR POSITIVE: Primary | ICD-10-CM

## 2025-06-05 DIAGNOSIS — J90 PLEURAL EFFUSION: Primary | ICD-10-CM

## 2025-06-05 DIAGNOSIS — D72.829 LEUKOCYTOSIS, UNSPECIFIED TYPE: ICD-10-CM

## 2025-06-05 LAB
ALBUMIN SERPL BCP-MCNC: 3.8 G/DL (ref 3.4–5)
ALP SERPL-CCNC: 72 U/L (ref 33–136)
ALT SERPL W P-5'-P-CCNC: 13 U/L (ref 7–45)
ANION GAP SERPL CALC-SCNC: 17 MMOL/L (ref 10–20)
ANION GAP SERPL CALC-SCNC: 21 MMOL/L (ref 10–20)
APTT PPP: 28 SECONDS (ref 26–36)
AST SERPL W P-5'-P-CCNC: 27 U/L (ref 9–39)
BASOPHILS # BLD AUTO: 0.03 X10*3/UL (ref 0–0.1)
BASOPHILS NFR BLD AUTO: 0.2 %
BILIRUB SERPL-MCNC: 0.5 MG/DL (ref 0–1.2)
BUN SERPL-MCNC: 22 MG/DL (ref 6–23)
BUN SERPL-MCNC: 22 MG/DL (ref 6–23)
CALCIUM SERPL-MCNC: 10.4 MG/DL (ref 8.6–10.3)
CALCIUM SERPL-MCNC: 8.6 MG/DL (ref 8.6–10.3)
CHLORIDE SERPL-SCNC: 93 MMOL/L (ref 98–107)
CHLORIDE SERPL-SCNC: 94 MMOL/L (ref 98–107)
CO2 SERPL-SCNC: 24 MMOL/L (ref 21–32)
CO2 SERPL-SCNC: 27 MMOL/L (ref 21–32)
CREAT SERPL-MCNC: 0.79 MG/DL (ref 0.5–1.05)
CREAT SERPL-MCNC: 0.88 MG/DL (ref 0.5–1.05)
EGFRCR SERPLBLD CKD-EPI 2021: 72 ML/MIN/1.73M*2
EGFRCR SERPLBLD CKD-EPI 2021: 82 ML/MIN/1.73M*2
EOSINOPHIL # BLD AUTO: 0.03 X10*3/UL (ref 0–0.7)
EOSINOPHIL NFR BLD AUTO: 0.2 %
ERYTHROCYTE [DISTWIDTH] IN BLOOD BY AUTOMATED COUNT: 13.6 % (ref 11.5–14.5)
GLUCOSE SERPL-MCNC: 116 MG/DL (ref 74–99)
GLUCOSE SERPL-MCNC: 128 MG/DL (ref 74–99)
HCT VFR BLD AUTO: 47.2 % (ref 36–46)
HGB BLD-MCNC: 15.1 G/DL (ref 12–16)
IMM GRANULOCYTES # BLD AUTO: 0.2 X10*3/UL (ref 0–0.7)
IMM GRANULOCYTES NFR BLD AUTO: 1 % (ref 0–0.9)
INR PPP: 1.1 (ref 0.9–1.1)
LACTATE SERPL-SCNC: 1.4 MMOL/L (ref 0.4–2)
LYMPHOCYTES # BLD AUTO: 1.21 X10*3/UL (ref 1.2–4.8)
LYMPHOCYTES NFR BLD AUTO: 6.2 %
MCH RBC QN AUTO: 31.4 PG (ref 26–34)
MCHC RBC AUTO-ENTMCNC: 32 G/DL (ref 32–36)
MCV RBC AUTO: 98 FL (ref 80–100)
MONOCYTES # BLD AUTO: 0.74 X10*3/UL (ref 0.1–1)
MONOCYTES NFR BLD AUTO: 3.8 %
MRSA DNA SPEC QL NAA+PROBE: NOT DETECTED
NEUTROPHILS # BLD AUTO: 17.43 X10*3/UL (ref 1.2–7.7)
NEUTROPHILS NFR BLD AUTO: 88.6 %
NRBC BLD-RTO: 0 /100 WBCS (ref 0–0)
PLATELET # BLD AUTO: 714 X10*3/UL (ref 150–450)
POTASSIUM SERPL-SCNC: 3.7 MMOL/L (ref 3.5–5.3)
POTASSIUM SERPL-SCNC: 4.7 MMOL/L (ref 3.5–5.3)
PROT SERPL-MCNC: 8 G/DL (ref 6.4–8.2)
PROTHROMBIN TIME: 11.6 SECONDS (ref 9.8–12.4)
RBC # BLD AUTO: 4.81 X10*6/UL (ref 4–5.2)
SODIUM SERPL-SCNC: 131 MMOL/L (ref 136–145)
SODIUM SERPL-SCNC: 136 MMOL/L (ref 136–145)
WBC # BLD AUTO: 19.6 X10*3/UL (ref 4.4–11.3)

## 2025-06-05 PROCEDURE — 71045 X-RAY EXAM CHEST 1 VIEW: CPT | Performed by: RADIOLOGY

## 2025-06-05 PROCEDURE — 2500000005 HC RX 250 GENERAL PHARMACY W/O HCPCS: Performed by: INTERNAL MEDICINE

## 2025-06-05 PROCEDURE — 87075 CULTR BACTERIA EXCEPT BLOOD: CPT | Mod: AHULAB | Performed by: EMERGENCY MEDICINE

## 2025-06-05 PROCEDURE — 99215 OFFICE O/P EST HI 40 MIN: CPT | Performed by: INTERNAL MEDICINE

## 2025-06-05 PROCEDURE — 87040 BLOOD CULTURE FOR BACTERIA: CPT | Mod: AHULAB | Performed by: EMERGENCY MEDICINE

## 2025-06-05 PROCEDURE — 85730 THROMBOPLASTIN TIME PARTIAL: CPT | Performed by: EMERGENCY MEDICINE

## 2025-06-05 PROCEDURE — 80053 COMPREHEN METABOLIC PANEL: CPT | Performed by: EMERGENCY MEDICINE

## 2025-06-05 PROCEDURE — 2500000004 HC RX 250 GENERAL PHARMACY W/ HCPCS (ALT 636 FOR OP/ED): Performed by: EMERGENCY MEDICINE

## 2025-06-05 PROCEDURE — 36415 COLL VENOUS BLD VENIPUNCTURE: CPT | Performed by: EMERGENCY MEDICINE

## 2025-06-05 PROCEDURE — 87640 STAPH A DNA AMP PROBE: CPT | Performed by: EMERGENCY MEDICINE

## 2025-06-05 PROCEDURE — 71045 X-RAY EXAM CHEST 1 VIEW: CPT

## 2025-06-05 PROCEDURE — 3078F DIAST BP <80 MM HG: CPT | Performed by: INTERNAL MEDICINE

## 2025-06-05 PROCEDURE — 1100000001 HC PRIVATE ROOM DAILY

## 2025-06-05 PROCEDURE — 3074F SYST BP LT 130 MM HG: CPT | Performed by: INTERNAL MEDICINE

## 2025-06-05 PROCEDURE — 2500000004 HC RX 250 GENERAL PHARMACY W/ HCPCS (ALT 636 FOR OP/ED): Performed by: INTERNAL MEDICINE

## 2025-06-05 PROCEDURE — 2500000005 HC RX 250 GENERAL PHARMACY W/O HCPCS: Performed by: EMERGENCY MEDICINE

## 2025-06-05 PROCEDURE — 99222 1ST HOSP IP/OBS MODERATE 55: CPT | Performed by: INTERNAL MEDICINE

## 2025-06-05 PROCEDURE — 83605 ASSAY OF LACTIC ACID: CPT | Performed by: EMERGENCY MEDICINE

## 2025-06-05 PROCEDURE — 94640 AIRWAY INHALATION TREATMENT: CPT

## 2025-06-05 PROCEDURE — 2500000001 HC RX 250 WO HCPCS SELF ADMINISTERED DRUGS (ALT 637 FOR MEDICARE OP): Performed by: INTERNAL MEDICINE

## 2025-06-05 PROCEDURE — 1126F AMNT PAIN NOTED NONE PRSNT: CPT | Performed by: INTERNAL MEDICINE

## 2025-06-05 PROCEDURE — 99285 EMERGENCY DEPT VISIT HI MDM: CPT | Performed by: EMERGENCY MEDICINE

## 2025-06-05 PROCEDURE — 99222 1ST HOSP IP/OBS MODERATE 55: CPT | Performed by: NURSE PRACTITIONER

## 2025-06-05 PROCEDURE — 1160F RVW MEDS BY RX/DR IN RCRD: CPT | Performed by: INTERNAL MEDICINE

## 2025-06-05 PROCEDURE — 85610 PROTHROMBIN TIME: CPT | Performed by: EMERGENCY MEDICINE

## 2025-06-05 PROCEDURE — 2500000002 HC RX 250 W HCPCS SELF ADMINISTERED DRUGS (ALT 637 FOR MEDICARE OP, ALT 636 FOR OP/ED): Performed by: INTERNAL MEDICINE

## 2025-06-05 PROCEDURE — 85025 COMPLETE CBC W/AUTO DIFF WBC: CPT | Performed by: EMERGENCY MEDICINE

## 2025-06-05 PROCEDURE — 87641 MR-STAPH DNA AMP PROBE: CPT | Performed by: EMERGENCY MEDICINE

## 2025-06-05 PROCEDURE — 1159F MED LIST DOCD IN RCRD: CPT | Performed by: INTERNAL MEDICINE

## 2025-06-05 PROCEDURE — 80048 BASIC METABOLIC PNL TOTAL CA: CPT | Mod: CCI | Performed by: INTERNAL MEDICINE

## 2025-06-05 RX ORDER — PROCHLORPERAZINE EDISYLATE 5 MG/ML
10 INJECTION INTRAMUSCULAR; INTRAVENOUS EVERY 6 HOURS PRN
OUTPATIENT
Start: 2026-03-26

## 2025-06-05 RX ORDER — ALBUTEROL SULFATE 0.83 MG/ML
3 SOLUTION RESPIRATORY (INHALATION) AS NEEDED
OUTPATIENT
Start: 2025-07-21

## 2025-06-05 RX ORDER — PROCHLORPERAZINE MALEATE 10 MG
10 TABLET ORAL EVERY 6 HOURS PRN
OUTPATIENT
Start: 2026-04-23

## 2025-06-05 RX ORDER — PANTOPRAZOLE SODIUM 40 MG/10ML
40 INJECTION, POWDER, LYOPHILIZED, FOR SOLUTION INTRAVENOUS
Status: DISPENSED | OUTPATIENT
Start: 2025-06-06

## 2025-06-05 RX ORDER — EPINEPHRINE 0.3 MG/.3ML
0.3 INJECTION SUBCUTANEOUS EVERY 5 MIN PRN
OUTPATIENT
Start: 2025-07-07

## 2025-06-05 RX ORDER — PROCHLORPERAZINE EDISYLATE 5 MG/ML
10 INJECTION INTRAMUSCULAR; INTRAVENOUS EVERY 6 HOURS PRN
OUTPATIENT
Start: 2025-06-23

## 2025-06-05 RX ORDER — EPINEPHRINE 0.3 MG/.3ML
0.3 INJECTION SUBCUTANEOUS EVERY 5 MIN PRN
OUTPATIENT
Start: 2026-01-31

## 2025-06-05 RX ORDER — DIPHENHYDRAMINE HYDROCHLORIDE 50 MG/ML
50 INJECTION, SOLUTION INTRAMUSCULAR; INTRAVENOUS AS NEEDED
OUTPATIENT
Start: 2026-04-23

## 2025-06-05 RX ORDER — ALBUTEROL SULFATE 0.83 MG/ML
3 SOLUTION RESPIRATORY (INHALATION) AS NEEDED
OUTPATIENT
Start: 2025-11-10

## 2025-06-05 RX ORDER — LAMOTRIGINE 25 MG/1
500 TABLET ORAL ONCE
OUTPATIENT
Start: 2025-12-06

## 2025-06-05 RX ORDER — EPINEPHRINE 0.3 MG/.3ML
0.3 INJECTION SUBCUTANEOUS EVERY 5 MIN PRN
OUTPATIENT
Start: 2025-07-21

## 2025-06-05 RX ORDER — LAMOTRIGINE 25 MG/1
500 TABLET ORAL ONCE
OUTPATIENT
Start: 2025-07-07

## 2025-06-05 RX ORDER — PANTOPRAZOLE SODIUM 40 MG/1
40 TABLET, DELAYED RELEASE ORAL
Status: DISPENSED | OUTPATIENT
Start: 2025-06-06

## 2025-06-05 RX ORDER — EPINEPHRINE 0.3 MG/.3ML
0.3 INJECTION SUBCUTANEOUS EVERY 5 MIN PRN
OUTPATIENT
Start: 2025-09-15

## 2025-06-05 RX ORDER — FAMOTIDINE 10 MG/ML
20 INJECTION, SOLUTION INTRAVENOUS ONCE AS NEEDED
OUTPATIENT
Start: 2025-11-10

## 2025-06-05 RX ORDER — LAMOTRIGINE 25 MG/1
500 TABLET ORAL ONCE
OUTPATIENT
Start: 2026-01-31

## 2025-06-05 RX ORDER — ALBUTEROL SULFATE 0.83 MG/ML
3 SOLUTION RESPIRATORY (INHALATION) AS NEEDED
OUTPATIENT
Start: 2026-01-03

## 2025-06-05 RX ORDER — PROCHLORPERAZINE MALEATE 10 MG
10 TABLET ORAL EVERY 6 HOURS PRN
OUTPATIENT
Start: 2025-06-23

## 2025-06-05 RX ORDER — EPINEPHRINE 0.3 MG/.3ML
0.3 INJECTION SUBCUTANEOUS EVERY 5 MIN PRN
OUTPATIENT
Start: 2025-06-23

## 2025-06-05 RX ORDER — FAMOTIDINE 10 MG/ML
20 INJECTION, SOLUTION INTRAVENOUS ONCE AS NEEDED
OUTPATIENT
Start: 2025-08-18

## 2025-06-05 RX ORDER — PROCHLORPERAZINE EDISYLATE 5 MG/ML
10 INJECTION INTRAMUSCULAR; INTRAVENOUS EVERY 6 HOURS PRN
OUTPATIENT
Start: 2026-01-31

## 2025-06-05 RX ORDER — LAMOTRIGINE 25 MG/1
500 TABLET ORAL ONCE
OUTPATIENT
Start: 2025-09-15

## 2025-06-05 RX ORDER — FAMOTIDINE 10 MG/ML
20 INJECTION, SOLUTION INTRAVENOUS ONCE AS NEEDED
OUTPATIENT
Start: 2026-03-26

## 2025-06-05 RX ORDER — LAMOTRIGINE 25 MG/1
500 TABLET ORAL ONCE
OUTPATIENT
Start: 2026-01-03

## 2025-06-05 RX ORDER — BUPROPION HYDROCHLORIDE 150 MG/1
300 TABLET ORAL EVERY 24 HOURS
Status: DISPENSED | OUTPATIENT
Start: 2025-06-05

## 2025-06-05 RX ORDER — ALBUTEROL SULFATE 0.83 MG/ML
3 SOLUTION RESPIRATORY (INHALATION) AS NEEDED
OUTPATIENT
Start: 2025-07-07

## 2025-06-05 RX ORDER — FAMOTIDINE 10 MG/ML
20 INJECTION, SOLUTION INTRAVENOUS ONCE AS NEEDED
OUTPATIENT
Start: 2025-12-06

## 2025-06-05 RX ORDER — ALBUTEROL SULFATE 0.83 MG/ML
3 SOLUTION RESPIRATORY (INHALATION) AS NEEDED
OUTPATIENT
Start: 2026-04-23

## 2025-06-05 RX ORDER — HYDROXYZINE HYDROCHLORIDE 25 MG/1
25 TABLET, FILM COATED ORAL DAILY
Status: DISPENSED | OUTPATIENT
Start: 2025-06-05

## 2025-06-05 RX ORDER — DIPHENHYDRAMINE HYDROCHLORIDE 50 MG/ML
50 INJECTION, SOLUTION INTRAMUSCULAR; INTRAVENOUS AS NEEDED
OUTPATIENT
Start: 2025-06-23

## 2025-06-05 RX ORDER — ALBUTEROL SULFATE 0.83 MG/ML
3 SOLUTION RESPIRATORY (INHALATION) AS NEEDED
OUTPATIENT
Start: 2026-01-31

## 2025-06-05 RX ORDER — EPINEPHRINE 0.3 MG/.3ML
0.3 INJECTION SUBCUTANEOUS EVERY 5 MIN PRN
OUTPATIENT
Start: 2026-03-26

## 2025-06-05 RX ORDER — DIPHENHYDRAMINE HYDROCHLORIDE 50 MG/ML
50 INJECTION, SOLUTION INTRAMUSCULAR; INTRAVENOUS AS NEEDED
OUTPATIENT
Start: 2026-01-03

## 2025-06-05 RX ORDER — PROCHLORPERAZINE MALEATE 10 MG
10 TABLET ORAL EVERY 6 HOURS PRN
OUTPATIENT
Start: 2025-07-21

## 2025-06-05 RX ORDER — ACETAMINOPHEN 160 MG/5ML
650 SOLUTION ORAL EVERY 4 HOURS PRN
Status: DISPENSED | OUTPATIENT
Start: 2025-06-05

## 2025-06-05 RX ORDER — IPRATROPIUM BROMIDE AND ALBUTEROL SULFATE 2.5; .5 MG/3ML; MG/3ML
3 SOLUTION RESPIRATORY (INHALATION) EVERY 6 HOURS PRN
Status: DISCONTINUED | OUTPATIENT
Start: 2025-06-05 | End: 2025-06-05

## 2025-06-05 RX ORDER — PROCHLORPERAZINE MALEATE 10 MG
10 TABLET ORAL EVERY 6 HOURS PRN
OUTPATIENT
Start: 2025-10-13

## 2025-06-05 RX ORDER — LAMOTRIGINE 25 MG/1
500 TABLET ORAL ONCE
OUTPATIENT
Start: 2025-06-23

## 2025-06-05 RX ORDER — ALBUTEROL SULFATE 0.83 MG/ML
3 SOLUTION RESPIRATORY (INHALATION) AS NEEDED
OUTPATIENT
Start: 2025-09-15

## 2025-06-05 RX ORDER — PROCHLORPERAZINE EDISYLATE 5 MG/ML
10 INJECTION INTRAMUSCULAR; INTRAVENOUS EVERY 6 HOURS PRN
OUTPATIENT
Start: 2026-02-26

## 2025-06-05 RX ORDER — PROCHLORPERAZINE MALEATE 10 MG
10 TABLET ORAL EVERY 6 HOURS PRN
OUTPATIENT
Start: 2025-11-10

## 2025-06-05 RX ORDER — DIPHENHYDRAMINE HYDROCHLORIDE 50 MG/ML
50 INJECTION, SOLUTION INTRAMUSCULAR; INTRAVENOUS AS NEEDED
OUTPATIENT
Start: 2025-12-06

## 2025-06-05 RX ORDER — PROCHLORPERAZINE EDISYLATE 5 MG/ML
10 INJECTION INTRAMUSCULAR; INTRAVENOUS EVERY 6 HOURS PRN
OUTPATIENT
Start: 2026-04-23

## 2025-06-05 RX ORDER — DIPHENHYDRAMINE HYDROCHLORIDE 50 MG/ML
50 INJECTION, SOLUTION INTRAMUSCULAR; INTRAVENOUS AS NEEDED
OUTPATIENT
Start: 2025-07-07

## 2025-06-05 RX ORDER — LAMOTRIGINE 25 MG/1
500 TABLET ORAL ONCE
OUTPATIENT
Start: 2025-10-13

## 2025-06-05 RX ORDER — FAMOTIDINE 10 MG/ML
20 INJECTION, SOLUTION INTRAVENOUS ONCE AS NEEDED
OUTPATIENT
Start: 2025-10-13

## 2025-06-05 RX ORDER — TALC
6 POWDER (GRAM) TOPICAL NIGHTLY
Status: DISPENSED | OUTPATIENT
Start: 2025-06-05

## 2025-06-05 RX ORDER — PROCHLORPERAZINE MALEATE 10 MG
10 TABLET ORAL EVERY 6 HOURS PRN
OUTPATIENT
Start: 2026-02-26

## 2025-06-05 RX ORDER — PROCHLORPERAZINE EDISYLATE 5 MG/ML
10 INJECTION INTRAMUSCULAR; INTRAVENOUS EVERY 6 HOURS PRN
OUTPATIENT
Start: 2026-01-03

## 2025-06-05 RX ORDER — PROCHLORPERAZINE EDISYLATE 5 MG/ML
10 INJECTION INTRAMUSCULAR; INTRAVENOUS EVERY 6 HOURS PRN
OUTPATIENT
Start: 2025-07-21

## 2025-06-05 RX ORDER — ACETAMINOPHEN 650 MG/1
650 SUPPOSITORY RECTAL EVERY 4 HOURS PRN
Status: ACTIVE | OUTPATIENT
Start: 2025-06-05

## 2025-06-05 RX ORDER — EPINEPHRINE 0.3 MG/.3ML
0.3 INJECTION SUBCUTANEOUS EVERY 5 MIN PRN
OUTPATIENT
Start: 2025-11-10

## 2025-06-05 RX ORDER — GUAIFENESIN 600 MG/1
600 TABLET, EXTENDED RELEASE ORAL EVERY 12 HOURS PRN
Status: DISPENSED | OUTPATIENT
Start: 2025-06-05

## 2025-06-05 RX ORDER — PROCHLORPERAZINE MALEATE 10 MG
10 TABLET ORAL EVERY 6 HOURS PRN
OUTPATIENT
Start: 2025-07-07

## 2025-06-05 RX ORDER — EPINEPHRINE 0.3 MG/.3ML
0.3 INJECTION SUBCUTANEOUS EVERY 5 MIN PRN
OUTPATIENT
Start: 2025-08-18

## 2025-06-05 RX ORDER — PROCHLORPERAZINE EDISYLATE 5 MG/ML
10 INJECTION INTRAMUSCULAR; INTRAVENOUS EVERY 6 HOURS PRN
OUTPATIENT
Start: 2025-09-15

## 2025-06-05 RX ORDER — IPRATROPIUM BROMIDE AND ALBUTEROL SULFATE 2.5; .5 MG/3ML; MG/3ML
3 SOLUTION RESPIRATORY (INHALATION)
Status: DISCONTINUED | OUTPATIENT
Start: 2025-06-05 | End: 2025-06-05

## 2025-06-05 RX ORDER — ROSUVASTATIN CALCIUM 20 MG/1
20 TABLET, COATED ORAL DAILY
Status: DISPENSED | OUTPATIENT
Start: 2025-06-05

## 2025-06-05 RX ORDER — LAMOTRIGINE 25 MG/1
500 TABLET ORAL ONCE
OUTPATIENT
Start: 2025-11-10

## 2025-06-05 RX ORDER — CETIRIZINE HYDROCHLORIDE 10 MG/1
10 TABLET ORAL DAILY
Qty: 30 TABLET | Refills: 11 | Status: ON HOLD | OUTPATIENT
Start: 2025-06-05 | End: 2026-06-05

## 2025-06-05 RX ORDER — IPRATROPIUM BROMIDE AND ALBUTEROL SULFATE 2.5; .5 MG/3ML; MG/3ML
3 SOLUTION RESPIRATORY (INHALATION) EVERY 2 HOUR PRN
Status: DISPENSED | OUTPATIENT
Start: 2025-06-05

## 2025-06-05 RX ORDER — ALBUTEROL SULFATE 0.83 MG/ML
3 SOLUTION RESPIRATORY (INHALATION) AS NEEDED
OUTPATIENT
Start: 2025-12-06

## 2025-06-05 RX ORDER — DIPHENHYDRAMINE HYDROCHLORIDE 50 MG/ML
50 INJECTION, SOLUTION INTRAMUSCULAR; INTRAVENOUS AS NEEDED
OUTPATIENT
Start: 2025-07-21

## 2025-06-05 RX ORDER — LANOLIN ALCOHOL/MO/W.PET/CERES
1000 CREAM (GRAM) TOPICAL DAILY
Status: DISPENSED | OUTPATIENT
Start: 2025-06-05

## 2025-06-05 RX ORDER — DIPHENHYDRAMINE HYDROCHLORIDE 50 MG/ML
50 INJECTION, SOLUTION INTRAMUSCULAR; INTRAVENOUS AS NEEDED
OUTPATIENT
Start: 2025-11-10

## 2025-06-05 RX ORDER — LAMOTRIGINE 25 MG/1
500 TABLET ORAL ONCE
OUTPATIENT
Start: 2026-04-23

## 2025-06-05 RX ORDER — EPINEPHRINE 0.3 MG/.3ML
0.3 INJECTION SUBCUTANEOUS EVERY 5 MIN PRN
OUTPATIENT
Start: 2025-10-13

## 2025-06-05 RX ORDER — PROCHLORPERAZINE MALEATE 10 MG
10 TABLET ORAL EVERY 6 HOURS PRN
OUTPATIENT
Start: 2026-01-03

## 2025-06-05 RX ORDER — ACETAMINOPHEN 325 MG/1
650 TABLET ORAL EVERY 4 HOURS PRN
Status: DISPENSED | OUTPATIENT
Start: 2025-06-05

## 2025-06-05 RX ORDER — CYCLOBENZAPRINE HCL 10 MG
10 TABLET ORAL 2 TIMES DAILY PRN
Status: DISPENSED | OUTPATIENT
Start: 2025-06-05

## 2025-06-05 RX ORDER — DIPHENHYDRAMINE HYDROCHLORIDE 50 MG/ML
50 INJECTION, SOLUTION INTRAMUSCULAR; INTRAVENOUS AS NEEDED
OUTPATIENT
Start: 2026-02-26

## 2025-06-05 RX ORDER — DIPHENHYDRAMINE HYDROCHLORIDE 50 MG/ML
50 INJECTION, SOLUTION INTRAMUSCULAR; INTRAVENOUS AS NEEDED
OUTPATIENT
Start: 2026-01-31

## 2025-06-05 RX ORDER — ENOXAPARIN SODIUM 100 MG/ML
40 INJECTION SUBCUTANEOUS EVERY 24 HOURS
Status: DISPENSED | OUTPATIENT
Start: 2025-06-05

## 2025-06-05 RX ORDER — PROCHLORPERAZINE EDISYLATE 5 MG/ML
10 INJECTION INTRAMUSCULAR; INTRAVENOUS EVERY 6 HOURS PRN
OUTPATIENT
Start: 2025-11-10

## 2025-06-05 RX ORDER — DIPHENHYDRAMINE HYDROCHLORIDE 50 MG/ML
50 INJECTION, SOLUTION INTRAMUSCULAR; INTRAVENOUS AS NEEDED
OUTPATIENT
Start: 2026-03-26

## 2025-06-05 RX ORDER — FAMOTIDINE 10 MG/ML
20 INJECTION, SOLUTION INTRAVENOUS ONCE AS NEEDED
OUTPATIENT
Start: 2026-04-23

## 2025-06-05 RX ORDER — LAMOTRIGINE 25 MG/1
500 TABLET ORAL ONCE
OUTPATIENT
Start: 2026-03-26

## 2025-06-05 RX ORDER — HYDROCHLOROTHIAZIDE 12.5 MG/1
12.5 TABLET ORAL DAILY
Status: DISPENSED | OUTPATIENT
Start: 2025-06-05

## 2025-06-05 RX ORDER — PROCHLORPERAZINE EDISYLATE 5 MG/ML
10 INJECTION INTRAMUSCULAR; INTRAVENOUS EVERY 6 HOURS PRN
OUTPATIENT
Start: 2025-07-07

## 2025-06-05 RX ORDER — LAMOTRIGINE 25 MG/1
500 TABLET ORAL ONCE
OUTPATIENT
Start: 2025-07-21

## 2025-06-05 RX ORDER — ONDANSETRON 4 MG/1
4 TABLET, FILM COATED ORAL EVERY 8 HOURS PRN
Status: ACTIVE | OUTPATIENT
Start: 2025-06-05

## 2025-06-05 RX ORDER — EPINEPHRINE 0.3 MG/.3ML
0.3 INJECTION SUBCUTANEOUS EVERY 5 MIN PRN
OUTPATIENT
Start: 2026-02-26

## 2025-06-05 RX ORDER — FAMOTIDINE 10 MG/ML
20 INJECTION, SOLUTION INTRAVENOUS ONCE AS NEEDED
OUTPATIENT
Start: 2025-06-23

## 2025-06-05 RX ORDER — FAMOTIDINE 10 MG/ML
20 INJECTION, SOLUTION INTRAVENOUS ONCE AS NEEDED
OUTPATIENT
Start: 2026-01-03

## 2025-06-05 RX ORDER — ALBUTEROL SULFATE 0.83 MG/ML
3 SOLUTION RESPIRATORY (INHALATION) AS NEEDED
OUTPATIENT
Start: 2025-08-18

## 2025-06-05 RX ORDER — PROCHLORPERAZINE MALEATE 10 MG
10 TABLET ORAL EVERY 6 HOURS PRN
OUTPATIENT
Start: 2026-03-26

## 2025-06-05 RX ORDER — ALBUTEROL SULFATE 0.83 MG/ML
3 SOLUTION RESPIRATORY (INHALATION) AS NEEDED
OUTPATIENT
Start: 2025-06-23

## 2025-06-05 RX ORDER — EPINEPHRINE 0.3 MG/.3ML
0.3 INJECTION SUBCUTANEOUS EVERY 5 MIN PRN
OUTPATIENT
Start: 2026-01-03

## 2025-06-05 RX ORDER — DIPHENHYDRAMINE HYDROCHLORIDE 50 MG/ML
50 INJECTION, SOLUTION INTRAMUSCULAR; INTRAVENOUS AS NEEDED
OUTPATIENT
Start: 2025-09-15

## 2025-06-05 RX ORDER — FAMOTIDINE 10 MG/ML
20 INJECTION, SOLUTION INTRAVENOUS ONCE AS NEEDED
OUTPATIENT
Start: 2026-01-31

## 2025-06-05 RX ORDER — PROCHLORPERAZINE MALEATE 10 MG
10 TABLET ORAL EVERY 6 HOURS PRN
OUTPATIENT
Start: 2026-01-31

## 2025-06-05 RX ORDER — POLYETHYLENE GLYCOL 3350 17 G/17G
17 POWDER, FOR SOLUTION ORAL DAILY PRN
Status: DISPENSED | OUTPATIENT
Start: 2025-06-05

## 2025-06-05 RX ORDER — FERROUS SULFATE 325(65) MG
65 TABLET ORAL DAILY
Status: DISPENSED | OUTPATIENT
Start: 2025-06-05

## 2025-06-05 RX ORDER — IPRATROPIUM BROMIDE AND ALBUTEROL SULFATE 2.5; .5 MG/3ML; MG/3ML
3 SOLUTION RESPIRATORY (INHALATION)
Status: DISCONTINUED | OUTPATIENT
Start: 2025-06-05 | End: 2025-06-07

## 2025-06-05 RX ORDER — LOPERAMIDE HCL 2 MG
4 TABLET ORAL AS NEEDED
Qty: 120 TABLET | Refills: 2 | Status: ON HOLD | OUTPATIENT
Start: 2025-06-05

## 2025-06-05 RX ORDER — PROCHLORPERAZINE MALEATE 10 MG
10 TABLET ORAL EVERY 6 HOURS PRN
OUTPATIENT
Start: 2025-12-06

## 2025-06-05 RX ORDER — FAMOTIDINE 10 MG/ML
20 INJECTION, SOLUTION INTRAVENOUS ONCE AS NEEDED
OUTPATIENT
Start: 2026-02-26

## 2025-06-05 RX ORDER — LAMOTRIGINE 25 MG/1
500 TABLET ORAL ONCE
OUTPATIENT
Start: 2025-08-18

## 2025-06-05 RX ORDER — ALBUTEROL SULFATE 0.83 MG/ML
3 SOLUTION RESPIRATORY (INHALATION) AS NEEDED
OUTPATIENT
Start: 2025-10-13

## 2025-06-05 RX ORDER — EPINEPHRINE 0.3 MG/.3ML
0.3 INJECTION SUBCUTANEOUS EVERY 5 MIN PRN
OUTPATIENT
Start: 2025-12-06

## 2025-06-05 RX ORDER — PROCHLORPERAZINE EDISYLATE 5 MG/ML
10 INJECTION INTRAMUSCULAR; INTRAVENOUS EVERY 6 HOURS PRN
OUTPATIENT
Start: 2025-08-18

## 2025-06-05 RX ORDER — ALBUTEROL SULFATE 0.83 MG/ML
3 SOLUTION RESPIRATORY (INHALATION) AS NEEDED
OUTPATIENT
Start: 2026-03-26

## 2025-06-05 RX ORDER — DIPHENHYDRAMINE HYDROCHLORIDE 50 MG/ML
50 INJECTION, SOLUTION INTRAMUSCULAR; INTRAVENOUS AS NEEDED
OUTPATIENT
Start: 2025-10-13

## 2025-06-05 RX ORDER — ONDANSETRON HYDROCHLORIDE 2 MG/ML
4 INJECTION, SOLUTION INTRAVENOUS EVERY 8 HOURS PRN
Status: ACTIVE | OUTPATIENT
Start: 2025-06-05

## 2025-06-05 RX ORDER — DIPHENHYDRAMINE HYDROCHLORIDE 50 MG/ML
50 INJECTION, SOLUTION INTRAMUSCULAR; INTRAVENOUS AS NEEDED
OUTPATIENT
Start: 2025-08-18

## 2025-06-05 RX ORDER — PROCHLORPERAZINE MALEATE 10 MG
10 TABLET ORAL EVERY 6 HOURS PRN
OUTPATIENT
Start: 2025-09-15

## 2025-06-05 RX ORDER — EPINEPHRINE 0.3 MG/.3ML
0.3 INJECTION SUBCUTANEOUS EVERY 5 MIN PRN
OUTPATIENT
Start: 2026-04-23

## 2025-06-05 RX ORDER — FAMOTIDINE 10 MG/ML
20 INJECTION, SOLUTION INTRAVENOUS ONCE AS NEEDED
OUTPATIENT
Start: 2025-09-15

## 2025-06-05 RX ORDER — LAMOTRIGINE 25 MG/1
500 TABLET ORAL ONCE
OUTPATIENT
Start: 2026-02-26

## 2025-06-05 RX ORDER — FAMOTIDINE 10 MG/ML
20 INJECTION, SOLUTION INTRAVENOUS ONCE AS NEEDED
OUTPATIENT
Start: 2025-07-21

## 2025-06-05 RX ORDER — FAMOTIDINE 10 MG/ML
20 INJECTION, SOLUTION INTRAVENOUS ONCE AS NEEDED
OUTPATIENT
Start: 2025-07-07

## 2025-06-05 RX ORDER — PROCHLORPERAZINE MALEATE 10 MG
10 TABLET ORAL EVERY 6 HOURS PRN
OUTPATIENT
Start: 2025-08-18

## 2025-06-05 RX ORDER — ALBUTEROL SULFATE 0.83 MG/ML
3 SOLUTION RESPIRATORY (INHALATION) AS NEEDED
OUTPATIENT
Start: 2026-02-26

## 2025-06-05 RX ORDER — PROCHLORPERAZINE EDISYLATE 5 MG/ML
10 INJECTION INTRAMUSCULAR; INTRAVENOUS EVERY 6 HOURS PRN
OUTPATIENT
Start: 2025-10-13

## 2025-06-05 RX ORDER — PROCHLORPERAZINE EDISYLATE 5 MG/ML
10 INJECTION INTRAMUSCULAR; INTRAVENOUS EVERY 6 HOURS PRN
OUTPATIENT
Start: 2025-12-06

## 2025-06-05 RX ADMIN — BUPROPION HYDROCHLORIDE 300 MG: 150 TABLET, EXTENDED RELEASE ORAL at 18:17

## 2025-06-05 RX ADMIN — HYDROXYZINE HYDROCHLORIDE 25 MG: 25 TABLET, FILM COATED ORAL at 18:17

## 2025-06-05 RX ADMIN — FERROUS SULFATE TAB 325 MG (65 MG ELEMENTAL FE) 1 TABLET: 325 (65 FE) TAB at 18:17

## 2025-06-05 RX ADMIN — Medication 1000 MCG: at 18:17

## 2025-06-05 RX ADMIN — HYDROCHLOROTHIAZIDE 12.5 MG: 12.5 TABLET ORAL at 18:17

## 2025-06-05 RX ADMIN — PIPERACILLIN SODIUM AND TAZOBACTAM SODIUM 3.38 G: 3; .375 INJECTION, SOLUTION INTRAVENOUS at 20:20

## 2025-06-05 RX ADMIN — PIPERACILLIN SODIUM AND TAZOBACTAM SODIUM 4.5 G: 4; .5 INJECTION, SOLUTION INTRAVENOUS at 14:35

## 2025-06-05 RX ADMIN — IPRATROPIUM BROMIDE AND ALBUTEROL SULFATE 3 ML: 2.5; .5 SOLUTION RESPIRATORY (INHALATION) at 17:37

## 2025-06-05 RX ADMIN — VANCOMYCIN HYDROCHLORIDE 1.5 G: 5 INJECTION, POWDER, LYOPHILIZED, FOR SOLUTION INTRAVENOUS at 16:42

## 2025-06-05 RX ADMIN — MELATONIN TAB 3 MG 6 MG: 3 TAB at 20:20

## 2025-06-05 RX ADMIN — ROSUVASTATIN 20 MG: 20 TABLET, FILM COATED ORAL at 18:17

## 2025-06-05 RX ADMIN — IPRATROPIUM BROMIDE AND ALBUTEROL SULFATE 3 ML: 2.5; .5 SOLUTION RESPIRATORY (INHALATION) at 20:16

## 2025-06-05 SDOH — SOCIAL STABILITY: SOCIAL INSECURITY: WITHIN THE LAST YEAR, HAVE YOU BEEN AFRAID OF YOUR PARTNER OR EX-PARTNER?: NO

## 2025-06-05 SDOH — SOCIAL STABILITY: SOCIAL INSECURITY: ABUSE: ADULT

## 2025-06-05 SDOH — ECONOMIC STABILITY: INCOME INSECURITY: IN THE PAST 12 MONTHS HAS THE ELECTRIC, GAS, OIL, OR WATER COMPANY THREATENED TO SHUT OFF SERVICES IN YOUR HOME?: NO

## 2025-06-05 SDOH — ECONOMIC STABILITY: FOOD INSECURITY: WITHIN THE PAST 12 MONTHS, YOU WORRIED THAT YOUR FOOD WOULD RUN OUT BEFORE YOU GOT THE MONEY TO BUY MORE.: NEVER TRUE

## 2025-06-05 SDOH — SOCIAL STABILITY: SOCIAL INSECURITY: DOES ANYONE TRY TO KEEP YOU FROM HAVING/CONTACTING OTHER FRIENDS OR DOING THINGS OUTSIDE YOUR HOME?: NO

## 2025-06-05 SDOH — SOCIAL STABILITY: SOCIAL INSECURITY: ARE THERE ANY APPARENT SIGNS OF INJURIES/BEHAVIORS THAT COULD BE RELATED TO ABUSE/NEGLECT?: NO

## 2025-06-05 SDOH — SOCIAL STABILITY: SOCIAL INSECURITY: WITHIN THE LAST YEAR, HAVE YOU BEEN HUMILIATED OR EMOTIONALLY ABUSED IN OTHER WAYS BY YOUR PARTNER OR EX-PARTNER?: NO

## 2025-06-05 SDOH — SOCIAL STABILITY: SOCIAL INSECURITY: HAVE YOU HAD THOUGHTS OF HARMING ANYONE ELSE?: NO

## 2025-06-05 SDOH — SOCIAL STABILITY: SOCIAL INSECURITY: HAS ANYONE EVER THREATENED TO HURT YOUR FAMILY OR YOUR PETS?: NO

## 2025-06-05 SDOH — SOCIAL STABILITY: SOCIAL INSECURITY: DO YOU FEEL UNSAFE GOING BACK TO THE PLACE WHERE YOU ARE LIVING?: NO

## 2025-06-05 SDOH — ECONOMIC STABILITY: FOOD INSECURITY: WITHIN THE PAST 12 MONTHS, THE FOOD YOU BOUGHT JUST DIDN'T LAST AND YOU DIDN'T HAVE MONEY TO GET MORE.: NEVER TRUE

## 2025-06-05 SDOH — SOCIAL STABILITY: SOCIAL INSECURITY: ARE YOU OR HAVE YOU BEEN THREATENED OR ABUSED PHYSICALLY, EMOTIONALLY, OR SEXUALLY BY ANYONE?: NO

## 2025-06-05 SDOH — SOCIAL STABILITY: SOCIAL INSECURITY: HAVE YOU HAD ANY THOUGHTS OF HARMING ANYONE ELSE?: NO

## 2025-06-05 SDOH — SOCIAL STABILITY: SOCIAL INSECURITY: DO YOU FEEL ANYONE HAS EXPLOITED OR TAKEN ADVANTAGE OF YOU FINANCIALLY OR OF YOUR PERSONAL PROPERTY?: NO

## 2025-06-05 SDOH — SOCIAL STABILITY: SOCIAL INSECURITY: WERE YOU ABLE TO COMPLETE ALL THE BEHAVIORAL HEALTH SCREENINGS?: YES

## 2025-06-05 ASSESSMENT — COGNITIVE AND FUNCTIONAL STATUS - GENERAL
WALKING IN HOSPITAL ROOM: A LITTLE
WALKING IN HOSPITAL ROOM: A LITTLE
CLIMB 3 TO 5 STEPS WITH RAILING: A LITTLE
MOBILITY SCORE: 22
MOBILITY SCORE: 22
PATIENT BASELINE BEDBOUND: NO
CLIMB 3 TO 5 STEPS WITH RAILING: A LITTLE
DAILY ACTIVITIY SCORE: 24
DAILY ACTIVITIY SCORE: 24

## 2025-06-05 ASSESSMENT — LIFESTYLE VARIABLES
HOW OFTEN DO YOU HAVE A DRINK CONTAINING ALCOHOL: NEVER
HOW OFTEN DO YOU HAVE 6 OR MORE DRINKS ON ONE OCCASION: NEVER
HOW MANY STANDARD DRINKS CONTAINING ALCOHOL DO YOU HAVE ON A TYPICAL DAY: PATIENT DOES NOT DRINK
SKIP TO QUESTIONS 9-10: 1
AUDIT-C TOTAL SCORE: 0
AUDIT-C TOTAL SCORE: 0

## 2025-06-05 ASSESSMENT — PAIN SCALES - GENERAL
PAINLEVEL_OUTOF10: 0 - NO PAIN
PAINLEVEL_OUTOF10: 0-NO PAIN
PAINLEVEL_OUTOF10: 0 - NO PAIN

## 2025-06-05 ASSESSMENT — ENCOUNTER SYMPTOMS: SHORTNESS OF BREATH: 1

## 2025-06-05 ASSESSMENT — PAIN - FUNCTIONAL ASSESSMENT
PAIN_FUNCTIONAL_ASSESSMENT: 0-10

## 2025-06-05 ASSESSMENT — ACTIVITIES OF DAILY LIVING (ADL)
WALKS IN HOME: INDEPENDENT
DRESSING YOURSELF: INDEPENDENT
ASSISTIVE_DEVICE: WALKER
WALKS IN HOME: INDEPENDENT
TOILETING: INDEPENDENT
FEEDING YOURSELF: INDEPENDENT
TOILETING: INDEPENDENT
ADEQUATE_TO_COMPLETE_ADL: YES
HEARING - LEFT EAR: FUNCTIONAL
LACK_OF_TRANSPORTATION: NO
ADEQUATE_TO_COMPLETE_ADL: YES
ASSISTIVE_DEVICE: WALKER
JUDGMENT_ADEQUATE_SAFELY_COMPLETE_DAILY_ACTIVITIES: YES
HEARING - LEFT EAR: FUNCTIONAL
BATHING: INDEPENDENT
LACK_OF_TRANSPORTATION: NO
BATHING: INDEPENDENT
PATIENT'S MEMORY ADEQUATE TO SAFELY COMPLETE DAILY ACTIVITIES?: YES
GROOMING: INDEPENDENT
LACK_OF_TRANSPORTATION: NO
GROOMING: INDEPENDENT
HEARING - RIGHT EAR: FUNCTIONAL
PATIENT'S MEMORY ADEQUATE TO SAFELY COMPLETE DAILY ACTIVITIES?: YES
JUDGMENT_ADEQUATE_SAFELY_COMPLETE_DAILY_ACTIVITIES: YES
HEARING - RIGHT EAR: FUNCTIONAL
DRESSING YOURSELF: INDEPENDENT
FEEDING YOURSELF: INDEPENDENT

## 2025-06-05 ASSESSMENT — PATIENT HEALTH QUESTIONNAIRE - PHQ9
SUM OF ALL RESPONSES TO PHQ9 QUESTIONS 1 & 2: 0
2. FEELING DOWN, DEPRESSED OR HOPELESS: NOT AT ALL
1. LITTLE INTEREST OR PLEASURE IN DOING THINGS: NOT AT ALL

## 2025-06-05 ASSESSMENT — COLUMBIA-SUICIDE SEVERITY RATING SCALE - C-SSRS
2. HAVE YOU ACTUALLY HAD ANY THOUGHTS OF KILLING YOURSELF?: NO
6. HAVE YOU EVER DONE ANYTHING, STARTED TO DO ANYTHING, OR PREPARED TO DO ANYTHING TO END YOUR LIFE?: NO
1. IN THE PAST MONTH, HAVE YOU WISHED YOU WERE DEAD OR WISHED YOU COULD GO TO SLEEP AND NOT WAKE UP?: NO

## 2025-06-05 NOTE — Clinical Note
12 F chest tube inserted to L mid back. No labs sent. Patient tolerated procedure well, VSS at this time. Post procedure chest XR ordered. Report called to bedside nurse.

## 2025-06-05 NOTE — PROGRESS NOTES
Reviewed Truqap and faslodex dose, frequency, administration, treatment cycle, duration of therapy, and missed doses. Counseled on potential side effects including but not limited to chemotherapy side effects: neutropenia, infection risk, anemia, fatigue, weakness, low energy, thrombocytopenia, bleeding/bruising, n/v, diarrhea, muscle or joint pain, mucositis, and skin rash. Discussed techniques to mitigate severity of side effects such as blood count checks, temperature checks, electrolyte monitoring, antiemetic use, loperamide use with max dose of 8 tabs per 24 hours, and staying hydrated if having diarrhea.  Provided medication/regimen handout.  All questions answered and contact information was given to patient.      Joycelyn ortiz. Check Truqap PA status 6/12. Patient to start on 6/30

## 2025-06-05 NOTE — PROGRESS NOTES
Mildred Moyer   Female,   1955  MRN: [id number]    Patient Visit Information:   Visit Type: Follow Up Visit      Cancer History:   Treatment Synopsis:    1.  invasive ductal carcinoma right breast, stage IV, right pleural effusion, multiple hepatic lesions     Patient is a 70-year-old female with history of MS, chair bound who was admitted in the hospital because of shortness of breath.        March 10, 2023 CAT scan of chest did show right pleural effusion, right breast mass 12 o'clock position, multiple hepatic lesion and multiple pulmonary nodule.      March 11, 2023, status post paracentesis, cytology did show atypical cells      March 13, 2023, ultrasound-guided biopsy of right breast mass done pathology positive for invasive ductal carcinoma, grade 2, ER 95%, [place] 60%, HER2 negative  , Ultrasound biopsy of right axillary lymph node done and negative for malignancy.     4/25/23 , PET scan did show abnormal metabolic activity of right breast, pulmonary nodules, mediastinal lymph node, hepatic lesion.     Current treatment, letrozole, Kisqali      8/11/23 , CT of chest abdominal pelvis, partial response, right breast mass, pulmonary nodules decreased in size   5/2/24 , PET , excellent partial response, left breast mass decreased in size, pulmonary nodules and adrenal nodule decreased in size hepatic nodule resolved  History of right pleural effusion and recurrent thoracentesis, large loculated  Thoracic surgical evaluation  10/10/24 ,  Thoracoscopy; Right PleurX Placement, with pleural biopsies with Dr Shepherd  Findings: Completely trapped lung, diffusely thickened parietal and visceral pleura   4/13/25 , CT chest, Interval worsening of oncologic findings including increased size of the primary right breast mass, increased right axillary and supraclavicular metastatic lymphadenopathy, increased pleural metastases, increased size of pulmonary metastases,  and new liver metastases.  Will large right pleural effusion and moderate left pleural effusion  4/14/25 , status post left thoracentesis  5/2/25 , right breast needle biopsy, pathology invasive ductal carcinoma grade 2, ER 90 to 100%, [place] 10 to 20%, HER2 negative  NGS  MICROSATELLITE STATUS: Cannot Be Determined.    DISEASE ASSOCIATED GENOMIC FINDINGS:   CCND1 amplification  PIK3CA p.[id number] ([id number] c.[id number])  PTEN p.R130G ([id number] c.388C>G)    DISEASE RELEVANT ALTERATIONS NOT DETECTED:   Negative for AKT1 activating mutation.  Negative for BRAF V600E.  Negative for ESR1 activating mutation  5/29/5 , left thoracentesis    History of Present Illness:        ID Statement:    OLINDA GUEVARA is a 68 year old Female        Chief Complaint: Right breast cancer   Interval History:    6/5/25  Has a history of metastatic breast cancer taking letrozole and Kisqali.    Patient history of recurrent right pleural effusion status post multiple thoracentesis and was diagnosed with loculated pleural effusion, thoracic surgical evaluation and on 10/10/24  s/pThoracoscopy; Right PleurX Placement, with pleural biopsies with Dr Shepherd  Findings: Completely trapped lung, diffusely thickened parietal and visceral pleura    Patient admitted in the hospital with shortness of breath CAT scan of the chest did show bilateral pleural effusion and increased size of pulmonary nodules in the right breast mass consistent with progression of disease.  Last left thoracentesis was done on May 29, 2025   Right breast mass needle biopsy done positive invasive ductal carcinoma and NGS did show , CCND1 amplification  PIK3CA p.[id number] ([id number] c.[id number]) , PTEN p.R130G ([id number] c.388C>G)    Patient has difficulty breathing for started last week and patient not ambulating even catching breath after 2 few minute talks.    Briefly pathology and NGS report discussed with patient and family  HPI  Patient is a  68-year-old female with history of MS, chair bound who was admitted in the hospital because of shortness of breath.      March 10, 2023 CAT scan of chest did show right pleural effusion, right breast mass 12 o'clock position, multiple hepatic lesion.      March 11, 2023, status post paracentesis, cytology did show atypical cells      March 13, 2023, ultrasound-guided biopsy of right breast mass done pathology positive for invasive ductal carcinoma, grade 2, ER 95%, [place] 60%, HER2 negative , Ultrasound biopsy of right axillary lymph node done and negative for malignancy.     Medical oncology consultation requested, patient is very anxious about her diagnosis.        Pathology report discussed with the patient.      Patient is sitting on chair, very anxious, no headache and dizziness, no shortness of breath, no nausea vomiting, no abdominal pain, generalized weakness and fatigue.        Review of Systems:   Review of Systems:    No headache or dizziness      No fever      some shortness of breath which is increasing        No breast tenderness      No nausea vomiting      No abdominal pain      Generalized weakness      All body pain, arthritis      Patient not active difficult to ambulate due to Morphine Sulfate        Allergies and Intolerances:       Allergies:         contrast (specific type unknown): Contrast, Anaphylaxis,  Active         iodine: Drug, Anaphylaxis, Active         Shellfish: Food, Anaphylaxis, Active     Outpatient Medication Profile:  * Patient Currently Takes Medications as of 07-Sep-2023 11:20 documented in Structured Notes         disability placard : valid for 5 years         exp: 9/2028, Start Date: 07-Sep-2023         Kisqali (200 mg daily dose) oral tablet: Last Dose Taken:  , 3 tab(s)  orally once a day  daily for 21 days then 7 days off , Start Date: 16-Aug-2023         letrozole 2.5 mg oral tablet: Last Dose Taken:  , 1 tab(s) orally once  a day , Start Date: 31-Jul-2023         portable  home oxygen concentrator 2L/min continuously via nasal canula : Last Dose Taken:  , Start Date: 27-Apr-2023         image guided biopsy of liver lesion : Last Dose Taken:  , Start Date:  30-Mar-2023         K-Tab 20 mEq oral tablet, extended release: Last Dose Taken:  , 2 tab(s)  orally once a day , Start Date: 14-Mar-2023         amoxicillin-clavulanate 875 mg-125 mg oral tablet: Last Dose Taken:   , 1 tab(s) orally 2 times a day , Start Date: 14-Mar-2023         buPROPion 300 mg/24 hours (XL) oral tablet, extended release: Last Dose  Taken:  , 1 tab(s) orally every 24 hours         carBAMazepine 100 mg oral tablet, chewable: Last Dose Taken:  , 1 tab  by mouth 3 times daily. Then 3 tabs by mouth once every evening         cyclobenzaprine 10 mg oral tablet: Last Dose Taken:  , 1 tab(s) orally  2 times a day         hydroCHLOROthiazide 12.5 mg oral capsule: Last Dose Taken:  , 1 cap(s)  orally once a day         hydrOXYzine hydrochloride 25 mg oral tablet: Last Dose Taken:  , 1 tab(s)  orally once a day         hydrOXYzine hydrochloride 50 mg oral tablet: Last Dose Taken:  , 1 tab(s)  orally once a day (at bedtime)         ibuprofen 800 mg oral tablet: Last Dose Taken:  , 1 tab(s) orally 2 times  a day         MethylPREDNISolone Dose Pack 4 mg oral tablet: Last Dose Taken:  , Day  3: 1 tab by mouth four times a day         Day 4: 1 tab by mouth three times a day          Day 5: 1 tab by mouth twice a day         day 6: 1 tab by mouth once          rosuvastatin 20 mg oral tablet: Last Dose Taken:  , 1 tab(s) orally once  a day         Vitamin B12 1000 mcg oral tablet: Last Dose Taken:  , 1 tab(s) orally  once a day         azithromycin 250 mg oral tablet: Last Dose Taken:  , 3 tabs by mouth  once every evening          melatonin 10 mg oral tablet, extended release: Last Dose Taken:  , 1  tab(s) orally once a day (at bedtime)         dextromethorphan-quinidine 20 mg-10 mg oral capsule: Last Dose Taken:   , 1 cap(s)  orally every 12 hours         omeprazole 20 mg oral delayed release capsule: Last Dose Taken:  , 1  cap(s) orally once a day             Medical History:         Hypoxia: ICD-10: R09.02, Status: Active         Pleural effusion on right: ICD-10: J90, Status: Active         Infiltrating ductal carcinoma of right breast, stage 4: ICD-10:  C50.911, Status: Active         MS (multiple sclerosis): ICD-10: G35, Status: Active     Family History: No Family History items are recorded  in the problem list.      Social History:   Social Substance History:  ·  Smoking Status never smoker (1)   ·  Alcohol Use denies   ·  Drug Use denies            Vitals and Measurements:   Vitals: Temp: 36.5  HR: NA  RR: 16  BP: 103/63  SPO2%:   NA   Measurements: HT(cm): 162.4  WT(kg): 53.8  BSA: 1.55   BMI:  20.3   Second Set of Vitals/Vitals Comment: unable to get  patient's pulse ox(2)   Last 3 Weights & Heights: Date:                           Weight/Scale Type:                    Height:   07-Sep-2023 10:40                53.8  kg                     162.4  cm  08-Jun-2023 14:55                63.2  kg                     162.4  cm  27-Apr-2023 11:20                69.5  kg                     162.4  cm      Physical Exam:      Constitutional: awake/alert/oriented x3, no distress,  very anxious, sitting on chair   Eyes: PERRL, EOMI, clear sclera   Head/Neck: Neck supple, no cervical lymphadenopathy   Respiratory/Thorax: Decreased breath sounds of both side   Cardiovascular: Regular, rate and rhythm,  normal  S 1and S 2   Gastrointestinal: Nondistended, soft, non-tender,   no masses palpable, no organomegaly, +BS,   Extremities: normal extremities, no cyanosis edema,   Neurological: alert and oriented x3,   Breast: Right breast examination did show a mass  measuring 2x3cm on 12 o'clock position     Left breast examination within normal limits   Lymphatic: No significant lymphadenopathy   Psychological: Patient is very anxious   Skin: Warm  and dry, no lesions, no rashes         Lab Results:                          Radiology report  4/13/25 , CT chest,Large right pleural effusion with foci of air throughout the right  pleural space and a PleurX catheter in place. As before, the right  lung is predominantly collapsed with minimal aeration of the right  upper lobe.  2. Moderate left pleural effusion, increased compared to prior CT  from 09/10/2024.  3. Interval worsening of oncologic findings including increased size  of the primary right breast mass, increased right axillary and  supraclavicular metastatic lymphadenopathy, increased pleural  metastases, increased size of pulmonary metastases, and new liver  metastases.     ·  Results      , Surgical Pathology Exam: [id number]  Order: [id number]   Collected 10/10/2024 11:25       Status: Final result       Visible to patient: Yes (not seen)       Dx: Pleural effusion; Secondary malignant...    0 Result Notes       Component  Resulting Agency   FINAL DIAGNOSIS    A.  Lung, right pleural biopsy:  - Acute fibrinous pleuritis with atypia.  See note     Note:  Microscopic examination of H&E sections demonstrates acute fibrinous pleuritis with scattered cellular atypia.  AE1/AE3, CK7 and calretinin highlight some of the atypical cells, consistent with reactive mesothelial cells.  Additionally there is lymphoplasmacytic inflammation.  TRPS 1 highlights inflammatory cells.. WT1 is not contributory.  [place] red is negative for amyloid.      Focused Solid Tumor Assay: [id number]  Order: [id number] - Reflex for Order [id number]   Collected 5/2/2025 10:47       Status: Final result    Test Result Released: Yes (seen)    0 Result Notes      Component    Focused Solid Tumor DNA Results    Specimen: FFPE, [id number]  Estimated Tumor Content: 40%     MICROSATELLITE STATUS: Cannot Be Determined.        DISEASE ASSOCIATED GENOMIC FINDINGS:   CCND1 amplification  PIK3CA p.[id number] ([id number]  c.[id number])  PTEN p.R130G ([id number] c.388C>G)     INTERPRETATION AND POTENTIAL THERAPEUTIC OPTIONS:  PTEN p.R130G  FDA RECOMMENDATIONS (Advanced HR+Her2- Breast Cancer):  capivasertib + fulvestrant     PIK3CA p.[id number]  NCCN RECOMMENDATIONS (Advanced HR+Her2- Breast Cancer):  alpelisib + fulvestrant  capivasertib + fulvestrant        DISEASE RELEVANT ALTERATIONS NOT DETECTED:   Negative for AKT1 activating mutation.  Negative for BRAF V600E.  Negative for ESR1 activating mutation.              Assessment and Plan:      Assessment and Plan:   Assessment:    1 invasive ductal carcinoma right breast, stage IV, right pleural effusion, multiple hepatic lesions  ER positive, [place] positive, HER2 negative  Current treatment with letrozole started in April 2023     Patient is a 68-year-old female with history of MS, chair bound who was admitted in the hospital because of shortness of breath.      March 10, 2023 CAT scan of chest did show right pleural effusion, right breast mass 12 o'clock position, multiple hepatic lesion.      March 11, 2023, status post paracentesis, cytology did show atypical cells      March 13, 2023, ultrasound-guided biopsy of right breast mass done pathology positive for invasive ductal carcinoma, grade 2, ER 95%, [place] 60%, HER2 negative , Ultrasound biopsy of right axillary lymph node done and negative for malignancy.     Current treatment, letrozole 2.5 mg p.o. daily, kisqali     8/11/23 , CT of CAP , partial response   5/2/24 , PET , excellent partial response, left breast mass decreased in size, pulmonary nodules and adrenal nodule decreased in size hepatic nodule resolved  History of right pleural effusion and recurrent thoracentesis, large loculated  Thoracic surgical evaluation  10/10/24 ,  Thoracoscopy; Right PleurX Placement, with pleural biopsies with Dr Shepherd  Findings: Completely trapped lung, diffusely thickened parietal and visceral pleura   History of Present Illness:    4/13/25 , CT chest, Interval worsening of oncologic findings including increased size of the primary right breast mass, increased right axillary and supraclavicular metastatic lymphadenopathy, increased pleural metastases, increased size of pulmonary metastases, and new liver metastases.  Will large right pleural effusion and moderate left pleural effusion  4/14/25 , status post left thoracentesis  5/2/25 , right breast needle biopsy, pathology invasive ductal carcinoma grade 2, ER 90 to 100%, [place] 10 to 20%, HER2 negative  NGS  MICROSATELLITE STATUS: Cannot Be Determined.    DISEASE ASSOCIATED GENOMIC FINDINGS:   CCND1 amplification  PIK3CA p.[id number] ([id number] c.[id number])  PTEN p.R130G ([id number] c.388C>G)  DISEASE RELEVANT ALTERATIONS NOT DETECTED:   Negative for AKT1 activating mutation.  Negative for BRAF V600E.  Negative for ESR1 activating mutation  5/29/5 , left thoracentesis    4/23/25     Metastatic breast cancer, ER positive [place] positive,  Currently patient is taking letrozole 2.5 mg p.o. daily  and Kisqali .  Her disease is progressive visit documented by recent CAT scan study.     Right breast biopsy positive invasive ductal carcinoma and NGS did show   MICROSATELLITE STATUS: Cannot Be Determined.    DISEASE ASSOCIATED GENOMIC FINDINGS:   CCND1 amplification  PIK3CA p.[id number] ([id number] c.[id number])  PTEN p.R130G ([id number] c.388C>G)  DISEASE RELEVANT ALTERATIONS NOT DETECTED:   Negative for AKT1 activating mutation.  Negative for BRAF V600E.  Negative for ESR1 activating mutation        The patient is symptomatic because of shortness of breath which is due to pleural effusion.  Last left thoracentesis was done only 1 week ago.  I will send to emergency department and possibly patient need chest tube.    Briefly pathology report discussed with the patient patient has progressive disease which is ER positive HER2 negative on the basis of NGS study patient will be treated with  Capivasertib and fulvestrant.  Possible side effect discussed with the patient and basic information also provided to patient.  Patient was also evaluated by pharmacist.    Consent obtained.    Today I will send to the emergency department for thoracentesis and possible chest tube reevaluation after 1 to 2 weeks then we will start her palliative therapy for recurrent progressive breast cancer.    Family meeting today by phone            Time spent 40 minutes.  .

## 2025-06-05 NOTE — ED TRIAGE NOTES
Pt dropped off at ambulance bay by brother. Brother stated Pt needs a chest tube but they were unable to do it in [place]. Pt is on 3L NC at baseline.

## 2025-06-05 NOTE — Clinical Note
400ml serosanguinous Fluid drained from Left Pleura.  Labs sent.  Pt VSS with no complaints at this time. Pt tolerated procedure well with no complications.  Report called to bedside nurse.

## 2025-06-05 NOTE — PATIENT INSTRUCTIONS
Follow up visit with Dr. Guzman for history of metastatic breast cancer.     You were consented for treatment with capivasertib and faslodex.     Capicasertib prescription will be sent to  specialty pharmacy. They will call you to arrange delivery once approved by insurance. You will start medication on a Monday as it is to be taken Monday to Thursday every week. Please call Dr. Guzman's office when you receive the prescription.     Faslodex injections will be scheduled every 2 weeks for 3 injections and then monthly after that.     Follow up with Dr. Guzman on day of first injection.     Call your cancer care team when your oral treatment arrives. Confirm your start date and treatment schedule with them before you start taking your oral treatment.  Questions about your oral treatment delivery?                                       Call the  Specialty Pharmacy at  [phone]

## 2025-06-05 NOTE — PROGRESS NOTES
06/05/25 1445   [facility] Disability Status   Are you deaf or do you have serious difficulty hearing? N   Are you blind or do you have serious difficulty seeing, even when wearing glasses? N   Because of a physical, mental, or emotional condition, do you have serious difficulty concentrating, remembering, or making decisions? (5 years old or older) Y  (metastatic lung cancer)   Do you have serious difficulty walking or climbing stairs? Y  (rollator & WC)   Do you have serious difficulty dressing or bathing? Y   Because of a physical, mental, or emotional condition, do you have serious difficulty doing errands alone such as visiting the doctor? Y

## 2025-06-05 NOTE — CARE PLAN
Mildred presents to [facility] for worsening SOB. Hx of metastatic breast cancer, mets to liver/lung and recurrent pleural effusions requiring repeat thoracentesis on the left and past history of a pleurX on the right. The right sided pleurX cath was recently removed by Dr. Shepherd 4/29/25 for decreased outputs and no relief of symptoms.     IR has been consulted to place a left sided pleurX catheter. Most recent thora (left side) 5/29/25, drained 1.1L. CXR from today with bilateral effusions, and partial collapse of the right lung largely unchanged.     IR to offer a left sided thoracentesis tomorrow and can set up for OP pleurX catheter placement. Plan of care discussed with Alyce Rothman, CNP

## 2025-06-05 NOTE — ED PROVIDER NOTES
Emergency Department Provider Note       History of Present Illness     History provided by: Patient  Limitations to History: None  External Records Reviewed with Brief Summary: History of metastatic breast cancer with recurrent pleural effusions.  Patient has had a Pleurx catheter in the right side in the past.  She now has recurrent left-sided pleural effusions.  She recently had thoracentesis performed.    HPI:  Mildred Moyer is a 70 y.o. female who presents to the emergency department with shortness of breath.  Patient has a history of metastatic lung cancer.  She has recurrent pleural effusions.  She was seen by her oncologist and sent to the emergency department for a Pleurx catheter placement.  The patient had an history of a right-sided Pleurx catheter which has since been removed.  The patient now has recurrent left-sided pleural effusions.  She was recently seen and had the effusion drained but it has recurred.  The patient is on oxygen at home.    Physical Exam   Triage vitals:  T 35.7 °C (96.3 °F)  HR (!) 108  /77  RR 14  O2 98 % None (Room air)    Physical Exam  Vitals and nursing note reviewed.   Constitutional:       Comments: Thin and frail appearing   HENT:      Head: Normocephalic and atraumatic.      Nose: Nose normal.   Eyes:      Conjunctiva/sclera: Conjunctivae normal.   Cardiovascular:      Rate and Rhythm: Normal rate and regular rhythm.      Pulses: Normal pulses.      Heart sounds: Normal heart sounds.   Pulmonary:      Effort: Pulmonary effort is normal.      Breath sounds: Examination of the right-upper field reveals decreased breath sounds. Examination of the right-middle field reveals decreased breath sounds. Examination of the left-middle field reveals decreased breath sounds. Examination of the right-lower field reveals decreased breath sounds. Examination of the left-lower field reveals decreased breath sounds. Decreased breath sounds present.   Abdominal:       General: Bowel sounds are normal.      Palpations: Abdomen is soft.   Musculoskeletal:         General: Normal range of motion.      Cervical back: Normal range of motion and neck supple.   Skin:     Findings: No rash.   Neurological:      General: No focal deficit present.      Mental Status: She is alert and oriented to person, place, and time.   Psychiatric:         Mood and Affect: Mood normal.           Medical Decision Making & ED Course   Medical Decision Makin y.o. female who presents to the emergency department due to shortness of breath.  The patient was found to have recurrent pleural effusion.  The patient's white blood count is elevated.  Will give antibiotics.  Thoracic surgery was consulted.  They will see the patient to determine feasibility of a Pleurx catheter.  She requires admission for further evaluation and management.  ----      Differential diagnoses considered include but are not limited to: COPD, asthma, CHF, DKA, ACS, pneumonia, pleural effusion, worsening metastatic disease, pneumothorax, pulmonary embolus, sepsis, bronchitis, foreign body aspiration.    Social Determinants of Health which Significantly Impact Care: Social Determinants of Health which Significantly Impact Care: None identified     EKG Independent Interpretation: EKG not obtained    Independent Result Review and Interpretation: Relevant laboratory and radiographic results were reviewed and independently interpreted by myself.  As necessary, they are commented on in the ED Course.    Chronic conditions affecting the patient's care: As documented above in [facility]    The patient was discussed with the following consultants/services: Hospitalist/Admitting Provider who accepted the patient for admission    Care Considerations: As documented above in [facility]    ED Course:  Diagnoses as of 25 1358   Pleural effusion   Leukocytosis, unspecified type       Disposition   As a result of their workup, the patient will require  admission to the hospital.  The patient was informed of her diagnosis.  The patient was given the opportunity to ask questions and I answered them. The patient agreed to be admitted to the hospital.    Procedures   Procedures        Himanshu Farias MD  Emergency Medicine                                                       Himanshu Farias MD  06/05/25 9863

## 2025-06-05 NOTE — H&P
Mildred Moyer is a 70 y.o. female   Shortness of Breath       Patient with a past medical history of breast carcinoma with meta stasis recurrent pleural effusions on the right side which needle PleurX catheter chronic hypoxic respiratory failure on 3 L of oxygen hypertension dyslipidemia who has had thoracentesis done on the left side for few times now presents with worsening shortness of breath and recurrent pleural effusion  Also noted to have elevated white count  She denies any fevers chills cough nausea vomiting or diarrhea    Past Medical History  Medical History[1]    Surgical History  Surgical History[2]     Social History  She reports that she quit smoking about 42 years ago. Her smoking use included cigarettes. She started smoking about 44 years ago. She has a 1 pack-year smoking history. She has never used smokeless tobacco. She reports that she does not currently use alcohol. She reports that she does not use drugs.    Family History  Family History[3]     Allergies  Iodinated contrast media, Iodine, Shellfish containing products, and Oxymetazoline    Review of Systems   Respiratory:  Positive for shortness of breath.         Constitutional: Feeling weak  Eyes: no blurred vision and no diplopia.   ENT: no hearing loss, no tinnitus, no earache, no sore throat, no hoarseness and no swollen glands in the neck.   Cardiovascular: no chest pain, no tightness or heavy pressure,   Respiratory: Shortness of breath  Gastrointestinal: no change in bowel habits, no diarrhea, no constipation, no bloody stools, no nausea, no vomiting, no abdominal pain, no signs and symptoms of ulcer disease, no marilin colored stools and no intolerance to fatty foods.   Genitourinary: no urinary frequency, no dysuria, no hematuria, no burning sensation during urination, urinary stream is not smaller and urinary stream does not start and stop.   Musculoskeletal: no arthralgias, no joint stiffness, no muscle weakness, no back pain and  no difficulty walking.   Skin: no rashes, no change in skin color and pigmentation, no skin lesions and no skin lumps.   Neurological: no headaches, no dizziness, no seizures, no tingling, no numbness, no signs and symptoms of stroke and no limb weakness.   Psychiatric: no confusion, no memory lapses or loss, no depression and no sleep disturbances.   Endocrine: no goiter, no thyroid disorder, no diabetes mellitus, no excessive thirst, no dry skin, no cold intolerance, no heat intolerance and no increased urinary frequency.   Hematologic/Lymphatic: is not slow to heal, does not bleed easily, does not bruise easily, no thrombophlebitis, no anemia and no history of blood transfusion.   All other systems have been reviewed and are negative for complaint.     Vitals:    06/05/25 1737   BP:    Pulse: (!) 119   Resp: (!) 29   Temp:    SpO2:         Scheduled medications  Scheduled Medications[4]  Continuous medications  Continuous Medications[5]  PRN medications  PRN Medications[6]    Results from last 7 days   Lab Units 06/05/25  1210   WBC AUTO x10*3/uL 19.6*   HEMOGLOBIN g/dL 15.1   HEMATOCRIT % 47.2*   PLATELETS AUTO x10*3/uL 714*     Results from last 7 days   Lab Units 06/05/25  1210   SODIUM mmol/L 136   POTASSIUM mmol/L 4.7   CHLORIDE mmol/L 93*   CO2 mmol/L 27   BUN mg/dL 22   CREATININE mg/dL 0.88   CALCIUM mg/dL 10.4*   PROTEIN TOTAL g/dL 8.0   BILIRUBIN TOTAL mg/dL 0.5   ALK PHOS U/L 72   ALT U/L 13   AST U/L 27   GLUCOSE mg/dL 116*            XR chest 1 view   Final Result   No change in the bilateral effusions, bilateral infiltrate, and   partial collapse of the right lung.        MACRO:   none        Signed by: Lc Munoz 6/5/2025 12:42 PM   Dictation workstation:   [id number]      Consult to Interventional Radiology    (Results Pending)       Physical Exam      Constitutional   General appearance: Alert thin appearing  Eyes   Inspection of eyes: Sclera and conjunctiva were normal.    Pupil exam:  Pupils were equal in size. Extraocular movements were intact.   Pulmonary   Respiratory assessment: On 3 L of oxygen with bilateral crackles  Cardiovascular   Auscultation of heart: Apical pulse normal, heart rate and rhythm normal, normal S1 and S2, no murmurs and no pericardial rub.    Exam for edema: No peripheral edema.   Abdomen   Abdominal Exam: No bruits, normal bowel sounds, soft, non-tender, no abdominal mass palpated.    Liver and Spleen exam: No hepato-splenomegaly.   Musculoskeletal     Inspection/palpation of joints, bones and muscles: No joint swelling. Normal movement of all extremities.   Skin   Skin inspection: Normal skin color and pigmentation, normal skin turgor and no visible rash.   Neurologic   Cranial nerves: Nerves 2-12 were intact, no focal neuro defects.   Psychiatric   Orientation: Oriented to person, place, and time.    Mood and affect: Normal.      Assessment/Plan      #Recurrent pleural effusion  #Breast cancer with meta stasis  CT surgery consulted for repeat thoracentesis and possible Pleurx catheter    #Leukocytosis  #Partial collapse of right lung  Starting IV antibiotics  Pulmonary consult    #Hypertension  #Dyslipidemia  #Chronic respiratory failure with hypoxia  Resume home medications and DuoNebs       [1]   Past Medical History:  Diagnosis Date    GERD (gastroesophageal reflux disease)     HLD (hyperlipidemia)     HTN (hypertension)     Liver disease     Lung neoplasm     MS (multiple sclerosis) (Multi)     Pleural effusion    [2]   Past Surgical History:  Procedure Laterality Date    DILATION AND CURETTAGE OF UTERUS      PERCUTANEOUS CHEST DRAIN INSERTION Right 10/10/2024    Thoracoscopy; Right PleurX Placement, with pleural biopsies   [3] No family history on file.  [4] buPROPion XL, 300 mg, oral, q24h  capivasertib, 400 mg, oral, BID  cyanocobalamin, 1,000 mcg, oral, Daily  enoxaparin, 40 mg, subcutaneous, q24h  ferrous sulfate, 65 mg of elemental iron, oral,  Daily  hydroCHLOROthiazide, 12.5 mg, oral, Daily  hydrOXYzine HCL, 25 mg, oral, Daily  [START ON 6/6/2025] ipratropium-albuteroL, 3 mL, nebulization, 4x daily  melatonin, 6 mg, oral, Nightly  [START ON 6/6/2025] pantoprazole, 40 mg, oral, Daily before breakfast   Or  [START ON 6/6/2025] pantoprazole, 40 mg, intravenous, Daily before breakfast  piperacillin-tazobactam, 3.375 g, intravenous, q6h  rosuvastatin, 20 mg, oral, Daily    [5]    [6] PRN medications: acetaminophen **OR** acetaminophen **OR** acetaminophen, cyclobenzaprine, guaiFENesin, ipratropium-albuteroL, ondansetron **OR** ondansetron, polyethylene glycol

## 2025-06-05 NOTE — PROGRESS NOTES
Transitional Care Coordination Progress Note:  Plan per Medical/Surgical team: treatment of left sided pleural effusion with IV ATB, IR consult- ?left sided pleurx cath needed, thoracic surgery consult  Status: Inpatient   Payor source: [employer] mcare  [facility], will need HHC again- recently [facility]  right sided pleurx cath care, home oxygen @ 4 liters in place  Potential Barriers: hx of metastatic lung cancer, ?prognosis & goals of care   ADOD: 6/7/2025   CATHERINE Young RN, BSN Transitional Care Coordinator ED# [phone]      06/05/25 1446   Discharge Planning   Living Arrangements Alone   Support Systems Family members   Assistance Needed IV ATB, IR consult- ?left sided pleurx cath needed, thoracic surgery consult, right sided pleurx cath care, home oxygen @ 4 liters in place, ?prognosis & goals of care   Type of Residence Assisted living   Do you have animals or pets at home? No   Care Facility Name [facility]   Home or Post Acute Services Post acute facilities (Rehab/SNF/etc);In home services   Type of Post Acute Facility Services Assisted living   Type of Home Care Services Home nursing visits   Expected Discharge Disposition Home H  ([facility]   Does the patient need discharge transport arranged? Yes   RoundTrip coordination needed? Yes   Has discharge transport been arranged? No   Financial Resource Strain   How hard is it for you to pay for the very basics like food, housing, medical care, and heating? Not hard   Housing Stability   In the last 12 months, was there a time when you were not able to pay the mortgage or rent on time? N   In the past 12 months, how many times have you moved where you were living? 1   At any time in the past 12 months, were you homeless or living in a shelter (including now)? N   Transportation Needs   In the past 12 months, has lack of transportation kept you from medical  appointments or from getting medications? no   In the past 12 months, has lack of transportation kept you from meetings, work, or from getting things needed for daily living? No   Patient Choice   Provider Choice list and CMS website (https://medicare.gov/care-compare#search) for post-acute Quality and Resource Measure Data were provided and reviewed with: Patient   Patient / Family choosing to utilize agency / facility established prior to hospitalization Yes   Stroke Family Assessment   Stroke Family Assessment Needed No   Intensity of Service   Intensity of Service 0-30 min

## 2025-06-05 NOTE — ED NOTES
Community Resource Name: No primary care physician.  Phone Number:   Staff Member:  Ani Bryan    Discussed the following topics on behalf of the patient:  []  Behavioral Health Assistance     []  Case Management  []   Assistance  []  Digital Equity Assistance  []  Dental Health Assistance  []  Education Assistance  []  Employment Assistance  []  Financial Strain Relief Assistance  []  Food Insecurity Assistance  [x]  Healthcare Coverage Assistance  []  Housing Stability Assistance  []  IP Violence Relief Assistance  []  Legal Assistance  []  Physical Activity Assistance  []  Social Connection Assistance  []  Stress Relief Assistance   []  Substance Abuse Assistance  []  Transportation Assistance  []  Utility Assistance  [x]  Other: [insert comment here]  Patient does have a PCP. CHW wasn't able to update the patient chart.  Next Steps:         WAYNE Quintero  Track patients for community healthcare services. CHW talked to patient regarding not having a primary care physician. Patient expressed that she does have a primary care physician name Dr. Rose Shepherd and [facility] Dual complete insurance. CHW was not able to update the patient chart.        WAYNE Quintero  06/05/25 3230

## 2025-06-05 NOTE — CONSULTS
Reason For Consult  Recurrent left pleural effusion     History Of Present Illness  Mildred Moyer is a 70 y.o. female with a previous medical history recurrent right pleural effusion requiring pleurx which was removed on 4/29, chronic hypoxic respiratory failure on 3-4L NC, hypertension, HLD, metastatic breast cancer with metastasis to the liver and lungs, anxiety and depression who presented after undergoing a thoracentesis on 5/29 with recurrent left pleural effusion and worsening shortness of breath.  Thoracic surgery was consulted for consideration of pleurx placement on the left.    On discussion with patient she reports significant improvement of symptoms after undergoing thoracentesis last week (5/29) with 1100ml removed.  Endorses progressively worsening shortness of breath since that time.  Denies any additional or red flag symptoms at that time.       Past Medical History  She has a past medical history of GERD (gastroesophageal reflux disease), HLD (hyperlipidemia), HTN (hypertension), Liver disease, Lung neoplasm, MS (multiple sclerosis) (Multi), and Pleural effusion.    Surgical History  She has a past surgical history that includes Dilation and curettage of uterus and Percutaneous chest drain insertion (Right, 10/10/2024).     Social History  She reports that she quit smoking about 42 years ago. Her smoking use included cigarettes. She started smoking about 44 years ago. She has a 1 pack-year smoking history. She has never used smokeless tobacco. She reports that she does not currently use alcohol. She reports that she does not use drugs.    Family History  Family History[1]     Allergies  Iodinated contrast media, Iodine, Shellfish containing products, and Oxymetazoline    Review of Systems  All review of symptoms were negative unless otherwise noted in HPI     Physical Exam  Physical Exam  Constitutional:       Appearance: She is toxic-appearing.   HENT:      Mouth/Throat:      Mouth: Mucous  "membranes are dry.   Eyes:      Extraocular Movements: Extraocular movements intact.   Cardiovascular:      Rate and Rhythm: Normal rate and regular rhythm.   Pulmonary:      Effort: Pulmonary effort is normal.      Comments: Diminished throughout   Abdominal:      General: Abdomen is flat. Bowel sounds are normal.      Palpations: Abdomen is soft.   Skin:     Capillary Refill: Capillary refill takes less than 2 seconds.   Neurological:      General: No focal deficit present.      Mental Status: She is oriented to person, place, and time.   Psychiatric:         Mood and Affect: Mood normal.            Last Recorded Vitals  Blood pressure 116/68, pulse (!) 103, temperature 35.7 °C (96.3 °F), temperature source Temporal, resp. rate (!) 22, height 1.626 m (5' 4\"), weight 59 kg (130 lb), SpO2 100%.    Relevant Results  XR chest 1 view  Result Date: 6/5/2025  Interpreted By:  Lc Munoz, STUDY: XR CHEST 1 VIEW; 6/5/2025 12:32 pm   INDICATION: CLINICAL INFORMATION: Signs/Symptoms:sob.   COMPARISON: 05/29/2025   ACCESSION NUMBER(S): [id number]   ORDERING CLINICIAN: JOSY CARREON   TECHNIQUE: Portable chest one view.   FINDINGS: The cardiac size is indeterminate in view of the AP projection. Patient is significantly rotated to the right. The some is cardiac and mediastinal assessment. Large bilateral pleural effusions are present. Bilateral perihilar and basilar infiltrate is suspected, more marked on the right with suspicion of volume loss of the right lung along with consolidation.       No change in the bilateral effusions, bilateral infiltrate, and partial collapse of the right lung.   MACRO: none   Signed by: Lc Munoz 6/5/2025 12:42 PM Dictation workstation:   [id number]         Assessment/Plan   70 y.o. female with a previous medical history recurrent right pleural effusion requiring pleurx which was removed on 4/29, chronic hypoxic respiratory failure on 3-4L NC, hypertension, HLD, metastatic breast " cancer, anxiety and depression who presented after undergoing a thoracentesis on 5/29 with recurrent pleural effusion and worsening shortness of breath.  Thoracic surgery was consulted for consideration of pleurx placement on the left.    Recurrent left pleural effusion   -Improvement with prior thoracentesis   -Recommend IR Consult for pleurx placement   -Will continue to follow  -Appreciate consult  -Plan discussed and formulated with Dr. Shepherd     I spent 60 minutes in the professional and overall care of this patient.      Alyce Rothman, APRN-CNP, DNP         [1] No family history on file.

## 2025-06-06 ENCOUNTER — APPOINTMENT (OUTPATIENT)
Dept: RADIOLOGY | Facility: HOSPITAL | Age: 70
DRG: 186 | End: 2025-06-06
Payer: COMMERCIAL

## 2025-06-06 ENCOUNTER — APPOINTMENT (OUTPATIENT)
Dept: CARDIOLOGY | Facility: HOSPITAL | Age: 70
DRG: 186 | End: 2025-06-06
Payer: COMMERCIAL

## 2025-06-06 LAB
ANION GAP SERPL CALC-SCNC: 16 MMOL/L (ref 10–20)
ATRIAL RATE: 105 BPM
BUN SERPL-MCNC: 20 MG/DL (ref 6–23)
CALCIUM SERPL-MCNC: 9 MG/DL (ref 8.6–10.3)
CHLORIDE SERPL-SCNC: 92 MMOL/L (ref 98–107)
CLARITY FLD: ABNORMAL
CO2 SERPL-SCNC: 29 MMOL/L (ref 21–32)
COLOR FLD: ABNORMAL
CREAT SERPL-MCNC: 0.8 MG/DL (ref 0.5–1.05)
EGFRCR SERPLBLD CKD-EPI 2021: 80 ML/MIN/1.73M*2
ERYTHROCYTE [DISTWIDTH] IN BLOOD BY AUTOMATED COUNT: 13.7 % (ref 11.5–14.5)
GLUCOSE FLD-MCNC: 92 MG/DL
GLUCOSE SERPL-MCNC: 109 MG/DL (ref 74–99)
HCT VFR BLD AUTO: 40.9 % (ref 36–46)
HGB BLD-MCNC: 13.6 G/DL (ref 12–16)
LDH FLD L TO P-CCNC: 385 U/L
LDH SERPL L TO P-CCNC: 263 U/L (ref 84–246)
MAGNESIUM SERPL-MCNC: 1.76 MG/DL (ref 1.6–2.4)
MCH RBC QN AUTO: 31.9 PG (ref 26–34)
MCHC RBC AUTO-ENTMCNC: 33.3 G/DL (ref 32–36)
MCV RBC AUTO: 96 FL (ref 80–100)
NRBC BLD-RTO: 0 /100 WBCS (ref 0–0)
P AXIS: 52 DEGREES
P OFFSET: 204 MS
P ONSET: 161 MS
PLATELET # BLD AUTO: 588 X10*3/UL (ref 150–450)
POTASSIUM SERPL-SCNC: 3.3 MMOL/L (ref 3.5–5.3)
PR INTERVAL: 112 MS
PROT FLD-MCNC: 3.1 G/DL
PROT SERPL-MCNC: 6.9 G/DL (ref 6.4–8.2)
Q ONSET: 217 MS
QRS COUNT: 17 BEATS
QRS DURATION: 82 MS
QT INTERVAL: 354 MS
QTC CALCULATION(BAZETT): 467 MS
QTC FREDERICIA: 426 MS
R AXIS: 42 DEGREES
RBC # BLD AUTO: 4.27 X10*6/UL (ref 4–5.2)
RBC # FLD AUTO: ABNORMAL /UL
SODIUM SERPL-SCNC: 134 MMOL/L (ref 136–145)
T AXIS: 60 DEGREES
T OFFSET: 394 MS
VENTRICULAR RATE: 105 BPM
WBC # BLD AUTO: 17.1 X10*3/UL (ref 4.4–11.3)
WBC # FLD AUTO: 917 /UL

## 2025-06-06 PROCEDURE — 2500000001 HC RX 250 WO HCPCS SELF ADMINISTERED DRUGS (ALT 637 FOR MEDICARE OP): Performed by: INTERNAL MEDICINE

## 2025-06-06 PROCEDURE — 83615 LACTATE (LD) (LDH) ENZYME: CPT | Mod: AHULAB | Performed by: INTERNAL MEDICINE

## 2025-06-06 PROCEDURE — 2500000002 HC RX 250 W HCPCS SELF ADMINISTERED DRUGS (ALT 637 FOR MEDICARE OP, ALT 636 FOR OP/ED)

## 2025-06-06 PROCEDURE — 82945 GLUCOSE OTHER FLUID: CPT | Mod: AHULAB | Performed by: INTERNAL MEDICINE

## 2025-06-06 PROCEDURE — 71045 X-RAY EXAM CHEST 1 VIEW: CPT

## 2025-06-06 PROCEDURE — 2500000004 HC RX 250 GENERAL PHARMACY W/ HCPCS (ALT 636 FOR OP/ED): Performed by: INTERNAL MEDICINE

## 2025-06-06 PROCEDURE — 99232 SBSQ HOSP IP/OBS MODERATE 35: CPT | Performed by: INTERNAL MEDICINE

## 2025-06-06 PROCEDURE — 89050 BODY FLUID CELL COUNT: CPT | Performed by: INTERNAL MEDICINE

## 2025-06-06 PROCEDURE — 2500000002 HC RX 250 W HCPCS SELF ADMINISTERED DRUGS (ALT 637 FOR MEDICARE OP, ALT 636 FOR OP/ED): Performed by: INTERNAL MEDICINE

## 2025-06-06 PROCEDURE — 88112 CYTOPATH CELL ENHANCE TECH: CPT | Performed by: PATHOLOGY

## 2025-06-06 PROCEDURE — 36415 COLL VENOUS BLD VENIPUNCTURE: CPT | Performed by: INTERNAL MEDICINE

## 2025-06-06 PROCEDURE — 88112 CYTOPATH CELL ENHANCE TECH: CPT | Mod: TC | Performed by: NURSE PRACTITIONER

## 2025-06-06 PROCEDURE — 85027 COMPLETE CBC AUTOMATED: CPT | Performed by: INTERNAL MEDICINE

## 2025-06-06 PROCEDURE — 88305 TISSUE EXAM BY PATHOLOGIST: CPT | Performed by: PATHOLOGY

## 2025-06-06 PROCEDURE — 83615 LACTATE (LD) (LDH) ENZYME: CPT | Performed by: INTERNAL MEDICINE

## 2025-06-06 PROCEDURE — 84157 ASSAY OF PROTEIN OTHER: CPT | Mod: AHULAB | Performed by: INTERNAL MEDICINE

## 2025-06-06 PROCEDURE — 84155 ASSAY OF PROTEIN SERUM: CPT | Performed by: INTERNAL MEDICINE

## 2025-06-06 PROCEDURE — 94640 AIRWAY INHALATION TREATMENT: CPT

## 2025-06-06 PROCEDURE — 80048 BASIC METABOLIC PNL TOTAL CA: CPT | Performed by: INTERNAL MEDICINE

## 2025-06-06 PROCEDURE — 0W9B3ZZ DRAINAGE OF LEFT PLEURAL CAVITY, PERCUTANEOUS APPROACH: ICD-10-PCS

## 2025-06-06 PROCEDURE — 2500000005 HC RX 250 GENERAL PHARMACY W/O HCPCS: Performed by: INTERNAL MEDICINE

## 2025-06-06 PROCEDURE — 2720000007 HC OR 272 NO HCPCS

## 2025-06-06 PROCEDURE — 32555 ASPIRATE PLEURA W/ IMAGING: CPT

## 2025-06-06 PROCEDURE — C1729 CATH, DRAINAGE: HCPCS

## 2025-06-06 PROCEDURE — 71045 X-RAY EXAM CHEST 1 VIEW: CPT | Performed by: STUDENT IN AN ORGANIZED HEALTH CARE EDUCATION/TRAINING PROGRAM

## 2025-06-06 PROCEDURE — 2500000004 HC RX 250 GENERAL PHARMACY W/ HCPCS (ALT 636 FOR OP/ED)

## 2025-06-06 PROCEDURE — 1100000001 HC PRIVATE ROOM DAILY

## 2025-06-06 PROCEDURE — 99223 1ST HOSP IP/OBS HIGH 75: CPT | Performed by: NURSE PRACTITIONER

## 2025-06-06 PROCEDURE — 93005 ELECTROCARDIOGRAM TRACING: CPT

## 2025-06-06 PROCEDURE — 83735 ASSAY OF MAGNESIUM: CPT

## 2025-06-06 RX ORDER — LIDOCAINE HYDROCHLORIDE 10 MG/ML
INJECTION, SOLUTION EPIDURAL; INFILTRATION; INTRACAUDAL; PERINEURAL
Status: COMPLETED | OUTPATIENT
Start: 2025-06-06 | End: 2025-06-06

## 2025-06-06 RX ORDER — POTASSIUM CHLORIDE 20 MEQ/1
40 TABLET, EXTENDED RELEASE ORAL ONCE
Status: COMPLETED | OUTPATIENT
Start: 2025-06-06 | End: 2025-06-06

## 2025-06-06 RX ADMIN — IPRATROPIUM BROMIDE AND ALBUTEROL SULFATE 3 ML: 2.5; .5 SOLUTION RESPIRATORY (INHALATION) at 20:28

## 2025-06-06 RX ADMIN — PIPERACILLIN SODIUM AND TAZOBACTAM SODIUM 3.38 G: 3; .375 INJECTION, SOLUTION INTRAVENOUS at 20:54

## 2025-06-06 RX ADMIN — Medication 3 L/MIN: at 20:28

## 2025-06-06 RX ADMIN — MELATONIN TAB 3 MG 6 MG: 3 TAB at 22:30

## 2025-06-06 RX ADMIN — IPRATROPIUM BROMIDE AND ALBUTEROL SULFATE 3 ML: 2.5; .5 SOLUTION RESPIRATORY (INHALATION) at 07:50

## 2025-06-06 RX ADMIN — POTASSIUM CHLORIDE 40 MEQ: 1500 TABLET, EXTENDED RELEASE ORAL at 09:39

## 2025-06-06 RX ADMIN — PIPERACILLIN SODIUM AND TAZOBACTAM SODIUM 3.38 G: 3; .375 INJECTION, SOLUTION INTRAVENOUS at 01:56

## 2025-06-06 RX ADMIN — LIDOCAINE HYDROCHLORIDE 10 ML: 10 INJECTION, SOLUTION EPIDURAL; INFILTRATION; INTRACAUDAL; PERINEURAL at 16:05

## 2025-06-06 RX ADMIN — PIPERACILLIN SODIUM AND TAZOBACTAM SODIUM 3.38 G: 3; .375 INJECTION, SOLUTION INTRAVENOUS at 08:33

## 2025-06-06 RX ADMIN — Medication 3 L/MIN: at 07:54

## 2025-06-06 RX ADMIN — IPRATROPIUM BROMIDE AND ALBUTEROL SULFATE 3 ML: 2.5; .5 SOLUTION RESPIRATORY (INHALATION) at 16:27

## 2025-06-06 RX ADMIN — HYDROCHLOROTHIAZIDE 12.5 MG: 12.5 TABLET ORAL at 08:33

## 2025-06-06 RX ADMIN — Medication 1000 MCG: at 08:33

## 2025-06-06 RX ADMIN — IPRATROPIUM BROMIDE AND ALBUTEROL SULFATE 3 ML: 2.5; .5 SOLUTION RESPIRATORY (INHALATION) at 11:26

## 2025-06-06 RX ADMIN — BUPROPION HYDROCHLORIDE 300 MG: 150 TABLET, EXTENDED RELEASE ORAL at 17:56

## 2025-06-06 RX ADMIN — FERROUS SULFATE TAB 325 MG (65 MG ELEMENTAL FE) 1 TABLET: 325 (65 FE) TAB at 08:33

## 2025-06-06 RX ADMIN — ENOXAPARIN SODIUM 40 MG: 40 INJECTION SUBCUTANEOUS at 17:56

## 2025-06-06 RX ADMIN — HYDROXYZINE HYDROCHLORIDE 25 MG: 25 TABLET, FILM COATED ORAL at 08:33

## 2025-06-06 RX ADMIN — PIPERACILLIN SODIUM AND TAZOBACTAM SODIUM 3.38 G: 3; .375 INJECTION, SOLUTION INTRAVENOUS at 13:55

## 2025-06-06 RX ADMIN — PANTOPRAZOLE SODIUM 40 MG: 40 INJECTION, POWDER, FOR SOLUTION INTRAVENOUS at 06:00

## 2025-06-06 RX ADMIN — ROSUVASTATIN 20 MG: 20 TABLET, FILM COATED ORAL at 08:33

## 2025-06-06 ASSESSMENT — COGNITIVE AND FUNCTIONAL STATUS - GENERAL
DRESSING REGULAR UPPER BODY CLOTHING: A LITTLE
TOILETING: A LITTLE
EATING MEALS: A LITTLE
DAILY ACTIVITIY SCORE: 18
DRESSING REGULAR UPPER BODY CLOTHING: A LITTLE
PERSONAL GROOMING: A LITTLE
DAILY ACTIVITIY SCORE: 20
MOBILITY SCORE: 18
TURNING FROM BACK TO SIDE WHILE IN FLAT BAD: A LITTLE
PERSONAL GROOMING: A LITTLE
MOVING FROM LYING ON BACK TO SITTING ON SIDE OF FLAT BED WITH BEDRAILS: A LITTLE
TOILETING: A LITTLE
HELP NEEDED FOR BATHING: A LITTLE
WALKING IN HOSPITAL ROOM: A LITTLE
DRESSING REGULAR LOWER BODY CLOTHING: A LITTLE
MOBILITY SCORE: 22
STANDING UP FROM CHAIR USING ARMS: A LITTLE
HELP NEEDED FOR BATHING: A LITTLE
WALKING IN HOSPITAL ROOM: A LITTLE
CLIMB 3 TO 5 STEPS WITH RAILING: A LITTLE
CLIMB 3 TO 5 STEPS WITH RAILING: A LITTLE
MOVING TO AND FROM BED TO CHAIR: A LITTLE

## 2025-06-06 ASSESSMENT — ENCOUNTER SYMPTOMS
VOMITING: 0
CHOKING: 0
NAUSEA: 0
CHILLS: 0
DIZZINESS: 1
WHEEZING: 0
TROUBLE SWALLOWING: 0
SHORTNESS OF BREATH: 1
FEVER: 0
RHINORRHEA: 0
COUGH: 1

## 2025-06-06 ASSESSMENT — PAIN SCALES - GENERAL
PAINLEVEL_OUTOF10: 0 - NO PAIN

## 2025-06-06 ASSESSMENT — PAIN - FUNCTIONAL ASSESSMENT
PAIN_FUNCTIONAL_ASSESSMENT: 0-10
PAIN_FUNCTIONAL_ASSESSMENT: 0-10

## 2025-06-06 NOTE — PROGRESS NOTES
06/06/25 1726   Discharge Planning   Living Arrangements Spouse/significant other   Support Systems Family members  (patient's brother at bedside)   Assistance Needed TCC met patient at bedside to follow up discharge plan; patient is on home oxygen 3-4 liters; plan; left thoracentesis today;had left pleurx; may have plurex bottles at home   Type of Residence Assisted living   [phone]   Home or Post Acute Services In home services   Type of Post Acute Facility Services Assisted living   Type of Home Care Services Home nursing visits   Expected Discharge Disposition Home H   Does the patient need discharge transport arranged? No  (patient's brother will provide transport home)   Patient Choice   Provider Choice list and CMS website (https://medicare.gov/care-compare#search) for post-acute Quality and Resource Measure Data were provided and reviewed with: Patient  (requested [facility]

## 2025-06-06 NOTE — POST-PROCEDURE NOTE
Interventional Radiology Brief Postprocedure Note    Attending: Dony Calderón CNP    Assistant: none    Diagnosis: left sided pleural effusion    Description of procedure: Ultrasound guided thoracentesis was preformed on the left.  400 mL of serosanguineous fluid was drained. Effusion was loculated and could not drain the entire effusion.     Anesthesia:  Local    Complications: None    Estimated Blood Loss: none    Medications  As of 06/06/25 1612      piperacillin-tazobactam (Zosyn) 4.5 g in dextrose (iso)  mL (g) Total dose:  4.5 g Dosing weight:  59      Date/Time Rate/Dose/Volume Action       06/05/25  1435 4.5 g (over 30 min) New Bag      1507  (over 30 min) Stopped               vancomycin (Vancocin) 1.5 g in dextrose 5% 500 mL IV (Ordered as: vancomycin) (mL/hr) Total dose:  1.42 g* Dosing weight:  59   *From user-documented volume     Date/Time Rate/Dose/Volume Action       06/05/25  1642 1.5 g - 353.3 mL/hr (over 90 min) New Bag      1836 500 mL Stopped               buPROPion XL (Wellbutrin XL) 24 hr tablet 300 mg (mg) Total dose:  300 mg      Date/Time Rate/Dose/Volume Action       06/05/25 1817 300 mg Given               cyanocobalamin (Vitamin B-12) tablet 1,000 mcg (mcg) Total dose:  2,000 mcg      Date/Time Rate/Dose/Volume Action       06/05/25 1817 1,000 mcg Given     06/06/25 0833 1,000 mcg Given               ferrous sulfate 325 mg (65 mg elemental) tablet 1 tablet (tablet) Total dose:  130 mg of elemental iron      Date/Time Rate/Dose/Volume Action       06/05/25 1817 1 tablet Given     06/06/25  0833 1 tablet Given               hydroCHLOROthiazide (Microzide) tablet 12.5 mg (mg) Total dose:  25 mg      Date/Time Rate/Dose/Volume Action       06/05/25 1817 12.5 mg Given     06/06/25 0833 12.5 mg Given               hydrOXYzine HCL (Atarax) tablet 25 mg (mg) Total dose:  50 mg      Date/Time Rate/Dose/Volume Action       06/05/25 1817 25 mg Given     06/06/25 0833 25 mg Given                rosuvastatin (Crestor) tablet 20 mg (mg) Total dose:  40 mg      Date/Time Rate/Dose/Volume Action       06/05/25  1817 20 mg Given     06/06/25  0833 20 mg Given               pantoprazole (ProtoNix) EC tablet 40 mg (mg) Total dose:  Cannot be calculated* Dosing weight:  59   *Administration dose not documented     Date/Time Rate/Dose/Volume Action       06/06/25  0600 *Not included in total See Alternative               pantoprazole (Protonix) injection 40 mg (mg) Total dose:  40 mg Dosing weight:  59      Date/Time Rate/Dose/Volume Action       06/06/25 0600 40 mg (over 2 min) Given               melatonin tablet 6 mg (mg) Total dose:  6 mg Dosing weight:  59      Date/Time Rate/Dose/Volume Action       06/05/25 2020 6 mg Given               enoxaparin (Lovenox) syringe 40 mg (mg) Total dose:  0 mg* Dosing weight:  59   *Administration not included in total     Date/Time Rate/Dose/Volume Action       06/05/25  1814 *40 mg Missed               ipratropium-albuteroL (Duo-Neb) 0.5-2.5 mg/3 mL nebulizer solution 3 mL (mL) Total volume:  12 mL Dosing weight:  59      Date/Time Rate/Dose/Volume Action       06/05/25  1737 3 mL Given      2016 3 mL Given     06/06/25  0750 3 mL Given      1126 3 mL Given               piperacillin-tazobactam (Zosyn) 3.375 g in dextrose (iso) IV 50 mL (g) Total dose:  10.125 g* Dosing weight:  59   *From user-documented volume     Date/Time Rate/Dose/Volume Action       06/05/25  2020 3.375 g (over 30 min) New Bag      2100  (over 30 min) Stopped     06/06/25  0156 3.375 g (over 30 min) New Bag      0226  (over 30 min) Stopped      0833 3.375 g (over 30 min) - 150 mL New Bag      0923  (over 30 min) Stopped      1355 3.375 g (over 30 min) New Bag      1440  (over 30 min) Stopped               oxygen (O2) therapy (L/min) Total volume:  Not documented* Dosing weight:  52   *Total volume has not been documented. View each administration to see the amount administered.      Date/Time Rate/Dose/Volume Action       06/06/25  0754 3 L/min Start               potassium chloride CR (Klor-Con M20) ER tablet 40 mEq (mEq) Total dose:  40 mEq Dosing weight:  52      Date/Time Rate/Dose/Volume Action       06/06/25  0939 40 mEq Given               lidocaine PF (Xylocaine) 10 mg/mL (1 %) injection (mL) Total volume:  10 mL      Date/Time Rate/Dose/Volume Action       06/06/25  1605 10 mL Given                   Specimens collected      See detailed result report with images in PACS.    The patient tolerated the procedure well without incident or complication and is in stable condition.

## 2025-06-06 NOTE — CONSULTS
Inpatient consult to Pulmonology  Consult performed by: Lilly Cadena, APRN-CNP  Consult ordered by: Clarence Mohan MD         Pulmonary Consultation Note     SUBJECTIVE      Mildred Moyer is a 70 y.o. 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 Patient presented with shortness of breath. Pulmonary was consulted for right-sided partial lung collapse.    History of Present Illness:  Patient with a history of breast cancer - invasive ductal carcinoma, grade 2, ER 95%, VT 60%, HER2 negative of the right breast diagnosed by US-guided biopsy of breast mass on 3/13/2023; US-guided biopsy of right axillary LN negative for malignancy. Large right pleural effusion present on initial imaging around the time of diagnosis. She underwent right-sided thoracentesis on 3/11/2023 in which 2.2 L was drained; cytology showed atypical cells, cell count lymphocytic-predominant, fluid exudative per Light's Criteria (3/3 criteria met).     She underwent right-sided thoracentesis again on 8/21/2024 (350 mL out) and on 9/10/2024 (400 mL out) but no fluid specimen collected either time.     She was seen by Thoracic Surgery/Dr. Shepherd later in September 2024 for recurrent right pleural effusion as the effusion was becoming more loculated and painful during thoracentesis procedures. She underwent right VATS, pleural biopsy with PleurX catheter placement on 10/10/2024. Intraoperatively the lung was trapped with thick pleural rind. Pleural biopsy final pathology showed acute fibrinous pleuritis with atypia.     She was recently admitted at [facility] 4/11/2025 - 4/15/2025, presenting with shortness of breath. CT chest on 4/13/2025 showed large  right pleural effusion and development of moderate left pleural effusion, as well as increased size of breast mass, increased right supraclavicular metastatic lymphadenopathy, increased pleural and pulmonary metastasis, and new liver metastasis. Her PleurX was drained in the ED and 350 mL was removed; pleural fluid culture negative, Light's Criteria not met per serum and body protein ratio and unable to evaluate by LDH as serum LDH not collected. She then underwent left-sided thoracentesis on 4/14/2025, 900 mL fiona colored fluid removed, but no fluid specimen was collected. Symptom improved subsequently and she was discharged home.    She was seen for outpatient follow-up with Dr. Shepherd after discharge. Since PleurX placement, patient was getting minimal output with drains and no relief of symptoms, so the PleurX was removed on 4/29/2025.    She was seen in the ED on 5/29/2025 for shortness of breath. Chest x-ray at this time showed large right pleural effusion and slight interval increase of the left pleural effusion. She underwent left-sided thoracentesis in which 1100 mL serosanguineous colored fluid was removed; fluid exudative per Light's Criteria based on serum and body protein ratio, not able to evaluate by LDH as serum LDH not collected. She was discharged home from the ED.    She underwent repeat breast mass biopsy on 5/2/2025 and was seen by Oncology for follow-up on 6/5/2025. Pathology of repeat biopsy consistent with progressive disease, ER positive HER2 negative. At the visit, she was short of breath and was directed to the ED for evaluation of pleural effusion and possible chest tube placement. Plan is to eventually start palliative therapy for her recurrent progressive breast cancer.    Today, she reports she has been feeling short of breath at rest and more so on exertion for the past 3 weeks and it has been progressively worsening. She reports an occasional cough which she reports is not new for  her; she denies any phlegm production or hemoptysis. Denies wheezing or chest pain. Denies fevers, chills, night sweats. Denies any swallowing issues or upper respiratory symptoms.     She denies any history of asthma or COPD, does not use bronchodilators or PAP therapy. She has been on oxygen since 4/2023, currently uses it at 3 L 24/7.    ED workup reviewed. Chest x-ray showed no change in bilateral effusion, bilateral infiltrates, and partial collapse of right lung as compared to chest x-ray on 5/29/2025. WBC elevated at 19.6, lactate normal. Blood cultures drawn and negative to date. MRSA PCR negative. She has been afebrile. Currently on her baseline oxygen requirement of 3 L.    Past Medical History:  She has a past medical history of GERD (gastroesophageal reflux disease), HLD (hyperlipidemia), HTN (hypertension), Liver disease, Lung neoplasm, MS (multiple sclerosis) (Multi), and Pleural effusion.    Surgical History:  She has a past surgical history that includes Dilation and curettage of uterus and Percutaneous chest drain insertion (Right, 10/10/2024).     Social History:  Smoking: Former smoker who smoked socially for 1 year 40 years ago.  Other Drug Use: Denies.    She reports that she quit smoking about 42 years ago. Her smoking use included cigarettes. She started smoking about 44 years ago. She has a 1 pack-year smoking history. She has never used smokeless tobacco. She reports that she does not currently use alcohol. She reports that she does not use drugs.    Family History:  Family History[1]    Allergies:  Iodinated contrast media, Iodine, Shellfish containing products, and Oxymetazoline    Review of Systems   Constitutional:  Negative for chills and fever.   HENT:  Negative for congestion, postnasal drip, rhinorrhea and trouble swallowing.    Respiratory:  Positive for cough (occasional/stable) and shortness of breath. Negative for choking and wheezing.    Cardiovascular:  Negative for chest pain  and leg swelling.   Gastrointestinal:  Negative for nausea and vomiting.   Neurological:  Positive for dizziness (occasional). Negative for syncope.       MEDICATIONS     Scheduled Medications:  Scheduled Medications[2]    Continuous Medications:  Continuous Medications[3]    PRN Medications:  PRN Medications[4]     Current Medications[5]     OBJECTIVE      VITAL SIGNS     /80 (BP Location: Left arm, Patient Position: Lying)   Pulse 107   Temp 34.8 °C (94.6 °F) (Temporal)   Resp (!) 105   Wt 52 kg (114 lb 9.6 oz)   SpO2 98%     PHYSICAL EXAM     Physical Exam  Constitutional:       General: She is not in acute distress.  HENT:      Nose:      Right Sinus: No maxillary sinus tenderness or frontal sinus tenderness.      Left Sinus: No maxillary sinus tenderness or frontal sinus tenderness.      Mouth/Throat:      Mouth: Mucous membranes are moist. No oral lesions.      Comments: No thrush  Neck:      Thyroid: No thyromegaly.      Vascular: No JVD.      Trachea: Phonation normal. No tracheal deviation.      Comments: No stridor  Cardiovascular:      Rate and Rhythm: Regular rhythm. Tachycardia present.      Heart sounds: Normal heart sounds, S1 normal and S2 normal. No murmur heard.     No friction rub. No gallop.   Pulmonary:      Effort: No tachypnea, bradypnea, accessory muscle usage, prolonged expiration or respiratory distress.      Breath sounds: Normal air entry. No stridor. Examination of the right-middle field reveals decreased breath sounds. Examination of the right-lower field reveals decreased breath sounds. Examination of the left-lower field reveals decreased breath sounds. Decreased breath sounds present. No wheezing, rhonchi or rales.   Musculoskeletal:      Right lower leg: No edema.      Left lower leg: No edema.   Lymphadenopathy:      Head:      Right side of head: No submandibular adenopathy.      Left side of head: No submandibular adenopathy.      Upper Body:      Right upper body: No  supraclavicular adenopathy.      Left upper body: No supraclavicular adenopathy.   Skin:     General: Skin is warm.      Findings: No lesion or rash.   Neurological:      General: No focal deficit present.      Mental Status: She is alert and oriented to person, place, and time.      Cranial Nerves: Cranial nerves 2-12 are intact.      Motor: Motor function is intact.   Psychiatric:         Mood and Affect: Mood and affect normal.        I/Os       Intake/Output Summary (Last 24 hours) at 6/6/2025 0913  Last data filed at 6/5/2025 1836  Gross per 24 hour   Intake 500 ml   Output --   Net 500 ml       LABS     Results from last 72 hours   Lab Units 06/06/25  0722 06/05/25 2001 06/05/25  1210   SODIUM mmol/L 134* 131* 136   POTASSIUM mmol/L 3.3* 3.7 4.7   CHLORIDE mmol/L 92* 94* 93*   CO2 mmol/L 29 24 27   BUN mg/dL 20 22 22   CREATININE mg/dL 0.80 0.79 0.88   GLUCOSE mg/dL 109* 128* 116*   CALCIUM mg/dL 9.0 8.6 10.4*   ANION GAP mmol/L 16 17 21*   EGFR mL/min/1.73m*2 80 82 72      Results from last 72 hours   Lab Units 06/06/25  0722 06/05/25  1210   WBC AUTO x10*3/uL 17.1* 19.6*   HEMOGLOBIN g/dL 13.6 15.1   HEMATOCRIT % 40.9 47.2*   PLATELETS AUTO x10*3/uL 588* 714*   NEUTROS PCT AUTO %  --  88.6   LYMPHS PCT AUTO %  --  6.2   MONOS PCT AUTO %  --  3.8   EOS PCT AUTO %  --  0.2                 Lab Results   Component Value Date    BLOODCULT Loaded on Instrument - Culture in progress 06/05/2025    BLOODCULT Loaded on Instrument - Culture in progress 06/05/2025     10/10/2024  FINAL DIAGNOSIS    A.  Lung, right pleural biopsy:  - Acute fibrinous pleuritis with atypia.  See note     Note:  Microscopic examination of H&E sections demonstrates acute fibrinous pleuritis with scattered cellular atypia.  AE1/AE3, CK7 and calretinin highlight some of the atypical cells, consistent with reactive mesothelial cells.  Additionally there is lymphoplasmacytic inflammation.  TRPS 1 highlights inflammatory cells.. WT1 is not  contributory.  [place] red is negative for amyloid.     3/11/2023  FINAL CYTOLOGICAL INTERPRETATION     A.  PLEURAL FLUID - RIGHT SIDE WITH CELL BLOCK:        ATYPICAL CELLS ARE PRESENT, SEE COMMENT.     Comment: Cell block did not have adequate cellularity (only 7 cc of fluid was   received) for immunocytochemistry. Further clinical correlation and adequate   follow up are advised.     IMAGING & DIAGNOSTIC TESTING     XR CHEST 1 VIEW; 6/5/2025 12:32 pm      INDICATION:  CLINICAL INFORMATION: Signs/Symptoms:sob.      COMPARISON:  05/29/2025      ACCESSION NUMBER(S):  [id number]      ORDERING CLINICIAN:  JOSY CARREON      TECHNIQUE:  Portable chest one view.      FINDINGS:  The cardiac size is indeterminate in view of the AP projection.  Patient is significantly rotated to the right. The some is cardiac  and mediastinal assessment. Large bilateral pleural effusions are  present. Bilateral perihilar and basilar infiltrate is suspected,  more marked on the right with suspicion of volume loss of the right  lung along with consolidation.      IMPRESSION:  No change in the bilateral effusions, bilateral infiltrate, and  partial collapse of the right lung.      MACRO:  none      Signed by: Lc Munoz 6/5/2025 12:42 PM  Dictation workstation:   [id number]    CT CHEST WO IV CONTRAST;  4/13/2025 8:19 pm      INDICATION:  Signs/Symptoms:Metastatic breast cancer/malignant pleural  effusions/pleuritic chest pain.      COMPARISON:  Chest CT 09/10/2024      ACCESSION NUMBER(S):  [id number]      ORDERING CLINICIAN:  MARYAN VU      TECHNIQUE:  Axial CT images of the chest obtained without contrast. Coronal and  sagittal reconstructions were created      FINDINGS:  BONES: The bones are diffusely demineralized. T2 vertebral body  sclerotic lesion, increased compared to 09/10/2024. No acute  fractures LOWER NECK: Left thyroid 1.2 cm nodule (4/57), decreased  compared to prior. CHEST WALL: Right breast 2.5 cm mass,  previously  2.2 cm. UPPER ABDOMEN: Left renal hyperdense exophytic lesions,  likely hemorrhagic or proteinaceous cysts. Multiple new liver lesions  suggestive of metastatic disease. For reference, a lesion in segment  6 measures 2.3 cm (4/334) and a lesion in segment 7 measures 2.3 cm  (4/276), new compared to prior.      VESSELS: Aberrant right subclavian artery with a retroesophageal  course. Atherosclerotic calcification of the aortic arch. HEART:  Mitral annulus, aortic annulus, and coronary artery calcifications.  LYMPH NODES: Interval increase in right axillary, and supraclavicular  lymphadenopathy. For example, a right level 1 axillary lymph node  measures 1.2 cm in short axis, previously 0.5 cm.  MEDIASTINUM/ESOPHAGUS: Unremarkable. LUNGS AND LARGE AIRWAYS: As  before, the right lung is predominantly class with few areas that  remain aerated predominantly in the right upper lobe. There is  asymmetric atrophy of the right chest with rightward mediastinal  shift. There is increased nodularity along the left major fissure  suggestive of lymphatic metastases. Pulmonary nodules have also  increased in size, in keeping with known metastatic disease. PLEURA:  Moderate left pleural effusion, increased compared to prior. Large  right pleural effusion with foci of air throughout the right pleural  space and a PleurX catheter in place. Similar nodular thickening  throughout the right pleura in keeping with metastatic disease.      IMPRESSION:  1. Large right pleural effusion with foci of air throughout the right  pleural space and a PleurX catheter in place. As before, the right  lung is predominantly collapsed with minimal aeration of the right  upper lobe.  2. Moderate left pleural effusion, increased compared to prior CT  from 09/10/2024.  3. Interval worsening of oncologic findings including increased size  of the primary right breast mass, increased right axillary and  supraclavicular metastatic lymphadenopathy,  increased pleural  metastases, increased size of pulmonary metastases, and new liver  metastases.  4. Slight increased sclerosis of the T2 vertebral body is nonspecific  but likely represents evolving osseous metastatic disease.      MACRO:  None      Signed by: Vance Winkler 4/13/2025 9:38 PM  Dictation workstation:   [id number]    IMPRESSION & RECOMMENDATIONS     Mildred Moyer is a 70 y.o. 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 Patient presented with shortness of breath. Pulmonary was consulted for right-sided partial lung collapse.    History of metastatic breast cancer following with Dr. Guzman, diagnosed 3/2023. Large right pleural effusion present on initial imaging around the time of diagnosis; right-sided thoracentesis completed on 3/11/2023 in which 2.2 L was drained; cytology showed atypical cells, cell count lymphocytic-predominant, fluid exudative per Light's Criteria (3/3 criteria met). She had multiple thoracentesis procedures subsequently and eventually underwent right VATS, pleural biopsy with PleurX catheter placement on 10/10/2024 with Dr. Shepherd - intraoperatively the lung was trapped with thick pleural rind and pleural biopsy final pathology showed acute fibrinous pleuritis with atypia. CT chest on 4/13/2025 during a hospitalization showed large right pleural effusion and development of moderate left pleural effusion, as well as increased size of breast mass, increased right supraclavicular metastatic lymphadenopathy, increased pleural and pulmonary metastasis, and new liver metastasis. Her PleurX was drained in the ED and 350 mL was removed; pleural fluid culture negative. She then underwent  left-sided thoracentesis on 4/14/2025, 900 mL fiona colored fluid removed, but no fluid specimen was collected. Recently seen in the ED. PleurX drain was removed 4/29/2025 for minimal output and no relief of symptoms. Seen in ED on 5/29/2025 for shortness of breath. Chest x-ray at this time showed large right pleural effusion and slight interval increase of the left pleural effusion. She underwent left-sided thoracentesis in which 1100 mL serosanguineous colored fluid was removed; fluid exudative per Light's Criteria based on serum and body protein ratio, not able to evaluate by LDH as serum LDH not collected. S/p breast mass biopsy on 5/2/2025 and was seen by Oncology for follow-up on 6/5/2025 - pathology of repeat biopsy consistent with progressive disease and plan to eventually start palliative chemo. This admission, chest x-ray showed no change in bilateral effusion, bilateral infiltrates, and partial collapse of right lung as compared to chest x-ray on 5/29/2025. WBC elevated at 19.6, lactate normal. Blood cultures drawn and negative to date. MRSA PCR negative. She has been afebrile. Currently on her baseline oxygen requirement of 3 L.    Recurrent left-sided pleural effusion  History of recurrent right-sided pleural effusion and trapped lung  Metastatic breast cancer  Leukocytosis  Dyspnea  Chronic hypoxic respiratory failure  Plan for left-sided thoracentesis today. Spoke with patient's oncologist, Dr. Guzman; suspect that this is malignant effusion given progression of disease on most recent CT scan/worsening pleural effusion, plan to obtain pleural fluid cytology. Will also add infectious workup/fluid diff and cell count given leukocytosis, along with total body protein and LDH (and serum protein and LDH), and fluid glucose.  Recommend to consider right-sided thoracentesis.  Plan per Thoracic Surgery to arrange for outpatient left-sided PleurX catheter placement.  Continue supplemental oxygen and monitor  SpO2, maintain above 92%.  Continue antibiotic therapy with Zosyn as we cannot rule-out bronchopulmonary pneumonia.  Check procalcitonin.  Check urine antigens for Legionella and Streptococcus pneumoniae.  Follow blood cultures.  Obtain sputum culture as able.  Continue DuoNebs.    Pulmonary will continue to follow, please call with any questions.    I spent 80 minutes in the professional and overall care of this patient.    Lilly Cadena, APRN-CNP   06/06/25 at 9:13 AM           [1] No family history on file.  [2] buPROPion XL, 300 mg, oral, q24h  [START ON 6/30/2025] capivasertib, 400 mg, oral, 2 times per day on Monday Tuesday Wednesday Thursday  cyanocobalamin, 1,000 mcg, oral, Daily  enoxaparin, 40 mg, subcutaneous, q24h  ferrous sulfate, 65 mg of elemental iron, oral, Daily  hydroCHLOROthiazide, 12.5 mg, oral, Daily  hydrOXYzine HCL, 25 mg, oral, Daily  ipratropium-albuteroL, 3 mL, nebulization, 4x daily  melatonin, 6 mg, oral, Nightly  pantoprazole, 40 mg, oral, Daily before breakfast   Or  pantoprazole, 40 mg, intravenous, Daily before breakfast  piperacillin-tazobactam, 3.375 g, intravenous, q6h  rosuvastatin, 20 mg, oral, Daily  [3]    [4] PRN medications: acetaminophen **OR** acetaminophen **OR** acetaminophen, cyclobenzaprine, guaiFENesin, ipratropium-albuteroL, ondansetron **OR** ondansetron, oxygen, polyethylene glycol  [5]   Current Facility-Administered Medications:     acetaminophen (Tylenol) tablet 650 mg, 650 mg, oral, q4h PRN **OR** acetaminophen (Tylenol) oral liquid 650 mg, 650 mg, oral, q4h PRN **OR** acetaminophen (Tylenol) suppository 650 mg, 650 mg, rectal, q4h PRN, Clarence Mohan MD    buPROPion XL (Wellbutrin XL) 24 hr tablet 300 mg, 300 mg, oral, q24h, Clarence Mohan MD, 300 mg at 06/05/25 1817    [START ON 6/30/2025] capivasertib (Truqap) tablet 400 mg - PATIENT OWN MEDICATION, 400 mg, oral, 2 times per day on Monday Tuesday Wednesday Thursday, Clarence Mohan MD    cyanocobalamin  (Vitamin B-12) tablet 1,000 mcg, 1,000 mcg, oral, Daily, Clarence Mohan MD, 1,000 mcg at 06/06/25 0833    cyclobenzaprine (Flexeril) tablet 10 mg, 10 mg, oral, BID PRN, Clarence Mohan MD    enoxaparin (Lovenox) syringe 40 mg, 40 mg, subcutaneous, q24h, Clarence Mohan MD    ferrous sulfate 325 mg (65 mg elemental) tablet 1 tablet, 65 mg of elemental iron, oral, Daily, Clarence Mohan MD, 1 tablet at 06/06/25 0833    guaiFENesin (Mucinex) 12 hr tablet 600 mg, 600 mg, oral, q12h PRN, Clarence Mohan MD    hydroCHLOROthiazide (Microzide) tablet 12.5 mg, 12.5 mg, oral, Daily, Clarence Mohan MD, 12.5 mg at 06/06/25 0833    hydrOXYzine HCL (Atarax) tablet 25 mg, 25 mg, oral, Daily, Clarence Mohan MD, 25 mg at 06/06/25 0833    ipratropium-albuteroL (Duo-Neb) 0.5-2.5 mg/3 mL nebulizer solution 3 mL, 3 mL, nebulization, 4x daily, Clarence Mohan MD, 3 mL at 06/06/25 0750    ipratropium-albuteroL (Duo-Neb) 0.5-2.5 mg/3 mL nebulizer solution 3 mL, 3 mL, nebulization, q2h PRN, Clarence Mohan MD    melatonin tablet 6 mg, 6 mg, oral, Nightly, Clarence Mohan MD, 6 mg at 06/05/25 2020    ondansetron (Zofran) tablet 4 mg, 4 mg, oral, q8h PRN **OR** ondansetron (Zofran) injection 4 mg, 4 mg, intravenous, q8h PRN, Clarence Mohan MD    oxygen (O2) therapy, , inhalation, Continuous PRN - O2/gases, Clarence Mohan MD, 3 L/min at 06/06/25 0754    pantoprazole (ProtoNix) EC tablet 40 mg, 40 mg, oral, Daily before breakfast **OR** pantoprazole (Protonix) injection 40 mg, 40 mg, intravenous, Daily before breakfast, Clarence Mohan MD, 40 mg at 06/06/25 0600    piperacillin-tazobactam (Zosyn) 3.375 g in dextrose (iso) IV 50 mL, 3.375 g, intravenous, q6h, Clarence Mohan MD, Last Rate: 0 mL/hr at 06/06/25 0226, 3.375 g at 06/06/25 0833    polyethylene glycol (Glycolax, Miralax) packet 17 g, 17 g, oral, Daily PRN, Clarence Mohan MD    rosuvastatin (Crestor) tablet 20 mg, 20 mg, oral, Daily, Clarence Mohan MD, 20 mg at 06/06/25 0833

## 2025-06-06 NOTE — PROGRESS NOTES
Mildred Moyer is a 70 y.o. female     Postthoracentesis  Loculated effusions reported  IR recommends chest tube  CT surgery aware    Review of Systems     Constitutional: no fever, no chills,   Cardiovascular: no chest pain   Respiratory: Shortness of breath  Gastrointestinal: no abdominal pain, no constipation, no melena, no nausea, no diarrhea, no vomiting and no blood in stools.   Neurological: no headache,   All other systems have been reviewed and are negative for complaint.       Vitals:    06/06/25 1627   BP:    Pulse:    Resp:    Temp:    SpO2: 96%        Scheduled medications  Scheduled Medications[1]  Continuous medications  Continuous Medications[2]  PRN medications  PRN Medications[3]    Lab Review   Results from last 7 days   Lab Units 06/06/25 0722 06/05/25  1210   WBC AUTO x10*3/uL 17.1* 19.6*   HEMOGLOBIN g/dL 13.6 15.1   HEMATOCRIT % 40.9 47.2*   PLATELETS AUTO x10*3/uL 588* 714*     Results from last 7 days   Lab Units 06/06/25 0722 06/05/25 2001 06/05/25  1210   SODIUM mmol/L 134* 131* 136   POTASSIUM mmol/L 3.3* 3.7 4.7   CHLORIDE mmol/L 92* 94* 93*   CO2 mmol/L 29 24 27   BUN mg/dL 20 22 22   CREATININE mg/dL 0.80 0.79 0.88   CALCIUM mg/dL 9.0 8.6 10.4*   PROTEIN TOTAL g/dL 6.9  --  8.0   BILIRUBIN TOTAL mg/dL  --   --  0.5   ALK PHOS U/L  --   --  72   ALT U/L  --   --  13   AST U/L  --   --  27   GLUCOSE mg/dL 109* 128* 116*            XR chest 1 view   Final Result   No change in the bilateral effusions, bilateral infiltrate, and   partial collapse of the right lung.        MACRO:   none        Signed by: Lc Munoz 6/5/2025 12:42 PM   Dictation workstation:   [id number]      US thoracentesis    (Results Pending)   XR chest 1 view    (Results Pending)         Physical Exam    Constitutional   General appearance: Alert and in no acute distress.   Pulmonary   Respiratory assessment: On nasal cannula  Decreased breath sounds  Cardiovascular   Auscultation of heart: Apical pulse  normal, heart rate and rhythm normal, normal S1 and S2, no murmurs and no pericardial rub.    Exam for edema: No peripheral edema.   Abdomen   Abdominal Exam: No bruits, normal bowel sounds, soft, non-tender, no abdominal mass palpated.    Liver and Spleen exam: No hepato-splenomegaly.   Musculoskeletal     Inspection/palpation of joints, bones and muscles: No joint swelling. Normal movement of all extremities.   Neurologic   Cranial nerves: Nerves 2-12 were intact, no focal neuro defects.         Assessment/Plan      #Recurrent pleural effusion  #Breast cancer with meta stasis  S/p thoracentesis  Loculations reported  IR recommend chest tube  CT surgery aware     #Leukocytosis  #Partial collapse of right lung  Continue antibiotics for possible pneumonia  Pulmonary on board     #Hypertension  #Dyslipidemia  #Chronic respiratory failure with hypoxia  Continue home medications and DuoNebs         [1] buPROPion XL, 300 mg, oral, q24h  [START ON 6/30/2025] capivasertib, 400 mg, oral, 2 times per day on Monday Tuesday Wednesday Thursday  cyanocobalamin, 1,000 mcg, oral, Daily  enoxaparin, 40 mg, subcutaneous, q24h  ferrous sulfate, 65 mg of elemental iron, oral, Daily  hydroCHLOROthiazide, 12.5 mg, oral, Daily  hydrOXYzine HCL, 25 mg, oral, Daily  ipratropium-albuteroL, 3 mL, nebulization, 4x daily  melatonin, 6 mg, oral, Nightly  pantoprazole, 40 mg, oral, Daily before breakfast   Or  pantoprazole, 40 mg, intravenous, Daily before breakfast  piperacillin-tazobactam, 3.375 g, intravenous, q6h  rosuvastatin, 20 mg, oral, Daily    [2]    [3] PRN medications: acetaminophen **OR** acetaminophen **OR** acetaminophen, cyclobenzaprine, guaiFENesin, ipratropium-albuteroL, ondansetron **OR** ondansetron, oxygen, polyethylene glycol

## 2025-06-06 NOTE — CONSULTS
Nutrition Consult Note  Nutrition Assessment      Reason for Assessment: Admission nursing screening    Mildred Moyer is a 70 y.o. year old female patient with Pleural effusion [J90]  Leukocytosis, unspecified type [D72.829]    referred for MST- wt loss .     Chart reviewed and pt visited.  Medical History[1]    Per chart review:  -Pt transfer from [place], needs chest tube placed  -Recent thoracentesis'; pleurX recently removed 4/29 (output had decreased)  -Left sided pleural effusion 5/29 (1.1L removed)  -Possible plan to have left pleuX placed on this admission    Pt care at time of visit.    2nd attempt-  Pt states:  -No food allergies  -No difficulties chewing/swallowing  -Yes wt loss, some from fluid on lungs, other wt loss r/t cancer  -Usually good appetite, 3 meals daily, plus protein shakes and snacks  -Agreeable to nutritional supplements    Dinner tray visible <25% consumed.    Scheduled medications  Scheduled Medications[2]  Continuous medications  Continuous Medications[3]  PRN medications  PRN Medications[4]    Nutrition Significant Labs:  BMP Trend:   Results from last 7 days   Lab Units 06/06/25  0722 06/05/25 2001 06/05/25  1210   GLUCOSE mg/dL 109* 128* 116*   CALCIUM mg/dL 9.0 8.6 10.4*   SODIUM mmol/L 134* 131* 136   POTASSIUM mmol/L 3.3* 3.7 4.7   CO2 mmol/L 29 24 27   CHLORIDE mmol/L 92* 94* 93*   BUN mg/dL 20 22 22   CREATININE mg/dL 0.80 0.79 0.88    , BG POCT trend:    , Liver Function Trend:   Results from last 7 days   Lab Units 06/05/25  1210   ALK PHOS U/L 72   AST U/L 27   ALT U/L 13   BILIRUBIN TOTAL mg/dL 0.5    , Renal Lab Trend:   Results from last 7 days   Lab Units 06/06/25  0722 06/05/25 2001 06/05/25  1210   POTASSIUM mmol/L 3.3* 3.7 4.7   SODIUM mmol/L 134* 131* 136   MAGNESIUM mg/dL 1.76  --   --    EGFR mL/min/1.73m*2 80 82 72   BUN mg/dL 20 22 22   CREATININE mg/dL 0.80 0.79 0.88    , TPN/PPN Labs:   Results from last 7 days   Lab Units 06/06/25  0722 06/05/25 2001  "06/05/25  1210   GLUCOSE mg/dL 109* 128* 116*   POTASSIUM mmol/L 3.3* 3.7 4.7   MAGNESIUM mg/dL 1.76  --   --    SODIUM mmol/L 134* 131* 136   CHLORIDE mmol/L 92* 94* 93*   ALT U/L  --   --  13   AST U/L  --   --  27   ALK PHOS U/L  --   --  72   BILIRUBIN TOTAL mg/dL  --   --  0.5    , Lipid Panel: No results found for: \"CHOL\", \"HDL\", \"CHHDL\", \"LDLF\", \"VLDL\", \"TRIG\" , Vit D: No results found for: \"VITD25\" , Vit B12: No results found for: \"HTFXYFOY34\" , Iron Panel: No results found for: \"IRON\", \"TIBC\", \"FERRITIN\" , Folate: No results found for: \"FOLATE\"     Dietary Orders (From admission, onward)       Start     Ordered    06/06/25 1802  Oral nutritional supplements  Until discontinued        Comments: vanilla   Question Answer Comment   Deliver with Breakfast vanilla   Deliver with Dinner    Select supplement: Ensure Plus High Protein        06/06/25 1802    06/06/25 1710  Adult diet Regular  Diet effective now        Question:  Diet type  Answer:  Regular    06/06/25 1709    06/05/25 1727  May Participate in Room Service With Assistance  ( ROOM SERVICE MAY PARTICIPATE WITH ASSISTANCE)  Once        Question:  .  Answer:  Yes    06/05/25 1726                  History:  Energy Intake: Good > 75 %  Food and Nutrient History: 3 meals daily per pt    Anthropometrics:  Height: 162.6 cm (5' 4.02\")  Weight: 52 kg (114 lb 9.6 oz)  BMI (Calculated): 19.66    Weight Change: 0    Wt Readings from Last 15 Encounters:  06/05/25 59.0 kg- stated wt  06/05/25 52 kg (114 lb 9.6 oz)  05/29/25 58.5 kg (129 lb)  04/29/25 54.4 kg (120 lb)  04/23/25 57.2 kg (125 lb 15.9 oz)  04/11/25 59 kg (130 lb)  04/03/25 59.5 kg (131 lb 4.5 oz)  01/02/25 63.3 kg (139 lb 7.1 oz)  10/24/24 64 kg (140 lb 15.8 oz)  10/22/24 64.4 kg (142 lb)  10/10/24 62.7 kg (138 lb 3.7 oz)  09/24/24 62.1 kg (137 lb)  09/10/24 57.4 kg (126 lb 8.7 oz)  08/08/24 62.5 kg (137 lb 12.6 oz)  05/09/24 61.2 kg (134 lb 14.7 oz)  12/07/23 57.5 kg (126 lb 12.2 oz)    Significant " "Weight Loss: Yes (Unsure of wt loss- pt does have wt but HO chronic pleural effusions and need for thoracentesis')    Energy Needs:  Height: 162.6 cm (5' 4.02\")  Temp: 36 °C (96.8 °F)    Total Energy Estimated Needs in 24 hours (kCal): 1550 kCal  Energy Estimated Needs per kg Body Weight in 24 hours (kCal/kg): 1825 kCal/kg  Method for Estimating Needs: 30-35kcal/kg    Total Protein Estimated Needs in 24 Hours (g): 55 g  Protein Estimated Needs per kg Body Weight in 24 Hours (g/kg): 65 g/kg  Method for Estimating 24 Hour Protein Needs: 1.0-1.2g/kg    Method for Estimating 24 Hour Fluid Needs: 1mL/kcal or MD recommendations       Nutrition Focused Physical Findings:  Orbital Fat Pads: Mild-Moderate (slight dark circles and slight hollowing)  Buccal Fat Pads: Well nourished (full, rounded cheeks)    Temporalis: Severe (hollowed scooping depression)    Edema: none       Skin: Negative       Nutrition Diagnosis   Malnutrition Diagnosis  Patient has Malnutrition Diagnosis: Yes  Diagnosis Status: New  Malnutrition Diagnosis: Moderate malnutrition related to chronic disease or condition  Related to: cancer  As Evidenced by: mild fat loss and mild muscle loss.  Additional Assessment Information: pt has some wt loss- some r/t chronic pleural effusions. Muscle loss may be r/t advanced age    Patient has Nutrition Diagnosis: Yes  Nutrition Diagnosis 1: Underweight  Diagnosis Status (1): New  Related to (1): BMI for age  As Evidenced by (1): BMI 19.66       Nutrition Interventions/Recommendations   Nutrition Prescription: Nutrition prescription for oral nutrition  Individualized Nutrition Prescription Provided for : Ensure BID    Food and/or Nutrient Delivery Interventions  Meals and Snacks: General healthful diet  Goal: Tolerate diet; >75% consumed     Medical Food Supplement: Commercial beverage medical food supplement therapy  Goal: Ensure nutritional supplements provides 350kcals and 20g of protein    Education " Documentation  N/A      Nutrition Monitoring and Evaluation   Food and Nutrient Related History  Estimated Energy Intake: Energy intake greater or equal to 75% of estimated energy needs    Fluid Intake: Estimated fluid intake    Intake / Amount of food: Meets > 75% estimated energy needs, Consumes at least 75% or more of meals/snacks/supplements    Anthropometrics: Body Composition/Growth/Weight History  Body Weight: Body weight - Maintain stable weight    Body Weight Change: Body weight gain - Gradual weight gain    Biochemical Data, Medical Tests and Procedures  Electrolyte and Renal Panel: Other (Comment), Sodium, Potassium, Chloride  Criteria: As clinically indicated    Gastrointestinal Profile: Other (Comment)  Criteria: As clinically indicated    Glucose/Endocrine Profile: Glucose within normal limits ( mg/dL)  Criteria: As clinically indicated    Nutritional Anemia Profile: Other (Comment)  Criteria: As clinically indicated    Vitamin Profile: Other (Comment)  Criteria: As clinically indicated    Nutrition Focused Physical Findings  Adipose Finding: Loss of subcutaneous fat    Digestive System Finding: Nausea, Vomiting, Loose stool, Diarrhea, Constipation, Abdominal pain    Muscle Finding: Muscle atrophy       Edema Finding:  (chronic pleural effusions)    Time Spent (min): 45 minutes  Last Date of Nutrition Visit: 06/06/25  Nutrition Follow-Up Needed?: Dietitian to reassess per policy  Follow up Comment: HARSHIL Dumont            [1]   Past Medical History:  Diagnosis Date    GERD (gastroesophageal reflux disease)     HLD (hyperlipidemia)     HTN (hypertension)     Liver disease     Lung neoplasm     MS (multiple sclerosis) (Multi)     Pleural effusion    [2] buPROPion XL, 300 mg, oral, q24h  [START ON 6/30/2025] capivasertib, 400 mg, oral, 2 times per day on Monday Tuesday Wednesday Thursday  cyanocobalamin, 1,000 mcg, oral, Daily  enoxaparin, 40 mg, subcutaneous, q24h  ferrous sulfate, 65 mg of elemental  iron, oral, Daily  hydroCHLOROthiazide, 12.5 mg, oral, Daily  hydrOXYzine HCL, 25 mg, oral, Daily  ipratropium-albuteroL, 3 mL, nebulization, 4x daily  melatonin, 6 mg, oral, Nightly  pantoprazole, 40 mg, oral, Daily before breakfast   Or  pantoprazole, 40 mg, intravenous, Daily before breakfast  piperacillin-tazobactam, 3.375 g, intravenous, q6h  rosuvastatin, 20 mg, oral, Daily     [3]    [4] PRN medications: acetaminophen **OR** acetaminophen **OR** acetaminophen, cyclobenzaprine, guaiFENesin, ipratropium-albuteroL, ondansetron **OR** ondansetron, oxygen, polyethylene glycol

## 2025-06-06 NOTE — INTERVAL H&P NOTE
H&P reviewed. The patient was examined and there are no changes to the H&P.  US guided thoracentesis

## 2025-06-06 NOTE — CARE PLAN
The patient's goals for the shift include      The clinical goals for the shift include rest  Problem: Chronic Conditions and Co-morbidities  Goal: Patient's chronic conditions and co-morbidity symptoms are monitored and maintained or improved  Outcome: Progressing     Problem: Safety - Adult  Goal: Free from fall injury  Outcome: Progressing

## 2025-06-06 NOTE — SIGNIFICANT EVENT
Plan of care discussed with Dr Shepherd. Patient seen at bedside s/p thoracentesis. Myself and Madelyn GUPTA discussed procedure. We discussed with the patient that during the procedure 400ml of fluid was drained from the thoracic cavity but that they were unable to drain the entire effusion as it was loculated. We discussed plan with patient to monitor her overnight and obtain a repeat chest x-ray in the morning. Based on the chest x-ray results and how the patient feels in the morning we will discuss possible options including monitoring the effusion with repeat x-rays or possibly placing a chest tube and doing lytic therapy to try and break up the loculation. Patient was agreeable to the plan and order placed for chest x-ray in the morning.    Radha Alves, APRN-CNP

## 2025-06-06 NOTE — PROGRESS NOTES
Pharmacy Medication History    Spoke to the patient and family member.  Source of Information:     Additional concerns with the patient's PTA list.     Notified Provider via [facility] : No    The following updates were made to the Prior to Admission medication list:     Medications ADDED:   Duloxetine   Propranolol  Medications CHANGED:    Medications REMOVED:     Medications NOT TAKING:       Allergy reviewed : Yes    Meds 2 Beds : No    Outpatient pharmacy confirmed and updated in chart : No    Pharmacy name:   The list below reflectives the updated PTA list. Please review each medication in order reconciliation for additional clarification and justification.    Prior to Admission Medications   Prescriptions Last Dose   DULoxetine (Cymbalta) 60 mg DR capsule    Sig: Take 1 capsule (60 mg) by mouth once every 24 hours.   acetaminophen (Tylenol 8 HOUR) 650 mg ER tablet    Sig: Take 1 tablet (650 mg) by mouth every 8 hours. prn moderate pain   buPROPion XL (Wellbutrin XL) 300 mg 24 hr tablet    Sig: Take 1 tablet (300 mg) by mouth once every 24 hours.   capivasertib (Truqap) 200 mg tablet    Sig: Take 2 tablets (400 mg) by mouth 2 times a day.   cetirizine (ZyrTEC) 10 mg tablet    Sig: Take 1 tablet (10 mg) by mouth once daily.   cyanocobalamin (Vitamin B-12) 1,000 mcg tablet    Sig: Take 1 tablet (1,000 mcg) by mouth once daily.   cyclobenzaprine (Flexeril) 10 mg tablet    Sig: Take 1 tablet (10 mg) by mouth 3 times a day as needed for muscle spasms (Muscle spasm or sudden pains and cramping).   dextromethorphan-quinidine (NUEDEXTA) 20-10 mg capsule    Sig: Take 1 capsule by mouth every 12 hours.   ferrous sulfate, 325 mg ferrous sulfate, tablet    Si tablet Orally one time a day for 30 days   hydrOXYzine HCL (Atarax) 25 mg tablet    Sig: Take 1 tablet (25 mg) by mouth once daily.   hydroCHLOROthiazide (Microzide) 12.5 mg capsule    Sig: Take 1 capsule (12.5 mg) by mouth once daily.   loperamide (Imodium A-D) 2 mg  tablet    Sig: Take 2 tablets (4 mg) by mouth if needed for diarrhea (take 2 tablets after first loose stool, 1 tablet after each subsequnet loose stool. max of 8 tabs per day).   melatonin 10 mg tablet extended release    Sig: Take 10 mg by mouth once daily at bedtime.   methylPREDNISolone (Medrol) 4 mg tablet    Sig: Take 1 tablet (4 mg) by mouth.   omeprazole (PriLOSEC) 20 mg DR capsule    Sig: Take 1 capsule (20 mg) by mouth once daily.   oxyCODONE (Roxicodone) 5 mg immediate release tablet    Sig: Take 1 tablet (5 mg) by mouth every 6 hours if needed for severe pain (7 - 10) for up to 5 doses.   Patient not taking: Reported on 4/29/2025   oxyCODONE-acetaminophen (Percocet) 5-325 mg tablet    Sig: Take 1 tablet by mouth every 6 hours if needed for moderate pain (4 - 6) or severe pain (7 - 10). May take 2 tabs by mouth every 6 hours as needed for severe pain.   Patient not taking: Reported on 4/29/2025   oxygen DME/Hospice (O2) therapy    Sig: Inhale 3 L/min continuously. Indications: heart failure with reduced ejection fraction   potassium chloride CR 20 mEq ER tablet    Sig: Take 1 tablet (20 mEq) by mouth once daily. supplement   propranolol (Inderal) 20 mg tablet    Sig: Take 1 tablet (20 mg) by mouth 3 times a day.   rosuvastatin (Crestor) 20 mg tablet    Sig: Take 1 tablet (20 mg) by mouth once daily.      Facility-Administered Medications: None       The list below reflectives the updated allergy list. Please review each documented allergy for additional clarification and justification.    RX Allergies[1]       06/06/25 at 2:18 PM - Ximena Bustillos          [1]   Allergies  Allergen Reactions    Iodinated Contrast Media Anaphylaxis    Iodine Anaphylaxis    Shellfish Containing Products Anaphylaxis    Oxymetazoline Unknown     (Afrin) Pt unsure

## 2025-06-07 ENCOUNTER — APPOINTMENT (OUTPATIENT)
Dept: RADIOLOGY | Facility: HOSPITAL | Age: 70
DRG: 186 | End: 2025-06-07
Payer: COMMERCIAL

## 2025-06-07 LAB
ANION GAP SERPL CALC-SCNC: 12 MMOL/L (ref 10–20)
APPEARANCE UR: CLEAR
BILIRUB UR STRIP.AUTO-MCNC: NEGATIVE MG/DL
BUN SERPL-MCNC: 16 MG/DL (ref 6–23)
CALCIUM SERPL-MCNC: 8.5 MG/DL (ref 8.6–10.3)
CHLORIDE SERPL-SCNC: 94 MMOL/L (ref 98–107)
CO2 SERPL-SCNC: 30 MMOL/L (ref 21–32)
COLOR UR: YELLOW
CREAT SERPL-MCNC: 0.8 MG/DL (ref 0.5–1.05)
EGFRCR SERPLBLD CKD-EPI 2021: 80 ML/MIN/1.73M*2
ERYTHROCYTE [DISTWIDTH] IN BLOOD BY AUTOMATED COUNT: 13.8 % (ref 11.5–14.5)
GLUCOSE SERPL-MCNC: 110 MG/DL (ref 74–99)
GLUCOSE UR STRIP.AUTO-MCNC: NORMAL MG/DL
HCT VFR BLD AUTO: 38.7 % (ref 36–46)
HGB BLD-MCNC: 13.1 G/DL (ref 12–16)
HYALINE CASTS #/AREA URNS AUTO: ABNORMAL /LPF
KETONES UR STRIP.AUTO-MCNC: ABNORMAL MG/DL
LEUKOCYTE ESTERASE UR QL STRIP.AUTO: ABNORMAL
MCH RBC QN AUTO: 32 PG (ref 26–34)
MCHC RBC AUTO-ENTMCNC: 33.9 G/DL (ref 32–36)
MCV RBC AUTO: 94 FL (ref 80–100)
MUCOUS THREADS #/AREA URNS AUTO: ABNORMAL /LPF
NITRITE UR QL STRIP.AUTO: NEGATIVE
NRBC BLD-RTO: 0 /100 WBCS (ref 0–0)
PH UR STRIP.AUTO: 5.5 [PH]
PLATELET # BLD AUTO: 543 X10*3/UL (ref 150–450)
POTASSIUM SERPL-SCNC: 3.2 MMOL/L (ref 3.5–5.3)
PROT UR STRIP.AUTO-MCNC: ABNORMAL MG/DL
RBC # BLD AUTO: 4.1 X10*6/UL (ref 4–5.2)
RBC # UR STRIP.AUTO: NEGATIVE MG/DL
RBC #/AREA URNS AUTO: ABNORMAL /HPF
SODIUM SERPL-SCNC: 133 MMOL/L (ref 136–145)
SP GR UR STRIP.AUTO: 1.04
SQUAMOUS #/AREA URNS AUTO: ABNORMAL /HPF
UROBILINOGEN UR STRIP.AUTO-MCNC: NORMAL MG/DL
WBC # BLD AUTO: 12.2 X10*3/UL (ref 4.4–11.3)
WBC #/AREA URNS AUTO: ABNORMAL /HPF

## 2025-06-07 PROCEDURE — 2500000001 HC RX 250 WO HCPCS SELF ADMINISTERED DRUGS (ALT 637 FOR MEDICARE OP): Performed by: FAMILY MEDICINE

## 2025-06-07 PROCEDURE — 36415 COLL VENOUS BLD VENIPUNCTURE: CPT | Performed by: INTERNAL MEDICINE

## 2025-06-07 PROCEDURE — 85027 COMPLETE CBC AUTOMATED: CPT | Performed by: INTERNAL MEDICINE

## 2025-06-07 PROCEDURE — 87899 AGENT NOS ASSAY W/OPTIC: CPT | Mod: AHULAB | Performed by: NURSE PRACTITIONER

## 2025-06-07 PROCEDURE — 71045 X-RAY EXAM CHEST 1 VIEW: CPT | Performed by: RADIOLOGY

## 2025-06-07 PROCEDURE — 2500000002 HC RX 250 W HCPCS SELF ADMINISTERED DRUGS (ALT 637 FOR MEDICARE OP, ALT 636 FOR OP/ED): Performed by: FAMILY MEDICINE

## 2025-06-07 PROCEDURE — 80048 BASIC METABOLIC PNL TOTAL CA: CPT | Performed by: INTERNAL MEDICINE

## 2025-06-07 PROCEDURE — 87449 NOS EACH ORGANISM AG IA: CPT | Mod: AHULAB | Performed by: NURSE PRACTITIONER

## 2025-06-07 PROCEDURE — 2500000005 HC RX 250 GENERAL PHARMACY W/O HCPCS: Performed by: INTERNAL MEDICINE

## 2025-06-07 PROCEDURE — 2500000002 HC RX 250 W HCPCS SELF ADMINISTERED DRUGS (ALT 637 FOR MEDICARE OP, ALT 636 FOR OP/ED): Performed by: INTERNAL MEDICINE

## 2025-06-07 PROCEDURE — 2500000001 HC RX 250 WO HCPCS SELF ADMINISTERED DRUGS (ALT 637 FOR MEDICARE OP): Performed by: INTERNAL MEDICINE

## 2025-06-07 PROCEDURE — 2500000004 HC RX 250 GENERAL PHARMACY W/ HCPCS (ALT 636 FOR OP/ED): Performed by: INTERNAL MEDICINE

## 2025-06-07 PROCEDURE — 81001 URINALYSIS AUTO W/SCOPE: CPT | Performed by: INTERNAL MEDICINE

## 2025-06-07 PROCEDURE — 94640 AIRWAY INHALATION TREATMENT: CPT

## 2025-06-07 PROCEDURE — 1200000002 HC GENERAL ROOM WITH TELEMETRY DAILY

## 2025-06-07 PROCEDURE — 71045 X-RAY EXAM CHEST 1 VIEW: CPT

## 2025-06-07 PROCEDURE — 99232 SBSQ HOSP IP/OBS MODERATE 35: CPT | Performed by: INTERNAL MEDICINE

## 2025-06-07 RX ORDER — POTASSIUM CHLORIDE 20 MEQ/1
40 TABLET, EXTENDED RELEASE ORAL
Status: COMPLETED | OUTPATIENT
Start: 2025-06-07 | End: 2025-06-07

## 2025-06-07 RX ORDER — POTASSIUM CHLORIDE 20 MEQ/1
40 TABLET, EXTENDED RELEASE ORAL EVERY 4 HOURS
Status: DISCONTINUED | OUTPATIENT
Start: 2025-06-07 | End: 2025-06-07

## 2025-06-07 RX ORDER — POTASSIUM CHLORIDE 1.5 G/1.58G
40 POWDER, FOR SOLUTION ORAL
Status: DISCONTINUED | OUTPATIENT
Start: 2025-06-07 | End: 2025-06-07

## 2025-06-07 RX ADMIN — HYDROXYZINE HYDROCHLORIDE 25 MG: 25 TABLET, FILM COATED ORAL at 08:50

## 2025-06-07 RX ADMIN — POTASSIUM CHLORIDE 40 MEQ: 1500 TABLET, EXTENDED RELEASE ORAL at 17:42

## 2025-06-07 RX ADMIN — ACETAMINOPHEN 650 MG: 325 TABLET ORAL at 00:10

## 2025-06-07 RX ADMIN — ENOXAPARIN SODIUM 40 MG: 40 INJECTION SUBCUTANEOUS at 16:45

## 2025-06-07 RX ADMIN — PIPERACILLIN SODIUM AND TAZOBACTAM SODIUM 3.38 G: 3; .375 INJECTION, SOLUTION INTRAVENOUS at 13:38

## 2025-06-07 RX ADMIN — ROSUVASTATIN 20 MG: 20 TABLET, FILM COATED ORAL at 08:50

## 2025-06-07 RX ADMIN — Medication 1000 MCG: at 08:51

## 2025-06-07 RX ADMIN — PANTOPRAZOLE SODIUM 40 MG: 40 TABLET, DELAYED RELEASE ORAL at 06:36

## 2025-06-07 RX ADMIN — HYDROCHLOROTHIAZIDE 12.5 MG: 12.5 TABLET ORAL at 08:51

## 2025-06-07 RX ADMIN — PIPERACILLIN SODIUM AND TAZOBACTAM SODIUM 3.38 G: 3; .375 INJECTION, SOLUTION INTRAVENOUS at 20:22

## 2025-06-07 RX ADMIN — PIPERACILLIN SODIUM AND TAZOBACTAM SODIUM 3.38 G: 3; .375 INJECTION, SOLUTION INTRAVENOUS at 02:08

## 2025-06-07 RX ADMIN — PIPERACILLIN SODIUM AND TAZOBACTAM SODIUM 3.38 G: 3; .375 INJECTION, SOLUTION INTRAVENOUS at 08:51

## 2025-06-07 RX ADMIN — MELATONIN TAB 3 MG 6 MG: 3 TAB at 21:00

## 2025-06-07 RX ADMIN — BUPROPION HYDROCHLORIDE 300 MG: 150 TABLET, EXTENDED RELEASE ORAL at 16:53

## 2025-06-07 RX ADMIN — ACETAMINOPHEN 650 MG: 325 TABLET ORAL at 15:20

## 2025-06-07 RX ADMIN — FERROUS SULFATE TAB 325 MG (65 MG ELEMENTAL FE) 1 TABLET: 325 (65 FE) TAB at 08:51

## 2025-06-07 RX ADMIN — Medication 3 L/MIN: at 07:37

## 2025-06-07 RX ADMIN — POTASSIUM CHLORIDE 40 MEQ: 1500 TABLET, EXTENDED RELEASE ORAL at 21:00

## 2025-06-07 RX ADMIN — IPRATROPIUM BROMIDE AND ALBUTEROL SULFATE 3 ML: 2.5; .5 SOLUTION RESPIRATORY (INHALATION) at 07:37

## 2025-06-07 ASSESSMENT — COGNITIVE AND FUNCTIONAL STATUS - GENERAL
EATING MEALS: A LITTLE
MOBILITY SCORE: 18
DRESSING REGULAR LOWER BODY CLOTHING: A LITTLE
STANDING UP FROM CHAIR USING ARMS: A LITTLE
MOVING FROM LYING ON BACK TO SITTING ON SIDE OF FLAT BED WITH BEDRAILS: A LITTLE
PERSONAL GROOMING: A LITTLE
MOBILITY SCORE: 18
MOVING FROM LYING ON BACK TO SITTING ON SIDE OF FLAT BED WITH BEDRAILS: A LITTLE
CLIMB 3 TO 5 STEPS WITH RAILING: A LITTLE
DRESSING REGULAR LOWER BODY CLOTHING: A LITTLE
PERSONAL GROOMING: A LITTLE
STANDING UP FROM CHAIR USING ARMS: A LITTLE
DAILY ACTIVITIY SCORE: 19
CLIMB 3 TO 5 STEPS WITH RAILING: A LITTLE
DRESSING REGULAR UPPER BODY CLOTHING: A LITTLE
TOILETING: A LITTLE
HELP NEEDED FOR BATHING: A LITTLE
WALKING IN HOSPITAL ROOM: A LITTLE
TURNING FROM BACK TO SIDE WHILE IN FLAT BAD: A LITTLE
TURNING FROM BACK TO SIDE WHILE IN FLAT BAD: A LITTLE
MOVING TO AND FROM BED TO CHAIR: A LITTLE
MOVING TO AND FROM BED TO CHAIR: A LITTLE
WALKING IN HOSPITAL ROOM: A LITTLE
HELP NEEDED FOR BATHING: A LITTLE
DRESSING REGULAR UPPER BODY CLOTHING: A LITTLE
DAILY ACTIVITIY SCORE: 18
TOILETING: A LITTLE

## 2025-06-07 ASSESSMENT — PAIN DESCRIPTION - ORIENTATION
ORIENTATION: RIGHT;LEFT
ORIENTATION: LEFT

## 2025-06-07 ASSESSMENT — PAIN DESCRIPTION - DESCRIPTORS
DESCRIPTORS: CRAMPING
DESCRIPTORS: ACHING;CRAMPING

## 2025-06-07 ASSESSMENT — PAIN SCALES - GENERAL
PAINLEVEL_OUTOF10: 0 - NO PAIN
PAINLEVEL_OUTOF10: 3

## 2025-06-07 ASSESSMENT — PAIN - FUNCTIONAL ASSESSMENT
PAIN_FUNCTIONAL_ASSESSMENT: 0-10

## 2025-06-07 ASSESSMENT — PAIN DESCRIPTION - LOCATION
LOCATION: LEG
LOCATION: LEG

## 2025-06-07 NOTE — PROGRESS NOTES
"Pulmonary Daily Progress Note   Subjective    Mildred Moyer is a 70 y.o. 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 Patient presented with shortness of breath. Pulmonary was consulted for right-sided partial lung collapse.     Interval History:  S/P left sided thora yesterday. Discussed results and liklihood it could be a malignant effusion.   Denies CP and SOB   Back on B/L o2 from home     Meds    Scheduled medications  Scheduled Medications[1]  Continuous medications  Continuous Medications[2]  PRN medications  PRN Medications[3]     Objective    Blood pressure 125/81, pulse 104, temperature 36.2 °C (97.2 °F), temperature source Temporal, resp. rate 18, height 1.626 m (5' 4.02\"), weight 52 kg (114 lb 9.6 oz), SpO2 100%.   Physical Exam   GENERAL: normal appearance.  No respiratory distress  HEAD/SINUSES: no sinus tenderness  OROPHARYNX: Moist mucosa  NECK: no JVD, midline trachea without stridor.  LUNGS: Symmetric chest. Good excursion. Diminished breath sounds in BLLL with Crackles.   CARDIAC: normal S1 and S2  EXTREMITIES: No edema  NEURO: grossly normal mental status  SKIN: Skin turgor normal. No rashes or lesions.   PSYCH: Normal affect  No intake or output data in the 24 hours ending 06/07/25 0913  Labs:   Results from last 72 hours   Lab Units 06/07/25  0634 06/06/25  0722 06/05/25 2001   SODIUM mmol/L 133* 134* 131*   POTASSIUM mmol/L 3.2* 3.3* 3.7   CHLORIDE mmol/L 94* 92* 94*   CO2 mmol/L 30 29 24   BUN mg/dL 16 20 22   CREATININE mg/dL 0.80 0.80 0.79   GLUCOSE mg/dL 110* 109* 128*   CALCIUM mg/dL 8.5* 9.0 8.6   ANION GAP mmol/L 12 16 17   EGFR mL/min/1.73m*2 80 80 82      Results from last 72 hours   Lab Units " 06/07/25  0634 06/06/25  0722 06/05/25  1210   WBC AUTO x10*3/uL 12.2* 17.1* 19.6*   HEMOGLOBIN g/dL 13.1 13.6 15.1   HEMATOCRIT % 38.7 40.9 47.2*   PLATELETS AUTO x10*3/uL 543* 588* 714*   NEUTROS PCT AUTO %  --   --  88.6   LYMPHS PCT AUTO %  --   --  6.2   MONOS PCT AUTO %  --   --  3.8   EOS PCT AUTO %  --   --  0.2               Micro/ID:   Lab Results   Component Value Date    BLOODCULT No growth at 1 day 06/05/2025    BLOODCULT No growth at 1 day 06/05/2025       Impression   Mildred Moyer is a 70 y.o. 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 Patient presented with shortness of breath. Pulmonary was consulted for right-sided partial lung collapse.     History of metastatic breast cancer following with Dr. Guzman, diagnosed 3/2023. Large right pleural effusion present on initial imaging around the time of diagnosis; right-sided thoracentesis completed on 3/11/2023 in which 2.2 L was drained; cytology showed atypical cells, cell count lymphocytic-predominant, fluid exudative per Light's Criteria (3/3 criteria met). She had multiple thoracentesis procedures subsequently and eventually underwent right VATS, pleural biopsy with PleurX catheter placement on 10/10/2024 with Dr. Shepherd - intraoperatively the lung was trapped with thick pleural rind and pleural biopsy final pathology showed acute fibrinous pleuritis with atypia. CT chest on 4/13/2025 during a hospitalization showed large right pleural effusion and development of moderate left pleural effusion, as well as increased size of breast mass, increased right supraclavicular metastatic lymphadenopathy, increased pleural and pulmonary metastasis, and new liver  metastasis. Her PleurX was drained in the ED and 350 mL was removed; pleural fluid culture negative. She then underwent left-sided thoracentesis on 4/14/2025, 900 mL fiona colored fluid removed, but no fluid specimen was collected. Recently seen in the ED. PleurX drain was removed 4/29/2025 for minimal output and no relief of symptoms. Seen in ED on 5/29/2025 for shortness of breath. Chest x-ray at this time showed large right pleural effusion and slight interval increase of the left pleural effusion. She underwent left-sided thoracentesis in which 1100 mL serosanguineous colored fluid was removed; fluid exudative per Light's Criteria based on serum and body protein ratio, not able to evaluate by LDH as serum LDH not collected. S/p breast mass biopsy on 5/2/2025 and was seen by Oncology for follow-up on 6/5/2025 - pathology of repeat biopsy consistent with progressive disease and plan to eventually start palliative chemo. This admission, chest x-ray showed no change in bilateral effusion, bilateral infiltrates, and partial collapse of right lung as compared to chest x-ray on 5/29/2025. WBC elevated at 19.6, lactate normal. Blood cultures drawn and negative to date. MRSA PCR negative. She has been afebrile. Currently on her baseline oxygen requirement of 3 L.    Recurrent left-sided pleural effusion  History of recurrent right-sided pleural effusion and trapped lung  Metastatic breast cancer  Leukocytosis  Dyspnea  Chronic hypoxic respiratory failure    Recommendations   As follows:  S/p Left sided thora. Exudative etiology per Light's criteria 3/3.   Follow up cytology and fluid Cx.   Thoracic surgery following.   Sent secure chat to Dr. Guzman; suspect that this is malignant effusion given progression of disease on most recent CT scan/worsening pleural effusion. Pt will likely need another pleurX   Recommend to consider right-sided thoracentesis.   Pt to have PleurX placed if malignant effusion on left as an OP  with thoracic surgery.   Continue supplemental oxygen and monitor SpO2, maintain above 92%.  Continue antibiotic therapy with Zosyn until cultures negative and ruled-out bronchopulmonary pneumonia.  Check procalcitonin. Ordered but not collected   Check urine antigens for Legionella and Streptococcus pneumoniae; Ordered but not collected   Follow blood cultures; NGTD x1 day   Obtain sputum culture as able.  Continue DuoNebs.    I spent 35 mins of time in professional care for this patient.     Kendell Courtney MD   06/07/25 at 9:13 AM     Disclaimer: Documentation completed with the information available at the time of input. Parts of this note may have been scribed or generated using voice dictation software, Dragon.  Homophonic errors may exist.  Please contact me directly if clarification is needed. The times in the chart may not be reflective of actual patient care times, interventions, or procedures. Documentation occurs after the physical care of the patient.           [1] buPROPion XL, 300 mg, oral, q24h  [START ON 6/30/2025] capivasertib, 400 mg, oral, 2 times per day on Monday Tuesday Wednesday Thursday  cyanocobalamin, 1,000 mcg, oral, Daily  enoxaparin, 40 mg, subcutaneous, q24h  ferrous sulfate, 65 mg of elemental iron, oral, Daily  hydroCHLOROthiazide, 12.5 mg, oral, Daily  hydrOXYzine HCL, 25 mg, oral, Daily  ipratropium-albuteroL, 3 mL, nebulization, 4x daily  melatonin, 6 mg, oral, Nightly  pantoprazole, 40 mg, oral, Daily before breakfast   Or  pantoprazole, 40 mg, intravenous, Daily before breakfast  piperacillin-tazobactam, 3.375 g, intravenous, q6h  rosuvastatin, 20 mg, oral, Daily  [2]    [3] PRN medications: acetaminophen **OR** acetaminophen **OR** acetaminophen, cyclobenzaprine, guaiFENesin, ipratropium-albuteroL, ondansetron **OR** ondansetron, oxygen, polyethylene glycol

## 2025-06-07 NOTE — SIGNIFICANT EVENT
Plan of care discussed with Dr Shepherd. Patient seen at bedside and discussed how she feels overnight after having the thoracentesis. Patient endorses feeling a minimal amount of relief but still has some shortness of breath. Per Dr Shepherd, request for IR placed pigtail catheter will be made for lytic therapy. Patient is agreeable to plan at this time and consult for IR placed. Patient will remain admitted until Monday. I did discuss plan for pigtail placement and lytic therapy with patient sister, Peggy, via phone per patient request. She is agreeable to the procedure. Will continue to follow peripherally over the weekend.     Radha Alves, APRN-CNP

## 2025-06-07 NOTE — PROGRESS NOTES
Mildred Moyer is a 70 y.o. female     Postthoracentesis  Loculated effusions reported  IR recommends chest tube  CT surgery aware  And evaluated pt and plan is for pigtail cath and lytic therapy   Pt is aware  'she is feeling ok  No pain    Review of Systems     Constitutional: no fever, no chills,   Cardiovascular: no chest pain   Respiratory: Shortness of breath  Gastrointestinal: no abdominal pain, no constipation, no melena, no nausea, no diarrhea, no vomiting and no blood in stools.   Neurological: no headache,   All other systems have been reviewed and are negative for complaint.       Vitals:    06/07/25 1533   BP: 130/79   Pulse: (!) 116   Resp: 17   Temp: 36.1 °C (97 °F)   SpO2: 97%        Scheduled medications  Scheduled Medications[1]  Continuous medications  Continuous Medications[2]  PRN medications  PRN Medications[3]    Lab Review   Results from last 7 days   Lab Units 06/07/25  0634 06/06/25 0722 06/05/25  1210   WBC AUTO x10*3/uL 12.2* 17.1* 19.6*   HEMOGLOBIN g/dL 13.1 13.6 15.1   HEMATOCRIT % 38.7 40.9 47.2*   PLATELETS AUTO x10*3/uL 543* 588* 714*     Results from last 7 days   Lab Units 06/07/25  0634 06/06/25 0722 06/05/25 2001 06/05/25  1210   SODIUM mmol/L 133* 134* 131* 136   POTASSIUM mmol/L 3.2* 3.3* 3.7 4.7   CHLORIDE mmol/L 94* 92* 94* 93*   CO2 mmol/L 30 29 24 27   BUN mg/dL 16 20 22 22   CREATININE mg/dL 0.80 0.80 0.79 0.88   CALCIUM mg/dL 8.5* 9.0 8.6 10.4*   PROTEIN TOTAL g/dL  --  6.9  --  8.0   BILIRUBIN TOTAL mg/dL  --   --   --  0.5   ALK PHOS U/L  --   --   --  72   ALT U/L  --   --   --  13   AST U/L  --   --   --  27   GLUCOSE mg/dL 110* 109* 128* 116*            XR chest 1 view   Final Result   1.  No significant change of bilateral airspace disease greater on   the right.        Signed by: Stiven Cota 6/7/2025 10:17 AM   Dictation workstation:   [id number]      US thoracentesis   Final Result   Uneventful thoracentesis, as detailed above. Left pleural space, 400   mL         I personally performed and/or directly supervised this study and was   present for the entire procedure.        Performed and dictated at [facility].        Signed by: Dony Calderón 6/6/2025 5:02 PM   Dictation workstation:   [id number]      XR chest 1 view   Final Result        Persistent small left pleural effusion, mildly decreased in volume.   No pneumothorax.        Stable large right pleural effusion with subtotal right lung collapse.        MACRO   None        Signed by: Jigna Perla 6/6/2025 4:35 PM   Dictation workstation:   [id number]      XR chest 1 view   Final Result   No change in the bilateral effusions, bilateral infiltrate, and   partial collapse of the right lung.        MACRO:   none        Signed by: Lc Munoz 6/5/2025 12:42 PM   Dictation workstation:   [id number]      Consult to Interventional Radiology    (Results Pending)         Physical Exam    Constitutional   General appearance: Alert and in no acute distress.   Pulmonary   Respiratory assessment: On nasal cannula  Decreased breath sounds  Cardiovascular   Auscultation of heart: Apical pulse normal, heart rate and rhythm normal, normal S1 and S2, no murmurs and no pericardial rub.    Exam for edema: No peripheral edema.   Abdomen   Abdominal Exam: No bruits, normal bowel sounds, soft, non-tender, no abdominal mass palpated.    Liver and Spleen exam: No hepato-splenomegaly.   Musculoskeletal     Inspection/palpation of joints, bones and muscles: No joint swelling. Normal movement of all extremities.   Neurologic   Cranial nerves: Nerves 2-12 were intact, no focal neuro defects.         Assessment/Plan      #Recurrent pleural effusion  #Breast cancer with meta stasis  S/p thoracentesis  Loculations reported  IR recommend chest tube  CT surgery aware     #Leukocytosis  #Partial collapse of right lung  Continue antibiotics for possible pneumonia  Pulmonary on board      #Hypertension  #Dyslipidemia  #Chronic respiratory failure with hypoxia  Continue home medications and DuoNebs    Noted input from thoracic and rec for IR to place pigtail and lytic therapy     Hypokalemia see orders     Dw pt and her sister on phone     Baldo Kaba MD           [1] buPROPion XL, 300 mg, oral, q24h  [START ON 6/30/2025] capivasertib, 400 mg, oral, 2 times per day on Monday Tuesday Wednesday Thursday  cyanocobalamin, 1,000 mcg, oral, Daily  enoxaparin, 40 mg, subcutaneous, q24h  ferrous sulfate, 65 mg of elemental iron, oral, Daily  hydroCHLOROthiazide, 12.5 mg, oral, Daily  hydrOXYzine HCL, 25 mg, oral, Daily  melatonin, 6 mg, oral, Nightly  pantoprazole, 40 mg, oral, Daily before breakfast   Or  pantoprazole, 40 mg, intravenous, Daily before breakfast  piperacillin-tazobactam, 3.375 g, intravenous, q6h  rosuvastatin, 20 mg, oral, Daily     [2]    [3] PRN medications: acetaminophen **OR** acetaminophen **OR** acetaminophen, cyclobenzaprine, guaiFENesin, ipratropium-albuteroL, ondansetron **OR** ondansetron, oxygen, polyethylene glycol

## 2025-06-08 VITALS
TEMPERATURE: 97.2 F | SYSTOLIC BLOOD PRESSURE: 133 MMHG | OXYGEN SATURATION: 99 % | RESPIRATION RATE: 17 BRPM | HEART RATE: 113 BPM | BODY MASS INDEX: 19.56 KG/M2 | WEIGHT: 114.6 LBS | DIASTOLIC BLOOD PRESSURE: 75 MMHG | HEIGHT: 64 IN

## 2025-06-08 LAB
ANION GAP SERPL CALC-SCNC: 11 MMOL/L (ref 10–20)
BACTERIA BLD CULT: NORMAL
BACTERIA BLD CULT: NORMAL
BUN SERPL-MCNC: 15 MG/DL (ref 6–23)
CALCIUM SERPL-MCNC: 8.6 MG/DL (ref 8.6–10.3)
CHLORIDE SERPL-SCNC: 97 MMOL/L (ref 98–107)
CO2 SERPL-SCNC: 30 MMOL/L (ref 21–32)
CREAT SERPL-MCNC: 0.71 MG/DL (ref 0.5–1.05)
EGFRCR SERPLBLD CKD-EPI 2021: >90 ML/MIN/1.73M*2
ERYTHROCYTE [DISTWIDTH] IN BLOOD BY AUTOMATED COUNT: 13.6 % (ref 11.5–14.5)
GLUCOSE SERPL-MCNC: 119 MG/DL (ref 74–99)
HCT VFR BLD AUTO: 38.2 % (ref 36–46)
HGB BLD-MCNC: 12.7 G/DL (ref 12–16)
LEGIONELLA AG UR QL: NEGATIVE
MCH RBC QN AUTO: 32.3 PG (ref 26–34)
MCHC RBC AUTO-ENTMCNC: 33.2 G/DL (ref 32–36)
MCV RBC AUTO: 97 FL (ref 80–100)
NRBC BLD-RTO: 0 /100 WBCS (ref 0–0)
PLATELET # BLD AUTO: 582 X10*3/UL (ref 150–450)
POTASSIUM SERPL-SCNC: 3.7 MMOL/L (ref 3.5–5.3)
RBC # BLD AUTO: 3.93 X10*6/UL (ref 4–5.2)
S PNEUM AG UR QL: NEGATIVE
SODIUM SERPL-SCNC: 134 MMOL/L (ref 136–145)
WBC # BLD AUTO: 18.1 X10*3/UL (ref 4.4–11.3)

## 2025-06-08 PROCEDURE — 80048 BASIC METABOLIC PNL TOTAL CA: CPT | Performed by: INTERNAL MEDICINE

## 2025-06-08 PROCEDURE — 1200000002 HC GENERAL ROOM WITH TELEMETRY DAILY

## 2025-06-08 PROCEDURE — 2500000001 HC RX 250 WO HCPCS SELF ADMINISTERED DRUGS (ALT 637 FOR MEDICARE OP): Performed by: INTERNAL MEDICINE

## 2025-06-08 PROCEDURE — 2500000004 HC RX 250 GENERAL PHARMACY W/ HCPCS (ALT 636 FOR OP/ED): Performed by: INTERNAL MEDICINE

## 2025-06-08 PROCEDURE — 2500000005 HC RX 250 GENERAL PHARMACY W/O HCPCS: Performed by: INTERNAL MEDICINE

## 2025-06-08 PROCEDURE — 2500000002 HC RX 250 W HCPCS SELF ADMINISTERED DRUGS (ALT 637 FOR MEDICARE OP, ALT 636 FOR OP/ED): Performed by: INTERNAL MEDICINE

## 2025-06-08 PROCEDURE — 85027 COMPLETE CBC AUTOMATED: CPT | Performed by: INTERNAL MEDICINE

## 2025-06-08 PROCEDURE — 36415 COLL VENOUS BLD VENIPUNCTURE: CPT | Performed by: INTERNAL MEDICINE

## 2025-06-08 RX ADMIN — PIPERACILLIN SODIUM AND TAZOBACTAM SODIUM 3.38 G: 3; .375 INJECTION, SOLUTION INTRAVENOUS at 08:28

## 2025-06-08 RX ADMIN — PIPERACILLIN SODIUM AND TAZOBACTAM SODIUM 3.38 G: 3; .375 INJECTION, SOLUTION INTRAVENOUS at 14:20

## 2025-06-08 RX ADMIN — HYDROCHLOROTHIAZIDE 12.5 MG: 12.5 TABLET ORAL at 08:27

## 2025-06-08 RX ADMIN — ACETAMINOPHEN 650 MG: 325 TABLET ORAL at 20:09

## 2025-06-08 RX ADMIN — Medication 1000 MCG: at 08:26

## 2025-06-08 RX ADMIN — HYDROXYZINE HYDROCHLORIDE 25 MG: 25 TABLET, FILM COATED ORAL at 08:27

## 2025-06-08 RX ADMIN — FERROUS SULFATE TAB 325 MG (65 MG ELEMENTAL FE) 1 TABLET: 325 (65 FE) TAB at 08:26

## 2025-06-08 RX ADMIN — PIPERACILLIN SODIUM AND TAZOBACTAM SODIUM 3.38 G: 3; .375 INJECTION, SOLUTION INTRAVENOUS at 20:09

## 2025-06-08 RX ADMIN — BUPROPION HYDROCHLORIDE 300 MG: 150 TABLET, EXTENDED RELEASE ORAL at 17:02

## 2025-06-08 RX ADMIN — PANTOPRAZOLE SODIUM 40 MG: 40 TABLET, DELAYED RELEASE ORAL at 06:23

## 2025-06-08 RX ADMIN — ROSUVASTATIN 20 MG: 20 TABLET, FILM COATED ORAL at 08:26

## 2025-06-08 RX ADMIN — GUAIFENESIN 600 MG: 600 TABLET, EXTENDED RELEASE ORAL at 08:28

## 2025-06-08 RX ADMIN — ENOXAPARIN SODIUM 40 MG: 40 INJECTION SUBCUTANEOUS at 17:03

## 2025-06-08 RX ADMIN — PIPERACILLIN SODIUM AND TAZOBACTAM SODIUM 3.38 G: 3; .375 INJECTION, SOLUTION INTRAVENOUS at 02:29

## 2025-06-08 RX ADMIN — CYCLOBENZAPRINE 10 MG: 10 TABLET, FILM COATED ORAL at 08:28

## 2025-06-08 RX ADMIN — POLYETHYLENE GLYCOL 3350 17 G: 17 POWDER, FOR SOLUTION ORAL at 08:27

## 2025-06-08 RX ADMIN — MELATONIN TAB 3 MG 6 MG: 3 TAB at 20:09

## 2025-06-08 RX ADMIN — ACETAMINOPHEN 650 MG: 650 SOLUTION ORAL at 08:29

## 2025-06-08 ASSESSMENT — COGNITIVE AND FUNCTIONAL STATUS - GENERAL
DRESSING REGULAR UPPER BODY CLOTHING: A LITTLE
TOILETING: A LITTLE
MOVING FROM LYING ON BACK TO SITTING ON SIDE OF FLAT BED WITH BEDRAILS: A LITTLE
MOBILITY SCORE: 18
WALKING IN HOSPITAL ROOM: A LITTLE
PERSONAL GROOMING: A LITTLE
DAILY ACTIVITIY SCORE: 19
DRESSING REGULAR LOWER BODY CLOTHING: A LITTLE
MOVING TO AND FROM BED TO CHAIR: A LITTLE
TURNING FROM BACK TO SIDE WHILE IN FLAT BAD: A LITTLE
CLIMB 3 TO 5 STEPS WITH RAILING: A LITTLE
HELP NEEDED FOR BATHING: A LITTLE
STANDING UP FROM CHAIR USING ARMS: A LITTLE

## 2025-06-08 ASSESSMENT — PAIN - FUNCTIONAL ASSESSMENT
PAIN_FUNCTIONAL_ASSESSMENT: 0-10
PAIN_FUNCTIONAL_ASSESSMENT: 0-10

## 2025-06-08 ASSESSMENT — PAIN SCALES - GENERAL
PAINLEVEL_OUTOF10: 2
PAINLEVEL_OUTOF10: 0 - NO PAIN
PAINLEVEL_OUTOF10: 0 - NO PAIN

## 2025-06-08 NOTE — PROGRESS NOTES
Mildred Moyer is a 70 y.o. female     Postthoracentesis  Loculated effusions reported  IR recommends chest tube  CT surgery aware  And evaluated pt and plan is for pigtail cath and lytic therapy   Pt is aware  'she is feeling ok  No pain    Review of Systems     Constitutional: no fever, no chills,   Cardiovascular: no chest pain   Respiratory: Shortness of breath  Gastrointestinal: no abdominal pain, no constipation, no melena, no nausea, no diarrhea, no vomiting and no blood in stools.   Neurological: no headache,   All other systems have been reviewed and are negative for complaint.       Vitals:    06/08/25 1231   BP: (!) 140/96   Pulse: (!) 113   Resp: 17   Temp: 36.4 °C (97.5 °F)   SpO2: 94%        Scheduled medications  Scheduled Medications[1]  Continuous medications  Continuous Medications[2]  PRN medications  PRN Medications[3]    Lab Review   Results from last 7 days   Lab Units 06/08/25 0453 06/07/25 0634 06/06/25  0722   WBC AUTO x10*3/uL 18.1* 12.2* 17.1*   HEMOGLOBIN g/dL 12.7 13.1 13.6   HEMATOCRIT % 38.2 38.7 40.9   PLATELETS AUTO x10*3/uL 582* 543* 588*     Results from last 7 days   Lab Units 06/08/25 0453 06/07/25 0634 06/06/25 0722 06/05/25 2001 06/05/25  1210   SODIUM mmol/L 134* 133* 134*   < > 136   POTASSIUM mmol/L 3.7 3.2* 3.3*   < > 4.7   CHLORIDE mmol/L 97* 94* 92*   < > 93*   CO2 mmol/L 30 30 29   < > 27   BUN mg/dL 15 16 20   < > 22   CREATININE mg/dL 0.71 0.80 0.80   < > 0.88   CALCIUM mg/dL 8.6 8.5* 9.0   < > 10.4*   PROTEIN TOTAL g/dL  --   --  6.9  --  8.0   BILIRUBIN TOTAL mg/dL  --   --   --   --  0.5   ALK PHOS U/L  --   --   --   --  72   ALT U/L  --   --   --   --  13   AST U/L  --   --   --   --  27   GLUCOSE mg/dL 119* 110* 109*   < > 116*    < > = values in this interval not displayed.            XR chest 1 view   Final Result   1.  No significant change of bilateral airspace disease greater on   the right.        Signed by: Stievn Cota 6/7/2025 10:17 AM   Dictation  workstation:   [id number]      US thoracentesis   Final Result   Uneventful thoracentesis, as detailed above. Left pleural space, 400   mL        I personally performed and/or directly supervised this study and was   present for the entire procedure.        Performed and dictated at [facility].        Signed by: Dony Calderón 6/6/2025 5:02 PM   Dictation workstation:   [id number]      XR chest 1 view   Final Result        Persistent small left pleural effusion, mildly decreased in volume.   No pneumothorax.        Stable large right pleural effusion with subtotal right lung collapse.        MACRO   None        Signed by: Jigna Perla 6/6/2025 4:35 PM   Dictation workstation:   [id number]      XR chest 1 view   Final Result   No change in the bilateral effusions, bilateral infiltrate, and   partial collapse of the right lung.        MACRO:   none        Signed by: Lc Munoz 6/5/2025 12:42 PM   Dictation workstation:   [id number]      Consult to Interventional Radiology    (Results Pending)         Physical Exam    Constitutional   General appearance: Alert and in no acute distress.   Pulmonary   Respiratory assessment: On nasal cannula  Decreased breath sounds  Cardiovascular   Auscultation of heart: Apical pulse normal, heart rate and rhythm normal, normal S1 and S2, no murmurs and no pericardial rub.    Exam for edema: No peripheral edema.   Abdomen   Abdominal Exam: No bruits, normal bowel sounds, soft, non-tender, no abdominal mass palpated.    Liver and Spleen exam: No hepato-splenomegaly.   Musculoskeletal     Inspection/palpation of joints, bones and muscles: No joint swelling. Normal movement of all extremities.   Neurologic   Cranial nerves: Nerves 2-12 were intact, no focal neuro defects.         Assessment/Plan      #Recurrent pleural effusion  #Breast cancer with meta stasis  S/p thoracentesis  Loculations reported  IR recommend chest tube  CT surgery aware      #Leukocytosis  #Partial collapse of right lung  Continue antibiotics for possible pneumonia  Pulmonary on board     #Hypertension  #Dyslipidemia  #Chronic respiratory failure with hypoxia  Continue home medications and DuoNebs    Noted input from thoracic and rec for IR to place pigtail and lytic therapy     Hypokalemia see orders     Dw pt and her sister on phone     Baldo Kaba MD           [1] buPROPion XL, 300 mg, oral, q24h  [START ON 6/30/2025] capivasertib, 400 mg, oral, 2 times per day on Monday Tuesday Wednesday Thursday  cyanocobalamin, 1,000 mcg, oral, Daily  enoxaparin, 40 mg, subcutaneous, q24h  ferrous sulfate, 65 mg of elemental iron, oral, Daily  hydroCHLOROthiazide, 12.5 mg, oral, Daily  hydrOXYzine HCL, 25 mg, oral, Daily  melatonin, 6 mg, oral, Nightly  pantoprazole, 40 mg, oral, Daily before breakfast   Or  pantoprazole, 40 mg, intravenous, Daily before breakfast  piperacillin-tazobactam, 3.375 g, intravenous, q6h  rosuvastatin, 20 mg, oral, Daily     [2]    [3] PRN medications: acetaminophen **OR** acetaminophen **OR** acetaminophen, cyclobenzaprine, guaiFENesin, ipratropium-albuteroL, ondansetron **OR** ondansetron, oxygen, polyethylene glycol

## 2025-06-08 NOTE — CARE PLAN
The patient's goals for the shift include  rest    The clinical goals for the shift include remain hds and rest    Problem: Pain - Adult  Goal: Verbalizes/displays adequate comfort level or baseline comfort level  Outcome: Progressing     Problem: Safety - Adult  Goal: Free from fall injury  Outcome: Progressing     Problem: Discharge Planning  Goal: Discharge to home or other facility with appropriate resources  Outcome: Progressing     Problem: Chronic Conditions and Co-morbidities  Goal: Patient's chronic conditions and co-morbidity symptoms are monitored and maintained or improved  Outcome: Progressing     Problem: Nutrition  Goal: Nutrient intake appropriate for maintaining nutritional needs  Outcome: Progressing

## 2025-06-08 NOTE — CARE PLAN
Problem: Skin  Goal: Promote skin healing  Outcome: Progressing   The patient's goals for the shift include      The clinical goals for the shift include remain hds and rest    Over the shift, the patient did not make progress toward the following goals. Barriers to progression include . Recommendations to address these barriers include .promote good nutriition

## 2025-06-09 ENCOUNTER — APPOINTMENT (OUTPATIENT)
Dept: RADIOLOGY | Facility: HOSPITAL | Age: 70
DRG: 186 | End: 2025-06-09
Payer: COMMERCIAL

## 2025-06-09 LAB
ANION GAP SERPL CALC-SCNC: 17 MMOL/L (ref 10–20)
BACTERIA BLD CULT: NORMAL
BACTERIA BLD CULT: NORMAL
BUN SERPL-MCNC: 15 MG/DL (ref 6–23)
CALCIUM SERPL-MCNC: 9 MG/DL (ref 8.6–10.3)
CHLORIDE SERPL-SCNC: 94 MMOL/L (ref 98–107)
CO2 SERPL-SCNC: 26 MMOL/L (ref 21–32)
CREAT SERPL-MCNC: 0.71 MG/DL (ref 0.5–1.05)
EGFRCR SERPLBLD CKD-EPI 2021: >90 ML/MIN/1.73M*2
ERYTHROCYTE [DISTWIDTH] IN BLOOD BY AUTOMATED COUNT: 13.8 % (ref 11.5–14.5)
GLUCOSE SERPL-MCNC: 111 MG/DL (ref 74–99)
HCT VFR BLD AUTO: 38.6 % (ref 36–46)
HGB BLD-MCNC: 12.7 G/DL (ref 12–16)
MCH RBC QN AUTO: 31.6 PG (ref 26–34)
MCHC RBC AUTO-ENTMCNC: 32.9 G/DL (ref 32–36)
MCV RBC AUTO: 96 FL (ref 80–100)
NRBC BLD-RTO: 0 /100 WBCS (ref 0–0)
PLATELET # BLD AUTO: 574 X10*3/UL (ref 150–450)
POTASSIUM SERPL-SCNC: 3.2 MMOL/L (ref 3.5–5.3)
RBC # BLD AUTO: 4.02 X10*6/UL (ref 4–5.2)
SODIUM SERPL-SCNC: 134 MMOL/L (ref 136–145)
WBC # BLD AUTO: 17 X10*3/UL (ref 4.4–11.3)

## 2025-06-09 PROCEDURE — 99232 SBSQ HOSP IP/OBS MODERATE 35: CPT | Performed by: INTERNAL MEDICINE

## 2025-06-09 PROCEDURE — 32557 INSERT CATH PLEURA W/ IMAGE: CPT | Performed by: RADIOLOGY

## 2025-06-09 PROCEDURE — 32557 INSERT CATH PLEURA W/ IMAGE: CPT

## 2025-06-09 PROCEDURE — 36415 COLL VENOUS BLD VENIPUNCTURE: CPT | Performed by: INTERNAL MEDICINE

## 2025-06-09 PROCEDURE — 2500000005 HC RX 250 GENERAL PHARMACY W/O HCPCS: Performed by: INTERNAL MEDICINE

## 2025-06-09 PROCEDURE — 0W9B30Z DRAINAGE OF LEFT PLEURAL CAVITY WITH DRAINAGE DEVICE, PERCUTANEOUS APPROACH: ICD-10-PCS | Performed by: RADIOLOGY

## 2025-06-09 PROCEDURE — 71045 X-RAY EXAM CHEST 1 VIEW: CPT

## 2025-06-09 PROCEDURE — 71045 X-RAY EXAM CHEST 1 VIEW: CPT | Performed by: RADIOLOGY

## 2025-06-09 PROCEDURE — C1729 CATH, DRAINAGE: HCPCS | Performed by: NURSE PRACTITIONER

## 2025-06-09 PROCEDURE — 2720000007 HC OR 272 NO HCPCS: Performed by: NURSE PRACTITIONER

## 2025-06-09 PROCEDURE — 99232 SBSQ HOSP IP/OBS MODERATE 35: CPT | Performed by: NURSE PRACTITIONER

## 2025-06-09 PROCEDURE — 2500000004 HC RX 250 GENERAL PHARMACY W/ HCPCS (ALT 636 FOR OP/ED): Performed by: RADIOLOGY

## 2025-06-09 PROCEDURE — 99221 1ST HOSP IP/OBS SF/LOW 40: CPT

## 2025-06-09 PROCEDURE — 2500000004 HC RX 250 GENERAL PHARMACY W/ HCPCS (ALT 636 FOR OP/ED): Performed by: INTERNAL MEDICINE

## 2025-06-09 PROCEDURE — 80048 BASIC METABOLIC PNL TOTAL CA: CPT | Performed by: INTERNAL MEDICINE

## 2025-06-09 PROCEDURE — 85027 COMPLETE CBC AUTOMATED: CPT | Performed by: INTERNAL MEDICINE

## 2025-06-09 PROCEDURE — 2500000001 HC RX 250 WO HCPCS SELF ADMINISTERED DRUGS (ALT 637 FOR MEDICARE OP): Performed by: NURSE PRACTITIONER

## 2025-06-09 PROCEDURE — 2060000001 HC INTERMEDIATE ICU ROOM DAILY

## 2025-06-09 PROCEDURE — 2500000002 HC RX 250 W HCPCS SELF ADMINISTERED DRUGS (ALT 637 FOR MEDICARE OP, ALT 636 FOR OP/ED): Performed by: INTERNAL MEDICINE

## 2025-06-09 PROCEDURE — C1769 GUIDE WIRE: HCPCS | Performed by: NURSE PRACTITIONER

## 2025-06-09 PROCEDURE — 99233 SBSQ HOSP IP/OBS HIGH 50: CPT | Performed by: INTERNAL MEDICINE

## 2025-06-09 PROCEDURE — 2500000001 HC RX 250 WO HCPCS SELF ADMINISTERED DRUGS (ALT 637 FOR MEDICARE OP): Performed by: INTERNAL MEDICINE

## 2025-06-09 RX ORDER — POTASSIUM CHLORIDE 20 MEQ/1
20 TABLET, EXTENDED RELEASE ORAL ONCE
Status: COMPLETED | OUTPATIENT
Start: 2025-06-09 | End: 2025-06-09

## 2025-06-09 RX ORDER — OXYCODONE HYDROCHLORIDE 5 MG/1
10 TABLET ORAL EVERY 6 HOURS PRN
Refills: 0 | Status: DISCONTINUED | OUTPATIENT
Start: 2025-06-09 | End: 2025-06-10

## 2025-06-09 RX ORDER — LIDOCAINE HYDROCHLORIDE 10 MG/ML
INJECTION, SOLUTION EPIDURAL; INFILTRATION; INTRACAUDAL; PERINEURAL
Status: COMPLETED | OUTPATIENT
Start: 2025-06-09 | End: 2025-06-09

## 2025-06-09 RX ORDER — OXYCODONE HYDROCHLORIDE 5 MG/1
5 TABLET ORAL EVERY 6 HOURS PRN
Refills: 0 | Status: DISCONTINUED | OUTPATIENT
Start: 2025-06-09 | End: 2025-06-12

## 2025-06-09 RX ADMIN — POTASSIUM CHLORIDE 20 MEQ: 1500 TABLET, EXTENDED RELEASE ORAL at 08:56

## 2025-06-09 RX ADMIN — PIPERACILLIN SODIUM AND TAZOBACTAM SODIUM 3.38 G: 3; .375 INJECTION, SOLUTION INTRAVENOUS at 18:40

## 2025-06-09 RX ADMIN — FERROUS SULFATE TAB 325 MG (65 MG ELEMENTAL FE) 1 TABLET: 325 (65 FE) TAB at 08:56

## 2025-06-09 RX ADMIN — MELATONIN TAB 3 MG 6 MG: 3 TAB at 20:26

## 2025-06-09 RX ADMIN — ACETAMINOPHEN 650 MG: 325 TABLET ORAL at 18:40

## 2025-06-09 RX ADMIN — OXYCODONE HYDROCHLORIDE 10 MG: 5 TABLET ORAL at 22:58

## 2025-06-09 RX ADMIN — BUPROPION HYDROCHLORIDE 300 MG: 150 TABLET, EXTENDED RELEASE ORAL at 18:39

## 2025-06-09 RX ADMIN — Medication 1000 MCG: at 08:56

## 2025-06-09 RX ADMIN — CYCLOBENZAPRINE HYDROCHLORIDE 10 MG: 5 TABLET, FILM COATED ORAL at 18:40

## 2025-06-09 RX ADMIN — ROSUVASTATIN 20 MG: 20 TABLET, FILM COATED ORAL at 08:56

## 2025-06-09 RX ADMIN — LIDOCAINE HYDROCHLORIDE 10 ML: 10 INJECTION, SOLUTION EPIDURAL; INFILTRATION; INTRACAUDAL; PERINEURAL at 16:45

## 2025-06-09 RX ADMIN — HYDROXYZINE HYDROCHLORIDE 25 MG: 25 TABLET, FILM COATED ORAL at 08:56

## 2025-06-09 RX ADMIN — PIPERACILLIN SODIUM AND TAZOBACTAM SODIUM 3.38 G: 3; .375 INJECTION, SOLUTION INTRAVENOUS at 20:26

## 2025-06-09 RX ADMIN — PANTOPRAZOLE SODIUM 40 MG: 40 INJECTION, POWDER, FOR SOLUTION INTRAVENOUS at 05:14

## 2025-06-09 RX ADMIN — PIPERACILLIN SODIUM AND TAZOBACTAM SODIUM 3.38 G: 3; .375 INJECTION, SOLUTION INTRAVENOUS at 05:14

## 2025-06-09 RX ADMIN — PIPERACILLIN SODIUM AND TAZOBACTAM SODIUM 3.38 G: 3; .375 INJECTION, SOLUTION INTRAVENOUS at 12:04

## 2025-06-09 RX ADMIN — ENOXAPARIN SODIUM 40 MG: 40 INJECTION SUBCUTANEOUS at 18:40

## 2025-06-09 ASSESSMENT — COGNITIVE AND FUNCTIONAL STATUS - GENERAL
MOVING TO AND FROM BED TO CHAIR: A LITTLE
PERSONAL GROOMING: A LITTLE
MOVING FROM LYING ON BACK TO SITTING ON SIDE OF FLAT BED WITH BEDRAILS: A LITTLE
DAILY ACTIVITIY SCORE: 18
DRESSING REGULAR LOWER BODY CLOTHING: A LITTLE
DRESSING REGULAR UPPER BODY CLOTHING: A LITTLE
TOILETING: A LITTLE
WALKING IN HOSPITAL ROOM: A LITTLE
STANDING UP FROM CHAIR USING ARMS: A LITTLE
MOBILITY SCORE: 18
TURNING FROM BACK TO SIDE WHILE IN FLAT BAD: A LITTLE
HELP NEEDED FOR BATHING: A LITTLE
CLIMB 3 TO 5 STEPS WITH RAILING: A LITTLE
EATING MEALS: A LITTLE

## 2025-06-09 ASSESSMENT — PAIN SCALES - GENERAL
PAINLEVEL_OUTOF10: 0 - NO PAIN
PAINLEVEL_OUTOF10: 8
PAINLEVEL_OUTOF10: 0 - NO PAIN

## 2025-06-09 ASSESSMENT — PAIN - FUNCTIONAL ASSESSMENT
PAIN_FUNCTIONAL_ASSESSMENT: 0-10

## 2025-06-09 ASSESSMENT — PAIN DESCRIPTION - LOCATION: LOCATION: OTHER (COMMENT)

## 2025-06-09 ASSESSMENT — PAIN DESCRIPTION - DESCRIPTORS: DESCRIPTORS: DISCOMFORT

## 2025-06-09 ASSESSMENT — PAIN DESCRIPTION - ORIENTATION: ORIENTATION: LEFT

## 2025-06-09 NOTE — PROGRESS NOTES
Mildred Moyer is a 70 y.o. female     Plans for pigtail with lysis today    Review of Systems     Constitutional: no fever, no chills,   Cardiovascular: no chest pain   Respiratory: Shortness of breath  Gastrointestinal: no abdominal pain, no constipation, no melena, no nausea, no diarrhea, no vomiting and no blood in stools.   Neurological: no headache,   All other systems have been reviewed and are negative for complaint.       Vitals:    06/09/25 1655   BP: 103/70   Pulse: (!) 111   Resp: 23   Temp:    SpO2: 100%        Scheduled medications  Scheduled Medications[1]  Continuous medications  Continuous Medications[2]  PRN medications  PRN Medications[3]    Lab Review   Results from last 7 days   Lab Units 06/09/25 0529 06/08/25 0453 06/07/25  0634   WBC AUTO x10*3/uL 17.0* 18.1* 12.2*   HEMOGLOBIN g/dL 12.7 12.7 13.1   HEMATOCRIT % 38.6 38.2 38.7   PLATELETS AUTO x10*3/uL 574* 582* 543*     Results from last 7 days   Lab Units 06/09/25  0529 06/08/25 0453 06/07/25  0634 06/06/25  0722 06/05/25 2001 06/05/25  1210   SODIUM mmol/L 134* 134* 133* 134*   < > 136   POTASSIUM mmol/L 3.2* 3.7 3.2* 3.3*   < > 4.7   CHLORIDE mmol/L 94* 97* 94* 92*   < > 93*   CO2 mmol/L 26 30 30 29   < > 27   BUN mg/dL 15 15 16 20   < > 22   CREATININE mg/dL 0.71 0.71 0.80 0.80   < > 0.88   CALCIUM mg/dL 9.0 8.6 8.5* 9.0   < > 10.4*   PROTEIN TOTAL g/dL  --   --   --  6.9  --  8.0   BILIRUBIN TOTAL mg/dL  --   --   --   --   --  0.5   ALK PHOS U/L  --   --   --   --   --  72   ALT U/L  --   --   --   --   --  13   AST U/L  --   --   --   --   --  27   GLUCOSE mg/dL 111* 119* 110* 109*   < > 116*    < > = values in this interval not displayed.            XR chest 1 view   Final Result   Redemonstration of extensive bilateral multifocal airspace disease   and pleural effusions worse on the right. This appears worsened   especially on the right.             MACRO:   None        Signed by: Cade Alcala 6/9/2025 6:09 PM   Dictation  workstation:   [id number]      XR chest 1 view   Final Result   1.  No significant change of bilateral airspace disease greater on   the right.        Signed by: Stiven Cota 6/7/2025 10:17 AM   Dictation workstation:   [id number]      US thoracentesis   Final Result   Uneventful thoracentesis, as detailed above. Left pleural space, 400   mL        I personally performed and/or directly supervised this study and was   present for the entire procedure.        Performed and dictated at [facility].        Signed by: Dony Calderón 6/6/2025 5:02 PM   Dictation workstation:   [id number]      XR chest 1 view   Final Result        Persistent small left pleural effusion, mildly decreased in volume.   No pneumothorax.        Stable large right pleural effusion with subtotal right lung collapse.        MACRO   None        Signed by: Jigna Perla 6/6/2025 4:35 PM   Dictation workstation:   [id number]      XR chest 1 view   Final Result   No change in the bilateral effusions, bilateral infiltrate, and   partial collapse of the right lung.        MACRO:   none        Signed by: Lc Munoz 6/5/2025 12:42 PM   Dictation workstation:   [id number]      US thoracentesis w insertion of tube includes water seal when performed    (Results Pending)         Physical Exam    Constitutional   General appearance: Alert and in no acute distress.   Pulmonary   Respiratory assessment: On nasal cannula  Decreased breath sounds  Cardiovascular   Auscultation of heart: Apical pulse normal, heart rate and rhythm normal, normal S1 and S2, no murmurs and no pericardial rub.    Exam for edema: No peripheral edema.   Abdomen   Abdominal Exam: No bruits, normal bowel sounds, soft, non-tender, no abdominal mass palpated.    Liver and Spleen exam: No hepato-splenomegaly.   Musculoskeletal     Inspection/palpation of joints, bones and muscles: No joint swelling. Normal movement of all extremities.   Neurologic    Cranial nerves: Nerves 2-12 were intact, no focal neuro defects.         Assessment/Plan      #Recurrent pleural effusion  #Breast cancer with meta stasis  S/p thoracentesis  Loculations reported  Scheduled for pigtail with lysis  Discussed with IR     #Leukocytosis  #Partial collapse of right lung  Continue antibiotics for possible pneumonia  Pulmonary on board     #Hypertension  #Dyslipidemia  #Chronic respiratory failure with hypoxia  Continue home medications and DuoNebs         [1] buPROPion XL, 300 mg, oral, q24h  [START ON 6/30/2025] capivasertib, 400 mg, oral, 2 times per day on Monday Tuesday Wednesday Thursday  cyanocobalamin, 1,000 mcg, oral, Daily  enoxaparin, 40 mg, subcutaneous, q24h  ferrous sulfate, 65 mg of elemental iron, oral, Daily  hydrOXYzine HCL, 25 mg, oral, Daily  melatonin, 6 mg, oral, Nightly  pantoprazole, 40 mg, oral, Daily before breakfast   Or  pantoprazole, 40 mg, intravenous, Daily before breakfast  piperacillin-tazobactam, 3.375 g, intravenous, q6h  rosuvastatin, 20 mg, oral, Daily     [2]    [3] PRN medications: acetaminophen **OR** acetaminophen **OR** acetaminophen, cyclobenzaprine, guaiFENesin, ipratropium-albuteroL, ondansetron **OR** ondansetron, oxygen, polyethylene glycol

## 2025-06-09 NOTE — PROGRESS NOTES
"Thoracic Surgery- Daily Progress Note   Subjective    Mildred Moyer is a 70 y.o. year old female patient admitted on 6/5/2025 due to a recurrent left pleural effusion    Interval History:  -On 6/6 patient underwent a thoracentesis with minimal symptomatic improvement   -Expresses she is experiencing ongoing shortness of breath   -Plan for pigtail placement today and anticipate lytic therapy tomorrow     Meds    Scheduled medications  Scheduled Medications[1]  Continuous medications  Continuous Medications[2]  PRN medications  PRN Medications[3]     Objective    Blood pressure 129/83, pulse 106, temperature 36.4 °C (97.5 °F), temperature source Temporal, resp. rate 15, height 1.626 m (5' 4.02\"), weight 52 kg (114 lb 9.6 oz), SpO2 100%. Body mass index is 19.66 kg/m².    Physical Exam   GENERAL:  Frail   OROPHARYNX: Moist mucosa, no thrush or lesions   NECK: no JVD, midline trachea without stridor.    LUNGS: Equal chest expansion, slightly labored.  Clear throughout upper lobes and diminished in the bases   CARDIAC: normal S1 and S2; no gallops, rubs or murmurs. Regular rate and rhythm  EXTREMITIES: No edema  NEURO: grossly normal mental status, CN reflexes and motor strength.   SKIN: Skin turgor normal. No rashes or lesions.   PSYCH: Normal affect      Fluid balance  No intake or output data in the 24 hours ending 06/09/25 1002    Labs:   Results from last 72 hours   Lab Units 06/09/25  0529 06/08/25  0453 06/07/25  0634   SODIUM mmol/L 134* 134* 133*   POTASSIUM mmol/L 3.2* 3.7 3.2*   CHLORIDE mmol/L 94* 97* 94*   CO2 mmol/L 26 30 30   BUN mg/dL 15 15 16   CREATININE mg/dL 0.71 0.71 0.80   GLUCOSE mg/dL 111* 119* 110*   CALCIUM mg/dL 9.0 8.6 8.5*   ANION GAP mmol/L 17 11 12   EGFR mL/min/1.73m*2 >90 >90 80      Results from last 72 hours   Lab Units 06/09/25  0529 06/08/25  0453 06/07/25  0634   WBC AUTO x10*3/uL 17.0* 18.1* 12.2*   HEMOGLOBIN g/dL 12.7 12.7 13.1   HEMATOCRIT % 38.6 38.2 38.7   PLATELETS AUTO " x10*3/uL 574* 582* 543*                   Micro/ID:   Lab Results   Component Value Date    BLOODCULT No growth at 3 days 06/05/2025    BLOODCULT No growth at 3 days 06/05/2025         Micheal Moyer is a 70 y.o. year old female patient with a previous medical history of a recurrent right pleural effusion requiring pleurx which was removed on 4/29, chronic hypoxic respiratory failure on 3-4L NC, hypertension, HLD, metastatic breast cancer, anxiety and depression who presented after undergoing a thoracentesis on 5/29 with recurrent pleural effusion and worsening shortness of breath.  Thoracic surgery was consulted for consideration of pleurx placement on the left.       Management Plans     Recurrent left pleural effusion   -Ongoing symptoms despite thoracentesis  -Plan for pigtail chest tube placement today by IR  -Anticipate lytic therapy tomorrow  -Transfer to SDU    -Appreciate consult, will continue to follow  -Plan discussed and formulated with Dr. Joao Rothman, APRN-CNP, DNP   06/09/25 at 10:02 AM            [1] buPROPion XL, 300 mg, oral, q24h  [START ON 6/30/2025] capivasertib, 400 mg, oral, 2 times per day on Monday Tuesday Wednesday Thursday  cyanocobalamin, 1,000 mcg, oral, Daily  enoxaparin, 40 mg, subcutaneous, q24h  ferrous sulfate, 65 mg of elemental iron, oral, Daily  hydrOXYzine HCL, 25 mg, oral, Daily  melatonin, 6 mg, oral, Nightly  pantoprazole, 40 mg, oral, Daily before breakfast   Or  pantoprazole, 40 mg, intravenous, Daily before breakfast  piperacillin-tazobactam, 3.375 g, intravenous, q6h  rosuvastatin, 20 mg, oral, Daily  [2]    [3] PRN medications: acetaminophen **OR** acetaminophen **OR** acetaminophen, cyclobenzaprine, guaiFENesin, ipratropium-albuteroL, ondansetron **OR** ondansetron, oxygen, polyethylene glycol

## 2025-06-09 NOTE — NURSING NOTE
Ordering hemostats to place at bedside, CTA will put continuous pulse ox on, bedside nurse is going to place dressing at bedside in case chest tube pulled out.     Messaged Alyce Rothman for instructions and orders for chest tube, asked her to come look at chest tubeto make sure all set up properly, not connected to suction at this time, no difficulty breathing at this time.

## 2025-06-09 NOTE — NURSING NOTE
After connecting suction patient became sob and said she can't breathe  and holding her chest. Pleurax container was bubbling so Kori Rothman was called to come back and assess patient, , suction was turned off.  Kori here now to assess patient

## 2025-06-09 NOTE — H&P (VIEW-ONLY)
Consults    Reason For Consult  Consulted by Radha Alves CNP for a left sided pigtail chest tube.     History Of Present Illness  Mildred Moyer is a 70 y.o. female presenting with a past medical history of stage IV breast cancer with metastatic disease to the lungs and liver with recurrent pleural effusions on the right with placement of a pleurX cather 10/10/24 with Dr. Shepherd (removed in office 4/29/25 after the catheter was not draining any fluid and she was having no relief of symptoms), now with recurrent left sided pleural effusion requiring thoracentesis (last drained 6/6/25, was loculated and had 400 mL removed), chronic hypoxia on 3-4 L NC, MS, HTN, HLD, GERD, anxiety, depression. Presented to [facility] [place], [place] ED 6/5/25 for SOB and fatigue. Has continued loculated effusion from last week for which IR has been consulted to place a pigtail CT for lytic therapy.      Mildred reports ongoing SOB without any improvement in her symptoms, on 4L NC (baseline), Denies any chest pain, cough, fever, chills, N/V.     Past Medical History  She has a past medical history of GERD (gastroesophageal reflux disease), HLD (hyperlipidemia), HTN (hypertension), Liver disease, Lung neoplasm, MS (multiple sclerosis) (Multi), and Pleural effusion.    Surgical History  She has a past surgical history that includes Dilation and curettage of uterus and Percutaneous chest drain insertion (Right, 10/10/2024).     Social History  She reports that she quit smoking about 42 years ago. Her smoking use included cigarettes. She started smoking about 44 years ago. She has a 1 pack-year smoking history. She has never used smokeless tobacco. She reports that she does not currently use alcohol. She reports that she does not use drugs.    Family History  Family History[1]     Allergies  Iodinated contrast media, Iodine, Shellfish containing products, and Oxymetazoline    MEDS:  Current Medications[2]    Review of Systems  Respiratory ROS: positive  "for - shortness of breath  Cardiovascular ROS: no chest pain or dyspnea on exertion  Gastrointestinal ROS: no abdominal pain, change in bowel habits, or black or bloody stools  Neurological ROS: negative     Last Recorded Vitals  /83 (BP Location: Left arm, Patient Position: Lying)   Pulse 106   Temp 36.4 °C (97.5 °F) (Temporal)   Resp 15   Wt 52 kg (114 lb 9.6 oz)   SpO2 100%      Physical Exam  Orientation: oriented to person, place, time, and general circumstances  HEENT: normocephalic, atraumatic  Pulm: diminished breath sounds in bilateral bases.   Cardiac: Regular rate and rhythm or without murmur or extra heart sounds  GI: soft, nontender, normal bowel sounds  Pulses: peripheral pulses symmetrical    Relevant Results    LABS:  Lab Results   Component Value Date    WBC 17.0 (H) 06/09/2025    HGB 12.7 06/09/2025    HCT 38.6 06/09/2025    MCV 96 06/09/2025     (H) 06/09/2025      Results from last 72 hours   Lab Units 06/09/25  0529   SODIUM mmol/L 134*   POTASSIUM mmol/L 3.2*   CHLORIDE mmol/L 94*   CO2 mmol/L 26   BUN mg/dL 15   CREATININE mg/dL 0.71   GLUCOSE mg/dL 111*   CALCIUM mg/dL 9.0   ANION GAP mmol/L 17   EGFR mL/min/1.73m*2 >90               No lab exists for component: \"PT\"    MICRO:  No results found for the last 14 days.      IMAGING:   XR chest 1 view   Final Result   1.  No significant change of bilateral airspace disease greater on   the right.        Signed by: Stiven Cota 6/7/2025 10:17 AM   Dictation workstation:   [id number]      US thoracentesis   Final Result   Uneventful thoracentesis, as detailed above. Left pleural space, 400   mL        I personally performed and/or directly supervised this study and was   present for the entire procedure.        Performed and dictated at [facility].        Signed by: Dony Calderón 6/6/2025 5:02 PM   Dictation workstation:   [id number]      XR chest 1 view   Final Result        Persistent small " left pleural effusion, mildly decreased in volume.   No pneumothorax.        Stable large right pleural effusion with subtotal right lung collapse.        MACRO   None        Signed by: Jigna Perla 6/6/2025 4:35 PM   Dictation workstation:   [id number]      XR chest 1 view   Final Result   No change in the bilateral effusions, bilateral infiltrate, and   partial collapse of the right lung.        MACRO:   none        Signed by: Lc Munoz 6/5/2025 12:42 PM   Dictation workstation:   [id number]      US thoracentesis w insertion of tube includes water seal when performed    (Results Pending)        Assessment/Plan     This is a 70 y.o. female presenting with a past medical history of stage IV breast cancer with metastatic disease to the lungs and liver with recurrent pleural effusions on the right with placement of a pleurX cather 10/10/24 with Dr. Shepherd (removed in office 4/29/25 after the catheter was not draining any fluid and she was having no relief of symptoms), now with recurrent left sided pleural effusion requiring thoracentesis (last drained 6/6/25, was loculated and had 400 mL removed), chronic hypoxia on 3-4 L NC, MS, HTN, HLD, GERD, anxiety, depression. Presented to [facility] [place] ED 6/5/25 for SOB and fatigue. Has continued loculated effusion from last week for which IR has been consulted to place a pigtail CT for lytic therapy.     Mildred has ongoing Sob and a loculated left sided pleural effusion on 4L NC. I spoke to Mildred and we called her sister Angelika to update on the plan of care. Mildred is amenable to a pigtail CT placement for lytic therapy.     Plan to place this afternoon. Please keep NPO.     Cardiothoracic surgery to manage chest tube once placed.     Treatment options were discussed with the patient. Risks of an US guided pigtail chest tube insertion were reviewed including but not limited to pneumothorax, bleeding, and infection were reviewed. Benefits of the procedure and  alternatives to treatment were also reviewed. Patient verbalizes understanding and wishes to proceed. They will further discuss with IR attending prior to procedure.     Planned Sedation/Anesthesia: Moderate    Airway assessment: normal    Mallampati: III (soft and hard palate and base of uvula visible)    ASA Score: ASA 3 - Patient with moderate systemic disease with functional limitations        CANDY Mercado-CNP    Time : Billing Time  Prep time on date of the patient encounter: 15 minutes.   Time spent directly with patient/family/caregiver: 15 minutes.   Documentation time: 15 minutes.   Total time (minutes):  45 minutes  Time Spent with this Patient (minutes).  More than 50% of This Time was Spent in Counseling and/or Coordination of Care         [1] No family history on file.  [2]   Current Facility-Administered Medications:     acetaminophen (Tylenol) tablet 650 mg, 650 mg, oral, q4h PRN, 650 mg at 06/08/25 2009 **OR** acetaminophen (Tylenol) oral liquid 650 mg, 650 mg, oral, q4h PRN, 650 mg at 06/08/25 0829 **OR** acetaminophen (Tylenol) suppository 650 mg, 650 mg, rectal, q4h PRN, Clarence Mohan MD    buPROPion XL (Wellbutrin XL) 24 hr tablet 300 mg, 300 mg, oral, q24h, Clarence Mohan MD, 300 mg at 06/08/25 1702    [START ON 6/30/2025] capivasertib (Truqap) tablet 400 mg - PATIENT OWN MEDICATION, 400 mg, oral, 2 times per day on Monday Tuesday Wednesday Thursday, Clarence Mohan MD    cyanocobalamin (Vitamin B-12) tablet 1,000 mcg, 1,000 mcg, oral, Daily, Clarence Mohan MD, 1,000 mcg at 06/08/25 0826    cyclobenzaprine (Flexeril) tablet 10 mg, 10 mg, oral, BID PRN, Clarence Mohan MD, 10 mg at 06/08/25 0828    enoxaparin (Lovenox) syringe 40 mg, 40 mg, subcutaneous, q24h, Clarence Mohan MD, 40 mg at 06/08/25 1703    ferrous sulfate 325 mg (65 mg elemental) tablet 1 tablet, 65 mg of elemental iron, oral, Daily, Clarence Mohan MD, 1 tablet at 06/08/25 0826    guaiFENesin (Mucinex) 12 hr tablet 600 mg,  600 mg, oral, q12h PRN, Clarence Mohan MD, 600 mg at 06/08/25 0828    hydroCHLOROthiazide (Microzide) tablet 12.5 mg, 12.5 mg, oral, Daily, Clarence Mohan MD, 12.5 mg at 06/08/25 0827    hydrOXYzine HCL (Atarax) tablet 25 mg, 25 mg, oral, Daily, Clarence Mohan MD, 25 mg at 06/08/25 0827    ipratropium-albuteroL (Duo-Neb) 0.5-2.5 mg/3 mL nebulizer solution 3 mL, 3 mL, nebulization, q2h PRN, Clarence Mohan MD    melatonin tablet 6 mg, 6 mg, oral, Nightly, Clarence Mohan MD, 6 mg at 06/08/25 2009    ondansetron (Zofran) tablet 4 mg, 4 mg, oral, q8h PRN **OR** ondansetron (Zofran) injection 4 mg, 4 mg, intravenous, q8h PRN, Clarence Mohan MD    oxygen (O2) therapy, , inhalation, Continuous PRN - O2/gases, Clarence Mohan MD, 3 L/min at 06/07/25 0737    pantoprazole (ProtoNix) EC tablet 40 mg, 40 mg, oral, Daily before breakfast, 40 mg at 06/08/25 0623 **OR** pantoprazole (Protonix) injection 40 mg, 40 mg, intravenous, Daily before breakfast, Clarence Mohan MD, 40 mg at 06/09/25 0514    piperacillin-tazobactam (Zosyn) 3.375 g in dextrose (iso) IV 50 mL, 3.375 g, intravenous, q6h, Clarence Mohan MD, Stopped at 06/09/25 0520    polyethylene glycol (Glycolax, Miralax) packet 17 g, 17 g, oral, Daily PRN, Clarence Mohan MD, 17 g at 06/08/25 0827    rosuvastatin (Crestor) tablet 20 mg, 20 mg, oral, Daily, Clarence Mohan MD, 20 mg at 06/08/25 0826

## 2025-06-09 NOTE — PROGRESS NOTES
Mildred Moyer is a 70 y.o. female on day 4 of admission presenting with Pleural effusion.  Patient with h/o MS, HTN, DLP, GERD, recurrent R pleural effusion s/p multiple thoracenteses and PleurX placement that was removed in 4/2025, chronic hypoxic respiratory failure on 3L NC, stage IV R breast invasive ductal carcinoma, anxiety, depression who p/w a gradual onset, progressive SOB x 3 weeks, associated with dry cough. In the ED work up revealed stable b/l effusion and infiltrates and partial R lung collapse on chest imaging, and leukocytosis. Admitted for recurrent pleural effusion. Pulmonary is consulted for partial R lung collapse. Of note since admission had L sided thoracentesis done.   Subjective   Increased O2 requirements overnight. Currently on 6L NC. Seen by thoracic surgery. Plan for L Pigtail placement and adhesiolysis.     Overall is feeling slightly worse than yesterday. SOB worse. Denies cough, sputum, wheezing or any pains.   Objective   Scheduled medications  buPROPion XL, 300 mg, oral, q24h  [START ON 6/30/2025] capivasertib, 400 mg, oral, 2 times per day on Monday Tuesday Wednesday Thursday  cyanocobalamin, 1,000 mcg, oral, Daily  enoxaparin, 40 mg, subcutaneous, q24h  ferrous sulfate, 65 mg of elemental iron, oral, Daily  hydrOXYzine HCL, 25 mg, oral, Daily  melatonin, 6 mg, oral, Nightly  pantoprazole, 40 mg, oral, Daily before breakfast   Or  pantoprazole, 40 mg, intravenous, Daily before breakfast  piperacillin-tazobactam, 3.375 g, intravenous, q6h  rosuvastatin, 20 mg, oral, Daily    Continuous medications     PRN medications  PRN medications: acetaminophen **OR** acetaminophen **OR** acetaminophen, cyclobenzaprine, guaiFENesin, ipratropium-albuteroL, ondansetron **OR** ondansetron, oxygen, polyethylene glycol   Physical Exam  Constitutional:       General: She is not in acute distress.     Appearance: She is normal weight. She is ill-appearing. She is not toxic-appearing.   HENT:       "Head: Normocephalic and atraumatic.      Nose:      Comments: On 6L NC.      Mouth/Throat:      Mouth: Mucous membranes are dry.      Comments: Mallampati 2.   Eyes:      General: No scleral icterus.     Extraocular Movements: Extraocular movements intact.      Pupils: Pupils are equal, round, and reactive to light.   Cardiovascular:      Rate and Rhythm: Regular rhythm. Tachycardia present.      Heart sounds: No murmur heard.     No friction rub. No gallop.   Pulmonary:      Effort: Pulmonary effort is normal. No respiratory distress.      Breath sounds: No wheezing or rales.      Comments: Reduced breath sounds at the bases. Overall fair air entry.   Abdominal:      General: Bowel sounds are normal. There is no distension.      Palpations: Abdomen is soft.      Tenderness: There is no abdominal tenderness.   Musculoskeletal:         General: No tenderness. Normal range of motion.      Cervical back: Normal range of motion and neck supple. No rigidity.      Right lower leg: Edema (Trace) present.      Left lower leg: Edema (Trace) present.   Lymphadenopathy:      Cervical: No cervical adenopathy.   Skin:     General: Skin is warm and dry.      Coloration: Skin is not jaundiced.   Neurological:      General: No focal deficit present.      Mental Status: She is alert and oriented to person, place, and time.      Cranial Nerves: No cranial nerve deficit.      Motor: No weakness.   Psychiatric:         Mood and Affect: Mood normal.         Behavior: Behavior normal.     Last Recorded Vitals  Blood pressure 131/89, pulse 107, temperature 36.2 °C (97.2 °F), temperature source Temporal, resp. rate 21, height 1.626 m (5' 4.02\"), weight 52 kg (114 lb 9.6 oz), SpO2 100%.  Intake/Output last 3 Shifts:  I/O last 3 completed shifts:  In: 350 (6.7 mL/kg) [IV Piggyback:350]  Out: - (0 mL/kg)   Weight: 52 kg     Relevant Results  Results for orders placed or performed during the hospital encounter of 06/05/25 (from the past 24 " hours)   Basic metabolic panel   Result Value Ref Range    Glucose 111 (H) 74 - 99 mg/dL    Sodium 134 (L) 136 - 145 mmol/L    Potassium 3.2 (L) 3.5 - 5.3 mmol/L    Chloride 94 (L) 98 - 107 mmol/L    Bicarbonate 26 21 - 32 mmol/L    Anion Gap 17 10 - 20 mmol/L    Urea Nitrogen 15 6 - 23 mg/dL    Creatinine 0.71 0.50 - 1.05 mg/dL    eGFR >90 >60 mL/min/1.73m*2    Calcium 9.0 8.6 - 10.3 mg/dL   CBC   Result Value Ref Range    WBC 17.0 (H) 4.4 - 11.3 x10*3/uL    nRBC 0.0 0.0 - 0.0 /100 WBCs    RBC 4.02 4.00 - 5.20 x10*6/uL    Hemoglobin 12.7 12.0 - 16.0 g/dL    Hematocrit 38.6 36.0 - 46.0 %    MCV 96 80 - 100 fL    MCH 31.6 26.0 - 34.0 pg    MCHC 32.9 32.0 - 36.0 g/dL    RDW 13.8 11.5 - 14.5 %    Platelets 574 (H) 150 - 450 x10*3/uL   No results found.   Assessment & Plan  Pleural effusion    70 YOF with h/o MS, HTN, DLP, GERD, recurrent R pleural effusion s/p multiple thoracenteses and PleurX placement that was removed in 4/2025, chronic hypoxic respiratory failure on 3L NC, stage IV R breast invasive ductal carcinoma, anxiety, depression who p/w a gradual onset, progressive SOB x 3 weeks, associated with dry cough. In the ED work up revealed stable b/l effusion and infiltrates and partial R lung collapse on chest imaging, and leukocytosis. Admitted for recurrent pleural effusion. Pulmonary is consulted for partial R lung collapse. Of note since admission had R sided thoracentesis done. Of note the patient with progression of her breast cancer, being evaluated for palliative chemotherapy.     Recurrent pleural effusions: bilateral. R present since 2023. S/p multiple R sided thoracentesis. Exudative, lymphocytic predominant, atypical cell on cytology. She had R VATs that confirmed trapped lung with pleural biopsy (pathology showed acute fibrinous pleuritis with atypia) and PleurX placement in 10/2024. A moderate size L effusion was detected on CT from 4/2025. S/p L thoracentesis with no specimen. PleurX was removed in  4/2025. Again had L thoracentesis done in 5/2025 that showed exudative effusion. Repeat imaging on this admission re-demonstrated bilateral pleural effusions. S/p L thoracentesis that showed exudative effusion.        Seen by thoracic surgery, plan for L Pigtail placement with dornase therapy       FU thoracentesis results       FU with thoracic surgery recommendations.          Possible pneumonia: MRSA screening negative       Continue Zosyn       FU cultures         Breast cancer: progressive with possible pulmonary and pleural mets.        Outpatient management as per oncology and thoracic surgery    Respiratory failure: acute on chronic with hypoxia. Due to above. Now markedly worse  since yesterday. Unclear etiology        Continue supplemental O2, wean off as tolerates        Home O2 evaluation before DC        BPH with IS, Acapella        Continue Duo-Neb        If continues to worsen might need repeat chest imaging +/- CT PE.     DVT prophylaxis: On Lovenox    Pulmonary will FU while in house.     Lucita Vargas MD

## 2025-06-09 NOTE — DOCUMENTATION CLARIFICATION NOTE
"    PATIENT:               OLINDA GUEVARA  ACCT #:                  [id number]  MRN:                       [id number]  :                       1955  ADMIT DATE:       2025 11:34 AM  DISCH DATE:  RESPONDING PROVIDER #:        [id number]          PROVIDER RESPONSE TEXT:    I agree with the dietician diagnosis of Moderate Malnutrition on 2025.    CDI QUERY TEXT:    Clarification    Instruction:    Based on your assessment of the patient and the clinical information, please provide the requested documentation by clicking on the appropriate radio button and enter any additional information if prompted.    Question: Please further clarify this patient nutritional status as    When answering this query, please exercise your independent professional judgment. The fact that a question is being asked, does not imply that any particular answer is desired or expected.    The patient's clinical indicators include:  Clinical Information: 71 y/o female presented with SOB. Admitted with pleural effusion.    Clinical Indicators:  Dietician consult: \"Moderate malnutrition related to chronic disease or condition  Related to: cancer  As Evidenced by: mild fat loss and mild muscle loss.  Additional Assessment Information: pt has some wt loss- some r/t chronic pleural effusions. Muscle loss may be r/t advanced age\"    BMI 19.6    Treatment: weight monitoring, Ensure with meals    Risk Factors: Breast cancer with metastasis to lungs, liver  Options provided:  -- I agree with the dietician diagnosis of Moderate Malnutrition on 2025.  -- Other - I will add my own diagnosis  -- Refer to Clinical Documentation Reviewer    Query created by: Peggy Jacob on 2025 10:39 AM      Electronically signed by:  MENDEZ MCGOWAN MD 2025 10:45 AM          "

## 2025-06-09 NOTE — PROGRESS NOTES
06/09/25 1722   Discharge Planning   Support Systems Family members   Assistance Needed per Thoracic Surgery chart review;Ongoing symptoms despite thoracentesis  -Plan for pigtail chest tube placement today by IR  -Anticipate lytic therapy tomorrow   Type of Post Acute Facility Services   (PT/OT evals requested)

## 2025-06-09 NOTE — CARE PLAN
Problem: Skin  Goal: Promote skin healing  Outcome: Progressing   The patient's goals for the shift include      The clinical goals for the shift include pt will remain safe this shift    Over the shift, the patient did not make progress toward the following goals. Barriers to progression include . Recommendations to address these barriers include .  Promote good nutrition

## 2025-06-09 NOTE — POST-PROCEDURE NOTE
Interventional Radiology Brief Postprocedure Note    Attending: Kelsie    Assistant: None    Diagnosis: loculated L pleural effusion    Description of procedure: US guided 12F L pigtail chest tube into multiseptated pleural effusion, serosanginous representative specimen aspirated/discarded.  Chest tube to water seal currently.    Anesthesia:  Local    Complications: None    Estimated Blood Loss: minimal          See detailed result report with images in PACS.    The patient tolerated the procedure well without incident or complication and is in stable condition.

## 2025-06-09 NOTE — PRE-PROCEDURE NOTE
Interventional Radiology Preprocedure Note    Indication for procedure: The primary encounter diagnosis was Pleural effusion. A diagnosis of Leukocytosis, unspecified type was also pertinent to this visit.    Relevant review of systems: NA    Relevant Labs:   Lab Results   Component Value Date    CREATININE 0.71 06/09/2025    EGFR >90 06/09/2025    INR 1.1 06/05/2025    PROTIME 11.6 06/05/2025       Planned Sedation/Anesthesia: Moderate    Airway assessment: normal    Directed physical examination:  Thorax symmetric    Mallampati: II (hard and soft palate, upper portion of tonsils and uvula visible)    ASA Score: ASA 3 - Patient with moderate systemic disease with functional limitations    Benefits, risks and alternatives of procedure and planned sedation have been discussed with the patient and/or their representative. All questions answered and they agree to proceed.

## 2025-06-09 NOTE — CONSULTS
Consults    Reason For Consult  Consulted by Radha Alves CNP for a left sided pigtail chest tube.     History Of Present Illness  Mildred Moyer is a 70 y.o. female presenting with a past medical history of stage IV breast cancer with metastatic disease to the lungs and liver with recurrent pleural effusions on the right with placement of a pleurX cather 10/10/24 with Dr. Shepherd (removed in office 4/29/25 after the catheter was not draining any fluid and she was having no relief of symptoms), now with recurrent left sided pleural effusion requiring thoracentesis (last drained 6/6/25, was loculated and had 400 mL removed), chronic hypoxia on 3-4 L NC, MS, HTN, HLD, GERD, anxiety, depression. Presented to [facility] ED 6/5/25 for SOB and fatigue. Has continued loculated effusion from last week for which IR has been consulted to place a pigtail CT for lytic therapy.      Mildred reports ongoing SOB without any improvement in her symptoms, on 4L NC (baseline), Denies any chest pain, cough, fever, chills, N/V.     Past Medical History  She has a past medical history of GERD (gastroesophageal reflux disease), HLD (hyperlipidemia), HTN (hypertension), Liver disease, Lung neoplasm, MS (multiple sclerosis) (Multi), and Pleural effusion.    Surgical History  She has a past surgical history that includes Dilation and curettage of uterus and Percutaneous chest drain insertion (Right, 10/10/2024).     Social History  She reports that she quit smoking about 42 years ago. Her smoking use included cigarettes. She started smoking about 44 years ago. She has a 1 pack-year smoking history. She has never used smokeless tobacco. She reports that she does not currently use alcohol. She reports that she does not use drugs.    Family History  Family History[1]     Allergies  Iodinated contrast media, Iodine, Shellfish containing products, and Oxymetazoline    MEDS:  Current Medications[2]    Review of Systems  Respiratory ROS: positive  "for - shortness of breath  Cardiovascular ROS: no chest pain or dyspnea on exertion  Gastrointestinal ROS: no abdominal pain, change in bowel habits, or black or bloody stools  Neurological ROS: negative     Last Recorded Vitals  /83 (BP Location: Left arm, Patient Position: Lying)   Pulse 106   Temp 36.4 °C (97.5 °F) (Temporal)   Resp 15   Wt 52 kg (114 lb 9.6 oz)   SpO2 100%      Physical Exam  Orientation: oriented to person, place, time, and general circumstances  HEENT: normocephalic, atraumatic  Pulm: diminished breath sounds in bilateral bases.   Cardiac: Regular rate and rhythm or without murmur or extra heart sounds  GI: soft, nontender, normal bowel sounds  Pulses: peripheral pulses symmetrical    Relevant Results    LABS:  Lab Results   Component Value Date    WBC 17.0 (H) 06/09/2025    HGB 12.7 06/09/2025    HCT 38.6 06/09/2025    MCV 96 06/09/2025     (H) 06/09/2025      Results from last 72 hours   Lab Units 06/09/25  0529   SODIUM mmol/L 134*   POTASSIUM mmol/L 3.2*   CHLORIDE mmol/L 94*   CO2 mmol/L 26   BUN mg/dL 15   CREATININE mg/dL 0.71   GLUCOSE mg/dL 111*   CALCIUM mg/dL 9.0   ANION GAP mmol/L 17   EGFR mL/min/1.73m*2 >90               No lab exists for component: \"PT\"    MICRO:  No results found for the last 14 days.      IMAGING:   XR chest 1 view   Final Result   1.  No significant change of bilateral airspace disease greater on   the right.        Signed by: Stiven Cota 6/7/2025 10:17 AM   Dictation workstation:   [id number]      US thoracentesis   Final Result   Uneventful thoracentesis, as detailed above. Left pleural space, 400   mL        I personally performed and/or directly supervised this study and was   present for the entire procedure.        Performed and dictated at [facility].        Signed by: Dony Calderón 6/6/2025 5:02 PM   Dictation workstation:   [id number]      XR chest 1 view   Final Result        Persistent small " left pleural effusion, mildly decreased in volume.   No pneumothorax.        Stable large right pleural effusion with subtotal right lung collapse.        MACRO   None        Signed by: Jigna Perla 6/6/2025 4:35 PM   Dictation workstation:   [id number]      XR chest 1 view   Final Result   No change in the bilateral effusions, bilateral infiltrate, and   partial collapse of the right lung.        MACRO:   none        Signed by: Lc Munoz 6/5/2025 12:42 PM   Dictation workstation:   [id number]      US thoracentesis w insertion of tube includes water seal when performed    (Results Pending)        Assessment/Plan     This is a 70 y.o. female presenting with a past medical history of stage IV breast cancer with metastatic disease to the lungs and liver with recurrent pleural effusions on the right with placement of a pleurX cather 10/10/24 with Dr. Shepherd (removed in office 4/29/25 after the catheter was not draining any fluid and she was having no relief of symptoms), now with recurrent left sided pleural effusion requiring thoracentesis (last drained 6/6/25, was loculated and had 400 mL removed), chronic hypoxia on 3-4 L NC, MS, HTN, HLD, GERD, anxiety, depression. Presented to [facility] ED 6/5/25 for SOB and fatigue. Has continued loculated effusion from last week for which IR has been consulted to place a pigtail CT for lytic therapy.     Mildred has ongoing Sob and a loculated left sided pleural effusion on 4L NC. I spoke to Mildred and we called her sister Angelika to update on the plan of care. Mildred is amenable to a pigtail CT placement for lytic therapy.     Plan to place this afternoon. Please keep NPO.     Cardiothoracic surgery to manage chest tube once placed.     Treatment options were discussed with the patient. Risks of an US guided pigtail chest tube insertion were reviewed including but not limited to pneumothorax, bleeding, and infection were reviewed. Benefits of the procedure and  alternatives to treatment were also reviewed. Patient verbalizes understanding and wishes to proceed. They will further discuss with IR attending prior to procedure.     Planned Sedation/Anesthesia: Moderate    Airway assessment: normal    Mallampati: III (soft and hard palate and base of uvula visible)    ASA Score: ASA 3 - Patient with moderate systemic disease with functional limitations        CANDY Mercado-CNP    Time : Billing Time  Prep time on date of the patient encounter: 15 minutes.   Time spent directly with patient/family/caregiver: 15 minutes.   Documentation time: 15 minutes.   Total time (minutes):  45 minutes  Time Spent with this Patient (minutes).  More than 50% of This Time was Spent in Counseling and/or Coordination of Care         [1] No family history on file.  [2]   Current Facility-Administered Medications:     acetaminophen (Tylenol) tablet 650 mg, 650 mg, oral, q4h PRN, 650 mg at 06/08/25 2009 **OR** acetaminophen (Tylenol) oral liquid 650 mg, 650 mg, oral, q4h PRN, 650 mg at 06/08/25 0829 **OR** acetaminophen (Tylenol) suppository 650 mg, 650 mg, rectal, q4h PRN, Clarence Mohan MD    buPROPion XL (Wellbutrin XL) 24 hr tablet 300 mg, 300 mg, oral, q24h, Clarence Mohan MD, 300 mg at 06/08/25 1702    [START ON 6/30/2025] capivasertib (Truqap) tablet 400 mg - PATIENT OWN MEDICATION, 400 mg, oral, 2 times per day on Monday Tuesday Wednesday Thursday, Clarence Mohan MD    cyanocobalamin (Vitamin B-12) tablet 1,000 mcg, 1,000 mcg, oral, Daily, Clarence Mohan MD, 1,000 mcg at 06/08/25 0826    cyclobenzaprine (Flexeril) tablet 10 mg, 10 mg, oral, BID PRN, Clarence Mohan MD, 10 mg at 06/08/25 0828    enoxaparin (Lovenox) syringe 40 mg, 40 mg, subcutaneous, q24h, Clarence Mohan MD, 40 mg at 06/08/25 1703    ferrous sulfate 325 mg (65 mg elemental) tablet 1 tablet, 65 mg of elemental iron, oral, Daily, Clarence Mohan MD, 1 tablet at 06/08/25 0826    guaiFENesin (Mucinex) 12 hr tablet 600 mg,  600 mg, oral, q12h PRN, Clarence Mohan MD, 600 mg at 06/08/25 0828    hydroCHLOROthiazide (Microzide) tablet 12.5 mg, 12.5 mg, oral, Daily, Clarence Mohan MD, 12.5 mg at 06/08/25 0827    hydrOXYzine HCL (Atarax) tablet 25 mg, 25 mg, oral, Daily, Clarence Mohan MD, 25 mg at 06/08/25 0827    ipratropium-albuteroL (Duo-Neb) 0.5-2.5 mg/3 mL nebulizer solution 3 mL, 3 mL, nebulization, q2h PRN, Clarence Mohan MD    melatonin tablet 6 mg, 6 mg, oral, Nightly, Clarence Mohan MD, 6 mg at 06/08/25 2009    ondansetron (Zofran) tablet 4 mg, 4 mg, oral, q8h PRN **OR** ondansetron (Zofran) injection 4 mg, 4 mg, intravenous, q8h PRN, Clarence Mohan MD    oxygen (O2) therapy, , inhalation, Continuous PRN - O2/gases, Clarence Mohan MD, 3 L/min at 06/07/25 0737    pantoprazole (ProtoNix) EC tablet 40 mg, 40 mg, oral, Daily before breakfast, 40 mg at 06/08/25 0623 **OR** pantoprazole (Protonix) injection 40 mg, 40 mg, intravenous, Daily before breakfast, Clarence Mohan MD, 40 mg at 06/09/25 0514    piperacillin-tazobactam (Zosyn) 3.375 g in dextrose (iso) IV 50 mL, 3.375 g, intravenous, q6h, Clarence Mohan MD, Stopped at 06/09/25 0520    polyethylene glycol (Glycolax, Miralax) packet 17 g, 17 g, oral, Daily PRN, Clarence Mohan MD, 17 g at 06/08/25 0827    rosuvastatin (Crestor) tablet 20 mg, 20 mg, oral, Daily, Clarence Mohan MD, 20 mg at 06/08/25 0826

## 2025-06-10 ENCOUNTER — APPOINTMENT (OUTPATIENT)
Dept: RADIOLOGY | Facility: HOSPITAL | Age: 70
DRG: 186 | End: 2025-06-10
Payer: COMMERCIAL

## 2025-06-10 LAB
ANION GAP SERPL CALC-SCNC: 13 MMOL/L (ref 10–20)
BUN SERPL-MCNC: 15 MG/DL (ref 6–23)
CALCIUM SERPL-MCNC: 8.7 MG/DL (ref 8.6–10.3)
CHLORIDE SERPL-SCNC: 93 MMOL/L (ref 98–107)
CO2 SERPL-SCNC: 34 MMOL/L (ref 21–32)
CREAT SERPL-MCNC: 0.83 MG/DL (ref 0.5–1.05)
EGFRCR SERPLBLD CKD-EPI 2021: 77 ML/MIN/1.73M*2
ERYTHROCYTE [DISTWIDTH] IN BLOOD BY AUTOMATED COUNT: 13.9 % (ref 11.5–14.5)
GLUCOSE SERPL-MCNC: 92 MG/DL (ref 74–99)
HCT VFR BLD AUTO: 41.9 % (ref 36–46)
HGB BLD-MCNC: 13.3 G/DL (ref 12–16)
MCH RBC QN AUTO: 31.4 PG (ref 26–34)
MCHC RBC AUTO-ENTMCNC: 31.7 G/DL (ref 32–36)
MCV RBC AUTO: 99 FL (ref 80–100)
NRBC BLD-RTO: 0 /100 WBCS (ref 0–0)
PLATELET # BLD AUTO: 625 X10*3/UL (ref 150–450)
POTASSIUM SERPL-SCNC: 4.6 MMOL/L (ref 3.5–5.3)
PROCALCITONIN SERPL-MCNC: 0.26 NG/ML
RBC # BLD AUTO: 4.24 X10*6/UL (ref 4–5.2)
SODIUM SERPL-SCNC: 135 MMOL/L (ref 136–145)
WBC # BLD AUTO: 16.2 X10*3/UL (ref 4.4–11.3)

## 2025-06-10 PROCEDURE — 2060000001 HC INTERMEDIATE ICU ROOM DAILY

## 2025-06-10 PROCEDURE — 84145 PROCALCITONIN (PCT): CPT | Mod: AHULAB | Performed by: INTERNAL MEDICINE

## 2025-06-10 PROCEDURE — 99233 SBSQ HOSP IP/OBS HIGH 50: CPT | Performed by: INTERNAL MEDICINE

## 2025-06-10 PROCEDURE — 71045 X-RAY EXAM CHEST 1 VIEW: CPT

## 2025-06-10 PROCEDURE — 80048 BASIC METABOLIC PNL TOTAL CA: CPT | Performed by: INTERNAL MEDICINE

## 2025-06-10 PROCEDURE — 2500000005 HC RX 250 GENERAL PHARMACY W/O HCPCS: Performed by: INTERNAL MEDICINE

## 2025-06-10 PROCEDURE — 2500000001 HC RX 250 WO HCPCS SELF ADMINISTERED DRUGS (ALT 637 FOR MEDICARE OP): Performed by: INTERNAL MEDICINE

## 2025-06-10 PROCEDURE — 71045 X-RAY EXAM CHEST 1 VIEW: CPT | Performed by: RADIOLOGY

## 2025-06-10 PROCEDURE — 2500000004 HC RX 250 GENERAL PHARMACY W/ HCPCS (ALT 636 FOR OP/ED): Performed by: INTERNAL MEDICINE

## 2025-06-10 PROCEDURE — 36415 COLL VENOUS BLD VENIPUNCTURE: CPT | Performed by: INTERNAL MEDICINE

## 2025-06-10 PROCEDURE — 85027 COMPLETE CBC AUTOMATED: CPT | Performed by: INTERNAL MEDICINE

## 2025-06-10 PROCEDURE — 94667 MNPJ CHEST WALL 1ST: CPT

## 2025-06-10 PROCEDURE — 2500000002 HC RX 250 W HCPCS SELF ADMINISTERED DRUGS (ALT 637 FOR MEDICARE OP, ALT 636 FOR OP/ED): Performed by: INTERNAL MEDICINE

## 2025-06-10 PROCEDURE — 99233 SBSQ HOSP IP/OBS HIGH 50: CPT | Performed by: NURSE PRACTITIONER

## 2025-06-10 PROCEDURE — 99232 SBSQ HOSP IP/OBS MODERATE 35: CPT | Performed by: INTERNAL MEDICINE

## 2025-06-10 PROCEDURE — 2500000001 HC RX 250 WO HCPCS SELF ADMINISTERED DRUGS (ALT 637 FOR MEDICARE OP): Performed by: NURSE PRACTITIONER

## 2025-06-10 PROCEDURE — 2500000005 HC RX 250 GENERAL PHARMACY W/O HCPCS: Performed by: NURSE PRACTITIONER

## 2025-06-10 PROCEDURE — 2500000004 HC RX 250 GENERAL PHARMACY W/ HCPCS (ALT 636 FOR OP/ED): Performed by: NURSE PRACTITIONER

## 2025-06-10 PROCEDURE — 99232 SBSQ HOSP IP/OBS MODERATE 35: CPT | Performed by: NURSE PRACTITIONER

## 2025-06-10 RX ORDER — MORPHINE SULFATE 2 MG/ML
2 INJECTION, SOLUTION INTRAMUSCULAR; INTRAVENOUS EVERY 4 HOURS PRN
Status: DISCONTINUED | OUTPATIENT
Start: 2025-06-10 | End: 2025-06-16 | Stop reason: HOSPADM

## 2025-06-10 RX ORDER — LIDOCAINE 560 MG/1
2 PATCH PERCUTANEOUS; TOPICAL; TRANSDERMAL DAILY
Status: DISCONTINUED | OUTPATIENT
Start: 2025-06-10 | End: 2025-06-16 | Stop reason: HOSPADM

## 2025-06-10 RX ADMIN — ACETAMINOPHEN 650 MG: 325 TABLET ORAL at 05:26

## 2025-06-10 RX ADMIN — PIPERACILLIN SODIUM AND TAZOBACTAM SODIUM 3.38 G: 3; .375 INJECTION, SOLUTION INTRAVENOUS at 11:54

## 2025-06-10 RX ADMIN — BUPROPION HYDROCHLORIDE 300 MG: 150 TABLET, EXTENDED RELEASE ORAL at 17:09

## 2025-06-10 RX ADMIN — PIPERACILLIN SODIUM AND TAZOBACTAM SODIUM 3.38 G: 3; .375 INJECTION, SOLUTION INTRAVENOUS at 05:20

## 2025-06-10 RX ADMIN — CYCLOBENZAPRINE HYDROCHLORIDE 10 MG: 5 TABLET, FILM COATED ORAL at 05:26

## 2025-06-10 RX ADMIN — PANTOPRAZOLE SODIUM 40 MG: 40 TABLET, DELAYED RELEASE ORAL at 05:20

## 2025-06-10 RX ADMIN — OXYCODONE HYDROCHLORIDE 5 MG: 5 TABLET ORAL at 17:09

## 2025-06-10 RX ADMIN — PIPERACILLIN SODIUM AND TAZOBACTAM SODIUM 3.38 G: 3; .375 INJECTION, SOLUTION INTRAVENOUS at 17:09

## 2025-06-10 RX ADMIN — ROSUVASTATIN 20 MG: 20 TABLET, FILM COATED ORAL at 08:54

## 2025-06-10 RX ADMIN — MORPHINE SULFATE 2 MG: 2 INJECTION, SOLUTION INTRAMUSCULAR; INTRAVENOUS at 15:54

## 2025-06-10 RX ADMIN — FERROUS SULFATE TAB 325 MG (65 MG ELEMENTAL FE) 1 TABLET: 325 (65 FE) TAB at 08:53

## 2025-06-10 RX ADMIN — MELATONIN TAB 3 MG 6 MG: 3 TAB at 20:44

## 2025-06-10 RX ADMIN — OXYCODONE HYDROCHLORIDE 10 MG: 5 TABLET ORAL at 08:56

## 2025-06-10 RX ADMIN — LIDOCAINE 4% 2 PATCH: 40 PATCH TOPICAL at 14:18

## 2025-06-10 RX ADMIN — ENOXAPARIN SODIUM 40 MG: 40 INJECTION SUBCUTANEOUS at 17:09

## 2025-06-10 RX ADMIN — Medication 1000 MCG: at 08:54

## 2025-06-10 RX ADMIN — HYDROXYZINE HYDROCHLORIDE 25 MG: 25 TABLET, FILM COATED ORAL at 08:53

## 2025-06-10 ASSESSMENT — COGNITIVE AND FUNCTIONAL STATUS - GENERAL
TOILETING: A LITTLE
HELP NEEDED FOR BATHING: A LITTLE
MOVING FROM LYING ON BACK TO SITTING ON SIDE OF FLAT BED WITH BEDRAILS: A LITTLE
WALKING IN HOSPITAL ROOM: A LITTLE
MOBILITY SCORE: 18
STANDING UP FROM CHAIR USING ARMS: A LITTLE
MOVING TO AND FROM BED TO CHAIR: A LITTLE
EATING MEALS: A LITTLE
DAILY ACTIVITIY SCORE: 18
DRESSING REGULAR UPPER BODY CLOTHING: A LITTLE
PERSONAL GROOMING: A LITTLE
DRESSING REGULAR LOWER BODY CLOTHING: A LITTLE
CLIMB 3 TO 5 STEPS WITH RAILING: A LITTLE
TURNING FROM BACK TO SIDE WHILE IN FLAT BAD: A LITTLE

## 2025-06-10 ASSESSMENT — PAIN SCALES - GENERAL
PAINLEVEL_OUTOF10: 4
PAINLEVEL_OUTOF10: 8
PAINLEVEL_OUTOF10: 3
PAINLEVEL_OUTOF10: 5 - MODERATE PAIN
PAINLEVEL_OUTOF10: 7

## 2025-06-10 ASSESSMENT — ENCOUNTER SYMPTOMS
ROS GI COMMENTS: BM 6/7
GASTROINTESTINAL NEGATIVE: 1
HEMATOLOGIC/LYMPHATIC NEGATIVE: 1
PSYCHIATRIC NEGATIVE: 1
APPETITE CHANGE: 1
ALLERGIC/IMMUNOLOGIC NEGATIVE: 1
MUSCULOSKELETAL NEGATIVE: 1
ENDOCRINE NEGATIVE: 1
EYES NEGATIVE: 1
WEAKNESS: 1
SHORTNESS OF BREATH: 1

## 2025-06-10 ASSESSMENT — PAIN DESCRIPTION - ORIENTATION
ORIENTATION: LEFT
ORIENTATION: LEFT

## 2025-06-10 ASSESSMENT — PAIN - FUNCTIONAL ASSESSMENT
PAIN_FUNCTIONAL_ASSESSMENT: 0-10

## 2025-06-10 NOTE — PROGRESS NOTES
Mildred Moyer is a 70 y.o. female on day 5 of admission presenting with Pleural effusion.  Patient with h/o MS, HTN, DLP, GERD, recurrent R pleural effusion s/p multiple thoracenteses and PleurX placement that was removed in 4/2025, chronic hypoxic respiratory failure on 3L NC, stage IV R breast invasive ductal carcinoma, anxiety, depression who p/w a gradual onset, progressive SOB x 3 weeks, associated with dry cough. In the ED work up revealed stable b/l effusion and infiltrates and partial R lung collapse on chest imaging, and leukocytosis. Admitted for recurrent pleural effusion. Pulmonary is consulted for partial R lung collapse. Of note since admission had L sided thoracentesis done.   Subjective   No acute overnight events. S/p pigtail CT placement on 6/9 by IR with drainage of 1.3L of fluid overnight, kept to water seal. O2 requirements improved to 4L.     Overall continues to feel better. SOB improved after CT placement. Complains of HA. Denies any other complaints.   Objective   Scheduled medications  buPROPion XL, 300 mg, oral, q24h  [START ON 6/30/2025] capivasertib, 400 mg, oral, 2 times per day on Monday Tuesday Wednesday Thursday  cyanocobalamin, 1,000 mcg, oral, Daily  enoxaparin, 40 mg, subcutaneous, q24h  ferrous sulfate, 65 mg of elemental iron, oral, Daily  hydrOXYzine HCL, 25 mg, oral, Daily  lidocaine, 2 patch, transdermal, Daily  melatonin, 6 mg, oral, Nightly  pantoprazole, 40 mg, oral, Daily before breakfast   Or  pantoprazole, 40 mg, intravenous, Daily before breakfast  piperacillin-tazobactam, 3.375 g, intravenous, q6h  rosuvastatin, 20 mg, oral, Daily    Continuous medications     PRN medications  PRN medications: acetaminophen **OR** acetaminophen **OR** acetaminophen, cyclobenzaprine, guaiFENesin, ipratropium-albuteroL, morphine, ondansetron **OR** ondansetron, oxyCODONE, oxygen, polyethylene glycol   Physical Exam  Constitutional:       General: She is not in acute distress.      "Appearance: She is normal weight. She is ill-appearing. She is not toxic-appearing.   HENT:      Head: Normocephalic and atraumatic.      Nose:      Comments: On 4L NC.      Mouth/Throat:      Mouth: Mucous membranes are dry.      Comments: Mallampati 2.   Eyes:      General: No scleral icterus.     Extraocular Movements: Extraocular movements intact.      Pupils: Pupils are equal, round, and reactive to light.   Cardiovascular:      Rate and Rhythm: Regular rhythm. Tachycardia present.      Heart sounds: No murmur heard.     No friction rub. No gallop.   Pulmonary:      Effort: Pulmonary effort is normal. No respiratory distress.      Breath sounds: No wheezing or rales.      Comments: Reduced breath sounds at the bases. Overall fair air entry. L sided CT in place draining serosanguinous fluid.   Abdominal:      General: Bowel sounds are normal. There is no distension.      Palpations: Abdomen is soft.      Tenderness: There is no abdominal tenderness.   Musculoskeletal:         General: No tenderness. Normal range of motion.      Cervical back: Normal range of motion and neck supple. No rigidity.      Right lower leg: Edema (Trace) present.      Left lower leg: Edema (Trace) present.   Lymphadenopathy:      Cervical: No cervical adenopathy.   Skin:     General: Skin is warm and dry.      Coloration: Skin is not jaundiced.   Neurological:      General: No focal deficit present.      Mental Status: She is alert and oriented to person, place, and time.      Cranial Nerves: No cranial nerve deficit.      Motor: No weakness.   Psychiatric:         Mood and Affect: Mood normal.         Behavior: Behavior normal.     Last Recorded Vitals  Blood pressure 114/50, pulse (!) 116, temperature 36.2 °C (97.2 °F), temperature source Temporal, resp. rate 18, height 1.626 m (5' 4.02\"), weight 52 kg (114 lb 9.6 oz), SpO2 99%.  Intake/Output last 3 Shifts:  I/O last 3 completed shifts:  In: 100 (1.9 mL/kg) [IV Piggyback:100]  Out: " 1350 (26 mL/kg) [Chest Tube:1350]  Weight: 52 kg     Relevant Results  Results for orders placed or performed during the hospital encounter of 06/05/25 (from the past 24 hours)   Basic metabolic panel   Result Value Ref Range    Glucose 92 74 - 99 mg/dL    Sodium 135 (L) 136 - 145 mmol/L    Potassium 4.6 3.5 - 5.3 mmol/L    Chloride 93 (L) 98 - 107 mmol/L    Bicarbonate 34 (H) 21 - 32 mmol/L    Anion Gap 13 10 - 20 mmol/L    Urea Nitrogen 15 6 - 23 mg/dL    Creatinine 0.83 0.50 - 1.05 mg/dL    eGFR 77 >60 mL/min/1.73m*2    Calcium 8.7 8.6 - 10.3 mg/dL   CBC   Result Value Ref Range    WBC 16.2 (H) 4.4 - 11.3 x10*3/uL    nRBC 0.0 0.0 - 0.0 /100 WBCs    RBC 4.24 4.00 - 5.20 x10*6/uL    Hemoglobin 13.3 12.0 - 16.0 g/dL    Hematocrit 41.9 36.0 - 46.0 %    MCV 99 80 - 100 fL    MCH 31.4 26.0 - 34.0 pg    MCHC 31.7 (L) 32.0 - 36.0 g/dL    RDW 13.9 11.5 - 14.5 %    Platelets 625 (H) 150 - 450 x10*3/uL   US thoracentesis w insertion of tube includes water seal when performed  Result Date: 6/10/2025  Interpreted By:  Demetrio Iglesias, STUDY: US THORACENTESIS WITH INSERTION OF TUBE INCLUDES WATER SEAL WHEN PERFORMED; ;  6/9/2025 5:00 pm   ULTRASOUND-GUIDED PERCUTANEOUS LEFT PIGTAIL THORACOSTOMY   INDICATION: Signs/Symptoms:Pigtail placement for lytic therapy. 70-year-old woman with multi-septated left pleural effusion.     COMPARISON: Ultrasound images from thoracentesis 06/06/2025   ACCESSION NUMBER(S): [id number]   ORDERING CLINICIAN: PEDRO PABLO MERCADO   TECHNIQUE:   ATTENDING : Demetrio Iglesias M.D.   TECHNICAL DESCRIPTION/FINDINGS: The procedure, including all risks, benefits and alternatives were explained to the patient in detail. All questions were answered and written informed consent was obtained.   The patient was positioned in the right posterior oblique position in her hospital bed in the ultrasound suite. A time-out was performed.   Focused ultrasound was performed the inferolateral aspect of the  left chest Re demonstrating multi-septated pleural effusion.   The overlying skin was prepped and draped in usual sterile fashion. 1% lidocaine was administered subcutaneously for local anesthesia. Under real-time ultrasound guidance, 5 Hungarian multi side-hole sheath needle was advanced via an oblique intercostal approach into the multi septated left pleural effusion. Ultrasound images were saved. Aspiration yielded serosanguineous fluid. An 035 Amplatz wire was coiled in the left pleural effusion after serial dilation, a 12 Hungarian pigtail drainage catheter functioning as a pigtail thoracostomy tube was placed. Focused post chest tube placement sonography demonstrates positioning of the pigtail catheter in the pleural effusion.   The newly placed catheter was then sutured to the skin with 2 0 Prolene stay suture and connected to Pleur-evac drainage. A sterile dressing was applied.   SEDATION/MEDICATIONS: Continuous cardiopulmonary monitoring was performed by a radiology nurse for the duration of the procedure. No conscious sedation was administered. Procedural duration 30 minutes. 10 cc 1% Lidocaine was administered subcutaneously for local anesthesia. SPECIMENS: 5 cc serosanguineous left pleural fluid aspirated, discarded. ESTIMATED BLOOD LOSS:  5 cc FLOUROSCOPY: None. CONTRAST: None.   FINDINGS: Test       Ultrasound-guided percutaneous 12 Hungarian pigtail drainage catheter into multi-septated left pleural effusion.     MACRO: None   Signed by: Demetrio Iglesias 6/10/2025 6:40 PM Dictation workstation:   [id number]    XR chest 1 view  Result Date: 6/10/2025  Interpreted By:  Mimi Real, STUDY: XR CHEST 1 VIEW;  6/10/2025 5:49 am   INDICATION: Signs/Symptoms:Monitor chest tube.   ACCESSION NUMBER(S): [id number]   ORDERING CLINICIAN: TOM MG   FINDINGS: Left-sided chest tube (pigtail) is again seen which is unchanged in position. There is a left pleural effusion which is unchanged.   Almost complete  opacification the right hemithorax is seen, which is also unchanged. The heart size is difficult to tell. No pneumothorax is noted       Redemonstration of almost complete opacification of the right hemithorax which is unchanged.   Left pleural effusion, which is unchanged.       MACRO: None   Signed by: Mimi Real 6/10/2025 8:42 AM Dictation workstation:   [id number]    XR chest 1 view  Result Date: 6/9/2025  Interpreted By:  Cade Alcala, STUDY: XR CHEST 1 VIEW;  6/9/2025 8:54 pm   INDICATION: Signs/Symptoms:Evaluate pleural effusion.     COMPARISON: 06/09/2025 at 5:23 p.m.   ACCESSION NUMBER(S): [id number]   ORDERING CLINICIAN: TOM MG   FINDINGS: Redemonstration of left basilar pigtail chest tube. Small left pleural effusion has decreased in size from the prior.   Large right pleural effusion with extensive calcification of the right hemithorax is similar to the prior. There is improvement in the left basilar airspace disease however. Cardiac silhouette is partially obscured.       Interval improvement in left pleural effusion and left basilar airspace disease. Pigtail chest tube in place No change on the right with near complete opacification of the right hemithorax and associated large pleural effusion and airspace disease   MACRO: None   Signed by: Cade Alcala 6/9/2025 9:01 PM Dictation workstation:   [id number]    XR chest 1 view  Result Date: 6/9/2025  Interpreted By:  Cade Alcala, STUDY: XR CHEST 1 VIEW;  6/9/2025 5:39 pm   INDICATION: Signs/Symptoms:s/p L chest tube placement.     COMPARISON: 06/07/2020   ACCESSION NUMBER(S): [id number]   ORDERING CLINICIAN: MEGAN DAS   FINDINGS:         CARDIOMEDIASTINAL SILHOUETTE: Cardiomediastinal silhouette is normal in size and configuration.   LUNGS: Redemonstration of bilateral pleural effusions, larger on the right and moderate on the left. Extensive airspace disease in the right lung again seen with near complete  opacification of the right hemithorax. This appears to be worsening from the prior. There is volume loss with deviation of the trachea to the right. There is patchy airspace disease at the left lung base.   ABDOMEN: No remarkable upper abdominal findings.   BONES: No acute osseous changes.       Redemonstration of extensive bilateral multifocal airspace disease and pleural effusions worse on the right. This appears worsened especially on the right.     MACRO: None   Signed by: Cade Alcala 6/9/2025 6:09 PM Dictation workstation:   [id number]     Assessment & Plan  Pleural effusion    70 YOF with h/o MS, HTN, DLP, GERD, recurrent R pleural effusion s/p multiple thoracenteses and PleurX placement that was removed in 4/2025, chronic hypoxic respiratory failure on 3L NC, stage IV R breast invasive ductal carcinoma, anxiety, depression who p/w a gradual onset, progressive SOB x 3 weeks, associated with dry cough. In the ED work up revealed stable b/l effusion and infiltrates and partial R lung collapse on chest imaging, and leukocytosis. Admitted for recurrent pleural effusion. Pulmonary is consulted for partial R lung collapse. Of note since admission had R sided thoracentesis done. Of note the patient with progression of her breast cancer, being evaluated for palliative chemotherapy.     Recurrent pleural effusions: bilateral. R present since 2023. S/p multiple R sided thoracentesis. Exudative, lymphocytic predominant, atypical cell on cytology. She had R VATs that confirmed trapped lung with pleural biopsy (pathology showed acute fibrinous pleuritis with atypia) and PleurX placement in 10/2024. A moderate size L effusion was detected on CT from 4/2025. S/p L thoracentesis with no specimen. PleurX was removed in 4/2025. Again had L thoracentesis done in 5/2025 that showed exudative effusion. Repeat imaging on this admission re-demonstrated bilateral pleural effusions. S/p L thoracentesis that showed exudative  effusion.        Seen by thoracic surgery, s/p L Pigtail placement, plan for possible dornase therapy       FU thoracentesis results       FU with thoracic surgery recommendations.          Possible pneumonia: MRSA screening negative       Continue Zosyn       FU cultures         Breast cancer: progressive with possible pulmonary and pleural mets.        Outpatient management as per oncology and thoracic surgery    Respiratory failure: acute on chronic with hypoxia. Due to above. Now improved after CT placement. Likely multi-factorial due to above.         Continue supplemental O2, wean off as tolerates        Home O2 evaluation before DC        BPH with IS, Acapella        Continue Duo-Neb    DVT prophylaxis: On Lovenox    Pulmonary will FU while in house.     Lucita Vargas MD

## 2025-06-10 NOTE — CONSULTS
Inpatient consult to Palliative Care  Consult performed by: Betty Medel, CANDY-CNP  Consult ordered by: Karen Tejead, CANDY-CNP          Reason For Consult  Reason for Consult: communication / medical decision making     History Of Present Illness  Mildred Moyer is a 70 y.o. female with past medical history of metastatic breast CA dx 2023(liver, lungs)with recurrent pleural effusions, hx of Pleurex but not currently.  Was seen in oncology office on 6/5 and was very short of breath, was directed to ED for possible thoracentesis. For left sided effusion.    Patient with chronic hypoxic respiratory failure on home O2, HTN, HLD, GERD, MS, anxiety and depression.  She had undergone thoracentesis on the left 5/29 for 1.1 liter.  Imaging in our ED showed large bilateral effusions.  She underwent thoracentesis 6/6 for 400 ml and noted effusion to be loculated. Pulm suspects this effusion to be malignant and recommending another pleurex on the left, likely as outpatient.  She did have a chest tube placed on the 9th on the left side    Imaging 4/2025 noted enlargement of breast mass, increase right supraclavicular LAD and increased pleural and pulm mets, liver mets also newly identified at this time.  6/5 with oncology reviewed repeat bx of mass c/w progressive disease and a plan was made to start palliative chemo if possible.      Palliative care consulted to assist with goals of care.  Patient has ACP documents with siblings Danny Ralph as first agent and Magalis Sumner as second.  Patient has been DNR on recent admissions.       Symptoms (0 - 10, Best to Worst)  [place] Symptom Assessment System  0-10 (Numeric) Pain Score: 4       Personal/Social History  Patient lives in [facility].  Facility c   She reports that she quit smoking about 42 years ago. Her smoking use included cigarettes. She started smoking about 44 years ago. She has a 1 pack-year smoking history. She has never used smokeless tobacco. She reports  that she does not currently use alcohol. She reports that she does not use drugs.         Past Medical History  She has a past medical history of GERD (gastroesophageal reflux disease), HLD (hyperlipidemia), HTN (hypertension), Liver disease, Lung neoplasm, MS (multiple sclerosis) (Multi), and Pleural effusion.    Surgical History  She has a past surgical history that includes Dilation and curettage of uterus and Percutaneous chest drain insertion (Right, 10/10/2024).     Family History  Family History[1]  Allergies  Iodinated contrast media, Iodine, Shellfish containing products, and Oxymetazoline    Review of Systems   Constitutional:  Positive for appetite change.        Patient reports she tries to eat, not very hungry but does have an appetite at times   HENT: Negative.     Eyes: Negative.    Respiratory:  Positive for shortness of breath.    Cardiovascular:         Chest wall pain at site of chest tube   Gastrointestinal: Negative.         BM 6/7   Endocrine: Negative.    Genitourinary: Negative.    Musculoskeletal: Negative.    Skin: Negative.    Allergic/Immunologic: Negative.    Neurological:  Positive for weakness.   Hematological: Negative.    Psychiatric/Behavioral: Negative.          Physical Exam  Constitutional:       Appearance: She is ill-appearing.   HENT:      Head:      Comments: Temporal wasting       Nose: Nose normal.   Eyes:      Conjunctiva/sclera: Conjunctivae normal.      Pupils: Pupils are equal, round, and reactive to light.   Cardiovascular:      Rate and Rhythm: Regular rhythm. Tachycardia present.      Heart sounds: Normal heart sounds.   Pulmonary:      Effort: No respiratory distress.      Comments: Diminished throughout  Left sided chest tube with bloody drainage  Abdominal:      General: Abdomen is flat. Bowel sounds are normal.      Palpations: Abdomen is soft.   Genitourinary:     Comments: deferred  Musculoskeletal:         General: No swelling. Normal range of motion.       "Cervical back: Normal range of motion.   Skin:     General: Skin is warm and dry.      Coloration: Skin is pale.   Neurological:      Mental Status: She is alert and oriented to person, place, and time.   Psychiatric:         Mood and Affect: Mood normal.         Behavior: Behavior normal.         Last Recorded Vitals  Blood pressure 115/77, pulse (!) 116, temperature 36.4 °C (97.5 °F), temperature source Oral, resp. rate 20, height 1.626 m (5' 4.02\"), weight 52 kg (114 lb 9.6 oz), SpO2 100%.    Relevant Results  XR chest 1 view  Result Date: 6/10/2025  Interpreted By:  Mimi Real, STUDY: XR CHEST 1 VIEW;  6/10/2025 5:49 am   INDICATION: Signs/Symptoms:Monitor chest tube.   ACCESSION NUMBER(S): [id number]   ORDERING CLINICIAN: TOM MG   FINDINGS: Left-sided chest tube (pigtail) is again seen which is unchanged in position. There is a left pleural effusion which is unchanged.   Almost complete opacification the right hemithorax is seen, which is also unchanged. The heart size is difficult to tell. No pneumothorax is noted       Redemonstration of almost complete opacification of the right hemithorax which is unchanged.   Left pleural effusion, which is unchanged.       MACRO: None   Signed by: iMmi Real 6/10/2025 8:42 AM Dictation workstation:   [id number]    XR chest 1 view  Result Date: 6/9/2025  Interpreted By:  Cade Alcala, STUDY: XR CHEST 1 VIEW;  6/9/2025 8:54 pm   INDICATION: Signs/Symptoms:Evaluate pleural effusion.     COMPARISON: 06/09/2025 at 5:23 p.m.   ACCESSION NUMBER(S): [id number]   ORDERING CLINICIAN: TOM MG   FINDINGS: Redemonstration of left basilar pigtail chest tube. Small left pleural effusion has decreased in size from the prior.   Large right pleural effusion with extensive calcification of the right hemithorax is similar to the prior. There is improvement in the left basilar airspace disease however. Cardiac silhouette is partially obscured.       Interval " improvement in left pleural effusion and left basilar airspace disease. Pigtail chest tube in place No change on the right with near complete opacification of the right hemithorax and associated large pleural effusion and airspace disease   MACRO: None   Signed by: Cade Alcala 6/9/2025 9:01 PM Dictation workstation:   [id number]    XR chest 1 view  Result Date: 6/9/2025  Interpreted By:  Cade Alcala, STUDY: XR CHEST 1 VIEW;  6/9/2025 5:39 pm   INDICATION: Signs/Symptoms:s/p L chest tube placement.     COMPARISON: 06/07/2020   ACCESSION NUMBER(S): [id number]   ORDERING CLINICIAN: MEGAN DAS   FINDINGS:         CARDIOMEDIASTINAL SILHOUETTE: Cardiomediastinal silhouette is normal in size and configuration.   LUNGS: Redemonstration of bilateral pleural effusions, larger on the right and moderate on the left. Extensive airspace disease in the right lung again seen with near complete opacification of the right hemithorax. This appears to be worsening from the prior. There is volume loss with deviation of the trachea to the right. There is patchy airspace disease at the left lung base.   ABDOMEN: No remarkable upper abdominal findings.   BONES: No acute osseous changes.       Redemonstration of extensive bilateral multifocal airspace disease and pleural effusions worse on the right. This appears worsened especially on the right.     MACRO: None   Signed by: Cade Alcala 6/9/2025 6:09 PM Dictation workstation:   [id number]    XR chest 1 view  Result Date: 6/7/2025  Interpreted By:  Stiven Cota, STUDY: XR CHEST 1 VIEW;  6/7/2025 4:41 am   INDICATION: Signs/Symptoms:s/p thoracentesis.   COMPARISON: 06/06/2025   ACCESSION NUMBER(S): [id number]   ORDERING CLINICIAN: PEDRO PABLO MERCADO   FINDINGS: There has not been significant change from prior examination. Again there is near-complete opacification of the right hemithorax with what appears to be some shift of the heart mediastinum to the right due to a  large effusion with underlying consolidation. No complication from reported thoracentesis. There is also opacification of the left lower lung with blunted costophrenic angle indicating a small to moderate effusion, probably with underlying atelectasis. The left pulmonary vasculature is borderline, the right is obscured.   ABDOMEN AND OTHER FINDINGS: No remarkable upper abdominal findings.   BONES: No acute osseous changes.       1.  No significant change of bilateral airspace disease greater on the right.   Signed by: Stiven Cota 6/7/2025 10:17 AM Dictation workstation:   [id number]    US thoracentesis  Result Date: 6/6/2025  Interpreted By:  Dony Calderón, STUDY: US THORACENTESIS6/6/20255:00 pm   INDICATION: Signs/Symptoms:Recurrent pleural effusion with need for Pleurx catheter   COMPARISON: US thoracentesis 5/29/2025   ACCESSION NUMBER(S): [id number]   ORDERING CLINICIAN: JOSY CARREON   TECHNIQUE: INTERVENTIONALIST(S): Dony Calderón   CONSENT: The patient/patient's POA/next of kin was informed of the nature of the proposed procedure. The purposes, alternatives, risks, and benefits were explained and discussed. All questions were answered and consent was obtained.   SEDATION: None   MEDICATION/CONTRAST: 1% lidocaine was used to anesthetize subcutaneously.   TIME OUT: A time out was performed immediately prior to procedure start with the interventional team, correctly identifying the patient name, date of birth, MRN, procedure, anatomy (including marking of site and side), patient position, procedure consent form, relevant laboratory and imaging test results, antibiotic administration, safety precautions, and procedure-specific equipment needs.   FINDINGS: The patient was placed in the sitting position.   The pleural space was examined with grey scale ultrasound, and the most accessible fluid identified and marked for thoracentesis.   The skin was prepped and draped in usual manner. Local anesthesia with  lidocaine was administered and a left-sided thoracentesis was performed.  A 5 [place] One-Step Valved thoracentesis needle/catheter was then placed where marked. Approximately 400 mL of serosanguineous colored fluid was removed, this was a multiloculated effusion and unable to drain the effusion to completion. The needle/catheter was then withdrawn. Findings were communicated to Dr. Mohan and Dr. Shepherd.   The patient tolerated the procedure well and there were no immediate complications. Specimen(s) sent to the laboratory for further evaluation, per the requesting team.       Uneventful thoracentesis, as detailed above. Left pleural space, 400 mL   I personally performed and/or directly supervised this study and was present for the entire procedure.   Performed and dictated at [facility].   Signed by: Dena Calderón 6/6/2025 5:02 PM Dictation workstation:   [id number]    XR chest 1 view  Result Date: 6/6/2025  Interpreted By:  Jigna Perla, STUDY: XR CHEST 1 VIEW; 6/6/2025 4:24 pm   INDICATION: Signs/Symptoms:s/p thora   COMPARISON: Radiographs 06/05/2025   ACCESSION NUMBER(S): [id number]   ORDERING CLINICIAN: DENA CALDERÓN   TECHNIQUE: Single frontal view of the chest performed.   FINDINGS:   LINES AND DEVICES: None.   LUNGS: There is mild decrease in volume of the left-sided pleural effusion, now with a small volume remaining. No pneumothorax. Stable large right pleural effusion with similar subtotal right lung collapse.   CARDIOMEDIASTINAL SILHOUETTE: The cardiomediastinal silhouette is obscured by adjacent effusions.         Persistent small left pleural effusion, mildly decreased in volume. No pneumothorax.   Stable large right pleural effusion with subtotal right lung collapse.   MACRO None   Signed by: Jigna Perla 6/6/2025 4:35 PM Dictation workstation:   [id number]    Electrocardiogram, 12-lead PRN ACS symptoms  Result Date: 6/6/2025  Sinus tachycardia Otherwise  normal ECG When compared with ECG of 29-MAY-2025 10:50, PREVIOUS ECG IS PRESENT    XR chest 1 view  Result Date: 6/5/2025  Interpreted By:  Lc Munoz, STUDY: XR CHEST 1 VIEW; 6/5/2025 12:32 pm   INDICATION: CLINICAL INFORMATION: Signs/Symptoms:sob.   COMPARISON: 05/29/2025   ACCESSION NUMBER(S): [id number]   ORDERING CLINICIAN: JOSY CARREON   TECHNIQUE: Portable chest one view.   FINDINGS: The cardiac size is indeterminate in view of the AP projection. Patient is significantly rotated to the right. The some is cardiac and mediastinal assessment. Large bilateral pleural effusions are present. Bilateral perihilar and basilar infiltrate is suspected, more marked on the right with suspicion of volume loss of the right lung along with consolidation.       No change in the bilateral effusions, bilateral infiltrate, and partial collapse of the right lung.   MACRO: none   Signed by: Lc Munoz 6/5/2025 12:42 PM Dictation workstation:   [id number]    ECG 12 lead  Result Date: 6/1/2025  Sinus rhythm Borderline low voltage, extremity leads Borderline prolonged QT interval See ED provider note for full interpretation and clinical correlation Confirmed by Rosa Marie ([id number]) on 6/1/2025 12:51:43 PM    US thoracentesis  Result Date: 5/29/2025  Interpreted By:  Arnol Lester, STUDY: US THORACENTESIS;  5/29/2025 2:53 pm   INDICATION: Signs/Symptoms:thorocentesis.     COMPARISON: None.   ACCESSION NUMBER(S): [id number]   ORDERING CLINICIAN: SANDIP MEDINA   TECHNIQUE:   CONSENT: The patient/patient's POA/next of kin was informed of the nature of the proposed procedure. The purposes, alternatives, risks, and benefits were explained and discussed. All questions were answered and consent was obtained.   SEDATION: None   MEDICATION/CONTRAST: No additional   TIME OUT: A time out was performed immediately prior to procedure start with the interventional team, correctly identifying the patient name, date of birth, MRN,  procedure, anatomy (including marking of site and side), patient position, procedure consent form, relevant laboratory and imaging test results, antibiotic administration, safety precautions, and procedure-specific equipment needs.   FINDINGS: The patient was placed in the sitting position.   The pleural space was examined with grey scale ultrasound, and the most accessible fluid identified and marked for thoracentesis.   The skin was prepped and draped in usual manner. Local anesthesia with Lidocaine was administered and a  left-sided thoracentesis was performed.  A 5 [place] One-Step thoracentesis needle/catheter was then placed where marked under direct ultrasound guidance. Approximately 1100 mL of serosanguineous colored fluid was removed. The needle/catheter was then withdrawn.   The patient tolerated the procedure well and there were no immediate complications. Specimen(s) sent to the laboratory and pathology for further evaluation, per the requesting team.       Uneventful  left-sided thoracentesis, as detailed above.     Performed and dictated at [facility].   MACRO: None   Signed by: Arnol Lester 5/29/2025 3:14 PM Dictation workstation:   [id number]    XR chest 1 view  Result Date: 5/29/2025  Interpreted By:  Rajendra Gipson, STUDY: XR CHEST 1 VIEW   INDICATION: Signs/Symptoms:shortness of breath.   COMPARISON: April 29   ACCESSION NUMBER(S): [id number]   ORDERING CLINICIAN: SANDIP MEDINA   FINDINGS: Large right effusion with volume loss and right basilar airspace opacity grossly similar to prior exam.   Slight interval increase of left pleural effusion and basilar airspace disease.   No evidence of pneumothorax.         Large right effusion with volume loss and right basilar airspace opacity grossly similar to prior exam.   Slight interval increase of left pleural effusion and basilar airspace disease.   Signed by: Rajendra Gipson 5/29/2025 12:15 PM Dictation  workstation:   [id number]     Results for orders placed or performed during the hospital encounter of 06/05/25 (from the past 24 hours)   Basic metabolic panel   Result Value Ref Range    Glucose 92 74 - 99 mg/dL    Sodium 135 (L) 136 - 145 mmol/L    Potassium 4.6 3.5 - 5.3 mmol/L    Chloride 93 (L) 98 - 107 mmol/L    Bicarbonate 34 (H) 21 - 32 mmol/L    Anion Gap 13 10 - 20 mmol/L    Urea Nitrogen 15 6 - 23 mg/dL    Creatinine 0.83 0.50 - 1.05 mg/dL    eGFR 77 >60 mL/min/1.73m*2    Calcium 8.7 8.6 - 10.3 mg/dL   CBC   Result Value Ref Range    WBC 16.2 (H) 4.4 - 11.3 x10*3/uL    nRBC 0.0 0.0 - 0.0 /100 WBCs    RBC 4.24 4.00 - 5.20 x10*6/uL    Hemoglobin 13.3 12.0 - 16.0 g/dL    Hematocrit 41.9 36.0 - 46.0 %    MCV 99 80 - 100 fL    MCH 31.4 26.0 - 34.0 pg    MCHC 31.7 (L) 32.0 - 36.0 g/dL    RDW 13.9 11.5 - 14.5 %    Platelets 625 (H) 150 - 450 x10*3/uL      Scheduled medications  Scheduled Medications[2]  Continuous medications  Continuous Medications[3]  PRN medications  PRN Medications[4]      Assessment/Plan       Mildred Moyer is a 70 y.o. female with past medical history of metastatic breast CA dx 2023(liver, lungs)with recurrent pleural effusions, hx of Pleurex but not currently.  Was seen in oncology office on 6/5 and was very short of breath, was directed to ED for possible thoracentesis. For left sided effusion.    Patient with chronic hypoxic respiratory failure on home O2, HTN, HLD, GERD, MS, anxiety and depression.  She had undergone thoracentesis on the left 5/29 for 1.1 liter.  Imaging in our ED showed large bilateral effusions.  She underwent thoracentesis 6/6 for 400 ml and noted effusion to be loculated. Pulm suspects this effusion to be malignant and recommending another pleurex on the left, likely as outpatient.  She did have a chest tube placed on the 9th on the left side.  Imaging 4/2025 noted enlargement of breast mass, increase right supraclavicular LAD and increased pleural and pulm  mets, liver mets also newly identified at this time.  6/5 with oncology reviewed repeat bx of mass c/w progressive disease and a plan was made to start palliative chemo if possible.      Palliative care consulted to assist with goals of care.  Patient has ACP documents with siblings Danny Ralph as first agent and Magalis Sumner as second.  Patient has been DNR on recent admissions.  Patient admits to being very tired today, finding it difficult to talk but agreeable to chat.  We reviewed her past code status, she is clear she would not want resuscitative measures and OK with changing code status.  Her goal is to return home, she does not want to have to live in nursing facility.  We did discuss future plans, she reports she wants to try palliative chemo, but unclear if she is even a candidate given how debilitated she is currently.  I did discuss hospice briefly and she is mostly associating that with having to live in nursing home.   I did speak with patient's sister. She is flying into [place] late tomorrow afternoon and we made plans to meet Thursday at 11.  She reports patient has likely be overstating her ability to manage and does not think she can return to her [facility].  Sister reports she has been thinking about hospice for awhile but patient has been resistant.    -code status changed to DNR/DNI, OK for ICU if needed  -will provide OH Portable closer to discharge  -family meeting planned on Thursday at 11 AM  -suggest PT/OT eval to help determine how mobile patient can be and if [facility] is appropriate    Chest wall pain from chest tube:  patient reports it is sharp at times but very brief, not using much of oxycodone or morphine that have been ordered  -adding lidocaine patches to either side of chest tube  -continue oxycodone 5 mg PO Q 6 hours PRN  -continue Morphine 2 mg IV Q 4 hours PRN  -continue cyclobenzaprine 10 mg BID PRN            Betty Medel, APRN-CNP         [1] No family history on file.  [2]  buPROPion XL, 300 mg, oral, q24h  [START ON 6/30/2025] capivasertib, 400 mg, oral, 2 times per day on Monday Tuesday Wednesday Thursday  cyanocobalamin, 1,000 mcg, oral, Daily  enoxaparin, 40 mg, subcutaneous, q24h  ferrous sulfate, 65 mg of elemental iron, oral, Daily  hydrOXYzine HCL, 25 mg, oral, Daily  melatonin, 6 mg, oral, Nightly  pantoprazole, 40 mg, oral, Daily before breakfast   Or  pantoprazole, 40 mg, intravenous, Daily before breakfast  piperacillin-tazobactam, 3.375 g, intravenous, q6h  rosuvastatin, 20 mg, oral, Daily  [3]    [4] PRN medications: acetaminophen **OR** acetaminophen **OR** acetaminophen, cyclobenzaprine, guaiFENesin, ipratropium-albuteroL, morphine, ondansetron **OR** ondansetron, oxyCODONE, oxygen, polyethylene glycol

## 2025-06-10 NOTE — PROGRESS NOTES
Mildred Moyer is a 70 y.o. female on day 5 of admission presenting with Pleural effusion.      Subjective   Conversational dyspnea. Notes mild progressive improvement.  Still with 5/10 R sided pain.  CT continues to drain serosanguinous fluid. Remains on 02. CXR, per radiologist:     Redemonstration of almost complete opacification of the right  hemithorax which is unchanged.      Left pleural effusion, which is unchanged.         Objective     Last Recorded Vitals  /77 (BP Location: Left arm, Patient Position: Lying)   Pulse (!) 116   Temp 36.4 °C (97.5 °F) (Oral)   Resp 20   Wt 52 kg (114 lb 9.6 oz)   SpO2 100%   Intake/Output last 3 Shifts:    Intake/Output Summary (Last 24 hours) at 6/10/2025 1248  Last data filed at 6/10/2025 0600  Gross per 24 hour   Intake 100 ml   Output 1320 ml   Net -1220 ml       Admission Weight  Weight: 59 kg (130 lb) (06/05/25 1137)    Daily Weight  06/06/25 : 52 kg (114 lb 9.6 oz)    Image Results  XR chest 1 view  Narrative: Interpreted By:  Mimi Real,   STUDY:  XR CHEST 1 VIEW;  6/10/2025 5:49 am      INDICATION:  Signs/Symptoms:Monitor chest tube.      ACCESSION NUMBER(S):  [id number]      ORDERING CLINICIAN:  TOM MG      FINDINGS:  Left-sided chest tube (pigtail) is again seen which is unchanged in  position. There is a left pleural effusion which is unchanged.      Almost complete opacification the right hemithorax is seen, which is  also unchanged. The heart size is difficult to tell. No pneumothorax  is noted      Impression: Redemonstration of almost complete opacification of the right  hemithorax which is unchanged.      Left pleural effusion, which is unchanged.              MACRO:  None      Signed by: Mimi Real 6/10/2025 8:42 AM  Dictation workstation:   [id number]      Physical Exam  Constitutional:       Comments: thin   HENT:      Head: Normocephalic and atraumatic.      Mouth/Throat:      Mouth: Mucous membranes are dry.   Eyes:       Conjunctiva/sclera: Conjunctivae normal.   Cardiovascular:      Rate and Rhythm: Regular rhythm. Tachycardia present.      Heart sounds: Normal heart sounds.   Pulmonary:      Effort: Pulmonary effort is normal.      Comments: Conversational dyspnea, diminished lung left base, very diminished right  Abdominal:      General: Bowel sounds are normal.      Palpations: Abdomen is soft.      Tenderness: There is no abdominal tenderness.   Musculoskeletal:      Comments: Generalized weakness   Skin:     General: Skin is warm and dry.   Neurological:      Mental Status: She is alert. Mental status is at baseline.   Psychiatric:         Mood and Affect: Mood normal.         Relevant Results      Scheduled medications  Scheduled Medications[1]  Continuous medications  Continuous Medications[2]  PRN medications  PRN Medications[3]     Results for orders placed or performed during the hospital encounter of 06/05/25 (from the past 24 hours)   Basic metabolic panel   Result Value Ref Range    Glucose 92 74 - 99 mg/dL    Sodium 135 (L) 136 - 145 mmol/L    Potassium 4.6 3.5 - 5.3 mmol/L    Chloride 93 (L) 98 - 107 mmol/L    Bicarbonate 34 (H) 21 - 32 mmol/L    Anion Gap 13 10 - 20 mmol/L    Urea Nitrogen 15 6 - 23 mg/dL    Creatinine 0.83 0.50 - 1.05 mg/dL    eGFR 77 >60 mL/min/1.73m*2    Calcium 8.7 8.6 - 10.3 mg/dL   CBC   Result Value Ref Range    WBC 16.2 (H) 4.4 - 11.3 x10*3/uL    nRBC 0.0 0.0 - 0.0 /100 WBCs    RBC 4.24 4.00 - 5.20 x10*6/uL    Hemoglobin 13.3 12.0 - 16.0 g/dL    Hematocrit 41.9 36.0 - 46.0 %    MCV 99 80 - 100 fL    MCH 31.4 26.0 - 34.0 pg    MCHC 31.7 (L) 32.0 - 36.0 g/dL    RDW 13.9 11.5 - 14.5 %    Platelets 625 (H) 150 - 450 x10*3/uL     *Note: Due to a large number of results and/or encounters for the requested time period, some results have not been displayed. A complete set of results can be found in Results Review.                  Assessment & Plan  Pleural effusion          Malnutrition Diagnosis  Status: New  Malnutrition Diagnosis: Moderate malnutrition related to chronic disease or condition  Related to: cancer  As Evidenced by: mild fat loss and mild muscle loss.  I agree with the dietitian's malnutrition diagnosis.      #Recurrent pleural effusion  #Breast cancer with meta stasis  S/p thoracentesis  Loculations reported  Pigtail CT placed    Per CTS:   -1.3L in atrium since pigtail placement   -Maintain in SDU  -Pulmonary toileting   -Pigtail chest tube to waterseal   -Daily CXR while chest tube is in place  -Daily CBC, BMP     #Tachycardia    Thought to be due to pain  Encouraged use of prn meds  Added morphine for severe pain       #Leukocytosis  #Partial collapse of right lung  Leukocytosis slowly improving  Continue antibiotics for possible pneumonia  Pulmonary on board       #Hypertension  #Dyslipidemia  #Chronic respiratory failure with hypoxia  Continue home medications and DuoNebs      #[facility]    Daughter requested Palliative consult for goals of care, appreciate their input      Plan to dc to SNF pending PT/OT eval  Case discussed with attending      Karen Tejeda, CANDY-CNP           [1] buPROPion XL, 300 mg, oral, q24h  [START ON 6/30/2025] capivasertib, 400 mg, oral, 2 times per day on Monday Tuesday Wednesday Thursday  cyanocobalamin, 1,000 mcg, oral, Daily  enoxaparin, 40 mg, subcutaneous, q24h  ferrous sulfate, 65 mg of elemental iron, oral, Daily  hydrOXYzine HCL, 25 mg, oral, Daily  lidocaine, 2 patch, transdermal, Daily  melatonin, 6 mg, oral, Nightly  pantoprazole, 40 mg, oral, Daily before breakfast   Or  pantoprazole, 40 mg, intravenous, Daily before breakfast  piperacillin-tazobactam, 3.375 g, intravenous, q6h  rosuvastatin, 20 mg, oral, Daily  [2]    [3] PRN medications: acetaminophen **OR** acetaminophen **OR** acetaminophen, cyclobenzaprine, guaiFENesin, ipratropium-albuteroL, morphine, ondansetron **OR** ondansetron, oxyCODONE, oxygen, polyethylene glycol

## 2025-06-10 NOTE — PROGRESS NOTES
Mildred Moyer is a 70 y.o. female     Patient has pain at this surgical site  Tachycardia brought on by pain plus shortness of breath    Review of Systems     Constitutional: no fever, no chills,   Cardiovascular: no chest pain   Respiratory: Shortness of breath  Gastrointestinal: no abdominal pain, no constipation, no melena, no nausea, no diarrhea, no vomiting and no blood in stools.   Neurological: no headache,   All other systems have been reviewed and are negative for complaint.       Vitals:    06/10/25 1110   BP: 115/77   Pulse: (!) 116   Resp: 20   Temp: 36.4 °C (97.5 °F)   SpO2: 100%        Scheduled medications  Scheduled Medications[1]  Continuous medications  Continuous Medications[2]  PRN medications  PRN Medications[3]    Lab Review   Results from last 7 days   Lab Units 06/10/25  0503 06/09/25  0529 06/08/25  0453   WBC AUTO x10*3/uL 16.2* 17.0* 18.1*   HEMOGLOBIN g/dL 13.3 12.7 12.7   HEMATOCRIT % 41.9 38.6 38.2   PLATELETS AUTO x10*3/uL 625* 574* 582*     Results from last 7 days   Lab Units 06/10/25  0503 06/09/25  0529 06/08/25  0453 06/07/25  0634 06/06/25  0722 06/05/25 2001 06/05/25  1210   SODIUM mmol/L 135* 134* 134*   < > 134*   < > 136   POTASSIUM mmol/L 4.6 3.2* 3.7   < > 3.3*   < > 4.7   CHLORIDE mmol/L 93* 94* 97*   < > 92*   < > 93*   CO2 mmol/L 34* 26 30   < > 29   < > 27   BUN mg/dL 15 15 15   < > 20   < > 22   CREATININE mg/dL 0.83 0.71 0.71   < > 0.80   < > 0.88   CALCIUM mg/dL 8.7 9.0 8.6   < > 9.0   < > 10.4*   PROTEIN TOTAL g/dL  --   --   --   --  6.9  --  8.0   BILIRUBIN TOTAL mg/dL  --   --   --   --   --   --  0.5   ALK PHOS U/L  --   --   --   --   --   --  72   ALT U/L  --   --   --   --   --   --  13   AST U/L  --   --   --   --   --   --  27   GLUCOSE mg/dL 92 111* 119*   < > 109*   < > 116*    < > = values in this interval not displayed.            XR chest 1 view   Final Result   Redemonstration of almost complete opacification of the right   hemithorax which is  unchanged.        Left pleural effusion, which is unchanged.                  MACRO:   None        Signed by: Mimi Real 6/10/2025 8:42 AM   Dictation workstation:   [id number]      XR chest 1 view   Final Result   Interval improvement in left pleural effusion and left basilar   airspace disease. Pigtail chest tube in place No change on the right   with near complete opacification of the right hemithorax and   associated large pleural effusion and airspace disease        MACRO:   None        Signed by: Cade Alcala 6/9/2025 9:01 PM   Dictation workstation:   [id number]      XR chest 1 view   Final Result   Redemonstration of extensive bilateral multifocal airspace disease   and pleural effusions worse on the right. This appears worsened   especially on the right.             MACRO:   None        Signed by: Cade Alcala 6/9/2025 6:09 PM   Dictation workstation:   [id number]      XR chest 1 view   Final Result   1.  No significant change of bilateral airspace disease greater on   the right.        Signed by: Stiven Cota 6/7/2025 10:17 AM   Dictation workstation:   [id number]      US thoracentesis   Final Result   Uneventful thoracentesis, as detailed above. Left pleural space, 400   mL        I personally performed and/or directly supervised this study and was   present for the entire procedure.        Performed and dictated at [facility].        Signed by: Dony Calderón 6/6/2025 5:02 PM   Dictation workstation:   [id number]      XR chest 1 view   Final Result        Persistent small left pleural effusion, mildly decreased in volume.   No pneumothorax.        Stable large right pleural effusion with subtotal right lung collapse.        MACRO   None        Signed by: Jigna Perla 6/6/2025 4:35 PM   Dictation workstation:   [id number]      XR chest 1 view   Final Result   No change in the bilateral effusions, bilateral infiltrate, and   partial collapse of the right  lung.        MACRO:   none        Signed by: Lc Munoz 6/5/2025 12:42 PM   Dictation workstation:   [id number]      US thoracentesis w insertion of tube includes water seal when performed    (Results Pending)         Physical Exam    Constitutional   General appearance: Alert and in no acute distress.   Pulmonary   Respiratory assessment: On nasal cannula  Decreased breath sounds  Cardiovascular   Auscultation of heart: Apical pulse normal, heart rate and rhythm normal, normal S1 and S2, no murmurs and no pericardial rub.    Exam for edema: No peripheral edema.   Abdomen   Abdominal Exam: No bruits, normal bowel sounds, soft, non-tender, no abdominal mass palpated.    Liver and Spleen exam: No hepato-splenomegaly.   Musculoskeletal     Inspection/palpation of joints, bones and muscles: No joint swelling. Normal movement of all extremities.   Neurologic   Cranial nerves: Nerves 2-12 were intact, no focal neuro defects.         Assessment/Plan      #Recurrent pleural effusion  #Breast cancer with meta stasis  S/p pigtail with lysis  Pain control    #Tachycardia  Secondary to pain and shortness of breath    #Severe malnutrition  Nutritional support     #Leukocytosis  #Partial collapse of right lung  Continue antibiotics for possible pneumonia  Procalcitonin ordered and pending     #Hypertension  #Dyslipidemia  #Chronic respiratory failure with hypoxia  Continue home medications and DuoNebs         [1] buPROPion XL, 300 mg, oral, q24h  [START ON 6/30/2025] capivasertib, 400 mg, oral, 2 times per day on Monday Tuesday Wednesday Thursday  cyanocobalamin, 1,000 mcg, oral, Daily  enoxaparin, 40 mg, subcutaneous, q24h  ferrous sulfate, 65 mg of elemental iron, oral, Daily  hydrOXYzine HCL, 25 mg, oral, Daily  lidocaine, 2 patch, transdermal, Daily  melatonin, 6 mg, oral, Nightly  pantoprazole, 40 mg, oral, Daily before breakfast   Or  pantoprazole, 40 mg, intravenous, Daily before breakfast  piperacillin-tazobactam,  3.375 g, intravenous, q6h  rosuvastatin, 20 mg, oral, Daily     [2]    [3] PRN medications: acetaminophen **OR** acetaminophen **OR** acetaminophen, cyclobenzaprine, guaiFENesin, ipratropium-albuteroL, morphine, ondansetron **OR** ondansetron, oxyCODONE, oxygen, polyethylene glycol

## 2025-06-10 NOTE — PROGRESS NOTES
06/10/25 1122   Discharge Planning   Type of Post Acute Facility Services Assisted living   Expected Discharge Disposition   (vs SNF.  PT/OT pending)   Does the patient need discharge transport arranged? Yes   RoundTrip coordination needed? Yes   Has discharge transport been arranged? No           Patient had left pigtail chest tube placed yesterday and is to water seal. PT/OT pending . Called and spoke with patient's sister Angelika ( [phone]), she is from [place] and is flying in tomorrow. Angelika would like her sister to go to SNF if possible and she would like Palliative Care to see what her goals are. Requested from NP to place order for it. Will continue to follow for discharge needs.

## 2025-06-10 NOTE — PROGRESS NOTES
"Thoracic Surgery- Daily Progress Note   Subjective    Mildred Moyer is a 70 y.o. year old female patient admitted on 6/5/2025 due to a recurrent left pleural effusion    Interval History:  -6/9: Pigtail with 1.3L in atrium since placement, did not tolerate to -20 suction   -CXR improved      Meds    Scheduled medications  Scheduled Medications[1]  Continuous medications  Continuous Medications[2]  PRN medications  PRN Medications[3]     Objective    Blood pressure 113/61, pulse (!) 115, temperature 36.3 °C (97.4 °F), temperature source Temporal, resp. rate 22, height 1.626 m (5' 4.02\"), weight 52 kg (114 lb 9.6 oz), SpO2 90%. Body mass index is 19.66 kg/m².    Physical Exam  Constitutional:       Appearance: She is ill-appearing.   Eyes:      Extraocular Movements: Extraocular movements intact.   Pulmonary:      Breath sounds: Normal breath sounds.      Comments: Left pigtail to waterseal without airleak  -Serosanguinous output appreciated   Abdominal:      General: Abdomen is flat. Bowel sounds are normal.      Palpations: Abdomen is soft.   Skin:     General: Skin is warm and dry.      Capillary Refill: Capillary refill takes less than 2 seconds.   Neurological:      General: No focal deficit present.      Mental Status: She is alert and oriented to person, place, and time.   Psychiatric:         Mood and Affect: Mood normal.         Behavior: Behavior normal.        GENERAL:  Frail   OROPHARYNX: Moist mucosa, no thrush or lesions   NECK: no JVD, midline trachea without stridor.    LUNGS: Equal chest expansion, slightly labored.  Clear throughout upper lobes and diminished in the bases   CARDIAC: normal S1 and S2; no gallops, rubs or murmurs. Regular rate and rhythm  EXTREMITIES: No edema  NEURO: grossly normal mental status, CN reflexes and motor strength.   SKIN: Skin turgor normal. No rashes or lesions.   PSYCH: Normal affect      Fluid balance    Intake/Output Summary (Last 24 hours) at 6/10/2025 0739  Last " data filed at 6/10/2025 0600  Gross per 24 hour   Intake 100 ml   Output 1320 ml   Net -1220 ml       Labs:   Results from last 72 hours   Lab Units 06/10/25  0503 06/09/25  0529 06/08/25  0453   SODIUM mmol/L 135* 134* 134*   POTASSIUM mmol/L 4.6 3.2* 3.7   CHLORIDE mmol/L 93* 94* 97*   CO2 mmol/L 34* 26 30   BUN mg/dL 15 15 15   CREATININE mg/dL 0.83 0.71 0.71   GLUCOSE mg/dL 92 111* 119*   CALCIUM mg/dL 8.7 9.0 8.6   ANION GAP mmol/L 13 17 11   EGFR mL/min/1.73m*2 77 >90 >90      Results from last 72 hours   Lab Units 06/10/25  0503 06/09/25  0529 06/08/25  0453   WBC AUTO x10*3/uL 16.2* 17.0* 18.1*   HEMOGLOBIN g/dL 13.3 12.7 12.7   HEMATOCRIT % 41.9 38.6 38.2   PLATELETS AUTO x10*3/uL 625* 574* 582*                   Micro/ID:   Lab Results   Component Value Date    BLOODCULT No growth at 4 days -  FINAL REPORT 06/05/2025    BLOODCULT No growth at 4 days -  FINAL REPORT 06/05/2025         Micheal Moyer is a 70 y.o. year old female patient with a previous medical history of a recurrent right pleural effusion requiring pleurx which was removed on 4/29, chronic hypoxic respiratory failure on 3-4L NC, hypertension, HLD, metastatic breast cancer, anxiety and depression who presented after undergoing a thoracentesis on 5/29 with recurrent pleural effusion and worsening shortness of breath.  Thoracic surgery was consulted for consideration of pleurx placement on the left.       Management Plans     Recurrent left pleural effusion   -1.3L in atrium since pigtail placement   -Maintain in SDU  -Pulmonary toileting   -Pigtail chest tube to waterseal   -Daily CXR while chest tube is in place  -Appreciate consult, will continue to follow  -Plan discussed and formulated with Dr. Shepherd     Time Spent: 30 minutes     CANDY Somers-CNP, DNP   06/10/25 at 7:39 AM            [1] buPROPion XL, 300 mg, oral, q24h  [START ON 6/30/2025] capivasertib, 400 mg, oral, 2 times per day on Monday Tuesday Wednesday  Thursday  cyanocobalamin, 1,000 mcg, oral, Daily  enoxaparin, 40 mg, subcutaneous, q24h  ferrous sulfate, 65 mg of elemental iron, oral, Daily  hydrOXYzine HCL, 25 mg, oral, Daily  melatonin, 6 mg, oral, Nightly  pantoprazole, 40 mg, oral, Daily before breakfast   Or  pantoprazole, 40 mg, intravenous, Daily before breakfast  piperacillin-tazobactam, 3.375 g, intravenous, q6h  rosuvastatin, 20 mg, oral, Daily     [2]    [3] PRN medications: acetaminophen **OR** acetaminophen **OR** acetaminophen, cyclobenzaprine, guaiFENesin, ipratropium-albuteroL, ondansetron **OR** ondansetron, oxyCODONE, oxyCODONE, oxygen, polyethylene glycol

## 2025-06-10 NOTE — CARE PLAN
Problem: Pain - Adult  Goal: Verbalizes/displays adequate comfort level or baseline comfort level  Outcome: Progressing     Problem: Safety - Adult  Goal: Free from fall injury  Outcome: Progressing     Problem: Discharge Planning  Goal: Discharge to home or other facility with appropriate resources  Outcome: Progressing     Problem: Chronic Conditions and Co-morbidities  Goal: Patient's chronic conditions and co-morbidity symptoms are monitored and maintained or improved  Outcome: Progressing     Problem: Nutrition  Goal: Nutrient intake appropriate for maintaining nutritional needs  Outcome: Progressing     Problem: Respiratory  Goal: Minimal/no exertional discomfort or dyspnea this shift  Outcome: Progressing  Goal: No signs of respiratory distress (eg. Use of accessory muscles. Peds grunting)  Outcome: Progressing

## 2025-06-11 ENCOUNTER — APPOINTMENT (OUTPATIENT)
Dept: CARDIOLOGY | Facility: HOSPITAL | Age: 70
DRG: 186 | End: 2025-06-11
Payer: COMMERCIAL

## 2025-06-11 ENCOUNTER — APPOINTMENT (OUTPATIENT)
Dept: RADIOLOGY | Facility: HOSPITAL | Age: 70
DRG: 186 | End: 2025-06-11
Payer: COMMERCIAL

## 2025-06-11 LAB
ANION GAP SERPL CALC-SCNC: 13 MMOL/L (ref 10–20)
BUN SERPL-MCNC: 16 MG/DL (ref 6–23)
CALCIUM SERPL-MCNC: 8.6 MG/DL (ref 8.6–10.3)
CHLORIDE SERPL-SCNC: 90 MMOL/L (ref 98–107)
CO2 SERPL-SCNC: 33 MMOL/L (ref 21–32)
CREAT SERPL-MCNC: 0.73 MG/DL (ref 0.5–1.05)
CREAT UR-MCNC: 172.4 MG/DL (ref 20–320)
D DIMER PPP FEU-MCNC: 2604 NG/ML FEU
EGFRCR SERPLBLD CKD-EPI 2021: 90 ML/MIN/1.73M*2
ERYTHROCYTE [DISTWIDTH] IN BLOOD BY AUTOMATED COUNT: 14 % (ref 11.5–14.5)
GLUCOSE SERPL-MCNC: 98 MG/DL (ref 74–99)
HCT VFR BLD AUTO: 38.2 % (ref 36–46)
HGB BLD-MCNC: 12.8 G/DL (ref 12–16)
MCH RBC QN AUTO: 32 PG (ref 26–34)
MCHC RBC AUTO-ENTMCNC: 33.5 G/DL (ref 32–36)
MCV RBC AUTO: 96 FL (ref 80–100)
NRBC BLD-RTO: 0 /100 WBCS (ref 0–0)
OSMOLALITY SERPL: 274 MOSM/KG (ref 280–300)
OSMOLALITY UR: 814 MOSM/KG (ref 200–1200)
PLATELET # BLD AUTO: 585 X10*3/UL (ref 150–450)
POTASSIUM SERPL-SCNC: 4.7 MMOL/L (ref 3.5–5.3)
RBC # BLD AUTO: 4 X10*6/UL (ref 4–5.2)
SODIUM SERPL-SCNC: 131 MMOL/L (ref 136–145)
SODIUM UR-SCNC: 37 MMOL/L
SODIUM/CREAT UR-RTO: 21 MMOL/G CREAT
WBC # BLD AUTO: 16.5 X10*3/UL (ref 4.4–11.3)

## 2025-06-11 PROCEDURE — 2500000002 HC RX 250 W HCPCS SELF ADMINISTERED DRUGS (ALT 637 FOR MEDICARE OP, ALT 636 FOR OP/ED): Performed by: INTERNAL MEDICINE

## 2025-06-11 PROCEDURE — 9420000001 HC RT PATIENT EDUCATION 5 MIN

## 2025-06-11 PROCEDURE — 2060000001 HC INTERMEDIATE ICU ROOM DAILY

## 2025-06-11 PROCEDURE — 36415 COLL VENOUS BLD VENIPUNCTURE: CPT | Performed by: INTERNAL MEDICINE

## 2025-06-11 PROCEDURE — 71045 X-RAY EXAM CHEST 1 VIEW: CPT

## 2025-06-11 PROCEDURE — 2500000004 HC RX 250 GENERAL PHARMACY W/ HCPCS (ALT 636 FOR OP/ED): Performed by: NURSE PRACTITIONER

## 2025-06-11 PROCEDURE — 99232 SBSQ HOSP IP/OBS MODERATE 35: CPT | Performed by: NURSE PRACTITIONER

## 2025-06-11 PROCEDURE — 83935 ASSAY OF URINE OSMOLALITY: CPT | Mod: AHULAB

## 2025-06-11 PROCEDURE — 97530 THERAPEUTIC ACTIVITIES: CPT | Mod: GO

## 2025-06-11 PROCEDURE — 2500000001 HC RX 250 WO HCPCS SELF ADMINISTERED DRUGS (ALT 637 FOR MEDICARE OP): Performed by: INTERNAL MEDICINE

## 2025-06-11 PROCEDURE — 2500000004 HC RX 250 GENERAL PHARMACY W/ HCPCS (ALT 636 FOR OP/ED): Performed by: INTERNAL MEDICINE

## 2025-06-11 PROCEDURE — 85027 COMPLETE CBC AUTOMATED: CPT | Performed by: INTERNAL MEDICINE

## 2025-06-11 PROCEDURE — 97161 PT EVAL LOW COMPLEX 20 MIN: CPT | Mod: GP

## 2025-06-11 PROCEDURE — 2500000005 HC RX 250 GENERAL PHARMACY W/O HCPCS: Performed by: INTERNAL MEDICINE

## 2025-06-11 PROCEDURE — 2500000005 HC RX 250 GENERAL PHARMACY W/O HCPCS: Performed by: NURSE PRACTITIONER

## 2025-06-11 PROCEDURE — 85379 FIBRIN DEGRADATION QUANT: CPT | Performed by: NURSE PRACTITIONER

## 2025-06-11 PROCEDURE — 93005 ELECTROCARDIOGRAM TRACING: CPT

## 2025-06-11 PROCEDURE — 93010 ELECTROCARDIOGRAM REPORT: CPT | Performed by: STUDENT IN AN ORGANIZED HEALTH CARE EDUCATION/TRAINING PROGRAM

## 2025-06-11 PROCEDURE — 2500000004 HC RX 250 GENERAL PHARMACY W/ HCPCS (ALT 636 FOR OP/ED)

## 2025-06-11 PROCEDURE — 97165 OT EVAL LOW COMPLEX 30 MIN: CPT | Mod: GO

## 2025-06-11 PROCEDURE — 83930 ASSAY OF BLOOD OSMOLALITY: CPT | Mod: AHULAB

## 2025-06-11 PROCEDURE — 82570 ASSAY OF URINE CREATININE: CPT

## 2025-06-11 PROCEDURE — 99232 SBSQ HOSP IP/OBS MODERATE 35: CPT | Performed by: INTERNAL MEDICINE

## 2025-06-11 PROCEDURE — 99233 SBSQ HOSP IP/OBS HIGH 50: CPT | Performed by: INTERNAL MEDICINE

## 2025-06-11 PROCEDURE — 36415 COLL VENOUS BLD VENIPUNCTURE: CPT | Performed by: NURSE PRACTITIONER

## 2025-06-11 PROCEDURE — 80048 BASIC METABOLIC PNL TOTAL CA: CPT | Performed by: INTERNAL MEDICINE

## 2025-06-11 PROCEDURE — 71045 X-RAY EXAM CHEST 1 VIEW: CPT | Performed by: RADIOLOGY

## 2025-06-11 RX ORDER — DIPHENHYDRAMINE HYDROCHLORIDE 50 MG/ML
50 INJECTION, SOLUTION INTRAMUSCULAR; INTRAVENOUS ONCE
Status: COMPLETED | OUTPATIENT
Start: 2025-06-12 | End: 2025-06-12

## 2025-06-11 RX ADMIN — PIPERACILLIN SODIUM AND TAZOBACTAM SODIUM 3.38 G: 3; .375 INJECTION, SOLUTION INTRAVENOUS at 06:08

## 2025-06-11 RX ADMIN — PANTOPRAZOLE SODIUM 40 MG: 40 INJECTION, POWDER, FOR SOLUTION INTRAVENOUS at 06:08

## 2025-06-11 RX ADMIN — SODIUM CHLORIDE: 9 INJECTION, SOLUTION INTRAVENOUS at 13:55

## 2025-06-11 RX ADMIN — CYCLOBENZAPRINE HYDROCHLORIDE 10 MG: 5 TABLET, FILM COATED ORAL at 06:08

## 2025-06-11 RX ADMIN — ENOXAPARIN SODIUM 40 MG: 40 INJECTION SUBCUTANEOUS at 17:17

## 2025-06-11 RX ADMIN — ROSUVASTATIN 20 MG: 20 TABLET, FILM COATED ORAL at 08:48

## 2025-06-11 RX ADMIN — Medication 1000 MCG: at 08:48

## 2025-06-11 RX ADMIN — FERROUS SULFATE TAB 325 MG (65 MG ELEMENTAL FE) 1 TABLET: 325 (65 FE) TAB at 08:47

## 2025-06-11 RX ADMIN — BUPROPION HYDROCHLORIDE 300 MG: 150 TABLET, EXTENDED RELEASE ORAL at 17:17

## 2025-06-11 RX ADMIN — LIDOCAINE 4% 2 PATCH: 40 PATCH TOPICAL at 08:48

## 2025-06-11 RX ADMIN — HYDROXYZINE HYDROCHLORIDE 25 MG: 25 TABLET, FILM COATED ORAL at 08:48

## 2025-06-11 RX ADMIN — MELATONIN TAB 3 MG 6 MG: 3 TAB at 21:20

## 2025-06-11 RX ADMIN — PIPERACILLIN SODIUM AND TAZOBACTAM SODIUM 3.38 G: 3; .375 INJECTION, SOLUTION INTRAVENOUS at 23:51

## 2025-06-11 RX ADMIN — PIPERACILLIN SODIUM AND TAZOBACTAM SODIUM 3.38 G: 3; .375 INJECTION, SOLUTION INTRAVENOUS at 14:30

## 2025-06-11 RX ADMIN — PIPERACILLIN SODIUM AND TAZOBACTAM SODIUM 3.38 G: 3; .375 INJECTION, SOLUTION INTRAVENOUS at 00:48

## 2025-06-11 RX ADMIN — PREDNISONE 50 MG: 20 TABLET ORAL at 21:49

## 2025-06-11 RX ADMIN — DORNASE ALFA 2.5 MG: 1 SOLUTION RESPIRATORY (INHALATION) at 13:55

## 2025-06-11 RX ADMIN — PIPERACILLIN SODIUM AND TAZOBACTAM SODIUM 3.38 G: 3; .375 INJECTION, SOLUTION INTRAVENOUS at 17:17

## 2025-06-11 RX ADMIN — MORPHINE SULFATE 2 MG: 2 INJECTION, SOLUTION INTRAMUSCULAR; INTRAVENOUS at 14:30

## 2025-06-11 RX ADMIN — ALTEPLASE 5 MG: 2.2 INJECTION, POWDER, LYOPHILIZED, FOR SOLUTION INTRAVENOUS at 13:55

## 2025-06-11 RX ADMIN — CYCLOBENZAPRINE HYDROCHLORIDE 10 MG: 5 TABLET, FILM COATED ORAL at 21:20

## 2025-06-11 RX ADMIN — SODIUM CHLORIDE 500 ML: 0.9 INJECTION, SOLUTION INTRAVENOUS at 16:43

## 2025-06-11 ASSESSMENT — COGNITIVE AND FUNCTIONAL STATUS - GENERAL
MOVING FROM LYING ON BACK TO SITTING ON SIDE OF FLAT BED WITH BEDRAILS: A LITTLE
TURNING FROM BACK TO SIDE WHILE IN FLAT BAD: A LITTLE
TOILETING: A LITTLE
PERSONAL GROOMING: A LITTLE
DRESSING REGULAR UPPER BODY CLOTHING: A LITTLE
WALKING IN HOSPITAL ROOM: A LITTLE
MOVING TO AND FROM BED TO CHAIR: A LOT
MOVING FROM LYING ON BACK TO SITTING ON SIDE OF FLAT BED WITH BEDRAILS: A LITTLE
DRESSING REGULAR UPPER BODY CLOTHING: A LOT
HELP NEEDED FOR BATHING: A LITTLE
WALKING IN HOSPITAL ROOM: A LOT
DAILY ACTIVITIY SCORE: 18
DRESSING REGULAR LOWER BODY CLOTHING: A LITTLE
MOBILITY SCORE: 18
EATING MEALS: A LITTLE
STANDING UP FROM CHAIR USING ARMS: A LITTLE
TURNING FROM BACK TO SIDE WHILE IN FLAT BAD: A LITTLE
HELP NEEDED FOR BATHING: A LOT
TOILETING: A LOT
EATING MEALS: A LITTLE
PERSONAL GROOMING: A LITTLE
DAILY ACTIVITIY SCORE: 14
MOVING TO AND FROM BED TO CHAIR: A LITTLE
DRESSING REGULAR LOWER BODY CLOTHING: A LOT
MOBILITY SCORE: 14
STANDING UP FROM CHAIR USING ARMS: A LITTLE
CLIMB 3 TO 5 STEPS WITH RAILING: TOTAL
CLIMB 3 TO 5 STEPS WITH RAILING: A LITTLE

## 2025-06-11 ASSESSMENT — ACTIVITIES OF DAILY LIVING (ADL)
BATHING_ASSISTANCE: MODERATE
ADL_ASSISTANCE: INDEPENDENT
ADL_ASSISTANCE: INDEPENDENT

## 2025-06-11 ASSESSMENT — PAIN SCALES - GENERAL
PAINLEVEL_OUTOF10: 4

## 2025-06-11 ASSESSMENT — PAIN - FUNCTIONAL ASSESSMENT
PAIN_FUNCTIONAL_ASSESSMENT: 0-10
PAIN_FUNCTIONAL_ASSESSMENT: 0-10

## 2025-06-11 NOTE — PROGRESS NOTES
06/11/25 1537   Discharge Planning   Home or Post Acute Services Post acute facilities (Rehab/SNF/etc)   Type of Post Acute Facility Services Skilled nursing   Expected Discharge Disposition SNF   Does the patient need discharge transport arranged? Yes   RoundTrip coordination needed? Yes   Has discharge transport been arranged? No   Patient Choice   Provider Choice list and CMS website (https://medicare.gov/care-compare#search) for post-acute Quality and Resource Measure Data were provided and reviewed with: Patient;Family     Met with patient at bedside, she has SNF list. Per patient, her brother and sister will be at bedside tomorrow to review list and identify choices. SW will follow.

## 2025-06-11 NOTE — CARE PLAN
The patient's goals for the shift include      The clinical goals for the shift include pt will remain pain free    Over the shift, the patient did not make progress toward the following goals. Barriers to progression include . Recommendations to address these barriers include   Problem: Pain - Adult  Goal: Verbalizes/displays adequate comfort level or baseline comfort level  Outcome: Progressing     Problem: Safety - Adult  Goal: Free from fall injury  Outcome: Progressing     Problem: Discharge Planning  Goal: Discharge to home or other facility with appropriate resources  Outcome: Progressing     Problem: Chronic Conditions and Co-morbidities  Goal: Patient's chronic conditions and co-morbidity symptoms are monitored and maintained or improved  Outcome: Progressing     Problem: Nutrition  Goal: Nutrient intake appropriate for maintaining nutritional needs  Outcome: Progressing     Problem: Respiratory  Goal: Clear secretions with interventions this shift  Outcome: Progressing  Goal: Minimize anxiety/maximize coping throughout shift  Outcome: Progressing  Goal: Minimal/no exertional discomfort or dyspnea this shift  Outcome: Progressing  Goal: No signs of respiratory distress (eg. Use of accessory muscles. Peds grunting)  Outcome: Progressing  Goal: Patent airway maintained this shift  Outcome: Progressing  Goal: Tolerate mechanical ventilation evidenced by VS/agitation level this shift  Outcome: Progressing  Goal: Tolerate pulmonary toileting this shift  Outcome: Progressing  Goal: Verbalize decreased shortness of breath this shift  Outcome: Progressing  Goal: Wean oxygen to maintain O2 saturation per order/standard this shift  Outcome: Progressing  Goal: Increase self care and/or family involvement in next 24 hours  Outcome: Progressing     Problem: Skin  Goal: Decreased wound size/increased tissue granulation at next dressing change  Outcome: Progressing  Goal: Participates in plan/prevention/treatment  measures  Outcome: Progressing  Goal: Prevent/manage excess moisture  Outcome: Progressing  Goal: Prevent/minimize sheer/friction injuries  Outcome: Progressing  Goal: Promote/optimize nutrition  Outcome: Progressing  Goal: Promote skin healing  Outcome: Progressing   .

## 2025-06-11 NOTE — SIGNIFICANT EVENT
.At 1400 1/2 dose tPA/Dornase administered to patient.  Patient advised regarding recommendations including adjusting position every 15min (lay on side, sit, stand, walk, etc) during 2 hour instillation and verbalized understanding.  Reviewed red flag symptoms that warrant emergency evaluation including severe pain or dyspnea.  Bedside RN updated on plan of care.  All questions addressed. Patient tolerated well    Total amount of fluid instilled: 130 cc  Amount in atrium prior to instillation:  1530 cc     CANDY Moore-CNP    At 1600 dwell time complete on TPA/Dornase and dwell was released.  Patient with 1730cc in chest tube post TPA/dornase release.  Patient tolerated procedure well without any immediate complications.

## 2025-06-11 NOTE — PROGRESS NOTES
"Thoracic Surgery- Daily Progress Note   Subjective    Mildred Moyer is a 70 y.o. year old female patient admitted on 6/5/2025 due to a recurrent left pleural effusion    Interval History:  -CXR stable  -No acute issues overnight     Meds    Scheduled medications  Scheduled Medications[1]  Continuous medications  Continuous Medications[2]  PRN medications  PRN Medications[3]     Objective    Blood pressure 127/74, pulse (!) 115, temperature 36.5 °C (97.7 °F), temperature source Temporal, resp. rate 16, height 1.626 m (5' 4.02\"), weight 52 kg (114 lb 9.6 oz), SpO2 95%. Body mass index is 19.66 kg/m².    Physical Exam  Constitutional:       Appearance: She is ill-appearing.   Eyes:      Extraocular Movements: Extraocular movements intact.   Pulmonary:      Breath sounds: Normal breath sounds.      Comments: Left pigtail to waterseal without airleak  -Serosanguinous output appreciated   Abdominal:      General: Abdomen is flat. Bowel sounds are normal.      Palpations: Abdomen is soft.   Skin:     General: Skin is warm and dry.      Capillary Refill: Capillary refill takes less than 2 seconds.   Neurological:      General: No focal deficit present.      Mental Status: She is alert and oriented to person, place, and time.   Psychiatric:         Mood and Affect: Mood normal.         Behavior: Behavior normal.        GENERAL:  Frail   OROPHARYNX: Moist mucosa, no thrush or lesions   NECK: no JVD, midline trachea without stridor.    LUNGS: Equal chest expansion, slightly labored.  Clear throughout upper lobes and diminished in the bases   CARDIAC: normal S1 and S2; no gallops, rubs or murmurs. Regular rate and rhythm  EXTREMITIES: No edema  NEURO: grossly normal mental status, CN reflexes and motor strength.   SKIN: Skin turgor normal. No rashes or lesions.   PSYCH: Normal affect      Fluid balance    Intake/Output Summary (Last 24 hours) at 6/11/2025 0910  Last data filed at 6/11/2025 0736  Gross per 24 hour   Intake -- "   Output 90 ml   Net -90 ml       Labs:   Results from last 72 hours   Lab Units 06/11/25  0454 06/10/25  0503 06/09/25  0529   SODIUM mmol/L 131* 135* 134*   POTASSIUM mmol/L 4.7 4.6 3.2*   CHLORIDE mmol/L 90* 93* 94*   CO2 mmol/L 33* 34* 26   BUN mg/dL 16 15 15   CREATININE mg/dL 0.73 0.83 0.71   GLUCOSE mg/dL 98 92 111*   CALCIUM mg/dL 8.6 8.7 9.0   ANION GAP mmol/L 13 13 17   EGFR mL/min/1.73m*2 90 77 >90      Results from last 72 hours   Lab Units 06/11/25  0454 06/10/25  0503 06/09/25  0529   WBC AUTO x10*3/uL 16.5* 16.2* 17.0*   HEMOGLOBIN g/dL 12.8 13.3 12.7   HEMATOCRIT % 38.2 41.9 38.6   PLATELETS AUTO x10*3/uL 585* 625* 574*                   Micro/ID:   Lab Results   Component Value Date    BLOODCULT No growth at 4 days -  FINAL REPORT 06/05/2025    BLOODCULT No growth at 4 days -  FINAL REPORT 06/05/2025         Impression   Mildred Moyer is a 70 y.o. year old female patient with a previous medical history of a recurrent right pleural effusion requiring pleurx which was removed on 4/29, chronic hypoxic respiratory failure on 3-4L NC, hypertension, HLD, metastatic breast cancer, anxiety and depression who presented after undergoing a thoracentesis on 5/29 with recurrent pleural effusion and worsening shortness of breath.  Thoracic surgery was consulted for consideration of pleurx placement on the left.       Management Plans     Recurrent left pleural effusion   -Will trial lytic therapy today  -Maintain in SDU  -Pulmonary toileting   -Frequent ambulation/PT/OOB  -Pigtail chest tube to waterseal   -Daily CXR while chest tube is in place  -Appreciate consult, will continue to follow  -Plan discussed and formulated with Dr. Shepherd     Time Spent: 30 minutes     CANDY Somers-CNP, DNP   06/11/25 at 9:10 AM            [1] buPROPion XL, 300 mg, oral, q24h  [START ON 6/30/2025] capivasertib, 400 mg, oral, 2 times per day on Monday Tuesday Wednesday Thursday  cyanocobalamin, 1,000 mcg, oral,  Daily  enoxaparin, 40 mg, subcutaneous, q24h  ferrous sulfate, 65 mg of elemental iron, oral, Daily  hydrOXYzine HCL, 25 mg, oral, Daily  lidocaine, 2 patch, transdermal, Daily  melatonin, 6 mg, oral, Nightly  pantoprazole, 40 mg, oral, Daily before breakfast   Or  pantoprazole, 40 mg, intravenous, Daily before breakfast  piperacillin-tazobactam, 3.375 g, intravenous, q6h  rosuvastatin, 20 mg, oral, Daily  [Held by provider] sodium chloride, 500 mL, intravenous, Once     [2]    [3] PRN medications: acetaminophen **OR** acetaminophen **OR** acetaminophen, cyclobenzaprine, guaiFENesin, ipratropium-albuteroL, morphine, ondansetron **OR** ondansetron, oxyCODONE, oxygen, polyethylene glycol

## 2025-06-11 NOTE — PROGRESS NOTES
Mildred Moyer is a 70 y.o. female on day 6 of admission presenting with Pleural effusion.  Patient with h/o MS, HTN, DLP, GERD, recurrent R pleural effusion s/p multiple thoracenteses and PleurX placement that was removed in 4/2025, chronic hypoxic respiratory failure on 3L NC, stage IV R breast invasive ductal carcinoma, anxiety, depression who p/w a gradual onset, progressive SOB x 3 weeks, associated with dry cough. In the ED work up revealed stable b/l effusion and infiltrates and partial R lung collapse on chest imaging, and leukocytosis. Admitted for recurrent pleural effusion. Pulmonary is consulted for partial R lung collapse. Of note since admission had L sided thoracentesis done.   Subjective   No acute overnight events. S/p 1/2 dose tPA/dornase instillation by thoracic surgery team earlier today. CT is currently clamped. CT drainage around 200 ml last 24 hours.     Currently is complaining of a moderate L sided CP after instillation of dornase. Also +ve dry cough. SOB improved. Denies wheezing or any other complaints.   Objective   Scheduled medications  buPROPion XL, 300 mg, oral, q24h  [START ON 6/30/2025] capivasertib, 400 mg, oral, 2 times per day on Monday Tuesday Wednesday Thursday  cyanocobalamin, 1,000 mcg, oral, Daily  enoxaparin, 40 mg, subcutaneous, q24h  ferrous sulfate, 65 mg of elemental iron, oral, Daily  hydrOXYzine HCL, 25 mg, oral, Daily  lidocaine, 2 patch, transdermal, Daily  melatonin, 6 mg, oral, Nightly  pantoprazole, 40 mg, oral, Daily before breakfast   Or  pantoprazole, 40 mg, intravenous, Daily before breakfast  piperacillin-tazobactam, 3.375 g, intravenous, q6h  rosuvastatin, 20 mg, oral, Daily  [Held by provider] sodium chloride, 500 mL, intravenous, Once    Continuous medications     PRN medications  PRN medications: acetaminophen **OR** acetaminophen **OR** acetaminophen, cyclobenzaprine, guaiFENesin, ipratropium-albuteroL, morphine, ondansetron **OR** ondansetron,  "oxyCODONE, oxygen, polyethylene glycol   Physical Exam  Constitutional:       General: She is not in acute distress.     Appearance: She is normal weight. She is ill-appearing. She is not toxic-appearing.   HENT:      Head: Normocephalic and atraumatic.      Nose:      Comments: On 3L NC.      Mouth/Throat:      Mouth: Mucous membranes are dry.      Comments: Mallampati 2.   Eyes:      General: No scleral icterus.     Extraocular Movements: Extraocular movements intact.      Pupils: Pupils are equal, round, and reactive to light.   Cardiovascular:      Rate and Rhythm: Regular rhythm. Tachycardia present.      Heart sounds: No murmur heard.     No friction rub. No gallop.   Pulmonary:      Effort: Pulmonary effort is normal. No respiratory distress.      Breath sounds: No wheezing or rales.      Comments: Reduced breath sounds at the bases. Overall fair air entry. L sided CT in place draining serosanguinous fluid.   Abdominal:      General: Bowel sounds are normal. There is no distension.      Palpations: Abdomen is soft.      Tenderness: There is no abdominal tenderness.   Musculoskeletal:         General: No tenderness. Normal range of motion.      Cervical back: Normal range of motion and neck supple. No rigidity.      Right lower leg: Edema (Trace) present.      Left lower leg: Edema (Trace) present.   Lymphadenopathy:      Cervical: No cervical adenopathy.   Skin:     General: Skin is warm and dry.      Coloration: Skin is not jaundiced.   Neurological:      General: No focal deficit present.      Mental Status: She is alert and oriented to person, place, and time.      Cranial Nerves: No cranial nerve deficit.      Motor: No weakness.   Psychiatric:         Mood and Affect: Mood normal.         Behavior: Behavior normal.     Last Recorded Vitals  Blood pressure 118/72, pulse (!) 126, temperature 36.6 °C (97.8 °F), temperature source Temporal, resp. rate 16, height 1.626 m (5' 4.02\"), weight 52 kg (114 lb 9.6 " oz), SpO2 95%.  Intake/Output last 3 Shifts:  I/O last 3 completed shifts:  In: 50 (1 mL/kg) [IV Piggyback:50]  Out: 1350 (26 mL/kg) [Chest Tube:1350]  Weight: 52 kg     Relevant Results  Results for orders placed or performed during the hospital encounter of 06/05/25 (from the past 24 hours)   Basic metabolic panel   Result Value Ref Range    Glucose 98 74 - 99 mg/dL    Sodium 131 (L) 136 - 145 mmol/L    Potassium 4.7 3.5 - 5.3 mmol/L    Chloride 90 (L) 98 - 107 mmol/L    Bicarbonate 33 (H) 21 - 32 mmol/L    Anion Gap 13 10 - 20 mmol/L    Urea Nitrogen 16 6 - 23 mg/dL    Creatinine 0.73 0.50 - 1.05 mg/dL    eGFR 90 >60 mL/min/1.73m*2    Calcium 8.6 8.6 - 10.3 mg/dL   CBC   Result Value Ref Range    WBC 16.5 (H) 4.4 - 11.3 x10*3/uL    nRBC 0.0 0.0 - 0.0 /100 WBCs    RBC 4.00 4.00 - 5.20 x10*6/uL    Hemoglobin 12.8 12.0 - 16.0 g/dL    Hematocrit 38.2 36.0 - 46.0 %    MCV 96 80 - 100 fL    MCH 32.0 26.0 - 34.0 pg    MCHC 33.5 32.0 - 36.0 g/dL    RDW 14.0 11.5 - 14.5 %    Platelets 585 (H) 150 - 450 x10*3/uL   Sodium, Urine Random   Result Value Ref Range    Sodium, Urine Random 37 mmol/L    Creatinine, Urine Random 172.4 20.0 - 320.0 mg/dL    Sodium/Creatinine Ratio 21 Not established. mmol/g Creat   XR chest 1 view  Result Date: 6/11/2025  Interpreted By:  Kee Pelletier, STUDY: XR CHEST 1 VIEW;  6/11/2025 5:13 am   INDICATION: Signs/Symptoms:Monitor chest tube.   COMPARISON: Chest x-rays, most recent from 06/10/2025. CT scan chest from 04/13/2025.   ACCESSION NUMBER(S): [id number]   ORDERING CLINICIAN: TOM MG   TECHNIQUE: Single AP portable view of the chest was obtained.   FINDINGS: MEDIASTINUM/ LUNGS/ LARA: Stable right-sided volume loss. Prominent grossly stable pleural and parenchymal opacities occupy most of the right hemithorax. There is stable mild rightward mediastinal shift. Small amount of aerated central right upper lobe, similar to the previous exam. Stable pigtail pleural catheter at the medial  left lung base. Stable left basilar pleural effusion with mild associated atelectasis. No gross pulmonary vascular congestion. No pneumothorax. No tracheal deviation. No abnormal hilar fullness or gross mass on either side.   BONES: No lytic or blastic destructive bone lesion.   UPPER ABDOMEN: Grossly intact.       Multiple stable findings as described.   MACRO: None   Signed by: Kee Pelletier 6/11/2025 8:09 AM Dictation workstation:   [id number]    US thoracentesis w insertion of tube includes water seal when performed  Result Date: 6/10/2025  Interpreted By:  Demetrio Iglesias, STUDY: US THORACENTESIS WITH INSERTION OF TUBE INCLUDES WATER SEAL WHEN PERFORMED; ;  6/9/2025 5:00 pm   ULTRASOUND-GUIDED PERCUTANEOUS LEFT PIGTAIL THORACOSTOMY   INDICATION: Signs/Symptoms:Pigtail placement for lytic therapy. 70-year-old woman with multi-septated left pleural effusion.     COMPARISON: Ultrasound images from thoracentesis 06/06/2025   ACCESSION NUMBER(S): [id number]   ORDERING CLINICIAN: PEDRO PABLO MERCADO   TECHNIQUE:   ATTENDING : Demetrio Iglesias M.D.   TECHNICAL DESCRIPTION/FINDINGS: The procedure, including all risks, benefits and alternatives were explained to the patient in detail. All questions were answered and written informed consent was obtained.   The patient was positioned in the right posterior oblique position in her hospital bed in the ultrasound suite. A time-out was performed.   Focused ultrasound was performed the inferolateral aspect of the left chest Re demonstrating multi-septated pleural effusion.   The overlying skin was prepped and draped in usual sterile fashion. 1% lidocaine was administered subcutaneously for local anesthesia. Under real-time ultrasound guidance, 5 Stateless multi side-hole sheath needle was advanced via an oblique intercostal approach into the multi septated left pleural effusion. Ultrasound images were saved. Aspiration yielded serosanguineous fluid. An 035 Amplatz  wire was coiled in the left pleural effusion after serial dilation, a 12 Ivorian pigtail drainage catheter functioning as a pigtail thoracostomy tube was placed. Focused post chest tube placement sonography demonstrates positioning of the pigtail catheter in the pleural effusion.   The newly placed catheter was then sutured to the skin with 2 0 Prolene stay suture and connected to Pleur-evac drainage. A sterile dressing was applied.   SEDATION/MEDICATIONS: Continuous cardiopulmonary monitoring was performed by a radiology nurse for the duration of the procedure. No conscious sedation was administered. Procedural duration 30 minutes. 10 cc 1% Lidocaine was administered subcutaneously for local anesthesia. SPECIMENS: 5 cc serosanguineous left pleural fluid aspirated, discarded. ESTIMATED BLOOD LOSS:  5 cc FLOUROSCOPY: None. CONTRAST: None.   FINDINGS: Test       Ultrasound-guided percutaneous 12 Ivorian pigtail drainage catheter into multi-septated left pleural effusion.     MACRO: None   Signed by: Demetrio Iglesias 6/10/2025 6:40 PM Dictation workstation:   [id number]    XR chest 1 view  Result Date: 6/10/2025  Interpreted By:  Mimi Real, STUDY: XR CHEST 1 VIEW;  6/10/2025 5:49 am   INDICATION: Signs/Symptoms:Monitor chest tube.   ACCESSION NUMBER(S): [id number]   ORDERING CLINICIAN: TOM MG   FINDINGS: Left-sided chest tube (pigtail) is again seen which is unchanged in position. There is a left pleural effusion which is unchanged.   Almost complete opacification the right hemithorax is seen, which is also unchanged. The heart size is difficult to tell. No pneumothorax is noted       Redemonstration of almost complete opacification of the right hemithorax which is unchanged.   Left pleural effusion, which is unchanged.       MACRO: None   Signed by: Mimi Real 6/10/2025 8:42 AM Dictation workstation:   [id number]    XR chest 1 view  Result Date: 6/9/2025  Interpreted By:  Cade Alcala, STUDY: XR  CHEST 1 VIEW;  6/9/2025 8:54 pm   INDICATION: Signs/Symptoms:Evaluate pleural effusion.     COMPARISON: 06/09/2025 at 5:23 p.m.   ACCESSION NUMBER(S): [id number]   ORDERING CLINICIAN: TOM MG   FINDINGS: Redemonstration of left basilar pigtail chest tube. Small left pleural effusion has decreased in size from the prior.   Large right pleural effusion with extensive calcification of the right hemithorax is similar to the prior. There is improvement in the left basilar airspace disease however. Cardiac silhouette is partially obscured.       Interval improvement in left pleural effusion and left basilar airspace disease. Pigtail chest tube in place No change on the right with near complete opacification of the right hemithorax and associated large pleural effusion and airspace disease   MACRO: None   Signed by: Cade Alcala 6/9/2025 9:01 PM Dictation workstation:   [id number]    XR chest 1 view  Result Date: 6/9/2025  Interpreted By:  Cade Alcala, STUDY: XR CHEST 1 VIEW;  6/9/2025 5:39 pm   INDICATION: Signs/Symptoms:s/p L chest tube placement.     COMPARISON: 06/07/2020   ACCESSION NUMBER(S): [id number]   ORDERING CLINICIAN: MEGAN DAS   FINDINGS:         CARDIOMEDIASTINAL SILHOUETTE: Cardiomediastinal silhouette is normal in size and configuration.   LUNGS: Redemonstration of bilateral pleural effusions, larger on the right and moderate on the left. Extensive airspace disease in the right lung again seen with near complete opacification of the right hemithorax. This appears to be worsening from the prior. There is volume loss with deviation of the trachea to the right. There is patchy airspace disease at the left lung base.   ABDOMEN: No remarkable upper abdominal findings.   BONES: No acute osseous changes.       Redemonstration of extensive bilateral multifocal airspace disease and pleural effusions worse on the right. This appears worsened especially on the right.     MACRO: None    Signed by: Cade Alcala 6/9/2025 6:09 PM Dictation workstation:   [id number]     Assessment & Plan  Pleural effusion    70 YOF with h/o MS, HTN, DLP, GERD, recurrent R pleural effusion s/p multiple thoracenteses and PleurX placement that was removed in 4/2025, chronic hypoxic respiratory failure on 3L NC, stage IV R breast invasive ductal carcinoma, anxiety, depression who p/w a gradual onset, progressive SOB x 3 weeks, associated with dry cough. In the ED work up revealed stable b/l effusion and infiltrates and partial R lung collapse on chest imaging, and leukocytosis. Admitted for recurrent pleural effusion. Pulmonary is consulted for partial R lung collapse. Of note since admission had R sided thoracentesis done. Of note the patient with progression of her breast cancer, being evaluated for palliative chemotherapy.     Recurrent pleural effusions: bilateral. R present since 2023. S/p multiple R sided thoracentesis. Exudative, lymphocytic predominant, atypical cell on cytology. She had R VATs that confirmed trapped lung with pleural biopsy (pathology showed acute fibrinous pleuritis with atypia) and PleurX placement in 10/2024. A moderate size L effusion was detected on CT from 4/2025. S/p L thoracentesis with no specimen. PleurX was removed in 4/2025. Again had L thoracentesis done in 5/2025 that showed exudative effusion. Repeat imaging on this admission re-demonstrated bilateral pleural effusions. S/p L thoracentesis that showed exudative effusion.        Seen by thoracic surgery, s/p L Pigtail placement, and dornase therapy #1       FU thoracentesis results       FU with thoracic surgery recommendations.          Possible pneumonia: MRSA screening negative       Continue Zosyn       FU cultures         Breast cancer: progressive with possible pulmonary and pleural mets.        Outpatient management as per oncology and thoracic surgery    Respiratory failure: acute on chronic with hypoxia. Due to above.  Now improved after CT placement. Likely multi-factorial due to above.         Continue supplemental O2, wean off as tolerates        Home O2 evaluation before DC        BPH with IS, Acapella        Continue Duo-Neb    DVT prophylaxis: On Lovenox    Pulmonary will FU while in house.     Lucita Vargas MD

## 2025-06-11 NOTE — PROGRESS NOTES
Occupational Therapy    Evaluation/Treatment    Patient Name: Mildred Moyer  MRN: [id number]  Department: [facility] [id number]  Room: [address] [place] [address]  Today's Date: 06/11/25  Time Calculation  Start Time: 1016  Stop Time: 1042  Time Calculation (min): 26 min       Assessment:  OT Assessment: Pt presents with significant deficits in fxnl activity tolerance which is limiting her ability to participate in self care or functional mobility. Pt would benefit from moderate intensity OT at discharge.  Prognosis: Fair  Barriers to Discharge Home: Caregiver assistance, Physical needs  Caregiver Assistance: Caregiver assistance needed per identified barriers - however, level of patient's required assistance exceeds assistance available at home  Physical Needs: 24hr mobility assistance needed, 24hr ADL assistance needed, High falls risk due to function or environment  Evaluation/Treatment Tolerance: Patient limited by fatigue  Medical Staff Made Aware: Yes  End of Session Communication: Bedside nurse  End of Session Patient Position: Up in chair, Alarm on  OT Assessment Results: Decreased ADL status, Decreased endurance, Decreased functional mobility  Prognosis: Fair  Barriers to Discharge: Decreased caregiver support  Evaluation/Treatment Tolerance: Patient limited by fatigue  Medical Staff Made Aware: Yes  Strengths: Ability to acquire knowledge, Support of extended family/friends, Housing layout  Barriers to Participation: Comorbidities, Insight into problems  Plan:  Treatment Interventions: ADL retraining, Functional transfer training, Endurance training, Patient/family training, Equipment evaluation/education, Neuromuscular reeducation, UE strengthening/ROM  OT Frequency: 3 times per week  OT Discharge Recommendations: Moderate intensity level of continued care  Equipment Recommended upon Discharge: Wheeled walker, Wheelchair (pt owns)  OT Recommended Transfer Status: Minimal assist, Assist of 1  OT - OK to Discharge: Yes (POC  established)  Treatment Interventions: ADL retraining, Functional transfer training, Endurance training, Patient/family training, Equipment evaluation/education, Neuromuscular reeducation, UE strengthening/ROM    Subjective   Current Problem:  1. Pleural effusion        2. Leukocytosis, unspecified type          OT Visit Info:  OT Received On: 06/11/25  General Visit Info:   General  Reason for Referral: Pt is a 71 y/o F admitted with pleural effusion. Referred to OT for ADL assessment.  Referred By: Shirley Garces PA-C  Past Medical History Relevant to Rehab: Medical History[1]  Family/Caregiver Present: No  Co-Treatment: PT  Co-Treatment Reason: co-eval with pt to maximize pt participation and outcomes  Prior to Session Communication: Bedside nurse  Patient Position Received: Up in chair, Alarm off, not on at start of session, Alarm off, caregiver present  General Comment: Pt up to chair with CTA at OT/PT arrival. pt agreeable to therapy evaluations, receptive to education   Precautions:  Medical Precautions: Fall precautions, Oxygen therapy device and L/min (4L)    Pain:  Pain Assessment  0-10 (Numeric) Pain Score: 4  Pain Type: Acute pain  Pain Location: Chest  Pain Orientation: Left  Pain Interventions: Repositioned, Distraction    Objective   Cognition:  Orientation Level: Oriented X4  Attention: Within Functional Limits  Insight: Mild  Impulsive: Moderately     Home Living:  Type of Home: Assisted living  Lives With: Alone  Home Adaptive Equipment: Walker rolling or standard, Wheelchair-manual, Cane (Rollator > w/c)  Home Layout: One level  Home Access: Level entry, No concerns  Bathroom Shower/Tub: Walk-in shower  Bathroom Toilet: Standard  Bathroom Equipment: Grab bars in shower, Built-in shower seat, Grab bars around toilet  Prior Function:  Level of [place]: Independent with ADLs and functional transfers, Needs assistance with homemaking  Receives Help From: Personal care attendant  ADL Assistance:  Independent  Homemaking Assistance: Needs assistance (staff completes)  Ambulatory Assistance: Independent (rollator, reports she cannot propel w/c with BUEs/ BLEs due to L weakness from MS)  Vocational: Retired  Hand Dominance: Right  ADL:  Eating Assistance: Independent  Grooming Assistance: Stand by  Grooming Deficit: Steadying, Increased time to complete (completed seated due to dyspnea)  Bathing Assistance: Moderate  Bathing Deficit: Left lower leg including foot, Right lower leg including foot, Buttocks, Increased time to complete   UE Dressing Assistance: Minimal  LE Dressing Assistance: Moderate  Toileting Assistance with Device: Moderate  ADL Comments: primarily limited by dyspnea with activity, however has decreased balance and activity tolerance during self care  Activity Tolerance:  Endurance: Tolerates 10 - 20 min exercise with multiple rests  Rate of Perceived Exertion (RPE): reports 5/10 after sit to stand/ stand to sit transfer with <30 seconds before sitting.  Functional Standing Tolerance:     Bed Mobility/Transfers: Transfers  Transfer: Yes  Transfer 1  Transfer From 1: Chair with arms to  Transfer to 1: Stand  Technique 1: Sit to stand, Stand to sit  Transfer Device 1: Walker  Transfer Level of Assistance 1: Minimum assistance, Moderate verbal cues, Moderate tactile cues  Trials/Comments 1: cues for hand placement, slowing and controlling movements and safety. x3 trials  Sitting Balance:  Static Sitting Balance  Static Sitting-Balance Support: Feet supported  Static Sitting-Level of Assistance: Close supervision  Therapy/Activity: Therapeutic Activity  Therapeutic Activity Performed: Yes  Therapeutic Activity 1: Eduction on energy conservation with pt requiring intermittent cues for rest breaks during eval/ tx. Education on importance of slow and controlled transfers and descent, particularly with chest tube in place. Educated pt on importance of being up OOB for increasing activity  tolerance    Sensation:  Sensation Comment: Reports intermittent numbness on L side due to MS  Strength:  Strength Comments: BUEs are grossly 4/5. Pt reports she feels significantly weaker than her baseline  Coordination:  Movements are Fluid and Coordinated: Yes   Hand Function:  Hand Function  Gross Grasp: Functional  Coordination: Functional  Extremities: KATRINAE   JESUS :  (4/5 strength, from WFL) and IRINEOE   KG:  (4/5 strength, ROM WFL)    Outcome Measures: [facility] Daily Activity  Putting on and taking off regular lower body clothing: A lot  Bathing (including washing, rinsing, drying): A lot  Putting on and taking off regular upper body clothing: A lot  Toileting, which includes using toilet, bedpan or urinal: A lot  Taking care of personal grooming such as brushing teeth: A little  Eating Meals: A little  Daily Activity - Total Score: 14    Education Documentation  Precautions, taught by Marguerite Colin OT at 6/11/2025 11:56 AM.  Learner: Patient  Readiness: Acceptance  Method: Explanation  Response: Verbalizes Understanding, Needs Reinforcement  Comment: requires ongoing training for energy conservation and safe functional transfers    Handouts, taught by Marguerite Colin OT at 6/11/2025 11:56 AM.  Learner: Patient  Readiness: Acceptance  Method: Explanation  Response: Verbalizes Understanding, Needs Reinforcement  Comment: requires ongoing training for energy conservation and safe functional transfers    Body Mechanics, taught by Marguerite Colin OT at 6/11/2025 11:56 AM.  Learner: Patient  Readiness: Acceptance  Method: Explanation  Response: Verbalizes Understanding, Needs Reinforcement  Comment: requires ongoing training for energy conservation and safe functional transfers    Home Exercise Program, taught by Marguerite Colin OT at 6/11/2025 11:56 AM.  Learner: Patient  Readiness: Acceptance  Method: Explanation  Response: Verbalizes Understanding, Needs Reinforcement  Comment: requires ongoing training for energy  conservation and safe functional transfers    ADL Training, taught by Marguerite Colin OT at 6/11/2025 11:56 AM.  Learner: Patient  Readiness: Acceptance  Method: Explanation  Response: Verbalizes Understanding, Needs Reinforcement  Comment: requires ongoing training for energy conservation and safe functional transfers    Education Comments  No comments found.      Goals:  Encounter Problems       Encounter Problems (Active)       ADLs       Patient with complete upper body dressing with stand by assist level of assistance donning and doffing all UE clothes with PRN adaptive equipment while supported sitting while implementing energy conservation techniques        Start:  06/11/25    Expected End:  06/25/25            Patient with complete lower body dressing with contact guard assist level of assistance donning and doffing all LE clothes  with PRN adaptive equipment while supported sitting while implementing energy conservation techniques        Start:  06/11/25    Expected End:  06/25/25            Patient will complete toileting including hygiene clothing management/hygiene with contact guard assist level of assistance and grab bars while implementing energy conservation techniques .       Start:  06/11/25    Expected End:  06/25/25               BALANCE       Patient will tolerate standing for 3  minutes to contact guard assist level of assistance with least restrictive device in order to improve functional activity tolerance for ADL tasks.       Start:  06/11/25    Expected End:  06/25/25               EXERCISE/STRENGTHENING       Patient will be educated on BUE light strengthening AND activity tolerance/ work simplification HEP for increased ADL performance.       Start:  06/11/25    Expected End:  06/25/25               TRANSFERS       Patient will complete functional transfer to toilet/ commode/ chair with arms with least restrictive device with contact guard assist level of assistance while implementing  energy conservation techniques        Start:  06/11/25    Expected End:  06/25/25                      [1]   Past Medical History:  Diagnosis Date    GERD (gastroesophageal reflux disease)     HLD (hyperlipidemia)     HTN (hypertension)     Liver disease     Lung neoplasm     MS (multiple sclerosis) (Multi)     Pleural effusion

## 2025-06-11 NOTE — PROGRESS NOTES
06/11/25 1130   Discharge Planning   Expected Discharge Disposition SNF   Does the patient need discharge transport arranged? Yes   RoundTrip coordination needed? Yes   Has discharge transport been arranged? No          Revisited patient at bedside to discuss discharge plan. Explained to patient about PT/OT rec for mod and SNF placement. Patient agreed. SW made aware to choice patient and to give her SNF list for choices.  Patient still have left chest tube and per CT NP will have lytic therapy today. Will continue to follow for discharge needs.

## 2025-06-11 NOTE — PROGRESS NOTES
Physical Therapy    Physical Therapy Evaluation    Patient Name: Mildred Moyer  MRN: [id number]  Department: [facility] [id number]  Room: [address] [place] [address]  Today's Date: 6/11/2025   Time Calculation  Start Time: 1017  Stop Time: 1043  Time Calculation (min): 26 min    Assessment/Plan   PT Assessment  PT Assessment Results: Decreased strength, Decreased endurance, Impaired balance, Decreased mobility, Decreased safety awareness, Impaired sensation, Pain  Rehab Prognosis: Good  Barriers to Discharge Home: Caregiver assistance, Physical needs  Caregiver Assistance: Patient lives alone and/or does not have reliable caregiver assistance  Physical Needs: Ambulating household distances limited by function/safety, 24hr mobility assistance needed, 24hr ADL assistance needed, High falls risk due to function or environment  Evaluation/Treatment Tolerance: Patient limited by fatigue  Medical Staff Made Aware: Yes  Strengths: Ability to acquire knowledge, Premorbid level of function  Barriers to Participation: Comorbidities  End of Session Communication: Bedside nurse, Care Coordinator  Assessment Comment: Pt presents today with decreased functional BLE strength, balance, and endurance. Pt is currently below the reported PLOF requiring min assist for transfers with fatigue-limited ambulatory ability this session. Continued PT would benefit the pt to address the above factors and bring the pt closer to the PLOF at D/C. At D/C, pt is anticipated to benefit from moderate intensity therapy at D/C.  End of Session Patient Position: Up in chair, Alarm on  IP OR SWING BED PT PLAN  Inpatient or Swing Bed: Inpatient  PT Plan  Treatment/Interventions: Bed mobility, Transfer training, Gait training, Balance training, Strengthening, Endurance training, Therapeutic exercise, Therapeutic activity, Home exercise program, Postural re-education  PT Plan: Ongoing PT  PT Frequency: 3 times per week  PT Discharge Recommendations: Moderate intensity level of continued  care  Equipment Recommended upon Discharge:  (Pt owns a wheeled walker)  PT Recommended Transfer Status: Assist x1, Assistive device  PT - OK to Discharge: Yes (PT POC initiated today)    Subjective     PT Visit Info:  PT Received On: 06/11/25  General Visit Information:  General  Reason for Referral: 70 y.o. F s/p presenting with shortness of breath, s/p placement on pigtail chest tube 6/9/25. Pt admitted for plueral effusion.  Referred By: LEWIS Rothman (APRN-CNP)  Past Medical History Relevant to Rehab: Medical History[1]  Family/Caregiver Present: No  Co-Treatment: OT  Co-Treatment Reason: Co-evaluation with OT to maximize pt safety and participation.  Prior to Session Communication: Bedside nurse  Patient Position Received: Up in chair, Alarm off, caregiver present (Handoff with nursing aide)  General Comment: Pt up in chair upon PT arrival. Cleared to participate with RN, and agreealbe to co-evaluation with PT/OT.  Home Living:  Home Living  Type of Home: Assisted living  Lives With: Alone  Home Adaptive Equipment: Wheelchair-manual (Rollator)  Home Layout: One level  Home Access: Level entry, No concerns  Bathroom Shower/Tub: Walk-in shower  Bathroom Toilet: Standard  Bathroom Equipment: Grab bars in shower, Built-in shower seat, Grab bars around toilet  Prior Level of Function:  Prior Function Per Pt/Caregiver Report  Level of [place]: Independent with ADLs and functional transfers, Needs assistance with homemaking  Receives Help From: Personal care attendant  ADL Assistance: Independent  Homemaking Assistance:  (Facility staff completes IADLs)  Ambulatory Assistance: Independent  Gait: Independent, Assistive device (Using rollator. Pt reports difficulty/inability to propel WC requiring assistance from brother)  Leisure: Pt enjoys playing [employer]  Hand Dominance: Right  Prior Function Comments: Pt denies recent falls  Precautions:  Precautions  Medical Precautions: Fall precautions, Oxygen therapy device and  L/min          Objective   Pain:  Pain Assessment  Pain Assessment: 0-10  0-10 (Numeric) Pain Score: 4  Pain Type: Acute pain  Pain Location: Chest  Pain Orientation: Left  Pain Interventions: Ambulation/increased activity, Distraction  Cognition:  Cognition  Orientation Level: Disoriented to time (Pt able to state the correct month)  Impulsive: Moderately    General Assessments:  Activity Tolerance  Endurance: Tolerates 10 - 20 min exercise with multiple rests    Sensation  Sensation Comment: N/t reported on pt'sleft side (intermittent)    Coordination  Movements are Fluid and Coordinated: Yes    Postural Control  Head Control: Forward head posture  Trunk Control: Trunk flexion in standing    Static Sitting Balance  Static Sitting-Balance Support: Bilateral upper extremity supported, Feet supported  Static Sitting-Level of Assistance: Distant supervision  Static Sitting-Comment/Number of Minutes: With posterior trunk and BUE support of recliner chair    Static Standing Balance  Static Standing-Balance Support: Bilateral upper extremity supported  Static Standing-Level of Assistance: Minimum assistance  Static Standing-Comment/Number of Minutes: With FWW support. Pt demonstrates retropulsive posturing in standing requiring assist for balance.  Dynamic Standing Balance  Dynamic Standing-Comments: Unable to assess dynamic standing balance at this time due to elevated pt fatigue/decreased endurance in standing.  Functional Assessments:  Bed Mobility  Bed Mobility: No (Pt up in chair before and after session)    Transfers  Transfer: Yes  Transfer 1  Transfer From 1: Chair with arms to  Transfer to 1: Stand  Technique 1: Sit to stand  Transfer Device 1: Walker  Transfer Level of Assistance 1: Minimum assistance, Minimal verbal cues  Trials/Comments 1: x 3 Trials. Pt demonstrates decreased safety awareness resting hands on FWW to  first trial. VC for hand placement on sitting surface for push to  remaining  trials.  Transfers 2  Transfer From 2: Stand to  Transfer to 2: Chair with arms  Technique 2: Stand to sit  Transfer Device 2: Walker  Transfer Level of Assistance 2: Minimum assistance  Trials/Comments 2: x 3 Trials. Pt able to initiate UE reach for the armrests of the chair to assist in controlled descent. Assist provided for increased control on descent.    Ambulation/Gait Training  Ambulation/Gait Training Performed: No    Stairs  Stairs: No  Extremity/Trunk Assessments:  RLE   RLE :  (Grossly 4/5 MMT)  LLE   LLE :  (Grossly 4/5 MMT)  Outcome Measures:  [facility] Basic Mobility  Turning from your back to your side while in a flat bed without using bedrails: A little  Moving from lying on your back to sitting on the side of a flat bed without using bedrails: A little  Moving to and from bed to chair (including a wheelchair): A lot  Standing up from a chair using your arms (e.g. wheelchair or bedside chair): A little  To walk in hospital room: A lot  Climbing 3-5 steps with railing: Total  Basic Mobility - Total Score: 14    Encounter Problems       Encounter Problems (Active)       Balance       Goal 1       Start:  06/11/25    Expected End:  06/25/25       Pt performs all sitting balance IND and standing balance with standby assist using wheeled walker            Mobility       STG - Patient will ambulate       Start:  06/11/25    Expected End:  06/25/25       25 ft with standby assist using wheeled walker with proper gait mechanics            PT Problem       PT Goal 1       Start:  06/11/25    Expected End:  06/25/25       Pt demonstrates IND in performing 2 sets of 12 reps of prescribed BLE HEP            PT Transfers       STG - Patient will perform bed mobility       Start:  06/11/25    Expected End:  06/25/25       IND         STG - Patient will transfer sit to and from stand       Start:  06/11/25    Expected End:  06/25/25       With standby assist using wheeled walker with proper body mechanics                 Education Documentation  Precautions, taught by Israel Oliver PT at 6/11/2025  1:01 PM.  Learner: Patient  Readiness: Acceptance  Method: Explanation, Demonstration  Response: Verbalizes Understanding    Body Mechanics, taught by Israel Oliver PT at 6/11/2025  1:01 PM.  Learner: Patient  Readiness: Acceptance  Method: Explanation, Demonstration  Response: Verbalizes Understanding    Mobility Training, taught by Israel Oliver PT at 6/11/2025  1:01 PM.  Learner: Patient  Readiness: Acceptance  Method: Explanation, Demonstration  Response: Verbalizes Understanding                   [1]   Past Medical History:  Diagnosis Date    GERD (gastroesophageal reflux disease)     HLD (hyperlipidemia)     HTN (hypertension)     Liver disease     Lung neoplasm     MS (multiple sclerosis) (Multi)     Pleural effusion

## 2025-06-11 NOTE — PROGRESS NOTES
"Nutrition Follow-up Note  Nutrition Assessment       HOD#6 for PE.  Intake appears inadequate, slept through lunch.  Very little ordered on lunch tray.  Last BM 6/8/25, monitor for constipation.  Remains at high risk for additional malnutrition.    History:  Energy Intake: Poor < 50 %  Food and Nutrient History: Attempted to visit, sleeping with lunch at bedside. Only ordered 2 hard boiled eggs & 2 ice creams. No intake charted to review, no supplements noted at bedside, but unclear if she is drinking them or they are being sent back.  Food Allergy: Shellfish    Anthropometrics:  Height: 162.6 cm (5' 4.02\")  Weight: 52 kg (114 lb 9.6 oz)  BMI (Calculated): 19.66  Needs new weight    Significant Weight Loss: Yes (Unsure of wt loss- pt does have wt but HO chronic pleural effusions and need for thoracentesis')    IBW/kg (Dietitian Calculated): 54.5 kg  Percent of IBW: 95 %     Energy Needs:  Total Energy Estimated Needs in 24 hours (kCal): 1550 kCal  Energy Estimated Needs per kg Body Weight in 24 hours (kCal/kg): 1825 kCal/kg  Method for Estimating Needs: 30-35kcal/kg    Total Protein Estimated Needs in 24 Hours (g): 55 g  Protein Estimated Needs per kg Body Weight in 24 Hours (g/kg): 65 g/kg  Method for Estimating 24 Hour Protein Needs: 1.0-1.2g/kg    Method for Estimating 24 Hour Fluid Needs: 1mL/kcal or MD recommendations     Dietary Orders   Adult diet Regular   Oral nutritional supplements    Select supplement: Mighty Shake With lunch  Select supplement: Ensure Plus High Protein BID     Nutrition Diagnosis   Malnutrition Diagnosis  Patient has Malnutrition Diagnosis: Yes  Diagnosis Status: Active  Malnutrition Diagnosis: Moderate malnutrition related to chronic disease or condition  Related to: cancer  As Evidenced by: mild fat loss and mild muscle loss.  Additional Assessment Information: pt has some wt loss- some r/t chronic pleural effusions. Muscle loss may be r/t advanced age    Patient has Nutrition " Diagnosis: Yes  Nutrition Diagnosis 1: Underweight  Diagnosis Status (1): New  Related to (1): BMI for age  As Evidenced by (1): BMI 19.66     Nutrition Diagnosis 2: Inadequate protein energy intake  Diagnosis Status (2): New  Related to (2): pain, anorexia  As Evidenced by (2): intake of meals <50%     Nutrition Interventions/Recommendations   Nutrition Prescription: Nutrition prescription for oral nutrition  Individualized Nutrition Prescription Provided for : continue diet as ordered. Ensure Plus TID, Mighty shake daily.  MD to manage IVF as indicated.  Monitor for dehydration & constipation.    Food and/or Nutrient Delivery Interventions  Meals and Snacks: General healthful diet  Goal: intake meets >75% estimated nutrient needs.     Medical Food Supplement: Commercial beverage medical food supplement therapy  Goal: Ensure nutritional supplements provides 350kcals and 20g of protein/ carton.  Mighty shake provides: 220 kcal, 6 gr PRO/serving.      Nutrition Monitoring and Evaluation   Food and Nutrient Related History   Intake / Amount of food: Consumes at least 75% or more of meals/snacks/supplements    Anthropometrics: Body Composition/Growth/Weight History  Body Weight: Body weight - Maintain stable weight    Body Weight Change: Body weight gain - Gradual weight gain  Biochemical Data, Medical Tests and Procedures  Electrolyte and Renal Panel: BUN, Calcium, serum, Chloride, Creatinine, Magnesium, Phosphorus, Potassium, Sodium  Criteria: as indicated    Nutritional Anemia Profile: Folate, serum, Hematocrit, Hemoglobin, Iron, serum, B12  Criteria: as indicated    Vitamin Profile: Vitamin D, 25 hydroxy, Other (Comment)  Criteria: as indicated    Nutrition Focused Physical Findings   Digestive System Finding: Anorexia, Constipation, Diarrhea, Early satiety, Nausea, Vomiting  Criteria: daily    Last Date of Nutrition Visit: 06/11/25  Nutrition Follow-Up Needed?: Dietitian to reassess per policy  Follow up Comment:  FU/poor po-TR

## 2025-06-11 NOTE — PROGRESS NOTES
Mildred Moyer is a 70 y.o. female     Pain and breathing are little better  CT surgery managing chest tube    Review of Systems     Constitutional: no fever, no chills,   Cardiovascular: no chest pain   Respiratory: Shortness of breath  Gastrointestinal: no abdominal pain, no constipation, no melena, no nausea, no diarrhea, no vomiting and no blood in stools.   Neurological: no headache,   All other systems have been reviewed and are negative for complaint.       Vitals:    06/11/25 0802   BP: 127/74   Pulse: (!) 115   Resp: 16   Temp: 36.5 °C (97.7 °F)   SpO2: 95%        Scheduled medications  Scheduled Medications[1]  Continuous medications  Continuous Medications[2]  PRN medications  PRN Medications[3]    Lab Review   Results from last 7 days   Lab Units 06/11/25  0454 06/10/25  0503 06/09/25  0529   WBC AUTO x10*3/uL 16.5* 16.2* 17.0*   HEMOGLOBIN g/dL 12.8 13.3 12.7   HEMATOCRIT % 38.2 41.9 38.6   PLATELETS AUTO x10*3/uL 585* 625* 574*     Results from last 7 days   Lab Units 06/11/25  0454 06/10/25  0503 06/09/25  0529 06/07/25  0634 06/06/25  0722 06/05/25 2001 06/05/25  1210   SODIUM mmol/L 131* 135* 134*   < > 134*   < > 136   POTASSIUM mmol/L 4.7 4.6 3.2*   < > 3.3*   < > 4.7   CHLORIDE mmol/L 90* 93* 94*   < > 92*   < > 93*   CO2 mmol/L 33* 34* 26   < > 29   < > 27   BUN mg/dL 16 15 15   < > 20   < > 22   CREATININE mg/dL 0.73 0.83 0.71   < > 0.80   < > 0.88   CALCIUM mg/dL 8.6 8.7 9.0   < > 9.0   < > 10.4*   PROTEIN TOTAL g/dL  --   --   --   --  6.9  --  8.0   BILIRUBIN TOTAL mg/dL  --   --   --   --   --   --  0.5   ALK PHOS U/L  --   --   --   --   --   --  72   ALT U/L  --   --   --   --   --   --  13   AST U/L  --   --   --   --   --   --  27   GLUCOSE mg/dL 98 92 111*   < > 109*   < > 116*    < > = values in this interval not displayed.            XR chest 1 view   Final Result   Multiple stable findings as described.        MACRO:   None        Signed by: Kee Pelletier 6/11/2025 8:09 AM    Dictation workstation:   [id number]      XR chest 1 view   Final Result   Redemonstration of almost complete opacification of the right   hemithorax which is unchanged.        Left pleural effusion, which is unchanged.                  MACRO:   None        Signed by: Mimi Real 6/10/2025 8:42 AM   Dictation workstation:   [id number]      XR chest 1 view   Final Result   Interval improvement in left pleural effusion and left basilar   airspace disease. Pigtail chest tube in place No change on the right   with near complete opacification of the right hemithorax and   associated large pleural effusion and airspace disease        MACRO:   None        Signed by: Cade Alcala 6/9/2025 9:01 PM   Dictation workstation:   [id number]      XR chest 1 view   Final Result   Redemonstration of extensive bilateral multifocal airspace disease   and pleural effusions worse on the right. This appears worsened   especially on the right.             MACRO:   None        Signed by: Cade Alcala 6/9/2025 6:09 PM   Dictation workstation:   [id number]      US thoracentesis w insertion of tube includes water seal when performed   Final Result   Ultrasound-guided percutaneous 12 [place] pigtail drainage catheter   into multi-septated left pleural effusion.             MACRO:   None        Signed by: Demetrio Iglesias 6/10/2025 6:40 PM   Dictation workstation:   [id number]      XR chest 1 view   Final Result   1.  No significant change of bilateral airspace disease greater on   the right.        Signed by: Stiven Cota 6/7/2025 10:17 AM   Dictation workstation:   [id number]      US thoracentesis   Final Result   Uneventful thoracentesis, as detailed above. Left pleural space, 400   mL        I personally performed and/or directly supervised this study and was   present for the entire procedure.        Performed and dictated at [facility].        Signed by: Dony Calderón 6/6/2025 5:02 PM    Dictation workstation:   [id number]      XR chest 1 view   Final Result        Persistent small left pleural effusion, mildly decreased in volume.   No pneumothorax.        Stable large right pleural effusion with subtotal right lung collapse.        MACRO   None        Signed by: Jigna Perla 6/6/2025 4:35 PM   Dictation workstation:   [id number]      XR chest 1 view   Final Result   No change in the bilateral effusions, bilateral infiltrate, and   partial collapse of the right lung.        MACRO:   none        Signed by: Lc Munoz 6/5/2025 12:42 PM   Dictation workstation:   [id number]            Physical Exam    Constitutional   General appearance: Alert and in no acute distress.   Pulmonary   Respiratory assessment: On nasal cannula  Decreased breath sounds  Cardiovascular   Auscultation of heart: Apical pulse normal, heart rate and rhythm normal, normal S1 and S2, no murmurs and no pericardial rub.    Exam for edema: No peripheral edema.   Abdomen   Abdominal Exam: No bruits, normal bowel sounds, soft, non-tender, no abdominal mass palpated.    Liver and Spleen exam: No hepato-splenomegaly.   Musculoskeletal     Inspection/palpation of joints, bones and muscles: No joint swelling. Normal movement of all extremities.   Neurologic   Cranial nerves: Nerves 2-12 were intact, no focal neuro defects.         Assessment/Plan      #Recurrent pleural effusion  #Breast cancer with meta stasis  S/p pigtail with lysis  Pain control    #Tachycardia  Secondary to pain and shortness of breath  Will give a little fluid bolus    #Severe malnutrition  Nutritional support     #Leukocytosis  #Partial collapse of right lung  Continue antibiotics for possible pneumonia  Procalcitonin borderline  We will continue antibiotics    #Hyponatremia  Likely SIADH from malignancy     #Hypertension  #Dyslipidemia  #Chronic respiratory failure with hypoxia  Continue home medications and DuoNebs         [1] buPROPion XL, 300 mg,  oral, q24h  [START ON 6/30/2025] capivasertib, 400 mg, oral, 2 times per day on Monday Tuesday Wednesday Thursday  cyanocobalamin, 1,000 mcg, oral, Daily  enoxaparin, 40 mg, subcutaneous, q24h  ferrous sulfate, 65 mg of elemental iron, oral, Daily  hydrOXYzine HCL, 25 mg, oral, Daily  lidocaine, 2 patch, transdermal, Daily  melatonin, 6 mg, oral, Nightly  pantoprazole, 40 mg, oral, Daily before breakfast   Or  pantoprazole, 40 mg, intravenous, Daily before breakfast  piperacillin-tazobactam, 3.375 g, intravenous, q6h  rosuvastatin, 20 mg, oral, Daily  [Held by provider] sodium chloride, 500 mL, intravenous, Once     [2]    [3] PRN medications: acetaminophen **OR** acetaminophen **OR** acetaminophen, cyclobenzaprine, guaiFENesin, ipratropium-albuteroL, morphine, ondansetron **OR** ondansetron, oxyCODONE, oxygen, polyethylene glycol

## 2025-06-11 NOTE — PROGRESS NOTES
"Mildred Moyer is a 70 y.o. female on day 6 of admission presenting with Pleural effusion.      Interval Hx and Subjective no acute events overnight.  Patient reports MCCONNELL but comfortable at rest. Endorses pain at chest tube site but not utilizing PRN meds       Objective  Blood pressure 89/55, pulse (!) 121, temperature 36.5 °C (97.7 °F), temperature source Temporal, resp. rate 16, height 1.626 m (5' 4.02\"), weight 52 kg (114 lb 9.6 oz), SpO2 93%.    General:  appears frail, thin, ill but not in extremis  Neuro: alert, oriented to situation.  No focal findings  Psych:  normal affect, engaged, cooperative  HEENT:  oral mucosa moist without exudate, tongue midline.  PERRLA, no discharge.     Resps:  + conversational dyspnea and MCCONNELL, lungs diminished, chest tube in place left side  Card:  RRR, no murmurs, rubs, or gallops.    GI:  normal bowel sounds, soft and non tender  :  deferred  MS:  No injury or deformity noted. I assisted patient to bed; she stood up well but sort of flopped into bed  Integ:  skin warm and dry overall        Imaging  US thoracentesis w insertion of tube includes water seal when performed  Result Date: 6/10/2025  Ultrasound-guided percutaneous 12 [place] pigtail drainage catheter into multi-septated left pleural effusion.     MACRO: None   Signed by: Demetrio Iglesias 6/10/2025 6:40 PM Dictation workstation:   [id number]    XR chest 1 view  Result Date: 6/10/2025  Redemonstration of almost complete opacification of the right hemithorax which is unchanged.   Left pleural effusion, which is unchanged.       MACRO: None   Signed by: Mimi Real 6/10/2025 8:42 AM Dictation workstation:   [id number]    XR chest 1 view  Result Date: 6/9/2025  Interval improvement in left pleural effusion and left basilar airspace disease. Pigtail chest tube in place No change on the right with near complete opacification of the right hemithorax and associated large pleural effusion and airspace disease   " MACRO: None   Signed by: Cade Alcala 6/9/2025 9:01 PM Dictation workstation:   [id number]    XR chest 1 view  Result Date: 6/9/2025  Redemonstration of extensive bilateral multifocal airspace disease and pleural effusions worse on the right. This appears worsened especially on the right.     MACRO: None   Signed by: Cade Alcala 6/9/2025 6:09 PM Dictation workstation:   [id number]      Cardiology, Vascular, and Other Imaging  No other imaging results found for the past 2 days     Results for orders placed or performed during the hospital encounter of 06/05/25 (from the past 24 hours)   Basic metabolic panel   Result Value Ref Range    Glucose 98 74 - 99 mg/dL    Sodium 131 (L) 136 - 145 mmol/L    Potassium 4.7 3.5 - 5.3 mmol/L    Chloride 90 (L) 98 - 107 mmol/L    Bicarbonate 33 (H) 21 - 32 mmol/L    Anion Gap 13 10 - 20 mmol/L    Urea Nitrogen 16 6 - 23 mg/dL    Creatinine 0.73 0.50 - 1.05 mg/dL    eGFR 90 >60 mL/min/1.73m*2    Calcium 8.6 8.6 - 10.3 mg/dL   CBC   Result Value Ref Range    WBC 16.5 (H) 4.4 - 11.3 x10*3/uL    nRBC 0.0 0.0 - 0.0 /100 WBCs    RBC 4.00 4.00 - 5.20 x10*6/uL    Hemoglobin 12.8 12.0 - 16.0 g/dL    Hematocrit 38.2 36.0 - 46.0 %    MCV 96 80 - 100 fL    MCH 32.0 26.0 - 34.0 pg    MCHC 33.5 32.0 - 36.0 g/dL    RDW 14.0 11.5 - 14.5 %    Platelets 585 (H) 150 - 450 x10*3/uL      Scheduled medications  Scheduled Medications[1]  Continuous medications  Continuous Medications[2]  PRN medications  PRN Medications[3]     Dietary Orders (From admission, onward)       Start     Ordered    06/09/25 1937  Adult diet Regular  Diet effective now        Question:  Diet type  Answer:  Regular    06/09/25 1936    06/06/25 1802  Oral nutritional supplements  Until discontinued        Comments: vanilla   Question Answer Comment   Deliver with Breakfast vanilla   Deliver with Dinner    Select supplement: Ensure Plus High Protein        06/06/25 1802    06/05/25 1727  May Participate in Room Service  With Assistance  ( ROOM SERVICE MAY PARTICIPATE WITH ASSISTANCE)  Once        Question:  .  Answer:  Yes    06/05/25 5313                     Assessment/Plan     Mildred Moyer is a 70 y.o. female with past medical history of metastatic breast CA dx 2023(liver, lungs)with recurrent pleural effusions, hx of Pleurex but not currently.  Was seen in oncology office on 6/5 and was very short of breath, was directed to ED for possible thoracentesis. For left sided effusion.    Patient with chronic hypoxic respiratory failure on home O2, HTN, HLD, GERD, MS, anxiety and depression.  She had undergone thoracentesis on the left 5/29 for 1.1 liter.  Imaging in our ED showed large bilateral effusions.  She underwent thoracentesis 6/6 for 400 ml and noted effusion to be loculated. Pulm suspects this effusion to be malignant and recommending another pleurex on the left, likely as outpatient.  She did have a chest tube placed on the 9th on the left side.  Imaging 4/2025 noted enlargement of breast mass, increase right supraclavicular LAD and increased pleural and pulm mets, liver mets also newly identified at this time.  6/5 with oncology reviewed repeat bx of mass c/w progressive disease and a plan was made to start palliative chemo if possible.       Palliative care consulted to assist with goals of care.  Patient has ACP documents with siblings Danyn Ralph as first agent and Magalis Sumner as second.  Patient has been DNR on recent admissions.  Patient admits to being very tired today, finding it difficult to talk but agreeable to chat.  We reviewed her past code status, she is clear she would not want resuscitative measures and OK with changing code status.  Her goal is to return home, she does not want to have to live in nursing facility.  We did discuss future plans, she reports she wants to try palliative chemo, but unclear if she is even a candidate given how debilitated she is currently.  I did discuss hospice  briefly and she is mostly associating that with having to live in nursing home.   I did speak with patient's sister. She is flying into [place] late tomorrow afternoon and we made plans to meet Thursday at 11.  She reports patient has likely be overstating her ability to manage and does not think she can return to her senior living.  Sister reports she has been thinking about hospice for awhile but patient has been resistant.    -continue to honor DNR/DNI, OK for ICU if needed  -will provide OH Portable closer to discharge  -family meeting planned on Thursday at 11 AM  -suggest PT/OT eval to help determine how mobile patient can be and if CASSANDRA is appropriate     Chest wall pain from chest tube:  patient reports it is sharp at times but very brief, not using much of oxycodone or morphine that have been ordered, encouraged use  -adding lidocaine patches to either side of chest tube  -continue oxycodone 5 mg PO Q 6 hours PRN  -continue Morphine 2 mg IV Q 4 hours PRN  -continue cyclobenzaprine 10 mg BID PRN     Time spent in chart review, assessment, coordination of care and documentation was 35 minutes  Martha Medel APRN CNP          [1] buPROPion XL, 300 mg, oral, q24h  [START ON 6/30/2025] capivasertib, 400 mg, oral, 2 times per day on Monday Tuesday Wednesday Thursday  cyanocobalamin, 1,000 mcg, oral, Daily  enoxaparin, 40 mg, subcutaneous, q24h  ferrous sulfate, 65 mg of elemental iron, oral, Daily  hydrOXYzine HCL, 25 mg, oral, Daily  lidocaine, 2 patch, transdermal, Daily  melatonin, 6 mg, oral, Nightly  pantoprazole, 40 mg, oral, Daily before breakfast   Or  pantoprazole, 40 mg, intravenous, Daily before breakfast  piperacillin-tazobactam, 3.375 g, intravenous, q6h  rosuvastatin, 20 mg, oral, Daily  [2]    [3] PRN medications: acetaminophen **OR** acetaminophen **OR** acetaminophen, cyclobenzaprine, guaiFENesin, ipratropium-albuteroL, morphine, ondansetron **OR** ondansetron, oxyCODONE, oxygen, polyethylene glycol

## 2025-06-11 NOTE — PROGRESS NOTES
Additional information:  HDS, soft BP overnight, remains tachycardic in setting of pain, on 3 L NC (on 3-4 L NC at home) .  S/P pigtail chest tube per thoracic surgery, trialing lysis today.  Procal is elevated, WBC stable at 16.5, continuing IV abx.  Palliative was consulted as well.   Sodium continues to decrease, 131.  Urine lytes, and will bolus if urine osmolality is not elevated. .      Vitals (Last 24 Hours):  Heart Rate:  [115-121]   Temp:  [35.5 °C (95.9 °F)-36.5 °C (97.7 °F)]   Resp:  [16-19]   BP: ()/(50-74)   SpO2:  [93 %-100 %]     I have reviewed imaging reports and labs from this visit    Results for orders placed or performed during the hospital encounter of 06/05/25 (from the past 24 hours)   Basic metabolic panel   Result Value Ref Range    Glucose 98 74 - 99 mg/dL    Sodium 131 (L) 136 - 145 mmol/L    Potassium 4.7 3.5 - 5.3 mmol/L    Chloride 90 (L) 98 - 107 mmol/L    Bicarbonate 33 (H) 21 - 32 mmol/L    Anion Gap 13 10 - 20 mmol/L    Urea Nitrogen 16 6 - 23 mg/dL    Creatinine 0.73 0.50 - 1.05 mg/dL    eGFR 90 >60 mL/min/1.73m*2    Calcium 8.6 8.6 - 10.3 mg/dL   CBC   Result Value Ref Range    WBC 16.5 (H) 4.4 - 11.3 x10*3/uL    nRBC 0.0 0.0 - 0.0 /100 WBCs    RBC 4.00 4.00 - 5.20 x10*6/uL    Hemoglobin 12.8 12.0 - 16.0 g/dL    Hematocrit 38.2 36.0 - 46.0 %    MCV 96 80 - 100 fL    MCH 32.0 26.0 - 34.0 pg    MCHC 33.5 32.0 - 36.0 g/dL    RDW 14.0 11.5 - 14.5 %    Platelets 585 (H) 150 - 450 x10*3/uL     MEDS:  Scheduled meds  Scheduled Medications[1]    Continuous meds  Continuous Medications[2]    PRN meds  PRN Medications[3]    ASSESSMENT/PLAN:    Mildred Moyer is a 70 year old female with a past medical history of breast carcinoma with meta stasis recurrent pleural effusions on the right side which needle PleurX catheter chronic hypoxic respiratory failure on 3 L of oxygen hypertension dyslipidemia who has had thoracentesis done on the left side for few times now presents with  worsening shortness of breath and recurrent pleural effusion  Also noted to have elevated white count  She denies any fevers chills cough nausea vomiting or diarrhea     #Recurrent pleural effusion  #Breast cancer with meta stasis  -S/p pigtail with lysis  -on 3 L NC which is BL  -Cytology labs pending  -Per thoracic surgery   :Trial lytic therapy today   :Pulm toileting   :Frequent ambulation/PT/OOB   :Pigtail CT to waterseal   :Daily CXR with CT in place  -Pain control  -Per pulmonary    :Continue zosyn   :FU cultures   :FU OP with onc and thoracic surgery   :BPH w IS and acapella      #Tachycardia  -Secondary to pain and shortness of breath  -Some soft BP, will give bolus pending urine studies     #Severe malnutrition  -Nutritional support     #Leukocytosis  #Partial collapse of right lung  -Leukocytosis stable at 16.5, procal .26.   -Continue zosyn for possible pneumonia  -MRSA swab negative    #Hypertension  #Dyslipidemia  #Chronic respiratory failure with hypoxia  -Holding home propranolol and hctz for soft BP  -continue DuoNebs    Goals of Care  -Seen by palliative    :DNR DNI code status   :Lidocaine patch to either side of CT   :Continue oxy 5 mg PO Q 6 hr PRN   :Continue morphine 2 mg IV qr hr PRN   :Continue cyclobenzaprine 10 mg BID PRN  -Palliative and family will be speaking on Thursday AM    DVT Prophylaxis  -eliquis     DC Disposition   -skilled for moderate intensity level of care    Other comorbidities as above  -continue medications as ordered and adjust based on clinical course     Discussed with Dr. Clarence Mohan and the interdisciplinary team     Kee Mora, CANDY-CNP          [1] alteplase (Activase) 5 mg in sodium chloride 0.9% 50 mL syringe, 5 mg, intrapleural, Once  buPROPion XL, 300 mg, oral, q24h  [START ON 6/30/2025] capivasertib, 400 mg, oral, 2 times per day on Monday Tuesday Wednesday Thursday  cyanocobalamin, 1,000 mcg, oral, Daily  dornase estefany (Pulmozyme) 2.5 mg in sodium  chloride 0.9% 50 mL syringe, 2.5 mg, intrapleural, Once  enoxaparin, 40 mg, subcutaneous, q24h  ferrous sulfate, 65 mg of elemental iron, oral, Daily  hydrOXYzine HCL, 25 mg, oral, Daily  lidocaine, 2 patch, transdermal, Daily  melatonin, 6 mg, oral, Nightly  pantoprazole, 40 mg, oral, Daily before breakfast   Or  pantoprazole, 40 mg, intravenous, Daily before breakfast  piperacillin-tazobactam, 3.375 g, intravenous, q6h  rosuvastatin, 20 mg, oral, Daily  sodium chloride 0.9 % 60 mL intrapleural syringe, , intrapleural, Once  [Held by provider] sodium chloride, 500 mL, intravenous, Once  [2]    [3] PRN medications: acetaminophen **OR** acetaminophen **OR** acetaminophen, cyclobenzaprine, guaiFENesin, ipratropium-albuteroL, morphine, ondansetron **OR** ondansetron, oxyCODONE, oxygen, polyethylene glycol

## 2025-06-12 ENCOUNTER — APPOINTMENT (OUTPATIENT)
Dept: RADIOLOGY | Facility: HOSPITAL | Age: 70
DRG: 186 | End: 2025-06-12
Payer: COMMERCIAL

## 2025-06-12 LAB
ANION GAP SERPL CALC-SCNC: 13 MMOL/L (ref 10–20)
BUN SERPL-MCNC: 16 MG/DL (ref 6–23)
CALCIUM SERPL-MCNC: 8.2 MG/DL (ref 8.6–10.3)
CHLORIDE SERPL-SCNC: 90 MMOL/L (ref 98–107)
CO2 SERPL-SCNC: 31 MMOL/L (ref 21–32)
CREAT SERPL-MCNC: 0.65 MG/DL (ref 0.5–1.05)
EGFRCR SERPLBLD CKD-EPI 2021: >90 ML/MIN/1.73M*2
ERYTHROCYTE [DISTWIDTH] IN BLOOD BY AUTOMATED COUNT: 13.6 % (ref 11.5–14.5)
GLUCOSE SERPL-MCNC: 122 MG/DL (ref 74–99)
HCT VFR BLD AUTO: 37.9 % (ref 36–46)
HGB BLD-MCNC: 12.4 G/DL (ref 12–16)
MCH RBC QN AUTO: 31.4 PG (ref 26–34)
MCHC RBC AUTO-ENTMCNC: 32.7 G/DL (ref 32–36)
MCV RBC AUTO: 96 FL (ref 80–100)
NRBC BLD-RTO: 0 /100 WBCS (ref 0–0)
PLATELET # BLD AUTO: 590 X10*3/UL (ref 150–450)
POTASSIUM SERPL-SCNC: 4.4 MMOL/L (ref 3.5–5.3)
RBC # BLD AUTO: 3.95 X10*6/UL (ref 4–5.2)
SODIUM SERPL-SCNC: 130 MMOL/L (ref 136–145)
WBC # BLD AUTO: 16.3 X10*3/UL (ref 4.4–11.3)

## 2025-06-12 PROCEDURE — 99497 ADVNCD CARE PLAN 30 MIN: CPT | Performed by: NURSE PRACTITIONER

## 2025-06-12 PROCEDURE — 99232 SBSQ HOSP IP/OBS MODERATE 35: CPT | Performed by: INTERNAL MEDICINE

## 2025-06-12 PROCEDURE — 2500000002 HC RX 250 W HCPCS SELF ADMINISTERED DRUGS (ALT 637 FOR MEDICARE OP, ALT 636 FOR OP/ED): Performed by: INTERNAL MEDICINE

## 2025-06-12 PROCEDURE — 71045 X-RAY EXAM CHEST 1 VIEW: CPT

## 2025-06-12 PROCEDURE — 2500000004 HC RX 250 GENERAL PHARMACY W/ HCPCS (ALT 636 FOR OP/ED): Performed by: NURSE PRACTITIONER

## 2025-06-12 PROCEDURE — 99233 SBSQ HOSP IP/OBS HIGH 50: CPT | Performed by: INTERNAL MEDICINE

## 2025-06-12 PROCEDURE — 85027 COMPLETE CBC AUTOMATED: CPT | Performed by: NURSE PRACTITIONER

## 2025-06-12 PROCEDURE — 99498 ADVNCD CARE PLAN ADDL 30 MIN: CPT | Performed by: NURSE PRACTITIONER

## 2025-06-12 PROCEDURE — 71275 CT ANGIOGRAPHY CHEST: CPT

## 2025-06-12 PROCEDURE — 99232 SBSQ HOSP IP/OBS MODERATE 35: CPT | Performed by: NURSE PRACTITIONER

## 2025-06-12 PROCEDURE — 2550000001 HC RX 255 CONTRASTS: Performed by: INTERNAL MEDICINE

## 2025-06-12 PROCEDURE — 2500000005 HC RX 250 GENERAL PHARMACY W/O HCPCS: Performed by: INTERNAL MEDICINE

## 2025-06-12 PROCEDURE — 36415 COLL VENOUS BLD VENIPUNCTURE: CPT | Performed by: NURSE PRACTITIONER

## 2025-06-12 PROCEDURE — 2500000005 HC RX 250 GENERAL PHARMACY W/O HCPCS: Performed by: NURSE PRACTITIONER

## 2025-06-12 PROCEDURE — 2500000001 HC RX 250 WO HCPCS SELF ADMINISTERED DRUGS (ALT 637 FOR MEDICARE OP): Performed by: NURSE PRACTITIONER

## 2025-06-12 PROCEDURE — 80048 BASIC METABOLIC PNL TOTAL CA: CPT | Performed by: NURSE PRACTITIONER

## 2025-06-12 PROCEDURE — 71045 X-RAY EXAM CHEST 1 VIEW: CPT | Performed by: RADIOLOGY

## 2025-06-12 PROCEDURE — 2060000001 HC INTERMEDIATE ICU ROOM DAILY

## 2025-06-12 PROCEDURE — 2500000004 HC RX 250 GENERAL PHARMACY W/ HCPCS (ALT 636 FOR OP/ED): Performed by: INTERNAL MEDICINE

## 2025-06-12 PROCEDURE — 2500000001 HC RX 250 WO HCPCS SELF ADMINISTERED DRUGS (ALT 637 FOR MEDICARE OP): Performed by: INTERNAL MEDICINE

## 2025-06-12 RX ORDER — OXYCODONE HYDROCHLORIDE 5 MG/1
5 TABLET ORAL EVERY 6 HOURS PRN
Refills: 0 | Status: DISCONTINUED | OUTPATIENT
Start: 2025-06-12 | End: 2025-06-16 | Stop reason: HOSPADM

## 2025-06-12 RX ORDER — OXYCODONE HYDROCHLORIDE 5 MG/1
2.5 TABLET ORAL 3 TIMES DAILY
Refills: 0 | Status: DISCONTINUED | OUTPATIENT
Start: 2025-06-12 | End: 2025-06-16

## 2025-06-12 RX ADMIN — Medication 1000 MCG: at 09:16

## 2025-06-12 RX ADMIN — BUPROPION HYDROCHLORIDE 300 MG: 150 TABLET, EXTENDED RELEASE ORAL at 18:00

## 2025-06-12 RX ADMIN — ROSUVASTATIN 20 MG: 20 TABLET, FILM COATED ORAL at 09:16

## 2025-06-12 RX ADMIN — HYDROXYZINE HYDROCHLORIDE 25 MG: 25 TABLET, FILM COATED ORAL at 09:16

## 2025-06-12 RX ADMIN — MELATONIN TAB 3 MG 6 MG: 3 TAB at 21:12

## 2025-06-12 RX ADMIN — BENZOCAINE AND MENTHOL 1 LOZENGE: 15; 3.6 LOZENGE ORAL at 06:09

## 2025-06-12 RX ADMIN — ENOXAPARIN SODIUM 40 MG: 40 INJECTION SUBCUTANEOUS at 18:01

## 2025-06-12 RX ADMIN — PIPERACILLIN SODIUM AND TAZOBACTAM SODIUM 3.38 G: 3; .375 INJECTION, SOLUTION INTRAVENOUS at 12:01

## 2025-06-12 RX ADMIN — IOHEXOL 75 ML: 350 INJECTION, SOLUTION INTRAVENOUS at 11:24

## 2025-06-12 RX ADMIN — MORPHINE SULFATE 2 MG: 2 INJECTION, SOLUTION INTRAMUSCULAR; INTRAVENOUS at 12:01

## 2025-06-12 RX ADMIN — PREDNISONE 50 MG: 20 TABLET ORAL at 04:45

## 2025-06-12 RX ADMIN — PREDNISONE 50 MG: 20 TABLET ORAL at 09:15

## 2025-06-12 RX ADMIN — OXYCODONE HYDROCHLORIDE 2.5 MG: 5 TABLET ORAL at 16:20

## 2025-06-12 RX ADMIN — PANTOPRAZOLE SODIUM 40 MG: 40 INJECTION, POWDER, FOR SOLUTION INTRAVENOUS at 06:09

## 2025-06-12 RX ADMIN — OXYCODONE HYDROCHLORIDE 2.5 MG: 5 TABLET ORAL at 21:12

## 2025-06-12 RX ADMIN — OXYCODONE HYDROCHLORIDE 5 MG: 5 TABLET ORAL at 04:45

## 2025-06-12 RX ADMIN — FERROUS SULFATE TAB 325 MG (65 MG ELEMENTAL FE) 1 TABLET: 325 (65 FE) TAB at 09:15

## 2025-06-12 RX ADMIN — DIPHENHYDRAMINE HYDROCHLORIDE 50 MG: 50 INJECTION, SOLUTION INTRAMUSCULAR; INTRAVENOUS at 09:16

## 2025-06-12 RX ADMIN — PIPERACILLIN SODIUM AND TAZOBACTAM SODIUM 3.38 G: 3; .375 INJECTION, SOLUTION INTRAVENOUS at 18:00

## 2025-06-12 RX ADMIN — LIDOCAINE 4% 2 PATCH: 40 PATCH TOPICAL at 09:15

## 2025-06-12 RX ADMIN — PIPERACILLIN SODIUM AND TAZOBACTAM SODIUM 3.38 G: 3; .375 INJECTION, SOLUTION INTRAVENOUS at 06:09

## 2025-06-12 ASSESSMENT — PAIN SCALES - GENERAL: PAINLEVEL_OUTOF10: 0 - NO PAIN

## 2025-06-12 ASSESSMENT — PAIN - FUNCTIONAL ASSESSMENT: PAIN_FUNCTIONAL_ASSESSMENT: 0-10

## 2025-06-12 NOTE — CARE PLAN
The patient's goals for the shift include      The clinical goals for the shift include pt will remain hd stable    Over the shift, the patient did not make progress toward the following goals. Barriers to progression include   Problem: Skin  Goal: Prevent/minimize sheer/friction injuries  6/12/2025 0347 by Tamra Serrano RN  Flowsheets (Taken 6/12/2025 0347)  Prevent/minimize sheer/friction injuries: Use pull sheet  6/12/2025 0347 by Tamra Serrano RN  Outcome: Progressing  Flowsheets (Taken 6/12/2025 0347)  Prevent/minimize sheer/friction injuries: Use pull sheet   . Recommendations to address these barriers include .

## 2025-06-12 NOTE — PROGRESS NOTES
"Thoracic Surgery- Daily Progress Note   Subjective    Mildred Moyer is a 70 y.o. female patient admitted on 6/5/2025 due to a recurrent left pleural effusion    Interval History:  -CXR improved  -1100ml from chest tube since lytic administration  -Reports symptomatic improvement this morning     Meds    Scheduled medications  Scheduled Medications[1]  Continuous medications  Continuous Medications[2]  PRN medications  PRN Medications[3]     Objective    Blood pressure 110/73, pulse 109, temperature 36.5 °C (97.7 °F), temperature source Temporal, resp. rate 17, height 1.626 m (5' 4.02\"), weight 52 kg (114 lb 9.6 oz), SpO2 100%. Body mass index is 19.66 kg/m².    Physical Exam  Constitutional:       Appearance: She is ill-appearing.   Eyes:      Extraocular Movements: Extraocular movements intact.   Cardiovascular:      Rate and Rhythm: Regular rhythm. Tachycardia present.   Pulmonary:      Effort: Pulmonary effort is normal.      Comments: Left pigtail chest tube without airleak and with moderate serosanguinous output   Abdominal:      General: Abdomen is flat. Bowel sounds are normal.      Palpations: Abdomen is soft.   Skin:     General: Skin is warm and dry.      Capillary Refill: Capillary refill takes less than 2 seconds.   Neurological:      General: No focal deficit present.      Mental Status: She is alert and oriented to person, place, and time.          Fluid balance    Intake/Output Summary (Last 24 hours) at 6/12/2025 0834  Last data filed at 6/12/2025 0705  Gross per 24 hour   Intake --   Output 690 ml   Net -690 ml       Labs:   Results from last 72 hours   Lab Units 06/12/25  0503 06/11/25  0454 06/10/25  0503   SODIUM mmol/L 130* 131* 135*   POTASSIUM mmol/L 4.4 4.7 4.6   CHLORIDE mmol/L 90* 90* 93*   CO2 mmol/L 31 33* 34*   BUN mg/dL 16 16 15   CREATININE mg/dL 0.65 0.73 0.83   GLUCOSE mg/dL 122* 98 92   CALCIUM mg/dL 8.2* 8.6 8.7   ANION GAP mmol/L 13 13 13   EGFR mL/min/1.73m*2 >90 90 77    "   Results from last 72 hours   Lab Units 06/12/25  0503 06/11/25  0454 06/10/25  0503   WBC AUTO x10*3/uL 16.3* 16.5* 16.2*   HEMOGLOBIN g/dL 12.4 12.8 13.3   HEMATOCRIT % 37.9 38.2 41.9   PLATELETS AUTO x10*3/uL 590* 585* 625*                   Micro/ID:   Lab Results   Component Value Date    BLOODCULT No growth at 4 days -  FINAL REPORT 06/05/2025    BLOODCULT No growth at 4 days -  FINAL REPORT 06/05/2025         Impression   Mildred Moyer is a 70 y.o.  female patient with a previous medical history of a recurrent right pleural effusion requiring pleurx which was removed on 4/29, chronic hypoxic respiratory failure on 3-4L [place], hypertension, HLD, metastatic breast cancer, anxiety and depression who presented after undergoing a thoracentesis on 5/29 with recurrent pleural effusion and worsening shortness of breath.  Thoracic surgery was consulted for consideration of pleurx placement on the left.  On 6/11 patient underwent lytic therapy with 1100ml from chest tube since administration and improvement of CXR.       Management Plans     Recurrent left pleural effusion   -Received lytic therapy yesterday with good response, will not repeat today  -Maintain in SDU  -Pulmonary toileting   -Frequent ambulation/PT/OOB  -Pigtail chest tube to waterseal   -Daily CXR while chest tube is in place  -Appreciate consult, will continue to follow  -Plan discussed and formulated with Dr. Shepherd     Time Spent: 30 minutes     CANDY Somers-CNP, DNP   06/12/25 at 8:34 AM            [1] buPROPion XL, 300 mg, oral, q24h  [START ON 6/30/2025] capivasertib, 400 mg, oral, 2 times per day on Monday Tuesday Wednesday Thursday  cyanocobalamin, 1,000 mcg, oral, Daily  diphenhydrAMINE, 50 mg, intravenous, Once  enoxaparin, 40 mg, subcutaneous, q24h  ferrous sulfate, 65 mg of elemental iron, oral, Daily  hydrOXYzine HCL, 25 mg, oral, Daily  lidocaine, 2 patch, transdermal, Daily  melatonin, 6 mg, oral, Nightly  pantoprazole, 40 mg,  oral, Daily before breakfast   Or  pantoprazole, 40 mg, intravenous, Daily before breakfast  piperacillin-tazobactam, 3.375 g, intravenous, q6h  predniSONE, 50 mg, oral, q6h  rosuvastatin, 20 mg, oral, Daily     [2]    [3] PRN medications: acetaminophen **OR** acetaminophen **OR** acetaminophen, benzocaine-menthol, cyclobenzaprine, guaiFENesin, ipratropium-albuteroL, morphine, ondansetron **OR** ondansetron, oxyCODONE, oxygen, polyethylene glycol

## 2025-06-12 NOTE — PROGRESS NOTES
06/12/25 1330   Discharge Planning   Expected Discharge Disposition SNF   Does the patient need discharge transport arranged? Yes   RoundTrip coordination needed? Yes   Has discharge transport been arranged? No          Patient remains with left chest tube. CT is planning to do lytic therapy today. Patient is going for CT of abdomen. SW is following with patient's sister for SNF choices. Will continue to follow for discharge needs.

## 2025-06-12 NOTE — PROGRESS NOTES
"Mildred Moyer is a 70 y.o. female on day 7 of admission presenting with Pleural effusion.      Interval Hx and Subjective:  underwent pleurodesis yesterday. Evaluated by therapy yesterday and approved for SNF.  D dimer very elevated yesterday, CT for PE was Negative.  Patient reports frequent pain left chest but not asking for PRNs because she doesn't want to be sleepy and more dependent upon others for needs.          Objective  Blood pressure 110/73, pulse 109, temperature 36.5 °C (97.7 °F), temperature source Temporal, resp. rate 17, height 1.626 m (5' 4.02\"), weight 52 kg (114 lb 9.6 oz), SpO2 100%.    General:  appears frail, thin, ill but not in extremis  Neuro: alert, oriented to situation.  No focal findings  Psych:  normal affect, engaged, cooperative  HEENT:  oral mucosa moist without exudate, tongue midline.  PERRLA, no discharge.     Resps:  + conversational dyspnea and MCCONNELL, lungs diminished, chest tube in place left side  Card:  RRR, no murmurs, rubs, or gallops.    GI:  normal bowel sounds, soft and non tender  :  deferred  MS:  No injury or deformity noted   Integ:  skin warm and dry overall           Imaging  XR chest 1 view  Result Date: 6/12/2025  1. Tiny left apical pneumothorax with a left-sided chest tube and interval decrease in the left-sided pleural effusion. 2. Continued volume loss and complete opacification of right hemithorax.       MACRO: None   Signed by: Kamilah Alcantar 6/12/2025 7:52 AM Dictation workstation:   [id number]    XR chest 1 view  Result Date: 6/11/2025  Multiple stable findings as described.   MACRO: None   Signed by: Kee Pelletier 6/11/2025 8:09 AM Dictation workstation:   [id number]      Cardiology, Vascular, and Other Imaging  ECG 12 lead  Result Date: 6/12/2025  Sinus tachycardia Left axis deviation Cannot rule out Anterior infarct , age undetermined Abnormal ECG When compared with ECG of 05-JUN-2025 11:42, (unconfirmed) QRS axis Shifted left       Results for orders " placed or performed during the hospital encounter of 06/05/25 (from the past 24 hours)   Osmolality, urine   Result Value Ref Range    Osmolality, Urine Random 814 200 - 1,200 mOsm/kg   Sodium, Urine Random   Result Value Ref Range    Sodium, Urine Random 37 mmol/L    Creatinine, Urine Random 172.4 20.0 - 320.0 mg/dL    Sodium/Creatinine Ratio 21 Not established. mmol/g Creat   ECG 12 lead   Result Value Ref Range    Ventricular Rate 124 BPM    Atrial Rate 124 BPM    DC Interval 128 ms    QRS Duration 80 ms    QT Interval 320 ms    QTC Calculation(Bazett) 459 ms    P Axis -14 degrees    R Axis -34 degrees    T Axis 52 degrees    QRS Count 21 beats    Q Onset 216 ms    P Onset 152 ms    P Offset 208 ms    T Offset 376 ms    QTC Fredericia 407 ms   D-Dimer, VTE Exclusion   Result Value Ref Range    D-Dimer, Quantitative VTE Exclusion 2,604 (H) <=500 ng/mL FEU   Basic metabolic panel   Result Value Ref Range    Glucose 122 (H) 74 - 99 mg/dL    Sodium 130 (L) 136 - 145 mmol/L    Potassium 4.4 3.5 - 5.3 mmol/L    Chloride 90 (L) 98 - 107 mmol/L    Bicarbonate 31 21 - 32 mmol/L    Anion Gap 13 10 - 20 mmol/L    Urea Nitrogen 16 6 - 23 mg/dL    Creatinine 0.65 0.50 - 1.05 mg/dL    eGFR >90 >60 mL/min/1.73m*2    Calcium 8.2 (L) 8.6 - 10.3 mg/dL   CBC   Result Value Ref Range    WBC 16.3 (H) 4.4 - 11.3 x10*3/uL    nRBC 0.0 0.0 - 0.0 /100 WBCs    RBC 3.95 (L) 4.00 - 5.20 x10*6/uL    Hemoglobin 12.4 12.0 - 16.0 g/dL    Hematocrit 37.9 36.0 - 46.0 %    MCV 96 80 - 100 fL    MCH 31.4 26.0 - 34.0 pg    MCHC 32.7 32.0 - 36.0 g/dL    RDW 13.6 11.5 - 14.5 %    Platelets 590 (H) 150 - 450 x10*3/uL     *Note: Due to a large number of results and/or encounters for the requested time period, some results have not been displayed. A complete set of results can be found in Results Review.      Scheduled medications  Scheduled Medications[1]  Continuous medications  Continuous Medications[2]  PRN medications  PRN Medications[3]     Dietary  Orders (From admission, onward)       Start     Ordered    06/11/25 1343  Oral nutritional supplements  Until discontinued        Question Answer Comment   Deliver with Lunch    Select supplement: Mighty Shake        06/11/25 1343    06/09/25 1937  Adult diet Regular  Diet effective now        Question:  Diet type  Answer:  Regular    06/09/25 1936    06/06/25 1802  Oral nutritional supplements  Until discontinued        Comments: vanilla   Question Answer Comment   Deliver with Breakfast vanilla   Deliver with Dinner    Select supplement: Ensure Plus High Protein        06/06/25 1802    06/05/25 1727  May Participate in Room Service With Assistance  ( ROOM SERVICE MAY PARTICIPATE WITH ASSISTANCE)  Once        Question:  .  Answer:  Yes    06/05/25 1726                     Assessment/Plan    Mildred Moyer is a 70 y.o. female with past medical history of metastatic breast CA dx 2023(liver, lungs)with recurrent pleural effusions, hx of Pleurex but not currently.  Was seen in oncology office on 6/5 and was very short of breath, was directed to ED for possible thoracentesis. For left sided effusion.    Patient with chronic hypoxic respiratory failure on home O2, HTN, HLD, GERD, MS, anxiety and depression.  She had undergone thoracentesis on the left 5/29 for 1.1 liter.  Imaging in our ED showed large bilateral effusions.  She underwent thoracentesis 6/6 for 400 ml and noted effusion to be loculated. Pulm suspects this effusion to be malignant and recommending another pleurex on the left, likely as outpatient.  She did have a chest tube placed on the 9th on the left side.  Imaging 4/2025 noted enlargement of breast mass, increase right supraclavicular LAD and increased pleural and pulm mets, liver mets also newly identified at this time.  6/5 with oncology reviewed repeat bx of mass c/w progressive disease and a plan was made to start palliative chemo if possible.       Palliative care consulted to assist with goals  of care.  Patient has ACP documents with siblings Danny Ralph as first agent and Magalis Sumner as second.  Patient has been DNR on recent admissions.  Patient admits to being very tired today, finding it difficult to talk but agreeable to chat.  We reviewed her past code status, she is clear she would not want resuscitative measures and OK with changing code status.  Her goal is to return home, she does not want to have to live in nursing facility.  We did discuss future plans, she reports she wants to try palliative chemo, but unclear if she is even a candidate given how debilitated she is currently.  I did discuss hospice briefly and she is mostly associating that with having to live in nursing home.   I did speak with patient's sister. She is flying into [place] late tomorrow afternoon and we made plans to meet Thursday at 11.  She reports patient has likely be overstating her ability to manage and does not think she can return to her [facility].  Sister reports she has been thinking about hospice for awhile but patient has been resistant.    6/12/25  Myself and Bere SILVA met with sister privately while patient down at CT.  We discussed SNF versus hospice as well as whether or not patient could tolerate her ADLs in longterm again.  I expressed concern that it's certainly appropriate to try for some therapy, however I don't think patient has enough reserve to make extensive progress and may not be able to return to [facility].  Patient has expressed she wants to try for rehab.  Patient likely a poor candidate for palliative chemo and we did discuss that hospice would not cover those meds.  When patient returned from CT, we discussed what rehab might look like and that if she did well, then that would be fantastic, but if she was not able to progress, she would be in a safe place where her needs could be met long term. We discussed hospice as an additional support after rehab.  Patient agreeable to this plan  -continue to  honor DNR/DNI, OK for ICU if needed  -[place] Portable DNRCCA is now at bedside for transport purposes        Chest wall pain from chest tube:  patient reports it is sharp at times but very brief, not using much of oxycodone or morphine that have been ordered, encouraged use.  Sister thinks patient just doesn't think to ask for it in addition to her desire to limit exposure to opiates.  We discussed low dose scheduled oxycodone and she is OK with that  -adding oxycodone 2.5 mg PO TID  -continue lidocaine patches to either side of chest tube  -continue oxycodone 5 mg PO Q 6 hours PRN for breakthrough  -continue Morphine 2 mg IV Q 4 hours PRN for more severe breakthrough  -continue cyclobenzaprine 10 mg BID PRN       Malnutrition Diagnosis Status: Active  Malnutrition Diagnosis: Moderate malnutrition related to chronic disease or condition  Related to: cancer  As Evidenced by: mild fat loss and mild muscle loss.  I agree with the dietitian's malnutrition diagnosis.        I spent 45 minutes in the professional and overall care of this patient related to ACP in addition to other time spent in assessment, chart review, and coordination of care       Martha Medel APRN CNP          [1] buPROPion XL, 300 mg, oral, q24h  [START ON 6/30/2025] capivasertib, 400 mg, oral, 2 times per day on Monday Tuesday Wednesday Thursday  cyanocobalamin, 1,000 mcg, oral, Daily  diphenhydrAMINE, 50 mg, intravenous, Once  enoxaparin, 40 mg, subcutaneous, q24h  ferrous sulfate, 65 mg of elemental iron, oral, Daily  hydrOXYzine HCL, 25 mg, oral, Daily  lidocaine, 2 patch, transdermal, Daily  melatonin, 6 mg, oral, Nightly  pantoprazole, 40 mg, oral, Daily before breakfast   Or  pantoprazole, 40 mg, intravenous, Daily before breakfast  piperacillin-tazobactam, 3.375 g, intravenous, q6h  predniSONE, 50 mg, oral, q6h  rosuvastatin, 20 mg, oral, Daily  [2]    [3] PRN medications: acetaminophen **OR** acetaminophen **OR** acetaminophen,  benzocaine-menthol, cyclobenzaprine, guaiFENesin, ipratropium-albuteroL, morphine, ondansetron **OR** ondansetron, oxyCODONE, oxygen, polyethylene glycol

## 2025-06-12 NOTE — PROGRESS NOTES
Mildred Moyer is a 70 y.o. female     There is post lysis therapy  Tachycardia persists  Getting prepped for planned CT PE study because of elevated D-dimers    Review of Systems     Constitutional: no fever, no chills,   Cardiovascular: no chest pain   Respiratory: Shortness of breath  Gastrointestinal: no abdominal pain, no constipation, no melena, no nausea, no diarrhea, no vomiting and no blood in stools.   Neurological: no headache,   All other systems have been reviewed and are negative for complaint.       Vitals:    06/12/25 1206   BP: 125/89   Pulse: (!) 113   Resp: 19   Temp: 36.3 °C (97.3 °F)   SpO2: 100%        Scheduled medications  Scheduled Medications[1]  Continuous medications  Continuous Medications[2]  PRN medications  PRN Medications[3]    Lab Review   Results from last 7 days   Lab Units 06/12/25  0503 06/11/25  0454 06/10/25  0503   WBC AUTO x10*3/uL 16.3* 16.5* 16.2*   HEMOGLOBIN g/dL 12.4 12.8 13.3   HEMATOCRIT % 37.9 38.2 41.9   PLATELETS AUTO x10*3/uL 590* 585* 625*     Results from last 7 days   Lab Units 06/12/25  0503 06/11/25  0454 06/10/25  0503 06/07/25  0634 06/06/25  0722   SODIUM mmol/L 130* 131* 135*   < > 134*   POTASSIUM mmol/L 4.4 4.7 4.6   < > 3.3*   CHLORIDE mmol/L 90* 90* 93*   < > 92*   CO2 mmol/L 31 33* 34*   < > 29   BUN mg/dL 16 16 15   < > 20   CREATININE mg/dL 0.65 0.73 0.83   < > 0.80   CALCIUM mg/dL 8.2* 8.6 8.7   < > 9.0   PROTEIN TOTAL g/dL  --   --   --   --  6.9   GLUCOSE mg/dL 122* 98 92   < > 109*    < > = values in this interval not displayed.            CT angio chest for pulmonary embolism   Final Result   No evidence of PE.        Left lower chest pigtail catheter in place with small pleural   effusion/thickening and trace pneumothorax. Bilateral   pleuroparenchymal opacities/nodularity and large loculated right   pleural effusion with subtotal atelectasis of the right lung as   discussed above similar to 04/13/2025.        Right breast mass and multiple  presumed metastatic lesions in the   liver and osseous thorax again noted. The osseous lesions are   slightly more conspicuous which could reflect healing or worsening   metastatic disease.        Other findings as described above.        MACRO:   None.        Signed by: Lis Henry 6/12/2025 12:18 PM   Dictation workstation:   [id number]      XR chest 1 view   Final Result   1. Tiny left apical pneumothorax with a left-sided chest tube and   interval decrease in the left-sided pleural effusion.   2. Continued volume loss and complete opacification of right   hemithorax.                  MACRO:   None        Signed by: Kamilah Alcantar 6/12/2025 7:52 AM   Dictation workstation:   [id number]      XR chest 1 view   Final Result   Multiple stable findings as described.        MACRO:   None        Signed by: Kee Pelletier 6/11/2025 8:09 AM   Dictation workstation:   [id number]      XR chest 1 view   Final Result   Redemonstration of almost complete opacification of the right   hemithorax which is unchanged.        Left pleural effusion, which is unchanged.                  MACRO:   None        Signed by: Mimi Real 6/10/2025 8:42 AM   Dictation workstation:   [id number]      XR chest 1 view   Final Result   Interval improvement in left pleural effusion and left basilar   airspace disease. Pigtail chest tube in place No change on the right   with near complete opacification of the right hemithorax and   associated large pleural effusion and airspace disease        MACRO:   None        Signed by: Cade Alcala 6/9/2025 9:01 PM   Dictation workstation:   [id number]      XR chest 1 view   Final Result   Redemonstration of extensive bilateral multifocal airspace disease   and pleural effusions worse on the right. This appears worsened   especially on the right.             MACRO:   None        Signed by: Cade Alcala 6/9/2025 6:09 PM   Dictation workstation:   [id number]      US thoracentesis w insertion of tube  includes water seal when performed   Final Result   Ultrasound-guided percutaneous 12 [place] pigtail drainage catheter   into multi-septated left pleural effusion.             MACRO:   None        Signed by: Demetrio Iglesias 6/10/2025 6:40 PM   Dictation workstation:   [id number]      XR chest 1 view   Final Result   1.  No significant change of bilateral airspace disease greater on   the right.        Signed by: Stiven Cota 6/7/2025 10:17 AM   Dictation workstation:   [id number]      US thoracentesis   Final Result   Uneventful thoracentesis, as detailed above. Left pleural space, 400   mL        I personally performed and/or directly supervised this study and was   present for the entire procedure.        Performed and dictated at [facility].        Signed by: Dony Calderón 6/6/2025 5:02 PM   Dictation workstation:   [id number]      XR chest 1 view   Final Result        Persistent small left pleural effusion, mildly decreased in volume.   No pneumothorax.        Stable large right pleural effusion with subtotal right lung collapse.        MACRO   None        Signed by: Jigna Perla 6/6/2025 4:35 PM   Dictation workstation:   [id number]      XR chest 1 view   Final Result   No change in the bilateral effusions, bilateral infiltrate, and   partial collapse of the right lung.        MACRO:   none        Signed by: Lc Munoz 6/5/2025 12:42 PM   Dictation workstation:   [id number]            Physical Exam    Constitutional   General appearance: Alert and in no acute distress.   Pulmonary   Respiratory assessment: On nasal cannula  Decreased breath sounds  Cardiovascular   Auscultation of heart: Apical pulse normal, heart rate and rhythm normal, normal S1 and S2, no murmurs and no pericardial rub.    Exam for edema: No peripheral edema.   Abdomen   Abdominal Exam: No bruits, normal bowel sounds, soft, non-tender, no abdominal mass palpated.    Liver and Spleen exam: No  hepato-splenomegaly.   Musculoskeletal     Inspection/palpation of joints, bones and muscles: No joint swelling. Normal movement of all extremities.   Neurologic   Cranial nerves: Nerves 2-12 were intact, no focal neuro defects.         Assessment/Plan      #Recurrent pleural effusion  #Breast cancer with meta stasis  S/p pigtail with lysis  Pain control    #Tachycardia  Elevated D-dimer which is to be expected and malignancy  Prepping the patient for CT PE study  VQ scan would be of limited benefit with her multiple lung findings  Encourage oral hydration    #Severe malnutrition  Nutritional support     #Leukocytosis  #Partial collapse of right lung  Continue antibiotics for possible pneumonia  Procalcitonin borderline  We will continue antibiotics    #Hyponatremia  Encourage oral hydration  Elevated urine sodium osmolality     #Hypertension  #Dyslipidemia  #Chronic respiratory failure with hypoxia  Continue home medications and DuoNebs         [1] buPROPion XL, 300 mg, oral, q24h  [START ON 6/30/2025] capivasertib, 400 mg, oral, 2 times per day on Monday Tuesday Wednesday Thursday  cyanocobalamin, 1,000 mcg, oral, Daily  enoxaparin, 40 mg, subcutaneous, q24h  ferrous sulfate, 65 mg of elemental iron, oral, Daily  hydrOXYzine HCL, 25 mg, oral, Daily  lidocaine, 2 patch, transdermal, Daily  melatonin, 6 mg, oral, Nightly  oxyCODONE, 2.5 mg, oral, TID  pantoprazole, 40 mg, oral, Daily before breakfast   Or  pantoprazole, 40 mg, intravenous, Daily before breakfast  piperacillin-tazobactam, 3.375 g, intravenous, q6h  rosuvastatin, 20 mg, oral, Daily     [2]    [3] PRN medications: acetaminophen **OR** acetaminophen **OR** acetaminophen, benzocaine-menthol, cyclobenzaprine, guaiFENesin, ipratropium-albuteroL, morphine, ondansetron **OR** ondansetron, oxyCODONE, oxygen, polyethylene glycol

## 2025-06-12 NOTE — PROGRESS NOTES
06/12/25 1359   Discharge Planning   Home or Post Acute Services Post acute facilities (Rehab/SNF/etc)   Type of Post Acute Facility Services Skilled nursing   Expected Discharge Disposition SNF   Does the patient need discharge transport arranged? Yes   RoundTrip coordination needed? Yes   Patient Choice   Provider Choice list and CMS website (https://medicare.gov/care-compare#search) for post-acute Quality and Resource Measure Data were provided and reviewed with: Family;Patient     Met with patient and sister at bedside. SNF choices identified:  [facility], [facility], [facility]. Referrals sent. JAYSON will follow.    Addendum: Called patient's sister with accepting facilities- she plans to tour tomorrow. JAYSON will follow.

## 2025-06-12 NOTE — PROGRESS NOTES
Physical Therapy                 Therapy Communication Note    Patient Name: Mildred Moyer  MRN: [id number]  Department: [place] [address]  Room: [address] [place] [address]  Today's Date: 6/12/2025     Discipline: Physical Therapy    Missed Visit: PT Missed Visit: Yes     Missed Visit Reason: Missed Visit Reason: Patient refused (Pt stating she is exhausted and not willing to participate in therapy. Encouragement given though  it just aggravated pt.)    Missed Time: Attempt

## 2025-06-12 NOTE — CARE PLAN
Problem: Pain - Adult  Goal: Verbalizes/displays adequate comfort level or baseline comfort level  Outcome: Progressing     Problem: Safety - Adult  Goal: Free from fall injury  Outcome: Progressing     Problem: Discharge Planning  Goal: Discharge to home or other facility with appropriate resources  Outcome: Progressing     Problem: Chronic Conditions and Co-morbidities  Goal: Patient's chronic conditions and co-morbidity symptoms are monitored and maintained or improved  Outcome: Progressing     Problem: Nutrition  Goal: Nutrient intake appropriate for maintaining nutritional needs  Outcome: Progressing     Problem: Respiratory  Goal: Clear secretions with interventions this shift  Outcome: Progressing  Goal: Minimize anxiety/maximize coping throughout shift  Outcome: Progressing  Goal: Minimal/no exertional discomfort or dyspnea this shift  Outcome: Progressing  Goal: No signs of respiratory distress (eg. Use of accessory muscles. Peds grunting)  Outcome: Progressing  Goal: Patent airway maintained this shift  Outcome: Progressing  Goal: Tolerate mechanical ventilation evidenced by VS/agitation level this shift  Outcome: Progressing  Goal: Tolerate pulmonary toileting this shift  Outcome: Progressing  Goal: Verbalize decreased shortness of breath this shift  Outcome: Progressing  Goal: Wean oxygen to maintain O2 saturation per order/standard this shift  Outcome: Progressing  Goal: Increase self care and/or family involvement in next 24 hours  Outcome: Progressing     Problem: Skin  Goal: Decreased wound size/increased tissue granulation at next dressing change  Outcome: Progressing  Flowsheets (Taken 6/12/2025 8329)  Decreased wound size/increased tissue granulation at next dressing change:   Promote sleep for wound healing   Protective dressings over bony prominences   Utilize specialty bed per algorithm  Goal: Participates in plan/prevention/treatment measures  Outcome: Progressing  Goal: Prevent/manage  excess moisture  Outcome: Progressing  Goal: Prevent/minimize sheer/friction injuries  Outcome: Progressing  Goal: Promote/optimize nutrition  Outcome: Progressing  Goal: Promote skin healing  Outcome: Progressing   The patient's goals for the shift include      The clinical goals for the shift include pt will remain hd stable    Over the shift, the patient did not make progress toward the following goals. Barriers to progression include . Recommendations to address these barriers include .

## 2025-06-12 NOTE — PROGRESS NOTES
Mildred Moyer is a 70 y.o. female on day 7 of admission presenting with Pleural effusion.  Patient with h/o MS, HTN, DLP, GERD, recurrent R pleural effusion s/p multiple thoracenteses and PleurX placement that was removed in 4/2025, chronic hypoxic respiratory failure on 3L NC, stage IV R breast invasive ductal carcinoma, anxiety, depression who p/w a gradual onset, progressive SOB x 3 weeks, associated with dry cough. In the ED work up revealed stable b/l effusion and infiltrates and partial R lung collapse on chest imaging, and leukocytosis. Admitted for recurrent pleural effusion. Pulmonary is consulted for partial R lung collapse. Of note since admission had L sided thoracentesis done.   Subjective   No acute overnight events. S/p 1/2 dose tPA/dornase instillation by thoracic surgery team on 6/12 with drainage of 1100 ml of fluid. O2 requirements improved to 3 LPM.     Overall is feeling markedly better. SOB much improved. Still has a mild dry cough. Also complains of back pain. No other complaints.   Objective   Scheduled medications  buPROPion XL, 300 mg, oral, q24h  [START ON 6/30/2025] capivasertib, 400 mg, oral, 2 times per day on Monday Tuesday Wednesday Thursday  cyanocobalamin, 1,000 mcg, oral, Daily  enoxaparin, 40 mg, subcutaneous, q24h  ferrous sulfate, 65 mg of elemental iron, oral, Daily  hydrOXYzine HCL, 25 mg, oral, Daily  lidocaine, 2 patch, transdermal, Daily  melatonin, 6 mg, oral, Nightly  pantoprazole, 40 mg, oral, Daily before breakfast   Or  pantoprazole, 40 mg, intravenous, Daily before breakfast  piperacillin-tazobactam, 3.375 g, intravenous, q6h  rosuvastatin, 20 mg, oral, Daily    Continuous medications     PRN medications  PRN medications: acetaminophen **OR** acetaminophen **OR** acetaminophen, benzocaine-menthol, cyclobenzaprine, guaiFENesin, ipratropium-albuteroL, morphine, ondansetron **OR** ondansetron, oxyCODONE, oxygen, polyethylene glycol   Physical Exam  Constitutional:      "  General: She is not in acute distress.     Appearance: She is normal weight. She is ill-appearing. She is not toxic-appearing.   HENT:      Head: Normocephalic and atraumatic.      Nose:      Comments: On 3L NC.      Mouth/Throat:      Mouth: Mucous membranes are dry.      Comments: Mallampati 2.   Eyes:      General: No scleral icterus.     Extraocular Movements: Extraocular movements intact.      Pupils: Pupils are equal, round, and reactive to light.   Cardiovascular:      Rate and Rhythm: Regular rhythm. Tachycardia present.      Heart sounds: No murmur heard.     No friction rub. No gallop.   Pulmonary:      Effort: Pulmonary effort is normal. No respiratory distress.      Breath sounds: No wheezing or rales.      Comments: Reduced breath sounds at the bases. Overall fair air entry. L sided CT in place draining serosanguinous fluid.   Abdominal:      General: Bowel sounds are normal. There is no distension.      Palpations: Abdomen is soft.      Tenderness: There is no abdominal tenderness.   Musculoskeletal:         General: No tenderness. Normal range of motion.      Cervical back: Normal range of motion and neck supple. No rigidity.      Right lower leg: Edema (Trace) present.      Left lower leg: Edema (Trace) present.   Lymphadenopathy:      Cervical: No cervical adenopathy.   Skin:     General: Skin is warm and dry.      Coloration: Skin is not jaundiced.   Neurological:      General: No focal deficit present.      Mental Status: She is alert and oriented to person, place, and time.      Cranial Nerves: No cranial nerve deficit.      Motor: No weakness.   Psychiatric:         Mood and Affect: Mood normal.         Behavior: Behavior normal.   Last Recorded Vitals  Blood pressure 125/89, pulse (!) 113, temperature 36.3 °C (97.3 °F), temperature source Temporal, resp. rate 19, height 1.626 m (5' 4.02\"), weight 52 kg (114 lb 9.6 oz), SpO2 100%.  Intake/Output last 3 Shifts:  I/O last 3 completed " negative... shifts:  In: - (0 mL/kg)   Out: 650 (12.5 mL/kg) [Chest Tube:650]  Weight: 52 kg     Relevant Results  Results for orders placed or performed during the hospital encounter of 06/05/25 (from the past 24 hours)   ECG 12 lead   Result Value Ref Range    Ventricular Rate 124 BPM    Atrial Rate 124 BPM    RI Interval 128 ms    QRS Duration 80 ms    QT Interval 320 ms    QTC Calculation(Bazett) 459 ms    P Axis -14 degrees    R Axis -34 degrees    T Axis 52 degrees    QRS Count 21 beats    Q Onset 216 ms    P Onset 152 ms    P Offset 208 ms    T Offset 376 ms    QTC Fredericia 407 ms   D-Dimer, VTE Exclusion   Result Value Ref Range    D-Dimer, Quantitative VTE Exclusion 2,604 (H) <=500 ng/mL FEU   Basic metabolic panel   Result Value Ref Range    Glucose 122 (H) 74 - 99 mg/dL    Sodium 130 (L) 136 - 145 mmol/L    Potassium 4.4 3.5 - 5.3 mmol/L    Chloride 90 (L) 98 - 107 mmol/L    Bicarbonate 31 21 - 32 mmol/L    Anion Gap 13 10 - 20 mmol/L    Urea Nitrogen 16 6 - 23 mg/dL    Creatinine 0.65 0.50 - 1.05 mg/dL    eGFR >90 >60 mL/min/1.73m*2    Calcium 8.2 (L) 8.6 - 10.3 mg/dL   CBC   Result Value Ref Range    WBC 16.3 (H) 4.4 - 11.3 x10*3/uL    nRBC 0.0 0.0 - 0.0 /100 WBCs    RBC 3.95 (L) 4.00 - 5.20 x10*6/uL    Hemoglobin 12.4 12.0 - 16.0 g/dL    Hematocrit 37.9 36.0 - 46.0 %    MCV 96 80 - 100 fL    MCH 31.4 26.0 - 34.0 pg    MCHC 32.7 32.0 - 36.0 g/dL    RDW 13.6 11.5 - 14.5 %    Platelets 590 (H) 150 - 450 x10*3/uL   CT angio chest for pulmonary embolism  Result Date: 6/12/2025  Interpreted By:  Lis Henry, STUDY: CT ANGIO CHEST FOR PULMONARY EMBOLISM;  6/12/2025 11:29 am   INDICATION: Signs/Symptoms:ongoing tachycardia, breast ca. r/o PE.     COMPARISON: 04/13/2025   ACCESSION NUMBER(S): [id number]   ORDERING CLINICIAN: DILSHAD GRIMALDO   TECHNIQUE: CT of the chest was performed. Sagittal and coronal reconstructions were generated. 75 ML Omnipaque 350 intravenous contrast given for the examination.   Multiplanar reconstructions of the pulmonary vessels were created on an independent workstation and provided for review.   FINDINGS:     CHEST WALL AND LOWER NECK: Slightly irregular heterogenous 2.8 cm right breast mass again seen. Right axillary adenopathy on the prior exam is no longer specifically seen. There is a 9 mm right subpectoral lymph node which is larger than on the prior exam.. 1.5 cm hypodense left thyroid nodule again seen.   MEDIASTINUM AND LARA:  Gaseous distention of the proximal esophagus. Mediastinal and paramediastinal soft tissue thickening/nodularity including 12 mm AP window node and 12 mm nodule in the anterior/precardiac fat similar to the prior exam. Mild soft tissue thickening in the left hilum. Right perihilar consolidation/atelectasis.   HEART AND VESSELS:  No pulmonary artery filling defect to suggest PE. Right pulmonary vessels are narrowed/attenuated within collapsed lung. Aberrant right subclavian artery coursing posterior to the esophagus, anatomic variant, again seen. The heart is normal in size. No significant pericardial effusion. Scattered atherosclerotic calcifications.   LUNGS, PLEURA, LARGE AIRWAYS: Large loculated right pleural effusion with pleural thickening and subtotal atelectasis of the right lung similar to the prior exam. catheter within the right pleural space on the previous exam is no longer seen. There is currently a pigtail catheter in the posterior left pleural space with small pleural effusion, uneven pleural thickening, and trace pneumothorax. Pleural and fissural thickening/nodularity in the left chest and multiple bilateral pulmonary nodules similar to the previous exam including 10 mm left upper lobe nodule image 54/289 and pleural-based lobulated 15 mm nodule in the medial left lower lobe image 146/289.  trachea and main bronchi are patent.   UPPER ABDOMEN:  Multiple low-density lesions in the included liver and bilateral adrenal thickening similar to  the previous exam.   BONES:  Kyphosis and degenerative changes of the visualized spine. Multiple sclerotic presumed metastatic lesions including T2, T5 and T7 are slightly more conspicuous.       No evidence of PE.   Left lower chest pigtail catheter in place with small pleural effusion/thickening and trace pneumothorax. Bilateral pleuroparenchymal opacities/nodularity and large loculated right pleural effusion with subtotal atelectasis of the right lung as discussed above similar to 04/13/2025.   Right breast mass and multiple presumed metastatic lesions in the liver and osseous thorax again noted. The osseous lesions are slightly more conspicuous which could reflect healing or worsening metastatic disease.   Other findings as described above.   MACRO: None.   Signed by: Lis Henry 6/12/2025 12:18 PM Dictation workstation:   [id number]    XR chest 1 view  Result Date: 6/12/2025  Interpreted By:  Kamilah Alcantar, STUDY: XR CHEST 1 VIEW;  6/12/2025 4:50 am   INDICATION: Signs/Symptoms:Monitor chest tube.     COMPARISON: 06/11/2025   ACCESSION NUMBER(S): [id number]   ORDERING CLINICIAN: TOM MG   FINDINGS: AP portable view of the chest is obtained.  Limited exam due to portable nature. Magnified cardiac silhouette. Left-sided chest tube. Interval decrease in the size of the left-sided pleural effusion. Tracheal shift to the right with near complete opacification of the right hemithorax, unchanged. Tiny left apical pneumothorax.       1. Tiny left apical pneumothorax with a left-sided chest tube and interval decrease in the left-sided pleural effusion. 2. Continued volume loss and complete opacification of right hemithorax.       MACRO: None   Signed by: Kamilah Alcantar 6/12/2025 7:52 AM Dictation workstation:   [id number]    ECG 12 lead  Result Date: 6/12/2025  Sinus tachycardia Left axis deviation Cannot rule out Anterior infarct , age undetermined Abnormal ECG When compared with ECG of 05-JUN-2025 11:42,  (unconfirmed) QRS axis Shifted left    XR chest 1 view  Result Date: 6/11/2025  Interpreted By:  Kee Pelletier, STUDY: XR CHEST 1 VIEW;  6/11/2025 5:13 am   INDICATION: Signs/Symptoms:Monitor chest tube.   COMPARISON: Chest x-rays, most recent from 06/10/2025. CT scan chest from 04/13/2025.   ACCESSION NUMBER(S): [id number]   ORDERING CLINICIAN: TOM MG   TECHNIQUE: Single AP portable view of the chest was obtained.   FINDINGS: MEDIASTINUM/ LUNGS/ LARA: Stable right-sided volume loss. Prominent grossly stable pleural and parenchymal opacities occupy most of the right hemithorax. There is stable mild rightward mediastinal shift. Small amount of aerated central right upper lobe, similar to the previous exam. Stable pigtail pleural catheter at the medial left lung base. Stable left basilar pleural effusion with mild associated atelectasis. No gross pulmonary vascular congestion. No pneumothorax. No tracheal deviation. No abnormal hilar fullness or gross mass on either side.   BONES: No lytic or blastic destructive bone lesion.   UPPER ABDOMEN: Grossly intact.       Multiple stable findings as described.   MACRO: None   Signed by: Kee Pelletier 6/11/2025 8:09 AM Dictation workstation:   [id number]     Assessment & Plan  Pleural effusion    70 YOF with h/o MS, HTN, DLP, GERD, recurrent R pleural effusion s/p multiple thoracenteses and PleurX placement that was removed in 4/2025, chronic hypoxic respiratory failure on 3L NC, stage IV R breast invasive ductal carcinoma, anxiety, depression who p/w a gradual onset, progressive SOB x 3 weeks, associated with dry cough. In the ED work up revealed stable b/l effusion and infiltrates and partial R lung collapse on chest imaging, and leukocytosis. Admitted for recurrent pleural effusion. Pulmonary is consulted for partial R lung collapse. Of note since admission had R sided thoracentesis done. Of note the patient with progression of her breast cancer, being evaluated for  palliative chemotherapy.     Recurrent pleural effusions: bilateral. R present since 2023. S/p multiple R sided thoracentesis. Exudative, lymphocytic predominant, atypical cell on cytology. She had R VATs that confirmed trapped lung with pleural biopsy (pathology showed acute fibrinous pleuritis with atypia) and PleurX placement in 10/2024. A moderate size L effusion was detected on CT from 4/2025. S/p L thoracentesis with no specimen. PleurX was removed in 4/2025. Again had L thoracentesis done in 5/2025 that showed exudative effusion. Repeat imaging on this admission re-demonstrated bilateral pleural effusions. S/p L thoracentesis that showed exudative effusion.        Seen by thoracic surgery, s/p L Pigtail placement, and dornase therapy #1 with significant improvement on the chest imaging.        FU thoracentesis results       FU with thoracic surgery recommendations.          Possible pneumonia: MRSA screening negative       Continue Zosyn       FU cultures         Breast cancer: progressive with possible pulmonary and pleural mets.        Outpatient management as per oncology and thoracic surgery    Respiratory failure: acute on chronic with hypoxia. Now improved after CT placement. Likely multi-factorial due to above.         Continue supplemental O2, wean off as tolerates        Home O2 evaluation before DC        BPH with IS, Acapella        Continue Duo-Neb    DVT prophylaxis: On Lovenox    Pulmonary will FU while in house.     Lucita Vargas MD

## 2025-06-12 NOTE — CARE PLAN
The patient's goals for the shift include      The clinical goals for the shift include pt will remain hd stable    Over the shift, the patient did not make progress toward the following goals. Barriers to progression include . Recommendations to address these barriers include   Problem: Pain - Adult  Goal: Verbalizes/displays adequate comfort level or baseline comfort level  Outcome: Progressing     Problem: Safety - Adult  Goal: Free from fall injury  Outcome: Progressing     Problem: Discharge Planning  Goal: Discharge to home or other facility with appropriate resources  Outcome: Progressing     Problem: Chronic Conditions and Co-morbidities  Goal: Patient's chronic conditions and co-morbidity symptoms are monitored and maintained or improved  Outcome: Progressing     Problem: Nutrition  Goal: Nutrient intake appropriate for maintaining nutritional needs  Outcome: Progressing     Problem: Respiratory  Goal: Clear secretions with interventions this shift  Outcome: Progressing  Goal: Minimize anxiety/maximize coping throughout shift  Outcome: Progressing  Goal: Minimal/no exertional discomfort or dyspnea this shift  Outcome: Progressing  Goal: No signs of respiratory distress (eg. Use of accessory muscles. Peds grunting)  Outcome: Progressing  Goal: Patent airway maintained this shift  Outcome: Progressing  Goal: Tolerate mechanical ventilation evidenced by VS/agitation level this shift  Outcome: Progressing  Goal: Tolerate pulmonary toileting this shift  Outcome: Progressing  Goal: Verbalize decreased shortness of breath this shift  Outcome: Progressing  Goal: Wean oxygen to maintain O2 saturation per order/standard this shift  Outcome: Progressing  Goal: Increase self care and/or family involvement in next 24 hours  Outcome: Progressing     Problem: Skin  Goal: Decreased wound size/increased tissue granulation at next dressing change  Outcome: Progressing  Goal: Participates in plan/prevention/treatment  measures  Outcome: Progressing  Goal: Prevent/manage excess moisture  Outcome: Progressing  Goal: Prevent/minimize sheer/friction injuries  Outcome: Progressing  Goal: Promote/optimize nutrition  Outcome: Progressing  Goal: Promote skin healing  Outcome: Progressing   .

## 2025-06-13 ENCOUNTER — APPOINTMENT (OUTPATIENT)
Dept: RADIOLOGY | Facility: HOSPITAL | Age: 70
DRG: 186 | End: 2025-06-13
Payer: COMMERCIAL

## 2025-06-13 LAB
ANION GAP SERPL CALC-SCNC: 13 MMOL/L (ref 10–20)
ATRIAL RATE: 124 BPM
BNP SERPL-MCNC: 62 PG/ML (ref 0–99)
BUN SERPL-MCNC: 20 MG/DL (ref 6–23)
CALCIUM SERPL-MCNC: 8.3 MG/DL (ref 8.6–10.3)
CHLORIDE SERPL-SCNC: 90 MMOL/L (ref 98–107)
CO2 SERPL-SCNC: 28 MMOL/L (ref 21–32)
CORTIS AM PEAK SERPL-MSCNC: 13.7 UG/DL (ref 5–20)
CREAT SERPL-MCNC: 0.63 MG/DL (ref 0.5–1.05)
EGFRCR SERPLBLD CKD-EPI 2021: >90 ML/MIN/1.73M*2
ERYTHROCYTE [DISTWIDTH] IN BLOOD BY AUTOMATED COUNT: 13.4 % (ref 11.5–14.5)
GLUCOSE SERPL-MCNC: 111 MG/DL (ref 74–99)
HCT VFR BLD AUTO: 30.8 % (ref 36–46)
HGB BLD-MCNC: 11.1 G/DL (ref 12–16)
MAGNESIUM SERPL-MCNC: 2.02 MG/DL (ref 1.6–2.4)
MCH RBC QN AUTO: 31.4 PG (ref 26–34)
MCHC RBC AUTO-ENTMCNC: 36 G/DL (ref 32–36)
MCV RBC AUTO: 87 FL (ref 80–100)
NRBC BLD-RTO: 0 /100 WBCS (ref 0–0)
P AXIS: -14 DEGREES
P OFFSET: 208 MS
P ONSET: 152 MS
PLATELET # BLD AUTO: 549 X10*3/UL (ref 150–450)
POTASSIUM SERPL-SCNC: 3.1 MMOL/L (ref 3.5–5.3)
PR INTERVAL: 128 MS
Q ONSET: 216 MS
QRS COUNT: 21 BEATS
QRS DURATION: 80 MS
QT INTERVAL: 320 MS
QTC CALCULATION(BAZETT): 459 MS
QTC FREDERICIA: 407 MS
R AXIS: -34 DEGREES
RBC # BLD AUTO: 3.53 X10*6/UL (ref 4–5.2)
SODIUM SERPL-SCNC: 128 MMOL/L (ref 136–145)
T AXIS: 52 DEGREES
T OFFSET: 376 MS
TSH SERPL-ACNC: 2.13 MIU/L (ref 0.44–3.98)
VENTRICULAR RATE: 124 BPM
WBC # BLD AUTO: 19.3 X10*3/UL (ref 4.4–11.3)

## 2025-06-13 PROCEDURE — 2500000004 HC RX 250 GENERAL PHARMACY W/ HCPCS (ALT 636 FOR OP/ED): Performed by: INTERNAL MEDICINE

## 2025-06-13 PROCEDURE — 99232 SBSQ HOSP IP/OBS MODERATE 35: CPT | Performed by: INTERNAL MEDICINE

## 2025-06-13 PROCEDURE — 2500000001 HC RX 250 WO HCPCS SELF ADMINISTERED DRUGS (ALT 637 FOR MEDICARE OP): Performed by: NURSE PRACTITIONER

## 2025-06-13 PROCEDURE — 99232 SBSQ HOSP IP/OBS MODERATE 35: CPT | Performed by: NURSE PRACTITIONER

## 2025-06-13 PROCEDURE — 84443 ASSAY THYROID STIM HORMONE: CPT

## 2025-06-13 PROCEDURE — 71045 X-RAY EXAM CHEST 1 VIEW: CPT

## 2025-06-13 PROCEDURE — 71045 X-RAY EXAM CHEST 1 VIEW: CPT | Performed by: RADIOLOGY

## 2025-06-13 PROCEDURE — 83880 ASSAY OF NATRIURETIC PEPTIDE: CPT

## 2025-06-13 PROCEDURE — 2500000005 HC RX 250 GENERAL PHARMACY W/O HCPCS: Performed by: INTERNAL MEDICINE

## 2025-06-13 PROCEDURE — 36415 COLL VENOUS BLD VENIPUNCTURE: CPT

## 2025-06-13 PROCEDURE — 2500000001 HC RX 250 WO HCPCS SELF ADMINISTERED DRUGS (ALT 637 FOR MEDICARE OP): Performed by: INTERNAL MEDICINE

## 2025-06-13 PROCEDURE — 2500000005 HC RX 250 GENERAL PHARMACY W/O HCPCS: Performed by: NURSE PRACTITIONER

## 2025-06-13 PROCEDURE — 36415 COLL VENOUS BLD VENIPUNCTURE: CPT | Performed by: NURSE PRACTITIONER

## 2025-06-13 PROCEDURE — 2500000002 HC RX 250 W HCPCS SELF ADMINISTERED DRUGS (ALT 637 FOR MEDICARE OP, ALT 636 FOR OP/ED): Performed by: INTERNAL MEDICINE

## 2025-06-13 PROCEDURE — 82533 TOTAL CORTISOL: CPT | Mod: AHULAB

## 2025-06-13 PROCEDURE — 2060000001 HC INTERMEDIATE ICU ROOM DAILY

## 2025-06-13 PROCEDURE — 83735 ASSAY OF MAGNESIUM: CPT | Mod: AHULAB

## 2025-06-13 PROCEDURE — 80048 BASIC METABOLIC PNL TOTAL CA: CPT | Performed by: NURSE PRACTITIONER

## 2025-06-13 PROCEDURE — 85027 COMPLETE CBC AUTOMATED: CPT | Performed by: NURSE PRACTITIONER

## 2025-06-13 PROCEDURE — 97530 THERAPEUTIC ACTIVITIES: CPT | Mod: GO

## 2025-06-13 PROCEDURE — 99233 SBSQ HOSP IP/OBS HIGH 50: CPT | Performed by: INTERNAL MEDICINE

## 2025-06-13 RX ORDER — POLYETHYLENE GLYCOL 3350 17 G/17G
17 POWDER, FOR SOLUTION ORAL DAILY
Status: DISCONTINUED | OUTPATIENT
Start: 2025-06-13 | End: 2025-06-16 | Stop reason: HOSPADM

## 2025-06-13 RX ORDER — POTASSIUM CHLORIDE 20 MEQ/1
20 TABLET, EXTENDED RELEASE ORAL ONCE
Status: COMPLETED | OUTPATIENT
Start: 2025-06-13 | End: 2025-06-13

## 2025-06-13 RX ADMIN — PIPERACILLIN SODIUM AND TAZOBACTAM SODIUM 3.38 G: 3; .375 INJECTION, SOLUTION INTRAVENOUS at 19:13

## 2025-06-13 RX ADMIN — LIDOCAINE 4% 2 PATCH: 40 PATCH TOPICAL at 10:51

## 2025-06-13 RX ADMIN — OXYCODONE HYDROCHLORIDE 2.5 MG: 5 TABLET ORAL at 15:45

## 2025-06-13 RX ADMIN — OXYCODONE HYDROCHLORIDE 2.5 MG: 5 TABLET ORAL at 21:41

## 2025-06-13 RX ADMIN — ROSUVASTATIN 20 MG: 20 TABLET, FILM COATED ORAL at 10:51

## 2025-06-13 RX ADMIN — POTASSIUM CHLORIDE 20 MEQ: 1500 TABLET, EXTENDED RELEASE ORAL at 10:51

## 2025-06-13 RX ADMIN — OXYCODONE HYDROCHLORIDE 2.5 MG: 5 TABLET ORAL at 10:51

## 2025-06-13 RX ADMIN — FERROUS SULFATE TAB 325 MG (65 MG ELEMENTAL FE) 1 TABLET: 325 (65 FE) TAB at 10:51

## 2025-06-13 RX ADMIN — ENOXAPARIN SODIUM 40 MG: 40 INJECTION SUBCUTANEOUS at 19:13

## 2025-06-13 RX ADMIN — HYDROXYZINE HYDROCHLORIDE 25 MG: 25 TABLET, FILM COATED ORAL at 10:51

## 2025-06-13 RX ADMIN — MELATONIN TAB 3 MG 6 MG: 3 TAB at 21:41

## 2025-06-13 RX ADMIN — Medication 1000 MCG: at 10:51

## 2025-06-13 RX ADMIN — PIPERACILLIN SODIUM AND TAZOBACTAM SODIUM 3.38 G: 3; .375 INJECTION, SOLUTION INTRAVENOUS at 05:41

## 2025-06-13 RX ADMIN — PIPERACILLIN SODIUM AND TAZOBACTAM SODIUM 3.38 G: 3; .375 INJECTION, SOLUTION INTRAVENOUS at 00:21

## 2025-06-13 RX ADMIN — BUPROPION HYDROCHLORIDE 300 MG: 150 TABLET, EXTENDED RELEASE ORAL at 19:13

## 2025-06-13 RX ADMIN — PANTOPRAZOLE SODIUM 40 MG: 40 TABLET, DELAYED RELEASE ORAL at 05:41

## 2025-06-13 RX ADMIN — PIPERACILLIN SODIUM AND TAZOBACTAM SODIUM 3.38 G: 3; .375 INJECTION, SOLUTION INTRAVENOUS at 12:52

## 2025-06-13 ASSESSMENT — COGNITIVE AND FUNCTIONAL STATUS - GENERAL
PERSONAL GROOMING: A LITTLE
MOBILITY SCORE: 21
MOVING TO AND FROM BED TO CHAIR: A LITTLE
MOVING TO AND FROM BED TO CHAIR: A LITTLE
STANDING UP FROM CHAIR USING ARMS: A LITTLE
CLIMB 3 TO 5 STEPS WITH RAILING: A LITTLE
TOILETING: A LOT
DRESSING REGULAR UPPER BODY CLOTHING: A LOT
DAILY ACTIVITIY SCORE: 14
DRESSING REGULAR LOWER BODY CLOTHING: A LOT
WALKING IN HOSPITAL ROOM: A LITTLE
DAILY ACTIVITIY SCORE: 22
WALKING IN HOSPITAL ROOM: A LITTLE
TOILETING: A LITTLE
EATING MEALS: A LITTLE
CLIMB 3 TO 5 STEPS WITH RAILING: A LITTLE
HELP NEEDED FOR BATHING: A LOT
PERSONAL GROOMING: A LITTLE
DAILY ACTIVITIY SCORE: 24
MOBILITY SCORE: 20

## 2025-06-13 ASSESSMENT — PAIN - FUNCTIONAL ASSESSMENT: PAIN_FUNCTIONAL_ASSESSMENT: 0-10

## 2025-06-13 ASSESSMENT — PAIN SCALES - GENERAL
PAINLEVEL_OUTOF10: 2
PAINLEVEL_OUTOF10: 0 - NO PAIN

## 2025-06-13 NOTE — PROGRESS NOTES
"Physical Therapy                 Therapy Communication Note    Patient Name: Mildred Moyer  MRN: [id number]  Department: [facility] [id number]  Room: [address] [place], [place] [address]  Today's Date: 6/13/2025     Discipline: Physical Therapy    Missed Visit: PT Missed Visit: Yes     Missed Visit Reason: Missed Visit Reason: Patient refused (Pt stating she has been walking to the bathroom with RN today. Refusing therapy because she is \"too tired\" to participate. Again pt educated in importance of therapy in recovery process.  Her duaghter present and encouraging her.  Pt declined.)    Missed Time: Attempt      "

## 2025-06-13 NOTE — PROGRESS NOTES
06/13/25 1636   Discharge Planning   Expected Discharge Disposition SNF  ([facility])   Does the patient need discharge transport arranged? Yes   RoundTrip coordination needed? Yes   Has discharge transport been arranged? No           Patient had her left chest tube discontinued. Sister toured facilities and FOC is [facility]. Waiting on PT notes to be able to start auth. Will continue to follow for discharge needs.

## 2025-06-13 NOTE — CARE PLAN
The patient's goals for the shift include      The clinical goals for the shift include Pt to remain hemodynamically stable through end of shift    Over the shift, the patient did not make progress toward the following goals. Barriers to progression include n/a. Recommendations to address these barriers include n/a.

## 2025-06-13 NOTE — PROGRESS NOTES
06/13/25 1536   Discharge Planning   Home or Post Acute Services Post acute facilities (Rehab/SNF/etc)   Type of Post Acute Facility Services Skilled nursing   Expected Discharge Disposition SNF   Does the patient need discharge transport arranged? Yes   RoundTrip coordination needed? Yes   Has discharge transport been arranged? No

## 2025-06-13 NOTE — PROGRESS NOTES
Mildred Moyer is a 70 y.o. female on day 8 of admission presenting with Pleural effusion.  Patient with h/o MS, HTN, DLP, GERD, recurrent R pleural effusion s/p multiple thoracenteses and PleurX placement that was removed in 4/2025, chronic hypoxic respiratory failure on 3L NC, stage IV R breast invasive ductal carcinoma, anxiety, depression who p/w a gradual onset, progressive SOB x 3 weeks, associated with dry cough. In the ED work up revealed stable b/l effusion and infiltrates and partial R lung collapse on chest imaging, and leukocytosis. Admitted for recurrent pleural effusion. Pulmonary is consulted for partial R lung collapse. Of note since admission had L sided thoracentesis done.   Subjective   No acute overnight events. Low drainage overnight. Pigtail was removed. O2 requirements improved to 3 LPM.     Continues to feel better. SOB much improved. Still has a mild dry cough.  Denies CP or wheezing. No other complaints.   Objective   Scheduled medications  buPROPion XL, 300 mg, oral, q24h  [START ON 6/30/2025] capivasertib, 400 mg, oral, 2 times per day on Monday Tuesday Wednesday Thursday  cyanocobalamin, 1,000 mcg, oral, Daily  enoxaparin, 40 mg, subcutaneous, q24h  ferrous sulfate, 65 mg of elemental iron, oral, Daily  hydrOXYzine HCL, 25 mg, oral, Daily  lidocaine, 2 patch, transdermal, Daily  melatonin, 6 mg, oral, Nightly  oxyCODONE, 2.5 mg, oral, TID  pantoprazole, 40 mg, oral, Daily before breakfast   Or  pantoprazole, 40 mg, intravenous, Daily before breakfast  piperacillin-tazobactam, 3.375 g, intravenous, q6h  polyethylene glycol, 17 g, oral, Daily  rosuvastatin, 20 mg, oral, Daily    Continuous medications     PRN medications  PRN medications: acetaminophen **OR** acetaminophen **OR** acetaminophen, benzocaine-menthol, cyclobenzaprine, guaiFENesin, ipratropium-albuteroL, morphine, ondansetron **OR** ondansetron, oxyCODONE, oxygen   Physical Exam  Constitutional:       General: She is not in  "acute distress.     Appearance: She is normal weight. She is ill-appearing. She is not toxic-appearing.   HENT:      Head: Normocephalic and atraumatic.      Nose:      Comments: On 3L NC.      Mouth/Throat:      Mouth: Mucous membranes are dry.      Comments: Mallampati 2.   Eyes:      General: No scleral icterus.     Extraocular Movements: Extraocular movements intact.      Pupils: Pupils are equal, round, and reactive to light.   Cardiovascular:      Rate and Rhythm: Regular rhythm. Tachycardia present.      Heart sounds: No murmur heard.     No friction rub. No gallop.   Pulmonary:      Effort: Pulmonary effort is normal. No respiratory distress.      Breath sounds: No wheezing or rales.      Comments: Reduced breath sounds at the bases. Overall fair air entry. L sided CT in place draining serosanguinous fluid.   Abdominal:      General: Bowel sounds are normal. There is no distension.      Palpations: Abdomen is soft.      Tenderness: There is no abdominal tenderness.   Musculoskeletal:         General: No tenderness. Normal range of motion.      Cervical back: Normal range of motion and neck supple. No rigidity.      Right lower leg: Edema (Trace) present.      Left lower leg: Edema (Trace) present.   Lymphadenopathy:      Cervical: No cervical adenopathy.   Skin:     General: Skin is warm and dry.      Coloration: Skin is not jaundiced.   Neurological:      General: No focal deficit present.      Mental Status: She is alert and oriented to person, place, and time.      Cranial Nerves: No cranial nerve deficit.      Motor: No weakness.   Psychiatric:         Mood and Affect: Mood normal.         Behavior: Behavior normal.     Last Recorded Vitals  Blood pressure 138/85, pulse (!) 117, temperature 36.4 °C (97.5 °F), temperature source Skin, resp. rate 20, height 1.626 m (5' 4.02\"), weight 52 kg (114 lb 9.6 oz), SpO2 100%.  Intake/Output last 3 Shifts:  I/O last 3 completed shifts:  In: 360 (6.9 mL/kg) " [P.O.:360]  Out: 500 (9.6 mL/kg) [Urine:500 (0.3 mL/kg/hr)]  Weight: 52 kg     Relevant Results  Results for orders placed or performed during the hospital encounter of 06/05/25 (from the past 24 hours)   Basic metabolic panel   Result Value Ref Range    Glucose 111 (H) 74 - 99 mg/dL    Sodium 128 (L) 136 - 145 mmol/L    Potassium 3.1 (L) 3.5 - 5.3 mmol/L    Chloride 90 (L) 98 - 107 mmol/L    Bicarbonate 28 21 - 32 mmol/L    Anion Gap 13 10 - 20 mmol/L    Urea Nitrogen 20 6 - 23 mg/dL    Creatinine 0.63 0.50 - 1.05 mg/dL    eGFR >90 >60 mL/min/1.73m*2    Calcium 8.3 (L) 8.6 - 10.3 mg/dL   CBC   Result Value Ref Range    WBC 19.3 (H) 4.4 - 11.3 x10*3/uL    nRBC 0.0 0.0 - 0.0 /100 WBCs    RBC 3.53 (L) 4.00 - 5.20 x10*6/uL    Hemoglobin 11.1 (L) 12.0 - 16.0 g/dL    Hematocrit 30.8 (L) 36.0 - 46.0 %    MCV 87 80 - 100 fL    MCH 31.4 26.0 - 34.0 pg    MCHC 36.0 32.0 - 36.0 g/dL    RDW 13.4 11.5 - 14.5 %    Platelets 549 (H) 150 - 450 x10*3/uL   TSH   Result Value Ref Range    Thyroid Stimulating Hormone 2.13 0.44 - 3.98 mIU/L   B-type natriuretic peptide   Result Value Ref Range    BNP 62 0 - 99 pg/mL   Cortisol AM   Result Value Ref Range    Cortisol  A.M. 13.7 5.0 - 20.0 ug/dL   XR chest 1 view  Result Date: 6/13/2025  Interpreted By:  Kee Pelletier, STUDY: XR CHEST 1 VIEW;  6/13/2025 2:01 pm   INDICATION: Signs/Symptoms:CT removal r/o pneumo.   COMPARISON: Chest x-ray from earlier this morning. CT scan chest from yesterday. These are the most recent priors.   ACCESSION NUMBER(S): [id number]   ORDERING CLINICIAN: FLIP CAP   TECHNIQUE: Single AP portable view of the chest was obtained.   FINDINGS: MEDIASTINUM/ LUNGS/ LARA: The patient had a tiny residual left apical pneumothorax on the prior CT scan from 06/12/2025. This is not apparent on the chest x-rays today. Left basilar chest tube has been removed. Persistent pleural and parenchymal opacities at the left base. There is pleural and parenchymal opacity  throughout most of the right hemithorax with a small amount of aerated right upper lobe, unchanged. There is right-sided volume loss with rightward mediastinal shift. This is stable. There are multiple small lung nodules evident in the lungs bilaterally, more evident on the left than on the right, and best seen on CT scan.   BONES: No lytic or blastic destructive bone lesion.   UPPER ABDOMEN: Grossly intact.       Left basilar pleural pigtail catheter has been removed. No identifiable left-sided pneumothorax in this exam.   Stable pleural and parenchymal opacities at the left base. Stable diffuse right-sided pleural and parenchymal opacities with stable right-sided volume loss and rightward mediastinal shift.   Patient has known underlying small bilateral lung nodules. These are much better seen on CT scan.   MACRO: None   Signed by: Kee Pelletier 6/13/2025 2:18 PM Dictation workstation:   [id number]    XR chest 1 view  Result Date: 6/13/2025  Interpreted By:  Kee Pelletier, STUDY: XR CHEST 1 VIEW;  6/13/2025 5:55 am   INDICATION: Signs/Symptoms:Monitor chest tube.   COMPARISON: Chest x-ray from 06/12/2025. CT scan chest from 06/12/2025.   ACCESSION NUMBER(S): [id number]   ORDERING CLINICIAN: TOM MG   TECHNIQUE: Single AP portable view of the chest was obtained.   FINDINGS: MEDIASTINUM/ LUNGS/ LARA: Stable right-sided volume loss with tracheal deviation to the right. Stable pleural and parenchymal opacities throughout the right hemithorax with stable small amount of aerated right upper lobe. Stable medial left basilar pleural catheter. Persistent small left pleural effusion is stable to minimally larger. There is mild haziness at the lateral left lung base, not significantly changed. Pulmonary vasculature today appears somewhat prominent and on the left there is some peripheral interstitial thickening. No pneumothorax.     BONES: No lytic or blastic destructive bone lesion.   UPPER ABDOMEN: Grossly intact.        Diffuse right-sided pleural and parenchymal opacities with right-sided volume loss as described, unchanged.   Stable pleural catheter at the medial left lung base. No pneumothorax.   Small left pleural effusion is stable to minimally larger.   There are findings worrisome for mild congestive failure. Clinical correlation needed.   MACRO: None   Signed by: Kee Pelletier 6/13/2025 8:12 AM Dictation workstation:   [id number]    CT angio chest for pulmonary embolism  Result Date: 6/12/2025  Interpreted By:  Lis Henry, STUDY: CT ANGIO CHEST FOR PULMONARY EMBOLISM;  6/12/2025 11:29 am   INDICATION: Signs/Symptoms:ongoing tachycardia, breast ca. r/o PE.     COMPARISON: 04/13/2025   ACCESSION NUMBER(S): [id number]   ORDERING CLINICIAN: DILSHAD GRIMALDO   TECHNIQUE: CT of the chest was performed. Sagittal and coronal reconstructions were generated. 75 ML Omnipaque 350 intravenous contrast given for the examination.  Multiplanar reconstructions of the pulmonary vessels were created on an independent workstation and provided for review.   FINDINGS:     CHEST WALL AND LOWER NECK: Slightly irregular heterogenous 2.8 cm right breast mass again seen. Right axillary adenopathy on the prior exam is no longer specifically seen. There is a 9 mm right subpectoral lymph node which is larger than on the prior exam.. 1.5 cm hypodense left thyroid nodule again seen.   MEDIASTINUM AND LARA:  Gaseous distention of the proximal esophagus. Mediastinal and paramediastinal soft tissue thickening/nodularity including 12 mm AP window node and 12 mm nodule in the anterior/precardiac fat similar to the prior exam. Mild soft tissue thickening in the left hilum. Right perihilar consolidation/atelectasis.   HEART AND VESSELS:  No pulmonary artery filling defect to suggest PE. Right pulmonary vessels are narrowed/attenuated within collapsed lung. Aberrant right subclavian artery coursing posterior to the esophagus, anatomic variant,  again seen. The heart is normal in size. No significant pericardial effusion. Scattered atherosclerotic calcifications.   LUNGS, PLEURA, LARGE AIRWAYS: Large loculated right pleural effusion with pleural thickening and subtotal atelectasis of the right lung similar to the prior exam. catheter within the right pleural space on the previous exam is no longer seen. There is currently a pigtail catheter in the posterior left pleural space with small pleural effusion, uneven pleural thickening, and trace pneumothorax. Pleural and fissural thickening/nodularity in the left chest and multiple bilateral pulmonary nodules similar to the previous exam including 10 mm left upper lobe nodule image 54/289 and pleural-based lobulated 15 mm nodule in the medial left lower lobe image 146/289.  trachea and main bronchi are patent.   UPPER ABDOMEN:  Multiple low-density lesions in the included liver and bilateral adrenal thickening similar to the previous exam.   BONES:  Kyphosis and degenerative changes of the visualized spine. Multiple sclerotic presumed metastatic lesions including T2, T5 and T7 are slightly more conspicuous.       No evidence of PE.   Left lower chest pigtail catheter in place with small pleural effusion/thickening and trace pneumothorax. Bilateral pleuroparenchymal opacities/nodularity and large loculated right pleural effusion with subtotal atelectasis of the right lung as discussed above similar to 04/13/2025.   Right breast mass and multiple presumed metastatic lesions in the liver and osseous thorax again noted. The osseous lesions are slightly more conspicuous which could reflect healing or worsening metastatic disease.   Other findings as described above.   MACRO: None.   Signed by: Lis Henry 6/12/2025 12:18 PM Dictation workstation:   [id number]    XR chest 1 view  Result Date: 6/12/2025  Interpreted By:  Kamilah Alcantar, STUDY: XR CHEST 1 VIEW;  6/12/2025 4:50 am   INDICATION: Signs/Symptoms:Monitor  chest tube.     COMPARISON: 06/11/2025   ACCESSION NUMBER(S): [id number]   ORDERING CLINICIAN: TOM MG   FINDINGS: AP portable view of the chest is obtained.  Limited exam due to portable nature. Magnified cardiac silhouette. Left-sided chest tube. Interval decrease in the size of the left-sided pleural effusion. Tracheal shift to the right with near complete opacification of the right hemithorax, unchanged. Tiny left apical pneumothorax.       1. Tiny left apical pneumothorax with a left-sided chest tube and interval decrease in the left-sided pleural effusion. 2. Continued volume loss and complete opacification of right hemithorax.       MACRO: None   Signed by: Kamilah Alcantar 6/12/2025 7:52 AM Dictation workstation:   [id number]     Assessment & Plan  Pleural effusion    70 YOF with h/o MS, HTN, DLP, GERD, recurrent R pleural effusion s/p multiple thoracenteses and PleurX placement that was removed in 4/2025, chronic hypoxic respiratory failure on 3L NC, stage IV R breast invasive ductal carcinoma, anxiety, depression who p/w a gradual onset, progressive SOB x 3 weeks, associated with dry cough. In the ED work up revealed stable b/l effusion and infiltrates and partial R lung collapse on chest imaging, and leukocytosis. Admitted for recurrent pleural effusion. Pulmonary is consulted for partial R lung collapse. Of note since admission had R sided thoracentesis done. Of note the patient with progression of her breast cancer, being evaluated for palliative chemotherapy.     Recurrent pleural effusions: bilateral. R present since 2023. S/p multiple R sided thoracentesis. Exudative, lymphocytic predominant, atypical cell on cytology. She had R VATs that confirmed trapped lung with pleural biopsy (pathology showed acute fibrinous pleuritis with atypia) and PleurX placement in 10/2024. A moderate size L effusion was detected on CT from 4/2025. S/p L thoracentesis with no specimen. PleurX was removed in 4/2025.  Again had L thoracentesis done in 5/2025 that showed exudative effusion. Repeat imaging on this admission re-demonstrated bilateral pleural effusions. S/p L thoracentesis that showed exudative effusion.        Seen by thoracic surgery, s/p L Pigtail placement, and dornase therapy #1 with significant improvement on the chest imaging.  CT removed on 6/13.        FU thoracentesis results. Culture NTD. Cytology pending.        FU with thoracic surgery recommendations.          Possible pneumonia: MRSA screening negative       Continue Zosyn for a total of 7 days.        FU cultures         Breast cancer: progressive with possible pulmonary and pleural mets.        Outpatient management as per oncology and thoracic surgery    Respiratory failure: acute on chronic with hypoxia. Now improved after CT placement. Likely multi-factorial due to above.         Continue supplemental O2, wean off as tolerates        Home O2 evaluation before DC        BPH with IS, Acapella        Continue Duo-Neb    DVT prophylaxis: On Lovenox    Pulmonary will FU while in house.     Lucita Vargas MD

## 2025-06-13 NOTE — PROGRESS NOTES
Mildred Moyer is a 70 y.o. female     Chest tube in place  Pain controlled  Breathing a little improved  Appetite fair    Review of Systems     Constitutional: no fever, no chills,   Cardiovascular: no chest pain   Respiratory: Shortness of breath  Gastrointestinal: no abdominal pain, no constipation, no melena, no nausea, no diarrhea, no vomiting and no blood in stools.   Neurological: no headache,   All other systems have been reviewed and are negative for complaint.       Vitals:    06/13/25 1209   BP: 120/79   Pulse:    Resp: 20   Temp: 36.2 °C (97.2 °F)   SpO2: 99%        Scheduled medications  Scheduled Medications[1]  Continuous medications  Continuous Medications[2]  PRN medications  PRN Medications[3]    Lab Review   Results from last 7 days   Lab Units 06/13/25  0607 06/12/25  0503 06/11/25  0454   WBC AUTO x10*3/uL 19.3* 16.3* 16.5*   HEMOGLOBIN g/dL 11.1* 12.4 12.8   HEMATOCRIT % 30.8* 37.9 38.2   PLATELETS AUTO x10*3/uL 549* 590* 585*     Results from last 7 days   Lab Units 06/13/25  0607 06/12/25  0503 06/11/25  0454   SODIUM mmol/L 128* 130* 131*   POTASSIUM mmol/L 3.1* 4.4 4.7   CHLORIDE mmol/L 90* 90* 90*   CO2 mmol/L 28 31 33*   BUN mg/dL 20 16 16   CREATININE mg/dL 0.63 0.65 0.73   CALCIUM mg/dL 8.3* 8.2* 8.6   GLUCOSE mg/dL 111* 122* 98            XR chest 1 view   Final Result   Diffuse right-sided pleural and parenchymal opacities with   right-sided volume loss as described, unchanged.        Stable pleural catheter at the medial left lung base. No pneumothorax.        Small left pleural effusion is stable to minimally larger.        There are findings worrisome for mild congestive failure. Clinical   correlation needed.        MACRO:   None        Signed by: Kee Pelletier 6/13/2025 8:12 AM   Dictation workstation:   [id number]      CT angio chest for pulmonary embolism   Final Result   No evidence of PE.        Left lower chest pigtail catheter in place with small pleural    effusion/thickening and trace pneumothorax. Bilateral   pleuroparenchymal opacities/nodularity and large loculated right   pleural effusion with subtotal atelectasis of the right lung as   discussed above similar to 04/13/2025.        Right breast mass and multiple presumed metastatic lesions in the   liver and osseous thorax again noted. The osseous lesions are   slightly more conspicuous which could reflect healing or worsening   metastatic disease.        Other findings as described above.        MACRO:   None.        Signed by: Lis Henry 6/12/2025 12:18 PM   Dictation workstation:   [id number]      XR chest 1 view   Final Result   1. Tiny left apical pneumothorax with a left-sided chest tube and   interval decrease in the left-sided pleural effusion.   2. Continued volume loss and complete opacification of right   hemithorax.                  MACRO:   None        Signed by: Kamilah Alcantar 6/12/2025 7:52 AM   Dictation workstation:   [id number]      XR chest 1 view   Final Result   Multiple stable findings as described.        MACRO:   None        Signed by: Kee Pelletier 6/11/2025 8:09 AM   Dictation workstation:   [id number]      XR chest 1 view   Final Result   Redemonstration of almost complete opacification of the right   hemithorax which is unchanged.        Left pleural effusion, which is unchanged.                  MACRO:   None        Signed by: Mimi Real 6/10/2025 8:42 AM   Dictation workstation:   [id number]      XR chest 1 view   Final Result   Interval improvement in left pleural effusion and left basilar   airspace disease. Pigtail chest tube in place No change on the right   with near complete opacification of the right hemithorax and   associated large pleural effusion and airspace disease        MACRO:   None        Signed by: Cade Alcala 6/9/2025 9:01 PM   Dictation workstation:   [id number]      XR chest 1 view   Final Result   Redemonstration of extensive bilateral multifocal  airspace disease   and pleural effusions worse on the right. This appears worsened   especially on the right.             MACRO:   None        Signed by: Cade Alcala 6/9/2025 6:09 PM   Dictation workstation:   [id number]      US thoracentesis w insertion of tube includes water seal when performed   Final Result   Ultrasound-guided percutaneous 12 [place] pigtail drainage catheter   into multi-septated left pleural effusion.             MACRO:   None        Signed by: Demetrio Iglesias 6/10/2025 6:40 PM   Dictation workstation:   [id number]      XR chest 1 view   Final Result   1.  No significant change of bilateral airspace disease greater on   the right.        Signed by: Stiven Cota 6/7/2025 10:17 AM   Dictation workstation:   [id number]      US thoracentesis   Final Result   Uneventful thoracentesis, as detailed above. Left pleural space, 400   mL        I personally performed and/or directly supervised this study and was   present for the entire procedure.        Performed and dictated at [facility].        Signed by: Dony Calderón 6/6/2025 5:02 PM   Dictation workstation:   [id number]      XR chest 1 view   Final Result        Persistent small left pleural effusion, mildly decreased in volume.   No pneumothorax.        Stable large right pleural effusion with subtotal right lung collapse.        MACRO   None        Signed by: Jigna Perla 6/6/2025 4:35 PM   Dictation workstation:   [id number]      XR chest 1 view   Final Result   No change in the bilateral effusions, bilateral infiltrate, and   partial collapse of the right lung.        MACRO:   none        Signed by: Lc Munoz 6/5/2025 12:42 PM   Dictation workstation:   [id number]      XR chest 1 view    (Results Pending)         Physical Exam    Constitutional   General appearance: Alert and in no acute distress.   Pulmonary   Respiratory assessment: On nasal cannula  Decreased breath  sounds  Cardiovascular   Auscultation of heart: Apical pulse normal, heart rate and rhythm normal, normal S1 and S2, no murmurs and no pericardial rub.    Exam for edema: No peripheral edema.   Abdomen   Abdominal Exam: No bruits, normal bowel sounds, soft, non-tender, no abdominal mass palpated.    Liver and Spleen exam: No hepato-splenomegaly.   Musculoskeletal     Inspection/palpation of joints, bones and muscles: No joint swelling. Normal movement of all extremities.   Neurologic   Cranial nerves: Nerves 2-12 were intact, no focal neuro defects.         Assessment/Plan      #Recurrent pleural effusion  #Breast cancer with meta stasis  S/p pigtail with lysis  Pain control    #Tachycardia  CT PE negative    #Severe malnutrition  Nutritional support     #Leukocytosis  #Partial collapse of right lung  Continue antibiotics for possible pneumonia  Procalcitonin borderline  We will continue antibiotics    #Hyponatremia  Poor solute intake  Elevated urine sodium osmolality     #Hypertension  #Dyslipidemia  #Chronic respiratory failure with hypoxia  Continue home medications and DuoNebs         [1] buPROPion XL, 300 mg, oral, q24h  [START ON 6/30/2025] capivasertib, 400 mg, oral, 2 times per day on Monday Tuesday Wednesday Thursday  cyanocobalamin, 1,000 mcg, oral, Daily  enoxaparin, 40 mg, subcutaneous, q24h  ferrous sulfate, 65 mg of elemental iron, oral, Daily  hydrOXYzine HCL, 25 mg, oral, Daily  lidocaine, 2 patch, transdermal, Daily  melatonin, 6 mg, oral, Nightly  oxyCODONE, 2.5 mg, oral, TID  pantoprazole, 40 mg, oral, Daily before breakfast   Or  pantoprazole, 40 mg, intravenous, Daily before breakfast  piperacillin-tazobactam, 3.375 g, intravenous, q6h  polyethylene glycol, 17 g, oral, Daily  rosuvastatin, 20 mg, oral, Daily     [2]    [3] PRN medications: acetaminophen **OR** acetaminophen **OR** acetaminophen, benzocaine-menthol, cyclobenzaprine, guaiFENesin, ipratropium-albuteroL, morphine, ondansetron  **OR** ondansetron, oxyCODONE, oxygen

## 2025-06-13 NOTE — PROCEDURES
Pain controlled,  denies sob  CT 150mL overnight, no air leak  Lungs - left clear, right decreased    A & P stage IV breast cancer with recurrent left effusion s/p lytic therapy     -  pigtail dc'd without difficulty,  dressing applied  - repeat CXR in 2 hours,  if unremarkable ok to dc from thoracic standpoint.  - follow up with pulmonologist at [place],  does not need to see DR. Shepherd     D/w DR. Shepherd & RN & patient    CXR unremarkable will sign off

## 2025-06-13 NOTE — PROGRESS NOTES
Additional information:  HR low 100's overnight, still intermittently 110's, WBC elevated after two days of steroids, plt stable at 549.  Remains on  3L NC which is BL.  Na continues to decrease 130, starting 1500 ml fluid restriction.    Vitals (Last 24 Hours):  Heart Rate:  [101-117]   Temp:  [35.7 °C (96.3 °F)-36.4 °C (97.5 °F)]   Resp:  [18-20]   BP: (113-138)/(64-85)   SpO2:  [97 %-100 %]     I have reviewed imaging reports and labs from this visit    Results for orders placed or performed during the hospital encounter of 06/05/25 (from the past 24 hours)   Basic metabolic panel   Result Value Ref Range    Glucose 111 (H) 74 - 99 mg/dL    Sodium 128 (L) 136 - 145 mmol/L    Potassium 3.1 (L) 3.5 - 5.3 mmol/L    Chloride 90 (L) 98 - 107 mmol/L    Bicarbonate 28 21 - 32 mmol/L    Anion Gap 13 10 - 20 mmol/L    Urea Nitrogen 20 6 - 23 mg/dL    Creatinine 0.63 0.50 - 1.05 mg/dL    eGFR >90 >60 mL/min/1.73m*2    Calcium 8.3 (L) 8.6 - 10.3 mg/dL   CBC   Result Value Ref Range    WBC 19.3 (H) 4.4 - 11.3 x10*3/uL    nRBC 0.0 0.0 - 0.0 /100 WBCs    RBC 3.53 (L) 4.00 - 5.20 x10*6/uL    Hemoglobin 11.1 (L) 12.0 - 16.0 g/dL    Hematocrit 30.8 (L) 36.0 - 46.0 %    MCV 87 80 - 100 fL    MCH 31.4 26.0 - 34.0 pg    MCHC 36.0 32.0 - 36.0 g/dL    RDW 13.4 11.5 - 14.5 %    Platelets 549 (H) 150 - 450 x10*3/uL   TSH   Result Value Ref Range    Thyroid Stimulating Hormone 2.13 0.44 - 3.98 mIU/L   B-type natriuretic peptide   Result Value Ref Range    BNP 62 0 - 99 pg/mL   Cortisol AM   Result Value Ref Range    Cortisol  A.M. 13.7 5.0 - 20.0 ug/dL     MEDS:  Scheduled meds  Scheduled Medications[1]    Continuous meds  Continuous Medications[2]    PRN meds  PRN Medications[3]    ASSESSMENT/PLAN:    Mildred Moyer is a 70 year old female with a past medical history of breast carcinoma with meta stasis recurrent pleural effusions on the right side which needle PleurX catheter chronic hypoxic respiratory failure on 3 L of oxygen  hypertension dyslipidemia who has had thoracentesis done on the left side for few times now presents with worsening shortness of breath and recurrent pleural effusion  Also noted to have elevated white count  She denies any fevers chills cough nausea vomiting or diarrhea     #Recurrent pleural effusion  #Breast cancer with meta stasis  -S/p pigtail with lysis  -Thoracic surgery has removed her chest tube and cleared for DC, repeat CXR with stable pleural and parenchymal opacities at L base, stable diffuse R sided pleural and parenchymal opacities with stable R sided volume loss and R mediastinal shift  -on 3 L NC which is BL  -Cytology labs pending  -Per thoracic surgery              :Trial lytic therapy 6/11/25, 1100 output              :Pulm toileting              :Frequent ambulation/PT/OOB  -Pain control  -Per pulmonary               :Continue zosyn              :FU cultures              :FU OP with onc and thoracic surgery              :BPH w IS and acapella      #Tachycardia  -Secondary to pain and shortness of breath  -D dimer elevated, CT PE negative     #Severe malnutrition  -Nutritional support     #Leukocytosis  #Partial collapse of right lung  -WBC increased after receiving 2 days of steroids at 19, procal .26.   -Continue zosyn for possible pneumonia  -MRSA swab negative     #Hypertension  #Dyslipidemia  #Chronic respiratory failure with hypoxia  -Holding home propranolol and hctz for soft BP and hyponatremia  -continue DuoNebs     Hyponatremia  -Persistent mild hyponatremia  -Urine osmal elevated, serum osmal low, urine Na borderline low  -TSH and cortisol WNL  -Will start 1500 ml fluid restriction, reassess tomorrow if sodium has improved    Goals of Care  -Seen by palliative               :DNR DNI code status   :Discuss hospice after rehab   :Low dose scheduled oxy 2.5 mg TID              :Lidocaine patch to either side of CT              :Continue oxy 5 mg PO Q 6 hr PRN              :Continue  morphine 2 mg IV Q4 hr hr PRN              :Continue cyclobenzaprine 10 mg BID PRN     DVT Prophylaxis  -eliquis      DC Disposition   -skilled for moderate intensity level of care     Other comorbidities as above  -continue medications as ordered and adjust based on clinical course      Discussed with Dr. Clarence Mohan and the interdisciplinary team     Kee Mora, APRN-CNP        [1] buPROPion XL, 300 mg, oral, q24h  [START ON 6/30/2025] capivasertib, 400 mg, oral, 2 times per day on Monday Tuesday Wednesday Thursday  cyanocobalamin, 1,000 mcg, oral, Daily  enoxaparin, 40 mg, subcutaneous, q24h  ferrous sulfate, 65 mg of elemental iron, oral, Daily  hydrOXYzine HCL, 25 mg, oral, Daily  lidocaine, 2 patch, transdermal, Daily  melatonin, 6 mg, oral, Nightly  oxyCODONE, 2.5 mg, oral, TID  pantoprazole, 40 mg, oral, Daily before breakfast   Or  pantoprazole, 40 mg, intravenous, Daily before breakfast  piperacillin-tazobactam, 3.375 g, intravenous, q6h  polyethylene glycol, 17 g, oral, Daily  rosuvastatin, 20 mg, oral, Daily  [2]    [3] PRN medications: acetaminophen **OR** acetaminophen **OR** acetaminophen, benzocaine-menthol, cyclobenzaprine, guaiFENesin, ipratropium-albuteroL, morphine, ondansetron **OR** ondansetron, oxyCODONE, oxygen

## 2025-06-13 NOTE — PROGRESS NOTES
Occupational Therapy    OT Treatment    Patient Name: Mildred Moyer  MRN: [id number]  Department: [facility] [id number]  Room: [address] [place] [address]  Today's Date: 6/13/2025  Time Calculation  Start Time: 0828  Stop Time: 0838  Time Calculation (min): 10 min        Assessment:  OT Assessment: Pt continues to have poor activity tolerance but is agreeable to OOB activity and receptive to education. Pt continues to be impulsive and moving very quickly, she is a high fall risk. Pt would benefit from mod intensity OT at discharge.  Prognosis: Fair  Barriers to Discharge Home: Caregiver assistance, Physical needs  Caregiver Assistance: Caregiver assistance needed per identified barriers - however, level of patient's required assistance exceeds assistance available at home  Physical Needs: 24hr mobility assistance needed, 24hr ADL assistance needed, High falls risk due to function or environment  Evaluation/Treatment Tolerance: Patient limited by fatigue  Medical Staff Made Aware: Yes  End of Session Communication: Bedside nurse  End of Session Patient Position: Up in chair, Alarm on  OT Assessment Results: Decreased ADL status, Decreased endurance, Decreased functional mobility  Prognosis: Fair  Barriers to Discharge: Decreased caregiver support  Evaluation/Treatment Tolerance: Patient limited by fatigue  Medical Staff Made Aware: Yes  Strengths: Ability to acquire knowledge, Attitude of self, Support of extended family/friends  Barriers to Participation: Comorbidities, Insight into problems  Plan:  Treatment Interventions: ADL retraining, Functional transfer training, Endurance training, Patient/family training, Equipment evaluation/education, Neuromuscular reeducation, UE strengthening/ROM  OT Frequency: 3 times per week  OT Discharge Recommendations: Moderate intensity level of continued care  Equipment Recommended upon Discharge: Wheeled walker, Wheelchair  OT Recommended Transfer Status: Minimal assist, Assist of 1  OT - OK to Discharge: Yes  "(per POC)  Treatment Interventions: ADL retraining, Functional transfer training, Endurance training, Patient/family training, Equipment evaluation/education, Neuromuscular reeducation, UE strengthening/ROM    Subjective   OT Visit Info:  OT Received On: 06/13/25  General Visit Info:  General  Reason for Referral: 70 y.o. F s/p presenting with shortness of breath, s/p placement on pigtail chest tube 6/9/25. Pt admitted for plueral effusion.  Referred By: LEWIS Rothman (APRN-CNP)  Past Medical History Relevant to Rehab: Medical History[1]  Family/Caregiver Present: No  Prior to Session Communication: Bedside nurse  Patient Position Received: Bed, 3 rail up, Alarm off, not on at start of session  Preferred Learning Style: auditory, kinesthetic, verbal  General Comment: pt in bed eating breakfast, reporting fatigue but agrees it would be beneficial to get up to the chair for breakfast  Precautions:  Medical Precautions: Chest tube, Fall precautions, Oxygen therapy device and L/min     Date/Time Vitals Session Patient Position Pulse Resp SpO2 BP MAP (mmHg)    06/13/25 0717 --  --  107  --  --  --  --     06/13/25 0746 --  --  101  20  99 %  113/76  89           Pain:  Pain Assessment  0-10 (Numeric) Pain Score: 2  Pain Type:  (\"my usual MS pain on the Left side\")  Pain Interventions: Repositioned, Emotional support    Objective    Cognition:  Cognition  Orientation Level: Oriented X4  Coordination:     Activities of Daily Living: Feeding  Feeding Level of Assistance: Setup  Feeding Where Assessed: Bed level, Chair  Feeding Comments: assist to open containers  Functional Standing Tolerance:     Bed Mobility/Transfers: Bed Mobility  Bed Mobility: Yes  Bed Mobility 1  Bed Mobility 1: Supine to sitting  Level of Assistance 1: Minimum assistance  Bed Mobility Comments 1: assist at trunk, cues for logroll and bracing at chest tube site. HOB elevated    Transfer 1  Transfer From 1: Bed to  Transfer to 1: Chair with arms  Technique " "1: Stand pivot  Transfer Device 1: Walker  Transfer Level of Assistance 1: Minimum assistance, Minimal verbal cues  Trials/Comments 1: Min A to steady with sit to stand, pt requires cuesx to slow pace and is quite impulsive with transfer. Requires ongoing education on importance of slow and controlled movement to reduce fall risk, as well as risk of pulling out chest tube. Verbalizes understanding but also reports she's always been a \"quick .\"  Outcome Measures:[facility] Daily Activity  Putting on and taking off regular lower body clothing: A lot  Bathing (including washing, rinsing, drying): A lot  Putting on and taking off regular upper body clothing: A lot  Toileting, which includes using toilet, bedpan or urinal: A lot  Taking care of personal grooming such as brushing teeth: A little  Eating Meals: A little  Daily Activity - Total Score: 14      Education Documentation  Precautions, taught by Marguerite Colin OT at 6/13/2025  8:50 AM.  Learner: Patient  Readiness: Acceptance  Method: Explanation  Response: Verbalizes Understanding, Needs Reinforcement  Comment: requires ongoing training for energy conservation and safe functional transfers    Body Mechanics, taught by Marguerite Colin OT at 6/13/2025  8:50 AM.  Learner: Patient  Readiness: Acceptance  Method: Explanation  Response: Verbalizes Understanding, Needs Reinforcement  Comment: requires ongoing training for energy conservation and safe functional transfers    Education Comments  No comments found.        OP EDUCATION:       Goals:  Encounter Problems       Encounter Problems (Active)       ADLs       Patient with complete upper body dressing with stand by assist level of assistance donning and doffing all UE clothes with PRN adaptive equipment while supported sitting while implementing energy conservation techniques  (Progressing)       Start:  06/11/25    Expected End:  06/25/25            Patient with complete lower body dressing with contact guard " assist level of assistance donning and doffing all LE clothes  with PRN adaptive equipment while supported sitting while implementing energy conservation techniques  (Progressing)       Start:  06/11/25    Expected End:  06/25/25            Patient will complete toileting including hygiene clothing management/hygiene with contact guard assist level of assistance and grab bars while implementing energy conservation techniques . (Progressing)       Start:  06/11/25    Expected End:  06/25/25               BALANCE       Patient will tolerate standing for 3  minutes to contact guard assist level of assistance with least restrictive device in order to improve functional activity tolerance for ADL tasks. (Progressing)       Start:  06/11/25    Expected End:  06/25/25               EXERCISE/STRENGTHENING       Patient will be educated on BUE light strengthening AND activity tolerance/ work simplification HEP for increased ADL performance. (Progressing)       Start:  06/11/25    Expected End:  06/25/25               TRANSFERS       Patient will complete functional transfer to toilet/ commode/ chair with arms with least restrictive device with contact guard assist level of assistance while implementing energy conservation techniques  (Progressing)       Start:  06/11/25    Expected End:  06/25/25                                  [1]   Past Medical History:  Diagnosis Date    GERD (gastroesophageal reflux disease)     HLD (hyperlipidemia)     HTN (hypertension)     Liver disease     Lung neoplasm     MS (multiple sclerosis) (Multi)     Pleural effusion

## 2025-06-14 ENCOUNTER — APPOINTMENT (OUTPATIENT)
Dept: RADIOLOGY | Facility: HOSPITAL | Age: 70
DRG: 186 | End: 2025-06-14
Payer: COMMERCIAL

## 2025-06-14 LAB
ANION GAP SERPL CALC-SCNC: 16 MMOL/L (ref 10–20)
ATRIAL RATE: 105 BPM
BUN SERPL-MCNC: 16 MG/DL (ref 6–23)
CALCIUM SERPL-MCNC: 8.4 MG/DL (ref 8.6–10.3)
CHLORIDE SERPL-SCNC: 91 MMOL/L (ref 98–107)
CO2 SERPL-SCNC: 25 MMOL/L (ref 21–32)
CREAT SERPL-MCNC: 0.62 MG/DL (ref 0.5–1.05)
EGFRCR SERPLBLD CKD-EPI 2021: >90 ML/MIN/1.73M*2
ERYTHROCYTE [DISTWIDTH] IN BLOOD BY AUTOMATED COUNT: 13.7 % (ref 11.5–14.5)
GLUCOSE SERPL-MCNC: 86 MG/DL (ref 74–99)
HCT VFR BLD AUTO: 30.4 % (ref 36–46)
HGB BLD-MCNC: 10.9 G/DL (ref 12–16)
MCH RBC QN AUTO: 31.6 PG (ref 26–34)
MCHC RBC AUTO-ENTMCNC: 35.9 G/DL (ref 32–36)
MCV RBC AUTO: 88 FL (ref 80–100)
NRBC BLD-RTO: 0 /100 WBCS (ref 0–0)
P AXIS: 52 DEGREES
P OFFSET: 204 MS
P ONSET: 161 MS
PLATELET # BLD AUTO: 542 X10*3/UL (ref 150–450)
POTASSIUM SERPL-SCNC: 3.4 MMOL/L (ref 3.5–5.3)
PR INTERVAL: 112 MS
Q ONSET: 217 MS
QRS COUNT: 17 BEATS
QRS DURATION: 82 MS
QT INTERVAL: 354 MS
QTC CALCULATION(BAZETT): 467 MS
QTC FREDERICIA: 426 MS
R AXIS: 42 DEGREES
RBC # BLD AUTO: 3.45 X10*6/UL (ref 4–5.2)
SODIUM SERPL-SCNC: 129 MMOL/L (ref 136–145)
T AXIS: 60 DEGREES
T OFFSET: 394 MS
VENTRICULAR RATE: 105 BPM
WBC # BLD AUTO: 15.8 X10*3/UL (ref 4.4–11.3)

## 2025-06-14 PROCEDURE — 2500000004 HC RX 250 GENERAL PHARMACY W/ HCPCS (ALT 636 FOR OP/ED): Performed by: NURSE PRACTITIONER

## 2025-06-14 PROCEDURE — 71045 X-RAY EXAM CHEST 1 VIEW: CPT | Performed by: RADIOLOGY

## 2025-06-14 PROCEDURE — 36415 COLL VENOUS BLD VENIPUNCTURE: CPT | Performed by: NURSE PRACTITIONER

## 2025-06-14 PROCEDURE — 97110 THERAPEUTIC EXERCISES: CPT | Mod: GP,CQ

## 2025-06-14 PROCEDURE — 2500000002 HC RX 250 W HCPCS SELF ADMINISTERED DRUGS (ALT 637 FOR MEDICARE OP, ALT 636 FOR OP/ED): Performed by: INTERNAL MEDICINE

## 2025-06-14 PROCEDURE — 80048 BASIC METABOLIC PNL TOTAL CA: CPT | Performed by: NURSE PRACTITIONER

## 2025-06-14 PROCEDURE — 2500000005 HC RX 250 GENERAL PHARMACY W/O HCPCS: Performed by: NURSE PRACTITIONER

## 2025-06-14 PROCEDURE — 99231 SBSQ HOSP IP/OBS SF/LOW 25: CPT | Performed by: INTERNAL MEDICINE

## 2025-06-14 PROCEDURE — 99233 SBSQ HOSP IP/OBS HIGH 50: CPT | Performed by: INTERNAL MEDICINE

## 2025-06-14 PROCEDURE — 2500000001 HC RX 250 WO HCPCS SELF ADMINISTERED DRUGS (ALT 637 FOR MEDICARE OP): Performed by: NURSE PRACTITIONER

## 2025-06-14 PROCEDURE — 2060000001 HC INTERMEDIATE ICU ROOM DAILY

## 2025-06-14 PROCEDURE — 85027 COMPLETE CBC AUTOMATED: CPT | Performed by: NURSE PRACTITIONER

## 2025-06-14 PROCEDURE — 2500000005 HC RX 250 GENERAL PHARMACY W/O HCPCS: Performed by: INTERNAL MEDICINE

## 2025-06-14 PROCEDURE — 2500000001 HC RX 250 WO HCPCS SELF ADMINISTERED DRUGS (ALT 637 FOR MEDICARE OP): Performed by: INTERNAL MEDICINE

## 2025-06-14 PROCEDURE — 97530 THERAPEUTIC ACTIVITIES: CPT | Mod: GP,CQ

## 2025-06-14 PROCEDURE — 2500000004 HC RX 250 GENERAL PHARMACY W/ HCPCS (ALT 636 FOR OP/ED)

## 2025-06-14 PROCEDURE — 71045 X-RAY EXAM CHEST 1 VIEW: CPT

## 2025-06-14 PROCEDURE — 2500000004 HC RX 250 GENERAL PHARMACY W/ HCPCS (ALT 636 FOR OP/ED): Performed by: INTERNAL MEDICINE

## 2025-06-14 RX ORDER — POTASSIUM CHLORIDE 20 MEQ/1
20 TABLET, EXTENDED RELEASE ORAL ONCE
Status: COMPLETED | OUTPATIENT
Start: 2025-06-14 | End: 2025-06-14

## 2025-06-14 RX ADMIN — MORPHINE SULFATE 2 MG: 2 INJECTION, SOLUTION INTRAMUSCULAR; INTRAVENOUS at 00:22

## 2025-06-14 RX ADMIN — OXYCODONE HYDROCHLORIDE 2.5 MG: 5 TABLET ORAL at 15:32

## 2025-06-14 RX ADMIN — FERROUS SULFATE TAB 325 MG (65 MG ELEMENTAL FE) 1 TABLET: 325 (65 FE) TAB at 10:49

## 2025-06-14 RX ADMIN — PIPERACILLIN SODIUM AND TAZOBACTAM SODIUM 3.38 G: 3; .375 INJECTION, SOLUTION INTRAVENOUS at 17:23

## 2025-06-14 RX ADMIN — MELATONIN TAB 3 MG 6 MG: 3 TAB at 21:35

## 2025-06-14 RX ADMIN — PIPERACILLIN SODIUM AND TAZOBACTAM SODIUM 3.38 G: 3; .375 INJECTION, SOLUTION INTRAVENOUS at 00:22

## 2025-06-14 RX ADMIN — PANTOPRAZOLE SODIUM 40 MG: 40 INJECTION, POWDER, FOR SOLUTION INTRAVENOUS at 06:00

## 2025-06-14 RX ADMIN — PIPERACILLIN SODIUM AND TAZOBACTAM SODIUM 3.38 G: 3; .375 INJECTION, SOLUTION INTRAVENOUS at 05:45

## 2025-06-14 RX ADMIN — Medication 1000 MCG: at 10:49

## 2025-06-14 RX ADMIN — OXYCODONE HYDROCHLORIDE 2.5 MG: 5 TABLET ORAL at 21:35

## 2025-06-14 RX ADMIN — ROSUVASTATIN 20 MG: 20 TABLET, FILM COATED ORAL at 10:49

## 2025-06-14 RX ADMIN — PIPERACILLIN SODIUM AND TAZOBACTAM SODIUM 3.38 G: 3; .375 INJECTION, SOLUTION INTRAVENOUS at 11:10

## 2025-06-14 RX ADMIN — MORPHINE SULFATE 2 MG: 2 INJECTION, SOLUTION INTRAMUSCULAR; INTRAVENOUS at 05:45

## 2025-06-14 RX ADMIN — CYCLOBENZAPRINE HYDROCHLORIDE 10 MG: 5 TABLET, FILM COATED ORAL at 10:51

## 2025-06-14 RX ADMIN — BUPROPION HYDROCHLORIDE 300 MG: 150 TABLET, EXTENDED RELEASE ORAL at 17:22

## 2025-06-14 RX ADMIN — ENOXAPARIN SODIUM 40 MG: 40 INJECTION SUBCUTANEOUS at 17:23

## 2025-06-14 RX ADMIN — LIDOCAINE 4% 2 PATCH: 40 PATCH TOPICAL at 10:50

## 2025-06-14 RX ADMIN — POTASSIUM CHLORIDE 20 MEQ: 1500 TABLET, EXTENDED RELEASE ORAL at 10:49

## 2025-06-14 RX ADMIN — POLYETHYLENE GLYCOL 3350 17 G: 17 POWDER, FOR SOLUTION ORAL at 10:49

## 2025-06-14 RX ADMIN — HYDROXYZINE HYDROCHLORIDE 25 MG: 25 TABLET, FILM COATED ORAL at 10:50

## 2025-06-14 ASSESSMENT — COGNITIVE AND FUNCTIONAL STATUS - GENERAL
TOILETING: A LITTLE
MOVING TO AND FROM BED TO CHAIR: A LITTLE
DAILY ACTIVITIY SCORE: 22
MOVING FROM LYING ON BACK TO SITTING ON SIDE OF FLAT BED WITH BEDRAILS: A LITTLE
STANDING UP FROM CHAIR USING ARMS: A LITTLE
MOBILITY SCORE: 14
TURNING FROM BACK TO SIDE WHILE IN FLAT BAD: A LITTLE
CLIMB 3 TO 5 STEPS WITH RAILING: TOTAL
MOVING TO AND FROM BED TO CHAIR: A LOT
WALKING IN HOSPITAL ROOM: A LITTLE
WALKING IN HOSPITAL ROOM: A LOT
CLIMB 3 TO 5 STEPS WITH RAILING: A LITTLE
MOBILITY SCORE: 20
PERSONAL GROOMING: A LITTLE
STANDING UP FROM CHAIR USING ARMS: A LITTLE

## 2025-06-14 ASSESSMENT — PAIN SCALES - GENERAL
PAINLEVEL_OUTOF10: 8
PAINLEVEL_OUTOF10: 0 - NO PAIN
PAINLEVEL_OUTOF10: 1
PAINLEVEL_OUTOF10: 0 - NO PAIN
PAINLEVEL_OUTOF10: 4
PAINLEVEL_OUTOF10: 8

## 2025-06-14 ASSESSMENT — PAIN DESCRIPTION - DESCRIPTORS
DESCRIPTORS: ACHING;DISCOMFORT;SORE
DESCRIPTORS: ACHING;DISCOMFORT;SORE

## 2025-06-14 ASSESSMENT — PAIN - FUNCTIONAL ASSESSMENT
PAIN_FUNCTIONAL_ASSESSMENT: 0-10

## 2025-06-14 NOTE — CARE PLAN
"The patient's goals for the shift include \"get a good night's sleep\"    The clinical goals for the shift include free of falls by end of shift    "

## 2025-06-14 NOTE — PROGRESS NOTES
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 She attempts to participate in gait training but is not able due to dizziness. She c/o feeling tired and request return to bed.  End of Session Patient Position: Bed, 3 rail up  PT Plan  Inpatient/Swing Bed or Outpatient: Inpatient  PT Plan  Treatment/Interventions: Bed mobility, Transfer training, Strengthening, Endurance training, Range of motion, Therapeutic exercise, Home exercise program  PT Plan: Ongoing PT  PT Frequency: 3 times per week  PT Discharge Recommendations: Moderate intensity level of continued care  Equipment Recommended upon Discharge: Wheeled walker, Wheelchair  PT Recommended Transfer Status: Assist x1, Assistive device  PT - OK to Discharge: Yes (Per POC)    PT Visit Info:  PT Received On: 06/14/25  Response to Previous Treatment: Patient with no complaints from previous session.     General Visit Information:   General  Reason for Referral: 70 y.o. F s/p presenting with shortness of breath, s/p placement on pigtail chest tube 6/9/25. Pt admitted for plueral effusion.  Referred By: LEWIS Rothman (APRN-CNP)  Family/Caregiver Present: Yes  Caregiver Feedback: Sister and daughter are present and supportive.  Prior to Session Communication: Bedside nurse  Patient Position Received: Bed, 3 rail up, Alarm off, not on at start of session  Preferred Learning Style: auditory,  kinesthetic, verbal  General Comment: Patient is in bed upon arrival. Vale arrives during session. C/o incisional pain but is agreeable to therapy session.    Subjective   Precautions:  Precautions  UE Weight Bearing Status: Weight Bearing as Tolerated  LE Weight Bearing Status: Weight Bearing as Tolerated  Medical Precautions: Oxygen therapy device and L/min            Objective   Pain:  Pain Assessment  Pain Assessment: 0-10  0-10 (Numeric) Pain Score: 4  Pain Type: Surgical pain  Pain Location: Shoulder  Pain Orientation: Lower, Right  Cognition:  Cognition  Overall Cognitive Status: Within Functional Limits  Orientation Level: Oriented X4  Coordination:  Movements are Fluid and Coordinated: Yes  Postural Control:  Postural Control  Postural Control: Within Functional Limits  Static Sitting Balance  Static Sitting-Balance Support: Bilateral upper extremity supported, Feet supported  Static Sitting-Level of Assistance: Distant supervision  Static Sitting-Comment/Number of Minutes: 2-mutes x 2 with breathing instruction and oxygen in place attempting to stand for gait training.  Dynamic Sitting Balance  Dynamic Sitting-Balance Support: Bilateral upper extremity supported  Dynamic Sitting-Level of Assistance: Contact guard  Dynamic Sitting-Balance: Lateral lean  Dynamic Sitting-Comments: Patient c/o dizziness with 3LO2  Static Standing Balance  Static Standing-Balance Support: Bilateral upper extremity supported  Static Standing-Level of Assistance: Minimum assistance  Static Standing-Comment/Number of Minutes: With FWW walker support. Patient c/o  dizziness with cues for breathing. O2 in place.  Dynamic Standing Balance  Dynamic Standing-Comments: Dizzziness with standing x 2.  Extremity/Trunk Assessments:    Activity Tolerance:  Activity Tolerance  Endurance: Tolerates 30 min exercise with multiple rests  Activity Tolerance Comments: Patient is short of breath during session with c/o intermittent dizziness.  Nursing is notified.  Treatments:  Therapeutic Exercise  Therapeutic Exercise Performed: Yes  Therapeutic Exercise Activity 1: Patient is instructed in and performs supine bilateral lower extremity ankle pumps, quad sets, glute sets, heel slides, hip abduction, x 12 reps x 2 with cues provided for improved breathing pattern.    Therapeutic Activity  Therapeutic Activity Performed: Yes  Therapeutic Activity 1: Review of home exercise program with education provided for improved breathing pattern, and energy conservation strategies. Emphasis is placed on slow movements with attention to improving safety awareness.    Balance/Neuromuscular Re-Education  Balance/Neuromuscular Re-Education Activity Performed: No    Bed Mobility  Bed Mobility: Yes  Bed Mobility 1  Bed Mobility 1: Supine to sitting  Level of Assistance 1: Minimum assistance  Bed Mobility Comments 1: Patient with solano movements. She rolled to side and became dizzy with attempts to elevate her trunk with assistance provided.    Ambulation/Gait Training  Ambulation/Gait Training Performed: No  Transfers  Transfer: No    Stairs  Stairs: No         Outcome Measures:  [facility] Basic Mobility  Turning from your back to your side while in a flat bed without using bedrails: A little  Moving from lying on your back to sitting on the side of a flat bed without using bedrails: A little  Moving to and from bed to chair (including a wheelchair): A lot  Standing up from a chair using your arms (e.g. wheelchair or bedside chair): A little  To walk in hospital room: A lot  Climbing 3-5 steps with railing: Total  Basic Mobility - Total Score: 14    Education Documentation  Precautions, taught by Belinda Tamez PTA at 6/14/2025  1:56 PM.  Learner: Family, Patient  Readiness: Acceptance  Method: Explanation, Demonstration, Teach-back  Response: Verbalizes Understanding, Demonstrated Understanding    Body Mechanics, taught by Belinda Tamez PTA at 6/14/2025  1:56 PM.  Learner:  Family, Patient  Readiness: Acceptance  Method: Explanation, Demonstration, Teach-back  Response: Verbalizes Understanding, Demonstrated Understanding    Mobility Training, taught by Belinda Tamez PTA at 6/14/2025  1:56 PM.  Learner: Family, Patient  Readiness: Acceptance  Method: Explanation, Demonstration, Teach-back  Response: Verbalizes Understanding, Demonstrated Understanding    Education Comments  No comments found.        OP EDUCATION:  Outpatient Education  Individual(s) Educated: Patient, Other  Education Provided: Fall Risk, Home Exercise Program, Post-Op Precautions    Encounter Problems       Encounter Problems (Active)       Balance       Goal 1 (Progressing)       Start:  06/11/25    Expected End:  06/25/25       Pt performs all sitting balance IND and standing balance with standby assist using wheeled walker            Mobility       STG - Patient will ambulate (Progressing)       Start:  06/11/25    Expected End:  06/25/25       25 ft with standby assist using wheeled walker with proper gait mechanics            PT Problem       PT Goal 1 (Progressing)       Start:  06/11/25    Expected End:  06/25/25       Pt demonstrates IND in performing 2 sets of 12 reps of prescribed BLE HEP            PT Transfers       STG - Patient will perform bed mobility (Progressing)       Start:  06/11/25    Expected End:  06/25/25       IND         STG - Patient will transfer sit to and from stand (Progressing)       Start:  06/11/25    Expected End:  06/25/25       With standby assist using wheeled walker with proper body mechanics            Pain - Adult

## 2025-06-14 NOTE — PROGRESS NOTES
Mildred Moyer is a 70 y.o. female     Chest tube is out and lungs are sounding clear to auscultation  Overall feels better but weak  Will need to go to rehab for therapy    Review of Systems     Constitutional: no fever, no chills,   Cardiovascular: no chest pain   Respiratory: Shortness of breath  Gastrointestinal: no abdominal pain, no constipation, no melena, no nausea, no diarrhea, no vomiting and no blood in stools.   Neurological: no headache,   All other systems have been reviewed and are negative for complaint.       Vitals:    06/14/25 1136   BP: 134/79   Pulse: (!) 112   Resp: 20   Temp: 36.7 °C (98.1 °F)   SpO2: 92%        Scheduled medications  Scheduled Medications[1]  Continuous medications  Continuous Medications[2]  PRN medications  PRN Medications[3]    Lab Review   Results from last 7 days   Lab Units 06/14/25  0508 06/13/25  0607 06/12/25  0503   WBC AUTO x10*3/uL 15.8* 19.3* 16.3*   HEMOGLOBIN g/dL 10.9* 11.1* 12.4   HEMATOCRIT % 30.4* 30.8* 37.9   PLATELETS AUTO x10*3/uL 542* 549* 590*     Results from last 7 days   Lab Units 06/14/25  0508 06/13/25  0607 06/12/25  0503   SODIUM mmol/L 129* 128* 130*   POTASSIUM mmol/L 3.4* 3.1* 4.4   CHLORIDE mmol/L 91* 90* 90*   CO2 mmol/L 25 28 31   BUN mg/dL 16 20 16   CREATININE mg/dL 0.62 0.63 0.65   CALCIUM mg/dL 8.4* 8.3* 8.2*   GLUCOSE mg/dL 86 111* 122*            XR chest 1 view   Final Result   Limited exam. Persistent opacification of most of the right   hemithorax, with associated stable left to right mediastinal shift.   Left effusion and left lung infiltrates again noted. Further   follow-up as needed.        Signed by: Jeff Blackwell 6/14/2025 2:14 PM   Dictation workstation:   [id number]      XR chest 1 view   Final Result   Left basilar pleural pigtail catheter has been removed. No   identifiable left-sided pneumothorax in this exam.        Stable pleural and parenchymal opacities at the left base. Stable   diffuse right-sided pleural and  parenchymal opacities with stable   right-sided volume loss and rightward mediastinal shift.        Patient has known underlying small bilateral lung nodules. These are   much better seen on CT scan.        MACRO:   None        Signed by: Kee Pelletier 6/13/2025 2:18 PM   Dictation workstation:   [id number]      XR chest 1 view   Final Result   Diffuse right-sided pleural and parenchymal opacities with   right-sided volume loss as described, unchanged.        Stable pleural catheter at the medial left lung base. No pneumothorax.        Small left pleural effusion is stable to minimally larger.        There are findings worrisome for mild congestive failure. Clinical   correlation needed.        MACRO:   None        Signed by: Kee Pelletier 6/13/2025 8:12 AM   Dictation workstation:   [id number]      CT angio chest for pulmonary embolism   Final Result   No evidence of PE.        Left lower chest pigtail catheter in place with small pleural   effusion/thickening and trace pneumothorax. Bilateral   pleuroparenchymal opacities/nodularity and large loculated right   pleural effusion with subtotal atelectasis of the right lung as   discussed above similar to 04/13/2025.        Right breast mass and multiple presumed metastatic lesions in the   liver and osseous thorax again noted. The osseous lesions are   slightly more conspicuous which could reflect healing or worsening   metastatic disease.        Other findings as described above.        MACRO:   None.        Signed by: Lis Henry 6/12/2025 12:18 PM   Dictation workstation:   [id number]      XR chest 1 view   Final Result   1. Tiny left apical pneumothorax with a left-sided chest tube and   interval decrease in the left-sided pleural effusion.   2. Continued volume loss and complete opacification of right   hemithorax.                  MACRO:   None        Signed by: Kamilah Alcantar 6/12/2025 7:52 AM   Dictation workstation:   [id number]      XR chest 1 view    Final Result   Multiple stable findings as described.        MACRO:   None        Signed by: Kee Pelletier 6/11/2025 8:09 AM   Dictation workstation:   [id number]      XR chest 1 view   Final Result   Redemonstration of almost complete opacification of the right   hemithorax which is unchanged.        Left pleural effusion, which is unchanged.                  MACRO:   None        Signed by: Mimi Real 6/10/2025 8:42 AM   Dictation workstation:   [id number]      XR chest 1 view   Final Result   Interval improvement in left pleural effusion and left basilar   airspace disease. Pigtail chest tube in place No change on the right   with near complete opacification of the right hemithorax and   associated large pleural effusion and airspace disease        MACRO:   None        Signed by: Cade Alcala 6/9/2025 9:01 PM   Dictation workstation:   [id number]      XR chest 1 view   Final Result   Redemonstration of extensive bilateral multifocal airspace disease   and pleural effusions worse on the right. This appears worsened   especially on the right.             MACRO:   None        Signed by: Cade Alcala 6/9/2025 6:09 PM   Dictation workstation:   [id number]      US thoracentesis w insertion of tube includes water seal when performed   Final Result   Ultrasound-guided percutaneous 12 [place] pigtail drainage catheter   into multi-septated left pleural effusion.             MACRO:   None        Signed by: Demetrio Iglesias 6/10/2025 6:40 PM   Dictation workstation:   [id number]      XR chest 1 view   Final Result   1.  No significant change of bilateral airspace disease greater on   the right.        Signed by: Stiven Cota 6/7/2025 10:17 AM   Dictation workstation:   [id number]      US thoracentesis   Final Result   Uneventful thoracentesis, as detailed above. Left pleural space, 400   mL        I personally performed and/or directly supervised this study and was   present for the entire procedure.         Performed and dictated at [facility].        Signed by: Dony Calderón 6/6/2025 5:02 PM   Dictation workstation:   [id number]      XR chest 1 view   Final Result        Persistent small left pleural effusion, mildly decreased in volume.   No pneumothorax.        Stable large right pleural effusion with subtotal right lung collapse.        MACRO   None        Signed by: Jigna Perla 6/6/2025 4:35 PM   Dictation workstation:   [id number]      XR chest 1 view   Final Result   No change in the bilateral effusions, bilateral infiltrate, and   partial collapse of the right lung.        MACRO:   none        Signed by: Lc Munoz 6/5/2025 12:42 PM   Dictation workstation:   [id number]            Physical Exam    Constitutional   General appearance: Alert and in no acute distress.   Pulmonary   Respiratory assessment: On nasal cannula  Decreased breath sounds  Cardiovascular   Auscultation of heart: Apical pulse normal, heart rate and rhythm normal, normal S1 and S2, no murmurs and no pericardial rub.    Exam for edema: No peripheral edema.   Abdomen   Abdominal Exam: No bruits, normal bowel sounds, soft, non-tender, no abdominal mass palpated.    Liver and Spleen exam: No hepato-splenomegaly.   Musculoskeletal     Inspection/palpation of joints, bones and muscles: No joint swelling. Normal movement of all extremities.   Neurologic   Cranial nerves: Nerves 2-12 were intact, no focal neuro defects.         Assessment/Plan      #Recurrent pleural effusion  #Breast cancer with meta stasis  S/p pigtail with lysis  Chest tube is out  We need regular x-rays to monitor the effusion    #Tachycardia  CT PE negative    #Severe malnutrition  Nutritional support     #Leukocytosis  #Partial collapse of right lung  Continue antibiotics for possible pneumonia  Procalcitonin borderline  We will continue antibiotics    #Hyponatremia  Poor solute intake  Elevated urine sodium osmolality      #Hypertension  #Dyslipidemia  #Chronic respiratory failure with hypoxia  Continue home medications and DuoNebs         [1] buPROPion XL, 300 mg, oral, q24h  [START ON 6/30/2025] capivasertib, 400 mg, oral, 2 times per day on Monday Tuesday Wednesday Thursday  cyanocobalamin, 1,000 mcg, oral, Daily  enoxaparin, 40 mg, subcutaneous, q24h  ferrous sulfate, 65 mg of elemental iron, oral, Daily  hydrOXYzine HCL, 25 mg, oral, Daily  lidocaine, 2 patch, transdermal, Daily  melatonin, 6 mg, oral, Nightly  oxyCODONE, 2.5 mg, oral, TID  pantoprazole, 40 mg, oral, Daily before breakfast   Or  pantoprazole, 40 mg, intravenous, Daily before breakfast  piperacillin-tazobactam, 3.375 g, intravenous, q6h  polyethylene glycol, 17 g, oral, Daily  rosuvastatin, 20 mg, oral, Daily     [2]    [3] PRN medications: acetaminophen **OR** acetaminophen **OR** acetaminophen, benzocaine-menthol, cyclobenzaprine, guaiFENesin, ipratropium-albuteroL, morphine, ondansetron **OR** ondansetron, oxyCODONE, oxygen

## 2025-06-14 NOTE — PROGRESS NOTES
Mildred Moyer is a 70 y.o. female on day 9 of admission presenting with Pleural effusion.  Patient with h/o MS, HTN, DLP, GERD, recurrent R pleural effusion s/p multiple thoracenteses and PleurX placement that was removed in 4/2025, chronic hypoxic respiratory failure on 3L NC, stage IV R breast invasive ductal carcinoma, anxiety, depression who p/w a gradual onset, progressive SOB x 3 weeks, associated with dry cough. In the ED work up revealed stable b/l effusion and infiltrates and partial R lung collapse on chest imaging, and leukocytosis. Admitted for recurrent pleural effusion. Pulmonary is consulted for partial R lung collapse. Of note since admission had L sided thoracentesis done.   Subjective   No acute overnight events. O2 requirements stable at 3 LPM.     Continues to feel better. SOB much improved. Cough resolved. Denies CP or wheezing. Complains of a mild pain at the chest tube insertion site. No other complaints.   Objective   Scheduled medications  buPROPion XL, 300 mg, oral, q24h  [START ON 6/30/2025] capivasertib, 400 mg, oral, 2 times per day on Monday Tuesday Wednesday Thursday  cyanocobalamin, 1,000 mcg, oral, Daily  enoxaparin, 40 mg, subcutaneous, q24h  ferrous sulfate, 65 mg of elemental iron, oral, Daily  hydrOXYzine HCL, 25 mg, oral, Daily  lidocaine, 2 patch, transdermal, Daily  melatonin, 6 mg, oral, Nightly  oxyCODONE, 2.5 mg, oral, TID  pantoprazole, 40 mg, oral, Daily before breakfast   Or  pantoprazole, 40 mg, intravenous, Daily before breakfast  piperacillin-tazobactam, 3.375 g, intravenous, q6h  polyethylene glycol, 17 g, oral, Daily  potassium chloride CR, 20 mEq, oral, Once  rosuvastatin, 20 mg, oral, Daily    Continuous medications     PRN medications  PRN medications: acetaminophen **OR** acetaminophen **OR** acetaminophen, benzocaine-menthol, cyclobenzaprine, guaiFENesin, ipratropium-albuteroL, morphine, ondansetron **OR** ondansetron, oxyCODONE, oxygen   Physical  "Exam  Constitutional:       General: She is not in acute distress.     Appearance: She is normal weight. She is ill-appearing. She is not toxic-appearing.   HENT:      Head: Normocephalic and atraumatic.      Nose:      Comments: On 3L NC.      Mouth/Throat:      Mouth: Mucous membranes are dry.      Comments: Mallampati 2.   Eyes:      General: No scleral icterus.     Extraocular Movements: Extraocular movements intact.      Pupils: Pupils are equal, round, and reactive to light.   Cardiovascular:      Rate and Rhythm: Regular rhythm. Tachycardia present.      Heart sounds: No murmur heard.     No friction rub. No gallop.   Pulmonary:      Effort: Pulmonary effort is normal. No respiratory distress.      Breath sounds: No wheezing or rales.      Comments: Reduced breath sounds at the bases. Overall fair air entry. L sided CT in place draining serosanguinous fluid.   Abdominal:      General: Bowel sounds are normal. There is no distension.      Palpations: Abdomen is soft.      Tenderness: There is no abdominal tenderness.   Musculoskeletal:         General: No tenderness. Normal range of motion.      Cervical back: Normal range of motion and neck supple. No rigidity.      Right lower leg: Edema (Trace) present.      Left lower leg: Edema (Trace) present.   Lymphadenopathy:      Cervical: No cervical adenopathy.   Skin:     General: Skin is warm and dry.      Coloration: Skin is not jaundiced.   Neurological:      General: No focal deficit present.      Mental Status: She is alert and oriented to person, place, and time.      Cranial Nerves: No cranial nerve deficit.      Motor: No weakness.   Psychiatric:         Mood and Affect: Mood normal.         Behavior: Behavior normal.     Last Recorded Vitals  Blood pressure 123/75, pulse 109, temperature 36.6 °C (97.9 °F), temperature source Temporal, resp. rate 19, height 1.626 m (5' 4.02\"), weight 52 kg (114 lb 9.6 oz), SpO2 94%.  Intake/Output last 3 Shifts:  I/O last " 3 completed shifts:  In: 580 (11.2 mL/kg) [P.O.:480; IV Piggyback:100]  Out: 400 (7.7 mL/kg) [Urine:400 (0.2 mL/kg/hr)]  Weight: 52 kg     Relevant Results  Results for orders placed or performed during the hospital encounter of 06/05/25 (from the past 24 hours)   Basic metabolic panel   Result Value Ref Range    Glucose 86 74 - 99 mg/dL    Sodium 129 (L) 136 - 145 mmol/L    Potassium 3.4 (L) 3.5 - 5.3 mmol/L    Chloride 91 (L) 98 - 107 mmol/L    Bicarbonate 25 21 - 32 mmol/L    Anion Gap 16 10 - 20 mmol/L    Urea Nitrogen 16 6 - 23 mg/dL    Creatinine 0.62 0.50 - 1.05 mg/dL    eGFR >90 >60 mL/min/1.73m*2    Calcium 8.4 (L) 8.6 - 10.3 mg/dL   CBC   Result Value Ref Range    WBC 15.8 (H) 4.4 - 11.3 x10*3/uL    nRBC 0.0 0.0 - 0.0 /100 WBCs    RBC 3.45 (L) 4.00 - 5.20 x10*6/uL    Hemoglobin 10.9 (L) 12.0 - 16.0 g/dL    Hematocrit 30.4 (L) 36.0 - 46.0 %    MCV 88 80 - 100 fL    MCH 31.6 26.0 - 34.0 pg    MCHC 35.9 32.0 - 36.0 g/dL    RDW 13.7 11.5 - 14.5 %    Platelets 542 (H) 150 - 450 x10*3/uL   XR chest 1 view  Result Date: 6/13/2025  Interpreted By:  Kee Pelletier, STUDY: XR CHEST 1 VIEW;  6/13/2025 2:01 pm   INDICATION: Signs/Symptoms:CT removal r/o pneumo.   COMPARISON: Chest x-ray from earlier this morning. CT scan chest from yesterday. These are the most recent priors.   ACCESSION NUMBER(S): [id number]   ORDERING CLINICIAN: FLIP ENRIQUEZ   TECHNIQUE: Single AP portable view of the chest was obtained.   FINDINGS: MEDIASTINUM/ LUNGS/ LARA: The patient had a tiny residual left apical pneumothorax on the prior CT scan from 06/12/2025. This is not apparent on the chest x-rays today. Left basilar chest tube has been removed. Persistent pleural and parenchymal opacities at the left base. There is pleural and parenchymal opacity throughout most of the right hemithorax with a small amount of aerated right upper lobe, unchanged. There is right-sided volume loss with rightward mediastinal shift. This is stable. There are  multiple small lung nodules evident in the lungs bilaterally, more evident on the left than on the right, and best seen on CT scan.   BONES: No lytic or blastic destructive bone lesion.   UPPER ABDOMEN: Grossly intact.       Left basilar pleural pigtail catheter has been removed. No identifiable left-sided pneumothorax in this exam.   Stable pleural and parenchymal opacities at the left base. Stable diffuse right-sided pleural and parenchymal opacities with stable right-sided volume loss and rightward mediastinal shift.   Patient has known underlying small bilateral lung nodules. These are much better seen on CT scan.   MACRO: None   Signed by: Kee Pelletier 6/13/2025 2:18 PM Dictation workstation:   [id number]    XR chest 1 view  Result Date: 6/13/2025  Interpreted By:  Kee Pelletier, STUDY: XR CHEST 1 VIEW;  6/13/2025 5:55 am   INDICATION: Signs/Symptoms:Monitor chest tube.   COMPARISON: Chest x-ray from 06/12/2025. CT scan chest from 06/12/2025.   ACCESSION NUMBER(S): [id number]   ORDERING CLINICIAN: TOM MG   TECHNIQUE: Single AP portable view of the chest was obtained.   FINDINGS: MEDIASTINUM/ LUNGS/ LARA: Stable right-sided volume loss with tracheal deviation to the right. Stable pleural and parenchymal opacities throughout the right hemithorax with stable small amount of aerated right upper lobe. Stable medial left basilar pleural catheter. Persistent small left pleural effusion is stable to minimally larger. There is mild haziness at the lateral left lung base, not significantly changed. Pulmonary vasculature today appears somewhat prominent and on the left there is some peripheral interstitial thickening. No pneumothorax.     BONES: No lytic or blastic destructive bone lesion.   UPPER ABDOMEN: Grossly intact.       Diffuse right-sided pleural and parenchymal opacities with right-sided volume loss as described, unchanged.   Stable pleural catheter at the medial left lung base. No pneumothorax.    Small left pleural effusion is stable to minimally larger.   There are findings worrisome for mild congestive failure. Clinical correlation needed.   MACRO: None   Signed by: Kee Pelletier 6/13/2025 8:12 AM Dictation workstation:   [id number]    CT angio chest for pulmonary embolism  Result Date: 6/12/2025  Interpreted By:  Lis Henry, STUDY: CT ANGIO CHEST FOR PULMONARY EMBOLISM;  6/12/2025 11:29 am   INDICATION: Signs/Symptoms:ongoing tachycardia, breast ca. r/o PE.     COMPARISON: 04/13/2025   ACCESSION NUMBER(S): [id number]   ORDERING CLINICIAN: DILSHAD GRIMALDO   TECHNIQUE: CT of the chest was performed. Sagittal and coronal reconstructions were generated. 75 ML Omnipaque 350 intravenous contrast given for the examination.  Multiplanar reconstructions of the pulmonary vessels were created on an independent workstation and provided for review.   FINDINGS:     CHEST WALL AND LOWER NECK: Slightly irregular heterogenous 2.8 cm right breast mass again seen. Right axillary adenopathy on the prior exam is no longer specifically seen. There is a 9 mm right subpectoral lymph node which is larger than on the prior exam.. 1.5 cm hypodense left thyroid nodule again seen.   MEDIASTINUM AND LARA:  Gaseous distention of the proximal esophagus. Mediastinal and paramediastinal soft tissue thickening/nodularity including 12 mm AP window node and 12 mm nodule in the anterior/precardiac fat similar to the prior exam. Mild soft tissue thickening in the left hilum. Right perihilar consolidation/atelectasis.   HEART AND VESSELS:  No pulmonary artery filling defect to suggest PE. Right pulmonary vessels are narrowed/attenuated within collapsed lung. Aberrant right subclavian artery coursing posterior to the esophagus, anatomic variant, again seen. The heart is normal in size. No significant pericardial effusion. Scattered atherosclerotic calcifications.   LUNGS, PLEURA, LARGE AIRWAYS: Large loculated right pleural effusion  with pleural thickening and subtotal atelectasis of the right lung similar to the prior exam. catheter within the right pleural space on the previous exam is no longer seen. There is currently a pigtail catheter in the posterior left pleural space with small pleural effusion, uneven pleural thickening, and trace pneumothorax. Pleural and fissural thickening/nodularity in the left chest and multiple bilateral pulmonary nodules similar to the previous exam including 10 mm left upper lobe nodule image 54/289 and pleural-based lobulated 15 mm nodule in the medial left lower lobe image 146/289.  trachea and main bronchi are patent.   UPPER ABDOMEN:  Multiple low-density lesions in the included liver and bilateral adrenal thickening similar to the previous exam.   BONES:  Kyphosis and degenerative changes of the visualized spine. Multiple sclerotic presumed metastatic lesions including T2, T5 and T7 are slightly more conspicuous.       No evidence of PE.   Left lower chest pigtail catheter in place with small pleural effusion/thickening and trace pneumothorax. Bilateral pleuroparenchymal opacities/nodularity and large loculated right pleural effusion with subtotal atelectasis of the right lung as discussed above similar to 04/13/2025.   Right breast mass and multiple presumed metastatic lesions in the liver and osseous thorax again noted. The osseous lesions are slightly more conspicuous which could reflect healing or worsening metastatic disease.   Other findings as described above.   MACRO: None.   Signed by: Lis Henry 6/12/2025 12:18 PM Dictation workstation:   [id number]     Assessment & Plan  Pleural effusion    70 YOF with h/o MS, HTN, DLP, GERD, recurrent R pleural effusion s/p multiple thoracenteses and PleurX placement that was removed in 4/2025, chronic hypoxic respiratory failure on 3L NC, stage IV R breast invasive ductal carcinoma, anxiety, depression who p/w a gradual onset, progressive SOB x 3 weeks,  associated with dry cough. In the ED work up revealed stable b/l effusion and infiltrates and partial R lung collapse on chest imaging, and leukocytosis. Admitted for recurrent pleural effusion. Pulmonary is consulted for partial R lung collapse. Of note since admission had R sided thoracentesis done. Of note the patient with progression of her breast cancer, being evaluated for palliative chemotherapy.     Recurrent pleural effusions: bilateral. R present since 2023. S/p multiple R sided thoracentesis. Exudative, lymphocytic predominant, atypical cell on cytology. She had R VATs that confirmed trapped lung with pleural biopsy (pathology showed acute fibrinous pleuritis with atypia) and PleurX placement in 10/2024. A moderate size L effusion was detected on CT from 4/2025. S/p L thoracentesis with no specimen. PleurX was removed in 4/2025. Again had L thoracentesis done in 5/2025 that showed exudative effusion. Repeat imaging on this admission re-demonstrated bilateral pleural effusions. S/p L thoracentesis that showed exudative effusion.        Seen by thoracic surgery, s/p L Pigtail placement, and dornase therapy #1 with significant improvement on the chest imaging.  CT removed on 6/13.        FU thoracentesis results. Culture NTD. Cytology pending.        FU with thoracic surgery recommendations.          Possible pneumonia: MRSA screening negative       Continue Zosyn for a total of 7 days.        FU cultures         Breast cancer: progressive with possible pulmonary and pleural mets.        Outpatient management as per oncology and thoracic surgery    Respiratory failure: acute on chronic with hypoxia. Now improved after CT placement. Likely multi-factorial due to above.         Continue supplemental O2, wean off as tolerates        Home O2 evaluation before DC        BPH with IS, Acapella        Continue Duo-Neb    DVT prophylaxis: On Lovenox    Pulmonary will FU while in house.     Lucita Vargas MD

## 2025-06-15 ENCOUNTER — APPOINTMENT (OUTPATIENT)
Dept: RADIOLOGY | Facility: HOSPITAL | Age: 70
DRG: 186 | End: 2025-06-15
Payer: COMMERCIAL

## 2025-06-15 VITALS
DIASTOLIC BLOOD PRESSURE: 71 MMHG | BODY MASS INDEX: 19.56 KG/M2 | TEMPERATURE: 97.4 F | WEIGHT: 114.6 LBS | RESPIRATION RATE: 20 BRPM | HEIGHT: 64 IN | OXYGEN SATURATION: 100 % | HEART RATE: 108 BPM | SYSTOLIC BLOOD PRESSURE: 127 MMHG

## 2025-06-15 LAB
ANION GAP SERPL CALC-SCNC: 11 MMOL/L (ref 10–20)
BUN SERPL-MCNC: 14 MG/DL (ref 6–23)
CALCIUM SERPL-MCNC: 8.1 MG/DL (ref 8.6–10.3)
CHLORIDE SERPL-SCNC: 91 MMOL/L (ref 98–107)
CO2 SERPL-SCNC: 30 MMOL/L (ref 21–32)
CREAT SERPL-MCNC: 0.6 MG/DL (ref 0.5–1.05)
EGFRCR SERPLBLD CKD-EPI 2021: >90 ML/MIN/1.73M*2
ERYTHROCYTE [DISTWIDTH] IN BLOOD BY AUTOMATED COUNT: 13.5 % (ref 11.5–14.5)
GLUCOSE SERPL-MCNC: 106 MG/DL (ref 74–99)
HCT VFR BLD AUTO: 30 % (ref 36–46)
HGB BLD-MCNC: 10.4 G/DL (ref 12–16)
MCH RBC QN AUTO: 31.4 PG (ref 26–34)
MCHC RBC AUTO-ENTMCNC: 34.7 G/DL (ref 32–36)
MCV RBC AUTO: 91 FL (ref 80–100)
NRBC BLD-RTO: 0 /100 WBCS (ref 0–0)
PLATELET # BLD AUTO: 539 X10*3/UL (ref 150–450)
POTASSIUM SERPL-SCNC: 3.4 MMOL/L (ref 3.5–5.3)
RBC # BLD AUTO: 3.31 X10*6/UL (ref 4–5.2)
SODIUM SERPL-SCNC: 129 MMOL/L (ref 136–145)
WBC # BLD AUTO: 14.4 X10*3/UL (ref 4.4–11.3)

## 2025-06-15 PROCEDURE — 85027 COMPLETE CBC AUTOMATED: CPT | Performed by: NURSE PRACTITIONER

## 2025-06-15 PROCEDURE — 2500000004 HC RX 250 GENERAL PHARMACY W/ HCPCS (ALT 636 FOR OP/ED): Performed by: INTERNAL MEDICINE

## 2025-06-15 PROCEDURE — 2500000001 HC RX 250 WO HCPCS SELF ADMINISTERED DRUGS (ALT 637 FOR MEDICARE OP): Performed by: INTERNAL MEDICINE

## 2025-06-15 PROCEDURE — 71045 X-RAY EXAM CHEST 1 VIEW: CPT | Performed by: RADIOLOGY

## 2025-06-15 PROCEDURE — 99233 SBSQ HOSP IP/OBS HIGH 50: CPT | Performed by: INTERNAL MEDICINE

## 2025-06-15 PROCEDURE — 71045 X-RAY EXAM CHEST 1 VIEW: CPT

## 2025-06-15 PROCEDURE — 99231 SBSQ HOSP IP/OBS SF/LOW 25: CPT | Performed by: INTERNAL MEDICINE

## 2025-06-15 PROCEDURE — 2500000002 HC RX 250 W HCPCS SELF ADMINISTERED DRUGS (ALT 637 FOR MEDICARE OP, ALT 636 FOR OP/ED): Performed by: INTERNAL MEDICINE

## 2025-06-15 PROCEDURE — 80048 BASIC METABOLIC PNL TOTAL CA: CPT | Performed by: NURSE PRACTITIONER

## 2025-06-15 PROCEDURE — 2060000001 HC INTERMEDIATE ICU ROOM DAILY

## 2025-06-15 PROCEDURE — 2500000005 HC RX 250 GENERAL PHARMACY W/O HCPCS: Performed by: INTERNAL MEDICINE

## 2025-06-15 PROCEDURE — 36415 COLL VENOUS BLD VENIPUNCTURE: CPT | Performed by: NURSE PRACTITIONER

## 2025-06-15 RX ORDER — POTASSIUM CHLORIDE 20 MEQ/1
20 TABLET, EXTENDED RELEASE ORAL ONCE
Status: COMPLETED | OUTPATIENT
Start: 2025-06-15 | End: 2025-06-15

## 2025-06-15 RX ADMIN — BUPROPION HYDROCHLORIDE 300 MG: 150 TABLET, EXTENDED RELEASE ORAL at 17:10

## 2025-06-15 RX ADMIN — PIPERACILLIN SODIUM AND TAZOBACTAM SODIUM 3.38 G: 3; .375 INJECTION, SOLUTION INTRAVENOUS at 06:39

## 2025-06-15 RX ADMIN — ROSUVASTATIN 20 MG: 20 TABLET, FILM COATED ORAL at 09:43

## 2025-06-15 RX ADMIN — POTASSIUM CHLORIDE 20 MEQ: 1500 TABLET, EXTENDED RELEASE ORAL at 11:25

## 2025-06-15 RX ADMIN — PIPERACILLIN SODIUM AND TAZOBACTAM SODIUM 3.38 G: 3; .375 INJECTION, SOLUTION INTRAVENOUS at 00:47

## 2025-06-15 RX ADMIN — FERROUS SULFATE TAB 325 MG (65 MG ELEMENTAL FE) 1 TABLET: 325 (65 FE) TAB at 09:43

## 2025-06-15 RX ADMIN — HYDROXYZINE HYDROCHLORIDE 25 MG: 25 TABLET, FILM COATED ORAL at 09:43

## 2025-06-15 RX ADMIN — PANTOPRAZOLE SODIUM 40 MG: 40 INJECTION, POWDER, FOR SOLUTION INTRAVENOUS at 06:39

## 2025-06-15 RX ADMIN — PIPERACILLIN SODIUM AND TAZOBACTAM SODIUM 3.38 G: 3; .375 INJECTION, SOLUTION INTRAVENOUS at 17:10

## 2025-06-15 RX ADMIN — ENOXAPARIN SODIUM 40 MG: 40 INJECTION SUBCUTANEOUS at 17:10

## 2025-06-15 RX ADMIN — MELATONIN TAB 3 MG 6 MG: 3 TAB at 21:57

## 2025-06-15 RX ADMIN — Medication 1000 MCG: at 09:43

## 2025-06-15 RX ADMIN — PIPERACILLIN SODIUM AND TAZOBACTAM SODIUM 3.38 G: 3; .375 INJECTION, SOLUTION INTRAVENOUS at 11:25

## 2025-06-15 ASSESSMENT — COGNITIVE AND FUNCTIONAL STATUS - GENERAL
EATING MEALS: A LITTLE
WALKING IN HOSPITAL ROOM: A LOT
DRESSING REGULAR LOWER BODY CLOTHING: A LITTLE
WALKING IN HOSPITAL ROOM: A LOT
TURNING FROM BACK TO SIDE WHILE IN FLAT BAD: A LITTLE
PERSONAL GROOMING: A LITTLE
STANDING UP FROM CHAIR USING ARMS: A LITTLE
DRESSING REGULAR UPPER BODY CLOTHING: A LITTLE
MOBILITY SCORE: 16
MOVING TO AND FROM BED TO CHAIR: A LITTLE
CLIMB 3 TO 5 STEPS WITH RAILING: A LOT
MOVING TO AND FROM BED TO CHAIR: A LITTLE
MOBILITY SCORE: 16
CLIMB 3 TO 5 STEPS WITH RAILING: A LOT
STANDING UP FROM CHAIR USING ARMS: A LITTLE
DRESSING REGULAR UPPER BODY CLOTHING: A LITTLE
DAILY ACTIVITIY SCORE: 18
MOVING FROM LYING ON BACK TO SITTING ON SIDE OF FLAT BED WITH BEDRAILS: A LITTLE
DRESSING REGULAR LOWER BODY CLOTHING: A LITTLE
TOILETING: A LITTLE
HELP NEEDED FOR BATHING: A LITTLE
TURNING FROM BACK TO SIDE WHILE IN FLAT BAD: A LITTLE
DAILY ACTIVITIY SCORE: 18
EATING MEALS: A LITTLE
PERSONAL GROOMING: A LITTLE
HELP NEEDED FOR BATHING: A LITTLE
MOVING FROM LYING ON BACK TO SITTING ON SIDE OF FLAT BED WITH BEDRAILS: A LITTLE
TOILETING: A LITTLE

## 2025-06-15 ASSESSMENT — PAIN SCALES - GENERAL
PAINLEVEL_OUTOF10: 0 - NO PAIN
PAINLEVEL_OUTOF10: 0 - NO PAIN

## 2025-06-15 ASSESSMENT — PAIN - FUNCTIONAL ASSESSMENT
PAIN_FUNCTIONAL_ASSESSMENT: 0-10
PAIN_FUNCTIONAL_ASSESSMENT: 0-10

## 2025-06-15 NOTE — PROGRESS NOTES
Mildred Moyer is a 70 y.o. female on day 10 of admission presenting with Pleural effusion.  Patient with h/o MS, HTN, DLP, GERD, recurrent R pleural effusion s/p multiple thoracenteses and PleurX placement that was removed in 4/2025, chronic hypoxic respiratory failure on 3L NC, stage IV R breast invasive ductal carcinoma, anxiety, depression who p/w a gradual onset, progressive SOB x 3 weeks, associated with dry cough. In the ED work up revealed stable b/l effusion and infiltrates and partial R lung collapse on chest imaging, and leukocytosis. Admitted for recurrent pleural effusion. Pulmonary is consulted for partial R lung collapse. Of note since admission had L sided thoracentesis done.   Subjective   No acute overnight events. O2 requirements stable at 3 LPM.     Still complains of significant MCCONNELL.  Cough resolved. Denies CP or wheezing. Denies any pains. No other complaints.   Objective   Scheduled medications  buPROPion XL, 300 mg, oral, q24h  [START ON 6/30/2025] capivasertib, 400 mg, oral, 2 times per day on Monday Tuesday Wednesday Thursday  cyanocobalamin, 1,000 mcg, oral, Daily  enoxaparin, 40 mg, subcutaneous, q24h  ferrous sulfate, 65 mg of elemental iron, oral, Daily  hydrOXYzine HCL, 25 mg, oral, Daily  lidocaine, 2 patch, transdermal, Daily  melatonin, 6 mg, oral, Nightly  oxyCODONE, 2.5 mg, oral, TID  pantoprazole, 40 mg, oral, Daily before breakfast   Or  pantoprazole, 40 mg, intravenous, Daily before breakfast  piperacillin-tazobactam, 3.375 g, intravenous, q6h  polyethylene glycol, 17 g, oral, Daily  rosuvastatin, 20 mg, oral, Daily    Continuous medications     PRN medications  PRN medications: acetaminophen **OR** acetaminophen **OR** acetaminophen, benzocaine-menthol, cyclobenzaprine, guaiFENesin, ipratropium-albuteroL, morphine, ondansetron **OR** ondansetron, oxyCODONE, oxygen   Physical Exam  Constitutional:       General: She is not in acute distress.     Appearance: She is normal  "weight. She is ill-appearing. She is not toxic-appearing.   HENT:      Head: Normocephalic and atraumatic.      Nose:      Comments: On 3L NC.      Mouth/Throat:      Mouth: Mucous membranes are dry.      Comments: Mallampati 2.   Eyes:      General: No scleral icterus.     Extraocular Movements: Extraocular movements intact.      Pupils: Pupils are equal, round, and reactive to light.   Cardiovascular:      Rate and Rhythm: Normal rate and regular rhythm.      Heart sounds: No murmur heard.     No friction rub. No gallop.   Pulmonary:      Effort: Pulmonary effort is normal. No respiratory distress.      Breath sounds: No wheezing or rales.      Comments: Reduced breath sounds at the bases. Overall fair air entry.   Abdominal:      General: Bowel sounds are normal. There is no distension.      Palpations: Abdomen is soft.      Tenderness: There is no abdominal tenderness.   Musculoskeletal:         General: No tenderness. Normal range of motion.      Cervical back: Normal range of motion and neck supple. No rigidity.      Right lower leg: Edema (Trace) present.      Left lower leg: Edema (Trace) present.   Lymphadenopathy:      Cervical: No cervical adenopathy.   Skin:     General: Skin is warm and dry.      Coloration: Skin is not jaundiced.   Neurological:      General: No focal deficit present.      Mental Status: She is alert and oriented to person, place, and time.      Cranial Nerves: No cranial nerve deficit.      Motor: No weakness.   Psychiatric:         Mood and Affect: Mood normal.         Behavior: Behavior normal.     Last Recorded Vitals  Blood pressure 105/68, pulse 99, temperature 36.5 °C (97.7 °F), temperature source Temporal, resp. rate 18, height 1.626 m (5' 4.02\"), weight 52 kg (114 lb 9.6 oz), SpO2 97%.  Intake/Output last 3 Shifts:  I/O last 3 completed shifts:  In: 1140 (21.9 mL/kg) [P.O.:940; IV Piggyback:200]  Out: - (0 mL/kg)   Weight: 52 kg     Relevant Results  Results for orders placed " or performed during the hospital encounter of 06/05/25 (from the past 24 hours)   Basic metabolic panel   Result Value Ref Range    Glucose 106 (H) 74 - 99 mg/dL    Sodium 129 (L) 136 - 145 mmol/L    Potassium 3.4 (L) 3.5 - 5.3 mmol/L    Chloride 91 (L) 98 - 107 mmol/L    Bicarbonate 30 21 - 32 mmol/L    Anion Gap 11 10 - 20 mmol/L    Urea Nitrogen 14 6 - 23 mg/dL    Creatinine 0.60 0.50 - 1.05 mg/dL    eGFR >90 >60 mL/min/1.73m*2    Calcium 8.1 (L) 8.6 - 10.3 mg/dL   CBC   Result Value Ref Range    WBC 14.4 (H) 4.4 - 11.3 x10*3/uL    nRBC 0.0 0.0 - 0.0 /100 WBCs    RBC 3.31 (L) 4.00 - 5.20 x10*6/uL    Hemoglobin 10.4 (L) 12.0 - 16.0 g/dL    Hematocrit 30.0 (L) 36.0 - 46.0 %    MCV 91 80 - 100 fL    MCH 31.4 26.0 - 34.0 pg    MCHC 34.7 32.0 - 36.0 g/dL    RDW 13.5 11.5 - 14.5 %    Platelets 539 (H) 150 - 450 x10*3/uL   XR chest 1 view  Result Date: 6/15/2025  Interpreted By:  Cade Alcala, STUDY: XR CHEST 1 VIEW;  6/15/2025 4:27 am   INDICATION: Signs/Symptoms:Monitor chest tube.     COMPARISON: 06/14/2025   ACCESSION NUMBER(S): [id number]   ORDERING CLINICIAN: TOM MG   FINDINGS: No change from the prior. Persistent near complete opacification of the right hemithorax with a large effusion. Moderate-sized left effusion present with extensive left basilar airspace disease. The cardiac silhouette is partially obscured but appears within normal limits where visualized       Redemonstration of near complete opacification of the right hemithorax with large effusion. Moderate-sized left pleural effusion and left basilar airspace disease also present. Overall no change from the prior     MACRO: None   Signed by: Cade Alcala 6/15/2025 11:08 AM Dictation workstation:   [id number]    XR chest 1 view  Result Date: 6/14/2025  Interpreted By:  Jeff Blackwell, STUDY: XR CHEST 1 VIEW; 6/14/2025 5:04 am   INDICATION: Signs/Symptoms:Monitor chest tube   COMPARISON: 06/12/2025 and 06/30/2025.   ACCESSION NUMBER(S):  [id number]   ORDERING CLINICIAN: TOM MG   FINDINGS: The study is limited due to  poor inspiratory effort, with resultant crowding of the pulmonary vasculature. The cardiac silhouette is indeterminate due to the technique. Opacification of most of the right hemithorax is again noted, probably due to combination of right lung atelectasis and pleural effusion, with associated stable left to right mediastinal shift. Small left effusion and mild left interstitial and lower lobe alveolar infiltrates are again noted. There is no pneumothorax. The osseous structures are unchanged.       Limited exam. Persistent opacification of most of the right hemithorax, with associated stable left to right mediastinal shift. Left effusion and left lung infiltrates again noted. Further follow-up as needed.   Signed by: Jeff Blackwell 6/14/2025 2:14 PM Dictation workstation:   [id number]    XR chest 1 view  Result Date: 6/13/2025  Interpreted By:  Kee Pelletier, STUDY: XR CHEST 1 VIEW;  6/13/2025 2:01 pm   INDICATION: Signs/Symptoms:CT removal r/o pneumo.   COMPARISON: Chest x-ray from earlier this morning. CT scan chest from yesterday. These are the most recent priors.   ACCESSION NUMBER(S): [id number]   ORDERING CLINICIAN: FLIP ENRIQUEZ   TECHNIQUE: Single AP portable view of the chest was obtained.   FINDINGS: MEDIASTINUM/ LUNGS/ LARA: The patient had a tiny residual left apical pneumothorax on the prior CT scan from 06/12/2025. This is not apparent on the chest x-rays today. Left basilar chest tube has been removed. Persistent pleural and parenchymal opacities at the left base. There is pleural and parenchymal opacity throughout most of the right hemithorax with a small amount of aerated right upper lobe, unchanged. There is right-sided volume loss with rightward mediastinal shift. This is stable. There are multiple small lung nodules evident in the lungs bilaterally, more evident on the left than on the right, and best seen  on CT scan.   BONES: No lytic or blastic destructive bone lesion.   UPPER ABDOMEN: Grossly intact.       Left basilar pleural pigtail catheter has been removed. No identifiable left-sided pneumothorax in this exam.   Stable pleural and parenchymal opacities at the left base. Stable diffuse right-sided pleural and parenchymal opacities with stable right-sided volume loss and rightward mediastinal shift.   Patient has known underlying small bilateral lung nodules. These are much better seen on CT scan.   MACRO: None   Signed by: Kee Pelletier 6/13/2025 2:18 PM Dictation workstation:   [id number]     Assessment & Plan  Pleural effusion    70 YOF with h/o MS, HTN, DLP, GERD, recurrent R pleural effusion s/p multiple thoracenteses and PleurX placement that was removed in 4/2025, chronic hypoxic respiratory failure on 3L NC, stage IV R breast invasive ductal carcinoma, anxiety, depression who p/w a gradual onset, progressive SOB x 3 weeks, associated with dry cough. In the ED work up revealed stable b/l effusion and infiltrates and partial R lung collapse on chest imaging, and leukocytosis. Admitted for recurrent pleural effusion. Pulmonary is consulted for partial R lung collapse. Of note since admission had R sided thoracentesis done. Of note the patient with progression of her breast cancer, being evaluated for palliative chemotherapy.     Recurrent pleural effusions: bilateral. R present since 2023. S/p multiple R sided thoracentesis. Exudative, lymphocytic predominant, atypical cell on cytology. She had R VATs that confirmed trapped lung with pleural biopsy (pathology showed acute fibrinous pleuritis with atypia) and PleurX placement in 10/2024. A moderate size L effusion was detected on CT from 4/2025. S/p L thoracentesis with no specimen. PleurX was removed in 4/2025. Again had L thoracentesis done in 5/2025 that showed exudative effusion. Repeat imaging on this admission re-demonstrated bilateral pleural  effusions. S/p L thoracentesis that showed exudative effusion.        Seen by thoracic surgery, s/p L Pigtail placement, and dornase therapy #1 with significant improvement on the chest imaging.  CT removed on 6/13.        FU thoracentesis results. Culture NTD. Cytology pending.        FU with thoracic surgery recommendations.          Possible pneumonia: MRSA screening negative       Continue Zosyn for a total of 7 days.        FU cultures         Breast cancer: progressive with possible pulmonary and pleural mets.        Outpatient management as per oncology and thoracic surgery    Respiratory failure: acute on chronic with hypoxia. Now improved after CT placement. Likely multi-factorial due to above.         Continue supplemental O2, wean off as tolerates        Home O2 evaluation before DC        BPH with IS, Acapella        Continue Duo-Neb    DVT prophylaxis: On Lovenox    Pulmonary will FU while in house.     Lucita Vargas MD

## 2025-06-15 NOTE — CARE PLAN
"The patient's goals for the shift include \"go to sleep\"    The clinical goals for the shift include free of falls by end of shift      "

## 2025-06-15 NOTE — PROGRESS NOTES
Mildred Moyer is a 70 y.o. female     stable    Review of Systems     Constitutional: no fever, no chills,   Cardiovascular: no chest pain   Respiratory: Shortness of breath  Gastrointestinal: no abdominal pain, no constipation, no melena, no nausea, no diarrhea, no vomiting and no blood in stools.   Neurological: no headache,   All other systems have been reviewed and are negative for complaint.       Vitals:    06/15/25 1200   BP: 119/76   Pulse: 102   Resp: 18   Temp: 36.2 °C (97.2 °F)   SpO2: 97%        Scheduled medications  Scheduled Medications[1]  Continuous medications  Continuous Medications[2]  PRN medications  PRN Medications[3]    Lab Review   Results from last 7 days   Lab Units 06/15/25  0533 06/14/25  0508 06/13/25  0607   WBC AUTO x10*3/uL 14.4* 15.8* 19.3*   HEMOGLOBIN g/dL 10.4* 10.9* 11.1*   HEMATOCRIT % 30.0* 30.4* 30.8*   PLATELETS AUTO x10*3/uL 539* 542* 549*     Results from last 7 days   Lab Units 06/15/25  0533 06/14/25  0508 06/13/25  0607   SODIUM mmol/L 129* 129* 128*   POTASSIUM mmol/L 3.4* 3.4* 3.1*   CHLORIDE mmol/L 91* 91* 90*   CO2 mmol/L 30 25 28   BUN mg/dL 14 16 20   CREATININE mg/dL 0.60 0.62 0.63   CALCIUM mg/dL 8.1* 8.4* 8.3*   GLUCOSE mg/dL 106* 86 111*            XR chest 1 view   Final Result   Redemonstration of near complete opacification of the right   hemithorax with large effusion. Moderate-sized left pleural effusion   and left basilar airspace disease also present. Overall no change   from the prior             MACRO:   None        Signed by: Cade Alcala 6/15/2025 11:08 AM   Dictation workstation:   [id number]      XR chest 1 view   Final Result   Limited exam. Persistent opacification of most of the right   hemithorax, with associated stable left to right mediastinal shift.   Left effusion and left lung infiltrates again noted. Further   follow-up as needed.        Signed by: Jeff Blackwell 6/14/2025 2:14 PM   Dictation workstation:   [id number]      XR  chest 1 view   Final Result   Left basilar pleural pigtail catheter has been removed. No   identifiable left-sided pneumothorax in this exam.        Stable pleural and parenchymal opacities at the left base. Stable   diffuse right-sided pleural and parenchymal opacities with stable   right-sided volume loss and rightward mediastinal shift.        Patient has known underlying small bilateral lung nodules. These are   much better seen on CT scan.        MACRO:   None        Signed by: Kee Pelletier 6/13/2025 2:18 PM   Dictation workstation:   [id number]      XR chest 1 view   Final Result   Diffuse right-sided pleural and parenchymal opacities with   right-sided volume loss as described, unchanged.        Stable pleural catheter at the medial left lung base. No pneumothorax.        Small left pleural effusion is stable to minimally larger.        There are findings worrisome for mild congestive failure. Clinical   correlation needed.        MACRO:   None        Signed by: Kee Pelletier 6/13/2025 8:12 AM   Dictation workstation:   [id number]      CT angio chest for pulmonary embolism   Final Result   No evidence of PE.        Left lower chest pigtail catheter in place with small pleural   effusion/thickening and trace pneumothorax. Bilateral   pleuroparenchymal opacities/nodularity and large loculated right   pleural effusion with subtotal atelectasis of the right lung as   discussed above similar to 04/13/2025.        Right breast mass and multiple presumed metastatic lesions in the   liver and osseous thorax again noted. The osseous lesions are   slightly more conspicuous which could reflect healing or worsening   metastatic disease.        Other findings as described above.        MACRO:   None.        Signed by: Lis Henry 6/12/2025 12:18 PM   Dictation workstation:   [id number]      XR chest 1 view   Final Result   1. Tiny left apical pneumothorax with a left-sided chest tube and   interval decrease in the  left-sided pleural effusion.   2. Continued volume loss and complete opacification of right   hemithorax.                  MACRO:   None        Signed by: Kamilah Alcantar 6/12/2025 7:52 AM   Dictation workstation:   [id number]      XR chest 1 view   Final Result   Multiple stable findings as described.        MACRO:   None        Signed by: Kee Pelletier 6/11/2025 8:09 AM   Dictation workstation:   [id number]      XR chest 1 view   Final Result   Redemonstration of almost complete opacification of the right   hemithorax which is unchanged.        Left pleural effusion, which is unchanged.                  MACRO:   None        Signed by: Mimi Real 6/10/2025 8:42 AM   Dictation workstation:   [id number]      XR chest 1 view   Final Result   Interval improvement in left pleural effusion and left basilar   airspace disease. Pigtail chest tube in place No change on the right   with near complete opacification of the right hemithorax and   associated large pleural effusion and airspace disease        MACRO:   None        Signed by: Cade Alcala 6/9/2025 9:01 PM   Dictation workstation:   [id number]      XR chest 1 view   Final Result   Redemonstration of extensive bilateral multifocal airspace disease   and pleural effusions worse on the right. This appears worsened   especially on the right.             MACRO:   None        Signed by: Cade Alcala 6/9/2025 6:09 PM   Dictation workstation:   [id number]      US thoracentesis w insertion of tube includes water seal when performed   Final Result   Ultrasound-guided percutaneous 12 Chinese pigtail drainage catheter   into multi-septated left pleural effusion.             MACRO:   None        Signed by: Demetrio Iglesias 6/10/2025 6:40 PM   Dictation workstation:   [id number]      XR chest 1 view   Final Result   1.  No significant change of bilateral airspace disease greater on   the right.        Signed by: Stiven Cota 6/7/2025 10:17 AM   Dictation workstation:    [id number]      US thoracentesis   Final Result   Uneventful thoracentesis, as detailed above. Left pleural space, 400   mL        I personally performed and/or directly supervised this study and was   present for the entire procedure.        Performed and dictated at [facility].        Signed by: Dony Calderón 6/6/2025 5:02 PM   Dictation workstation:   [id number]      XR chest 1 view   Final Result        Persistent small left pleural effusion, mildly decreased in volume.   No pneumothorax.        Stable large right pleural effusion with subtotal right lung collapse.        MACRO   None        Signed by: Jigna Perla 6/6/2025 4:35 PM   Dictation workstation:   [id number]      XR chest 1 view   Final Result   No change in the bilateral effusions, bilateral infiltrate, and   partial collapse of the right lung.        MACRO:   none        Signed by: Lc Munoz 6/5/2025 12:42 PM   Dictation workstation:   [id number]            Physical Exam    Constitutional   General appearance: Alert and in no acute distress.   Pulmonary   Respiratory assessment: On nasal cannula  Decreased breath sounds  Cardiovascular   Auscultation of heart: Apical pulse normal, heart rate and rhythm normal, normal S1 and S2, no murmurs and no pericardial rub.    Exam for edema: No peripheral edema.   Abdomen   Abdominal Exam: No bruits, normal bowel sounds, soft, non-tender, no abdominal mass palpated.    Liver and Spleen exam: No hepato-splenomegaly.   Musculoskeletal     Inspection/palpation of joints, bones and muscles: No joint swelling. Normal movement of all extremities.   Neurologic   Cranial nerves: Nerves 2-12 were intact, no focal neuro defects.         Assessment/Plan      #Recurrent pleural effusion  #Breast cancer with meta stasis  S/p pigtail with lysis  Chest tube is out  We need regular x-rays to monitor the effusion        #Severe malnutrition  Nutritional support      #Leukocytosis  #Partial collapse of right lung  Continue antibiotics for possible pneumonia  Procalcitonin borderline  We will continue antibiotics    #Hyponatremia  Persistent Hypokalemia  Poor solute intake  Elevated urine sodium osmolality   replenish    #Hypertension  #Dyslipidemia  #Chronic respiratory failure with hypoxia  Continue home medications and DuoNebs         [1] buPROPion XL, 300 mg, oral, q24h  [START ON 6/30/2025] capivasertib, 400 mg, oral, 2 times per day on Monday Tuesday Wednesday Thursday  cyanocobalamin, 1,000 mcg, oral, Daily  enoxaparin, 40 mg, subcutaneous, q24h  ferrous sulfate, 65 mg of elemental iron, oral, Daily  hydrOXYzine HCL, 25 mg, oral, Daily  lidocaine, 2 patch, transdermal, Daily  melatonin, 6 mg, oral, Nightly  oxyCODONE, 2.5 mg, oral, TID  pantoprazole, 40 mg, oral, Daily before breakfast   Or  pantoprazole, 40 mg, intravenous, Daily before breakfast  piperacillin-tazobactam, 3.375 g, intravenous, q6h  polyethylene glycol, 17 g, oral, Daily  rosuvastatin, 20 mg, oral, Daily     [2]    [3] PRN medications: acetaminophen **OR** acetaminophen **OR** acetaminophen, benzocaine-menthol, cyclobenzaprine, guaiFENesin, ipratropium-albuteroL, morphine, ondansetron **OR** ondansetron, oxyCODONE, oxygen

## 2025-06-16 ENCOUNTER — APPOINTMENT (OUTPATIENT)
Dept: RADIOLOGY | Facility: HOSPITAL | Age: 70
DRG: 186 | End: 2025-06-16
Payer: COMMERCIAL

## 2025-06-16 ENCOUNTER — TELEPHONE (OUTPATIENT)
Dept: HEMATOLOGY/ONCOLOGY | Facility: CLINIC | Age: 70
End: 2025-06-16
Payer: COMMERCIAL

## 2025-06-16 VITALS
RESPIRATION RATE: 18 BRPM | DIASTOLIC BLOOD PRESSURE: 75 MMHG | BODY MASS INDEX: 19.56 KG/M2 | HEIGHT: 64 IN | OXYGEN SATURATION: 98 % | HEART RATE: 104 BPM | SYSTOLIC BLOOD PRESSURE: 130 MMHG | WEIGHT: 114.6 LBS | TEMPERATURE: 97.5 F

## 2025-06-16 LAB
ANION GAP SERPL CALC-SCNC: 16 MMOL/L (ref 10–20)
BUN SERPL-MCNC: 12 MG/DL (ref 6–23)
CALCIUM SERPL-MCNC: 8.2 MG/DL (ref 8.6–10.3)
CHLORIDE SERPL-SCNC: 93 MMOL/L (ref 98–107)
CO2 SERPL-SCNC: 26 MMOL/L (ref 21–32)
CREAT SERPL-MCNC: 0.55 MG/DL (ref 0.5–1.05)
EGFRCR SERPLBLD CKD-EPI 2021: >90 ML/MIN/1.73M*2
ERYTHROCYTE [DISTWIDTH] IN BLOOD BY AUTOMATED COUNT: 13.9 % (ref 11.5–14.5)
GLUCOSE SERPL-MCNC: 92 MG/DL (ref 74–99)
HCT VFR BLD AUTO: 29.8 % (ref 36–46)
HGB BLD-MCNC: 10.2 G/DL (ref 12–16)
LABORATORY COMMENT REPORT: NORMAL
LABORATORY COMMENT REPORT: NORMAL
MAGNESIUM SERPL-MCNC: 1.91 MG/DL (ref 1.6–2.4)
MCH RBC QN AUTO: 31.3 PG (ref 26–34)
MCHC RBC AUTO-ENTMCNC: 34.2 G/DL (ref 32–36)
MCV RBC AUTO: 91 FL (ref 80–100)
NRBC BLD-RTO: 0 /100 WBCS (ref 0–0)
PATH REPORT.FINAL DX SPEC: NORMAL
PATH REPORT.GROSS SPEC: NORMAL
PATH REPORT.RELEVANT HX SPEC: NORMAL
PATH REPORT.TOTAL CANCER: NORMAL
PLATELET # BLD AUTO: 543 X10*3/UL (ref 150–450)
POTASSIUM SERPL-SCNC: 3.7 MMOL/L (ref 3.5–5.3)
RBC # BLD AUTO: 3.26 X10*6/UL (ref 4–5.2)
SODIUM SERPL-SCNC: 131 MMOL/L (ref 136–145)
WBC # BLD AUTO: 14.7 X10*3/UL (ref 4.4–11.3)

## 2025-06-16 PROCEDURE — 36415 COLL VENOUS BLD VENIPUNCTURE: CPT | Performed by: NURSE PRACTITIONER

## 2025-06-16 PROCEDURE — 71045 X-RAY EXAM CHEST 1 VIEW: CPT

## 2025-06-16 PROCEDURE — 97535 SELF CARE MNGMENT TRAINING: CPT | Mod: GO

## 2025-06-16 PROCEDURE — 83735 ASSAY OF MAGNESIUM: CPT | Performed by: INTERNAL MEDICINE

## 2025-06-16 PROCEDURE — 2500000002 HC RX 250 W HCPCS SELF ADMINISTERED DRUGS (ALT 637 FOR MEDICARE OP, ALT 636 FOR OP/ED): Performed by: INTERNAL MEDICINE

## 2025-06-16 PROCEDURE — 2500000001 HC RX 250 WO HCPCS SELF ADMINISTERED DRUGS (ALT 637 FOR MEDICARE OP): Performed by: INTERNAL MEDICINE

## 2025-06-16 PROCEDURE — 99232 SBSQ HOSP IP/OBS MODERATE 35: CPT | Performed by: NURSE PRACTITIONER

## 2025-06-16 PROCEDURE — 99497 ADVNCD CARE PLAN 30 MIN: CPT | Performed by: NURSE PRACTITIONER

## 2025-06-16 PROCEDURE — 97530 THERAPEUTIC ACTIVITIES: CPT | Mod: GP,CQ | Performed by: PHYSICAL THERAPY ASSISTANT

## 2025-06-16 PROCEDURE — 97116 GAIT TRAINING THERAPY: CPT | Mod: GP,CQ | Performed by: PHYSICAL THERAPY ASSISTANT

## 2025-06-16 PROCEDURE — 80048 BASIC METABOLIC PNL TOTAL CA: CPT | Performed by: NURSE PRACTITIONER

## 2025-06-16 PROCEDURE — 99239 HOSP IP/OBS DSCHRG MGMT >30: CPT | Performed by: INTERNAL MEDICINE

## 2025-06-16 PROCEDURE — 85027 COMPLETE CBC AUTOMATED: CPT | Performed by: NURSE PRACTITIONER

## 2025-06-16 PROCEDURE — 71045 X-RAY EXAM CHEST 1 VIEW: CPT | Performed by: RADIOLOGY

## 2025-06-16 PROCEDURE — 2500000004 HC RX 250 GENERAL PHARMACY W/ HCPCS (ALT 636 FOR OP/ED): Performed by: INTERNAL MEDICINE

## 2025-06-16 RX ORDER — CYCLOBENZAPRINE HCL 10 MG
10 TABLET ORAL 2 TIMES DAILY PRN
Qty: 30 TABLET | Refills: 0 | Status: SHIPPED | OUTPATIENT
Start: 2025-06-16

## 2025-06-16 RX ORDER — ACETAMINOPHEN 325 MG/1
650 TABLET ORAL EVERY 4 HOURS PRN
Start: 2025-06-16

## 2025-06-16 RX ORDER — ONDANSETRON 4 MG/1
4 TABLET, FILM COATED ORAL EVERY 8 HOURS PRN
Start: 2025-06-16

## 2025-06-16 RX ORDER — OXYCODONE HYDROCHLORIDE 5 MG/1
2.5 TABLET ORAL 3 TIMES DAILY
Qty: 6 TABLET | Refills: 0 | Status: SHIPPED | OUTPATIENT
Start: 2025-06-16

## 2025-06-16 RX ORDER — IPRATROPIUM BROMIDE AND ALBUTEROL SULFATE 2.5; .5 MG/3ML; MG/3ML
3 SOLUTION RESPIRATORY (INHALATION) EVERY 2 HOUR PRN
Qty: 180 ML | Refills: 11 | Status: SHIPPED | OUTPATIENT
Start: 2025-06-16

## 2025-06-16 RX ORDER — POTASSIUM CHLORIDE 20 MEQ/1
20 TABLET, EXTENDED RELEASE ORAL DAILY
Status: DISCONTINUED | OUTPATIENT
Start: 2025-06-16 | End: 2025-06-16 | Stop reason: HOSPADM

## 2025-06-16 RX ADMIN — HYDROXYZINE HYDROCHLORIDE 25 MG: 25 TABLET, FILM COATED ORAL at 09:48

## 2025-06-16 RX ADMIN — Medication 1000 MCG: at 09:48

## 2025-06-16 RX ADMIN — BUPROPION HYDROCHLORIDE 300 MG: 150 TABLET, EXTENDED RELEASE ORAL at 17:36

## 2025-06-16 RX ADMIN — ENOXAPARIN SODIUM 40 MG: 40 INJECTION SUBCUTANEOUS at 17:36

## 2025-06-16 RX ADMIN — PIPERACILLIN SODIUM AND TAZOBACTAM SODIUM 3.38 G: 3; .375 INJECTION, SOLUTION INTRAVENOUS at 06:44

## 2025-06-16 RX ADMIN — ROSUVASTATIN 20 MG: 20 TABLET, FILM COATED ORAL at 09:48

## 2025-06-16 RX ADMIN — PIPERACILLIN SODIUM AND TAZOBACTAM SODIUM 3.38 G: 3; .375 INJECTION, SOLUTION INTRAVENOUS at 01:53

## 2025-06-16 RX ADMIN — PANTOPRAZOLE SODIUM 40 MG: 40 TABLET, DELAYED RELEASE ORAL at 06:44

## 2025-06-16 RX ADMIN — POTASSIUM CHLORIDE 20 MEQ: 1500 TABLET, EXTENDED RELEASE ORAL at 09:48

## 2025-06-16 RX ADMIN — FERROUS SULFATE TAB 325 MG (65 MG ELEMENTAL FE) 1 TABLET: 325 (65 FE) TAB at 09:48

## 2025-06-16 ASSESSMENT — COGNITIVE AND FUNCTIONAL STATUS - GENERAL
CLIMB 3 TO 5 STEPS WITH RAILING: TOTAL
PERSONAL GROOMING: A LITTLE
DRESSING REGULAR UPPER BODY CLOTHING: A LITTLE
DRESSING REGULAR LOWER BODY CLOTHING: A LITTLE
DRESSING REGULAR UPPER BODY CLOTHING: A LITTLE
TOILETING: A LOT
WALKING IN HOSPITAL ROOM: A LOT
DAILY ACTIVITIY SCORE: 17
MOVING FROM LYING ON BACK TO SITTING ON SIDE OF FLAT BED WITH BEDRAILS: A LITTLE
MOBILITY SCORE: 14
STANDING UP FROM CHAIR USING ARMS: A LOT
TURNING FROM BACK TO SIDE WHILE IN FLAT BAD: A LITTLE
WALKING IN HOSPITAL ROOM: A LOT
MOVING FROM LYING ON BACK TO SITTING ON SIDE OF FLAT BED WITH BEDRAILS: A LITTLE
STANDING UP FROM CHAIR USING ARMS: A LITTLE
HELP NEEDED FOR BATHING: A LITTLE
MOBILITY SCORE: 16
DAILY ACTIVITIY SCORE: 18
CLIMB 3 TO 5 STEPS WITH RAILING: A LOT
HELP NEEDED FOR BATHING: A LITTLE
EATING MEALS: A LITTLE
DRESSING REGULAR LOWER BODY CLOTHING: A LITTLE
TOILETING: A LITTLE
TURNING FROM BACK TO SIDE WHILE IN FLAT BAD: A LITTLE
PERSONAL GROOMING: A LITTLE
MOVING TO AND FROM BED TO CHAIR: A LITTLE
EATING MEALS: A LITTLE
MOVING TO AND FROM BED TO CHAIR: A LITTLE

## 2025-06-16 ASSESSMENT — PAIN SCALES - GENERAL
PAINLEVEL_OUTOF10: 0 - NO PAIN

## 2025-06-16 ASSESSMENT — PAIN - FUNCTIONAL ASSESSMENT
PAIN_FUNCTIONAL_ASSESSMENT: 0-10

## 2025-06-16 ASSESSMENT — ACTIVITIES OF DAILY LIVING (ADL): HOME_MANAGEMENT_TIME_ENTRY: 26

## 2025-06-16 NOTE — PROGRESS NOTES
Pulmonary Daily Progress Note     SUBJECTIVE     Mildred Moyer is a 70 y.o. year old female patient with a past medical history of MS, HTN, DLP, GERD, recurrent R pleural effusion s/p multiple thoracenteses and PleurX placement that was removed in 4/2025, chronic hypoxic respiratory failure on 3L NC, stage IV R breast invasive ductal carcinoma, anxiety, depression admitted on 6/5/2025. She presented with gradual onset, progressive SOB x 3 weeks, associated with dry cough. In the ED work up revealed stable b/l effusion and infiltrates and partial R lung collapse on chest imaging, and leukocytosis. Admitted for recurrent pleural effusion. Pulmonary is consulted for partial R lung collapse. Of note since admission had R sided thoracentesis done. Of note the patient with progression of her breast cancer, being evaluated for palliative chemotherapy.    Interval History:  Today, patient reports she is doing ok but feels tired. She denies any respiratory symptoms today, including shortness of breath, coughing, wheezing, or chest pain. She is currently on 3 L oxygen via nasal cannula. She is planned to be discharged to [facility] today. Repeat chest x-ray report today with stable pleural and parenchymal opacities and progressed on the left, and stable right-sided volume loss with rightward mediastinal shift.    MEDICATIONS     Scheduled Medications:  Scheduled Medications[1]    Continuous Medications:  Continuous Medications[2]    PRN Medications:  PRN Medications[3]     Current Medications[4]     OBJECTIVE      VITAL SIGNS     /80 (BP Location: Left arm, Patient Position: Lying)   Pulse 104   Temp 36.2 °C (97.2 °F) (Temporal)   Resp 18   Wt 52 kg (114 lb 9.6 oz)   SpO2 98%     PHYSICAL EXAM     Physical Exam  Constitutional:       General: She is not in acute distress.  HENT:      Nose:      Right Sinus: No maxillary sinus tenderness or frontal sinus tenderness.      Left Sinus: No maxillary sinus tenderness or  frontal sinus tenderness.      Mouth/Throat:      Mouth: Mucous membranes are moist. No oral lesions.      Comments: No thrush  Neck:      Thyroid: No thyromegaly.      Vascular: No JVD.      Trachea: Phonation normal. No tracheal deviation.      Comments: No stridor  Cardiovascular:      Rate and Rhythm: Normal rate and regular rhythm.      Heart sounds: Normal heart sounds, S1 normal and S2 normal. No murmur heard.     No friction rub. No gallop.   Pulmonary:      Effort: No tachypnea, bradypnea, accessory muscle usage, prolonged expiration or respiratory distress.      Breath sounds: Normal air entry. No stridor. Examination of the right-upper field reveals decreased breath sounds. Examination of the right-middle field reveals decreased breath sounds. Examination of the right-lower field reveals decreased breath sounds. Decreased breath sounds present. No wheezing, rhonchi or rales.   Musculoskeletal:      Right lower leg: No edema.      Left lower leg: No edema.   Lymphadenopathy:      Head:      Right side of head: No submandibular adenopathy.      Left side of head: No submandibular adenopathy.      Upper Body:      Right upper body: No supraclavicular adenopathy.      Left upper body: No supraclavicular adenopathy.   Skin:     General: Skin is warm.      Findings: No lesion or rash.   Neurological:      General: No focal deficit present.      Mental Status: She is alert and oriented to person, place, and time.      Cranial Nerves: Cranial nerves 2-12 are intact.      Motor: Motor function is intact.   Psychiatric:         Mood and Affect: Mood and affect normal.          I/Os     Intake/Output Summary (Last 24 hours) at 6/16/2025 1044  Last data filed at 6/16/2025 0811  Gross per 24 hour   Intake 740 ml   Output 800 ml   Net -60 ml     LABS     Results from last 72 hours   Lab Units 06/16/25  0748 06/15/25  0533 06/14/25  0508   SODIUM mmol/L 131* 129* 129*   POTASSIUM mmol/L 3.7 3.4* 3.4*   CHLORIDE mmol/L  93* 91* 91*   CO2 mmol/L 26 30 25   BUN mg/dL 12 14 16   CREATININE mg/dL 0.55 0.60 0.62   GLUCOSE mg/dL 92 106* 86   CALCIUM mg/dL 8.2* 8.1* 8.4*   ANION GAP mmol/L 16 11 16   EGFR mL/min/1.73m*2 >90 >90 >90      Results from last 72 hours   Lab Units 06/16/25  0748 06/15/25  0533 06/14/25  0508   WBC AUTO x10*3/uL 14.7* 14.4* 15.8*   HEMOGLOBIN g/dL 10.2* 10.4* 10.9*   HEMATOCRIT % 29.8* 30.0* 30.4*   PLATELETS AUTO x10*3/uL 543* 539* 542*                 Lab Results   Component Value Date    BLOODCULT No growth at 4 days -  FINAL REPORT 06/05/2025    BLOODCULT No growth at 4 days -  FINAL REPORT 06/05/2025     IMAGING & DIAGNOSTIC TESTING     XR CHEST 1 VIEW;  6/16/2025 5:40 am      INDICATION:  Signs/Symptoms:Monitor chest tube.      COMPARISON:  CT scan chest from  06/12/2025. chest x-rays, most recent from  06/15/2025.      ACCESSION NUMBER(S):  [id number]      ORDERING CLINICIAN:  TOM MG      TECHNIQUE:  Single AP portable view of the chest was obtained.      FINDINGS:  MEDIASTINUM/ LUNGS/ LARA:  No cardiomegaly. Stable prominent pleural and parenchymal opacities  with volume loss in the right hemithorax. Stable rightward  mediastinal shift. There are pleural and parenchymal opacities in the  mid and lower left hemithorax. These are slightly further progressed.      No pneumothorax.  No tracheal deviation.  No abnormal hilar fullness or gross mass on either side.      BONES:  No lytic or blastic destructive bone lesion.      UPPER ABDOMEN:  Grossly intact.      IMPRESSION:  Bilateral pleural and parenchymal opacities as described, stable on  the right but progressed on the left.      Stable right-sided volume loss with rightward mediastinal shift.    CT ANGIO CHEST FOR PULMONARY EMBOLISM;  6/12/2025 11:29 am      INDICATION:  Signs/Symptoms:ongoing tachycardia, breast ca. r/o PE.          COMPARISON:  04/13/2025      ACCESSION NUMBER(S):  [id number]      ORDERING CLINICIAN:  DILSHAD GRIMALDO       TECHNIQUE:  CT of the chest was performed. Sagittal and coronal reconstructions  were generated. 75 ML Omnipaque 350 intravenous contrast given for  the examination.  Multiplanar reconstructions of the pulmonary  vessels were created on an independent workstation and provided for  review.      FINDINGS:          CHEST WALL AND LOWER NECK: Slightly irregular heterogenous 2.8 cm  right breast mass again seen. Right axillary adenopathy on the prior  exam is no longer specifically seen. There is a 9 mm right  subpectoral lymph node which is larger than on the prior exam.. 1.5  cm hypodense left thyroid nodule again seen.      MEDIASTINUM AND LARA:  Gaseous distention of the proximal esophagus.  Mediastinal and paramediastinal soft tissue thickening/nodularity  including 12 mm AP window node and 12 mm nodule in the  anterior/precardiac fat similar to the prior exam. Mild soft tissue  thickening in the left hilum. Right perihilar  consolidation/atelectasis.      HEART AND VESSELS:  No pulmonary artery filling defect to suggest PE.  Right pulmonary vessels are narrowed/attenuated within collapsed  lung. Aberrant right subclavian artery coursing posterior to the  esophagus, anatomic variant, again seen. The heart is normal in size.  No significant pericardial effusion. Scattered atherosclerotic  calcifications.      LUNGS, PLEURA, LARGE AIRWAYS: Large loculated right pleural effusion  with pleural thickening and subtotal atelectasis of the right lung  similar to the prior exam. catheter within the right pleural space on  the previous exam is no longer seen. There is currently a pigtail  catheter in the posterior left pleural space with small pleural  effusion, uneven pleural thickening, and trace pneumothorax. Pleural  and fissural thickening/nodularity in the left chest and multiple  bilateral pulmonary nodules similar to the previous exam including 10  mm left upper lobe nodule image 54/289 and pleural-based lobulated  15  mm nodule in the medial left lower lobe image 146/289.  trachea and  main bronchi are patent.      UPPER ABDOMEN:  Multiple low-density lesions in the included liver  and bilateral adrenal thickening similar to the previous exam.      BONES:  Kyphosis and degenerative changes of the visualized spine.  Multiple sclerotic presumed metastatic lesions including T2, T5 and  T7 are slightly more conspicuous.      IMPRESSION:  No evidence of PE.      Left lower chest pigtail catheter in place with small pleural  effusion/thickening and trace pneumothorax. Bilateral  pleuroparenchymal opacities/nodularity and large loculated right  pleural effusion with subtotal atelectasis of the right lung as  discussed above similar to 04/13/2025.      Right breast mass and multiple presumed metastatic lesions in the  liver and osseous thorax again noted. The osseous lesions are  slightly more conspicuous which could reflect healing or worsening  metastatic disease.      Other findings as described above.    IMPRESSION & RECOMMENDATIONS     Mildred Moyer is a 70 y.o. year old female patient with a past medical history of MS, HTN, DLP, GERD, recurrent R pleural effusion s/p multiple thoracenteses and PleurX placement that was removed in 4/2025, chronic hypoxic respiratory failure on 3L NC, stage IV R breast invasive ductal carcinoma, anxiety, depression admitted on 6/5/2025. She presented with gradual onset, progressive SOB x 3 weeks, associated with dry cough. In the ED work up revealed stable b/l effusion and infiltrates and partial R lung collapse on chest imaging, and leukocytosis. Admitted for recurrent pleural effusion. Pulmonary is consulted for partial R lung collapse. Of note since admission had R sided thoracentesis done. Of note the patient with progression of her breast cancer, being evaluated for palliative chemotherapy.     Recurrent pleural effusions  History of recurrent bilateral effusions ongoing since 2023 s/p  multiple R sided thoracentesis. Exudative, lymphocytic predominant, atypical cell on cytology. She had R VATs that confirmed trapped lung with pleural biopsy (pathology showed acute fibrinous pleuritis with atypia) and PleurX placement in 10/2024. A moderate size L effusion was detected on CT from 4/2025. S/p L thoracentesis with no specimen. PleurX was removed in 4/2025. Again had L thoracentesis done in 5/2025 that showed exudative effusion. Repeat imaging on this admission re-demonstrated bilateral pleural effusions. S/p L thoracentesis 6/6/2025 showed exudative effusion, cytology showed rare atypical cells in background of reactive mesothelial and inflammatory cells. S/p left pigtail placement by Thoracic on 6/9/2025, s/p lytics 6/11/2025 with good output and improvement on imaging, pigtail removed 6/13/2025. Chest x-ray this morning shows stable right-sided pleural and parenchymal opacities but worse on left, and stable right-sided volume loss with rightward mediastinal shift. Imaging reviewed with Dr. Scales, changes not significantly worse from prior, no intervention warranted at this time - symptoms and oxygen requirements stable.  Repeat chest x-ray as an outpatient in 1-2 weeks or sooner if symptoms worsen.  Follow-up with outpatient Pulmonary after x-ray.  Plan discussed with patient, patient's daughter and sister, and hospitalist JUNG.    Possible pneumonia  MRSA screen negative, urine antigens for Streptococcus pneumoniae and Legionella negative, blood cultures negative. Procalcitonin was slightly elevated.  Completed course of Zosyn.  Continue BPH with IS and Acapella.    Breast cancer  Progressive with possible pulmonary and pleural metastasis.  Follow-up with Oncology as directed.    Acute on chronic hypoxic respiratory failure  Improved. Uses 3 L oxygen 24/7 at baseline.  Continue supplemental oxygen, wean as tolerated, maintain SpO2 92%-95%.  Complete home O2 evaluation prior to discharge.  Continue  BPH with IS and Acapella.  Continue DuoNeb PRN.    Plan discussed with Dr. Scales.    Pulmonary will continue to follow, please call with any questions.    I spent 35 minutes in the professional and overall care of this patient.    LUDIN Tang   06/16/25 at 10:44 AM           [1] buPROPion XL, 300 mg, oral, q24h  [START ON 6/30/2025] capivasertib, 400 mg, oral, 2 times per day on Monday Tuesday Wednesday Thursday  cyanocobalamin, 1,000 mcg, oral, Daily  enoxaparin, 40 mg, subcutaneous, q24h  ferrous sulfate, 65 mg of elemental iron, oral, Daily  hydrOXYzine HCL, 25 mg, oral, Daily  lidocaine, 2 patch, transdermal, Daily  melatonin, 6 mg, oral, Nightly  oxyCODONE, 2.5 mg, oral, TID  pantoprazole, 40 mg, oral, Daily before breakfast   Or  pantoprazole, 40 mg, intravenous, Daily before breakfast  polyethylene glycol, 17 g, oral, Daily  potassium chloride CR, 20 mEq, oral, Daily  rosuvastatin, 20 mg, oral, Daily  [2]    [3] PRN medications: acetaminophen **OR** acetaminophen **OR** acetaminophen, benzocaine-menthol, cyclobenzaprine, guaiFENesin, ipratropium-albuteroL, morphine, ondansetron **OR** ondansetron, oxyCODONE, oxygen  [4]   Current Facility-Administered Medications:     acetaminophen (Tylenol) tablet 650 mg, 650 mg, oral, q4h PRN, 650 mg at 06/10/25 0526 **OR** acetaminophen (Tylenol) oral liquid 650 mg, 650 mg, oral, q4h PRN, 650 mg at 06/08/25 0829 **OR** acetaminophen (Tylenol) suppository 650 mg, 650 mg, rectal, q4h PRN, Clarence Mohna MD    benzocaine-menthol (Cepastat Sore Throat) lozenge 1 lozenge, 1 lozenge, Mouth/Throat, q2h PRN, CANDY Knight-CNP, 1 lozenge at 06/12/25 0609    buPROPion XL (Wellbutrin XL) 24 hr tablet 300 mg, 300 mg, oral, q24h, Clarence Mohan MD, 300 mg at 06/15/25 1710    [START ON 6/30/2025] capivasertib (Truqap) tablet 400 mg - PATIENT OWN MEDICATION, 400 mg, oral, 2 times per day on Monday Tuesday Wednesday Thursday, Clarence Mohan MD     cyanocobalamin (Vitamin B-12) tablet 1,000 mcg, 1,000 mcg, oral, Daily, Clarence Mohan MD, 1,000 mcg at 06/16/25 0948    cyclobenzaprine (Flexeril) tablet 10 mg, 10 mg, oral, BID PRN, Clarence Mohan MD, 10 mg at 06/14/25 1051    enoxaparin (Lovenox) syringe 40 mg, 40 mg, subcutaneous, q24h, Clarence Mohan MD, 40 mg at 06/15/25 1710    ferrous sulfate 325 mg (65 mg elemental) tablet 1 tablet, 65 mg of elemental iron, oral, Daily, Clarence Mohan MD, 1 tablet at 06/16/25 0948    guaiFENesin (Mucinex) 12 hr tablet 600 mg, 600 mg, oral, q12h PRN, Clarence Mohan MD, 600 mg at 06/08/25 0828    hydrOXYzine HCL (Atarax) tablet 25 mg, 25 mg, oral, Daily, Clarence Mohan MD, 25 mg at 06/16/25 0948    ipratropium-albuteroL (Duo-Neb) 0.5-2.5 mg/3 mL nebulizer solution 3 mL, 3 mL, nebulization, q2h PRN, Clarence Mohan MD    lidocaine 4 % patch 2 patch, 2 patch, transdermal, Daily, LUDIN Cosme, 2 patch at 06/14/25 1050    melatonin tablet 6 mg, 6 mg, oral, Nightly, Clarence Mohan MD, 6 mg at 06/15/25 2157    morphine injection 2 mg, 2 mg, intravenous, q4h PRN, LUDIN Grijalva, 2 mg at 06/14/25 0545    ondansetron (Zofran) tablet 4 mg, 4 mg, oral, q8h PRN **OR** ondansetron (Zofran) injection 4 mg, 4 mg, intravenous, q8h PRN, Clarence Mohan MD    oxyCODONE (Roxicodone) immediate release tablet 2.5 mg, 2.5 mg, oral, TID, LUDIN Cosme, 2.5 mg at 06/14/25 2135    oxyCODONE (Roxicodone) immediate release tablet 5 mg, 5 mg, oral, q6h PRN, Betty Medel, APRN-CNP    oxygen (O2) therapy, , inhalation, Continuous PRN - O2/gases, Clarence Mohan MD, 3 L/min at 06/07/25 0737    pantoprazole (ProtoNix) EC tablet 40 mg, 40 mg, oral, Daily before breakfast, 40 mg at 06/16/25 0644 **OR** pantoprazole (Protonix) injection 40 mg, 40 mg, intravenous, Daily before breakfast, Clarence Mohan MD, 40 mg at 06/15/25 0639    polyethylene glycol (Glycolax, Miralax) packet 17 g, 17 g, oral, Daily, Kee Mora,  APRN-CNP, 17 g at 06/14/25 1049    potassium chloride CR (Klor-Con M20) ER tablet 20 mEq, 20 mEq, oral, Daily, Clarence Mohan MD, 20 mEq at 06/16/25 0948    rosuvastatin (Crestor) tablet 20 mg, 20 mg, oral, Daily, Clarence Mohan MD, 20 mg at 06/16/25 0948

## 2025-06-16 NOTE — CARE PLAN
"The patient's goals for the shift include \"try to go to sleep\"    The clinical goals for the shift include no reports of pain by end of shift      Problem: Safety - Adult  Goal: Free from fall injury  Outcome: Progressing     Problem: Discharge Planning  Goal: Discharge to home or other facility with appropriate resources  Outcome: Progressing     Problem: Respiratory  Goal: Clear secretions with interventions this shift  Outcome: Progressing  Goal: Minimize anxiety/maximize coping throughout shift  Outcome: Progressing  Goal: Minimal/no exertional discomfort or dyspnea this shift  Outcome: Progressing  Goal: No signs of respiratory distress (eg. Use of accessory muscles. Peds grunting)  Outcome: Progressing  Goal: Patent airway maintained this shift  Outcome: Progressing  Goal: Tolerate mechanical ventilation evidenced by VS/agitation level this shift  Outcome: Progressing  Goal: Tolerate pulmonary toileting this shift  Outcome: Progressing  Goal: Verbalize decreased shortness of breath this shift  Outcome: Progressing  Goal: Wean oxygen to maintain O2 saturation per order/standard this shift  Outcome: Progressing  Goal: Increase self care and/or family involvement in next 24 hours  Outcome: Progressing     "

## 2025-06-16 NOTE — PROGRESS NOTES
Occupational Therapy    OT Treatment    Patient Name: Mildred Moyer  MRN: [id number]  Department: [place] [address]  Room: [address] [place] [address]  Today's Date: 6/16/2025  Time Calculation  Start Time: 1013  Stop Time: 1039  Time Calculation (min): 26 min        Assessment:  OT Assessment: Pt is significantly limited by dyspnea on exertion. Due to dyspnea, it requires increased time for all self care and mobility. She has impaired activity tolerance and requires increased assistance with self care, fxnl mob and fxnl transfers. Recommending moderate intensity OT upon DC  Prognosis: Fair  Barriers to Discharge Home: Caregiver assistance, Physical needs  Caregiver Assistance: Caregiver assistance needed per identified barriers - however, level of patient's required assistance exceeds assistance available at home  Physical Needs: 24hr mobility assistance needed, 24hr ADL assistance needed, High falls risk due to function or environment  Evaluation/Treatment Tolerance: Patient limited by fatigue  Medical Staff Made Aware: Yes  End of Session Communication: Bedside nurse, Care Coordinator  End of Session Patient Position: Up in chair, Alarm on  OT Assessment Results: Decreased ADL status, Decreased endurance, Decreased functional mobility  Prognosis: Fair  Barriers to Discharge: Decreased caregiver support  Evaluation/Treatment Tolerance: Patient limited by fatigue  Medical Staff Made Aware: Yes  Strengths: Attitude of self, Ability to acquire knowledge  Barriers to Participation: Comorbidities  Plan:  Treatment Interventions: ADL retraining, Functional transfer training, Endurance training, Patient/family training, Equipment evaluation/education, Neuromuscular reeducation, UE strengthening/ROM  OT Frequency: 3 times per week  OT Discharge Recommendations: Moderate intensity level of continued care  Equipment Recommended upon Discharge: Wheeled walker, Wheelchair  OT Recommended Transfer Status: Minimal assist, Assist of 1  OT - OK to  Discharge: Yes (per POC)  Treatment Interventions: ADL retraining, Functional transfer training, Endurance training, Patient/family training, Equipment evaluation/education, Neuromuscular reeducation, UE strengthening/ROM    Subjective   OT Visit Info:  OT Received On: 06/16/25  General Visit Info:  General  Reason for Referral: 70 y.o. F s/p presenting with shortness of breath, s/p placement on pigtail chest tube 6/9/25. Pt admitted for plueral effusion.  Referred By: LEWIS Rothman (APRN-CNP)  Past Medical History Relevant to Rehab: Medical History[1]  Prior to Session Communication: Bedside nurse  Patient Position Received: Bed, 3 rail up, Alarm off, not on at start of session  Preferred Learning Style: auditory, kinesthetic, verbal  General Comment: agreeable to OT treatment session  Precautions:  Medical Precautions: Oxygen therapy device and L/min (4 L)    Pain:  Pain Assessment  0-10 (Numeric) Pain Score: 0 - No pain    Objective    Cognition:  Cognition  Orientation Level: Oriented X4  Coordination:     Activities of Daily Living: Grooming  Grooming Level of Assistance: Close supervision  Grooming Where Assessed: Edge of bed  Grooming Comments: washing face EOB    LE Dressing  LE Dressing: Yes  Pants Level of Assistance: Contact guard  Sock Level of Assistance: Close supervision  LE Dressing Where Assessed: Edge of bed, Bed level  LE Dressing Comments: Pt donns socks at bed level bringing LEs up to chest, pt threads BLEs into pants at bed level also. Pt with significant dyspnea on exertion, O2 saturation 95% and up, however pt feels she cannot get oxygen into her lungs. OT notified RN and engaged pt in pursed lip breathing, trialing different positiongs including sitting EOB and supported in chair. Significantly increased time required for LB ADLs due to need for rest breaks to manage dyspnea.  Functional Standing Tolerance:     Bed Mobility/Transfers: Bed Mobility 1  Bed Mobility 1: Supine to sitting  Level of  Assistance 1: Close supervision  Bed Mobility Comments 1: use of bed rails, HOB slightly elevated    Transfer 1  Transfer From 1: Bed to  Transfer to 1: Chair with arms  Technique 1: Stand pivot  Transfer Device 1: Walker  Transfer Level of Assistance 1: Minimum assistance  Trials/Comments 1: posterior LOB, assist with line mgmt, pt moves very quickly due to fear of dyspnea increasing when standing.    Sitting Balance:  Static Sitting Balance  Static Sitting-Balance Support: Feet supported  Static Sitting-Level of Assistance: Close supervision    Outcome Measures:[facility] Daily Activity  Putting on and taking off regular lower body clothing: A little  Bathing (including washing, rinsing, drying): A little  Putting on and taking off regular upper body clothing: A little  Toileting, which includes using toilet, bedpan or urinal: A lot  Taking care of personal grooming such as brushing teeth: A little  Eating Meals: A little  Daily Activity - Total Score: 17     and OT Adult Other Outcome Measures  Modified Barthel Index (Norton version): 49/100 indicating severe dependence in self care, fxnl mobility and fxnl transfers.    Education Documentation  Body Mechanics, taught by Marguerite Colin OT at 6/16/2025 10:53 AM.  Learner: Patient  Readiness: Acceptance  Method: Explanation  Response: Verbalizes Understanding, Needs Reinforcement  Comment: ongoing education on dyspnea mgmt and pursed lip breathing    Home Exercise Program, taught by Marguerite Colin OT at 6/16/2025 10:53 AM.  Learner: Patient  Readiness: Acceptance  Method: Explanation  Response: Verbalizes Understanding, Needs Reinforcement  Comment: ongoing education on dyspnea mgmt and pursed lip breathing    ADL Training, taught by Marguerite Colin OT at 6/16/2025 10:53 AM.  Learner: Patient  Readiness: Acceptance  Method: Explanation  Response: Verbalizes Understanding, Needs Reinforcement  Comment: ongoing education on dyspnea mgmt and pursed lip  breathing    Education Comments  No comments found.      Goals:  Encounter Problems       Encounter Problems (Active)       ADLs       Patient with complete upper body dressing with stand by assist level of assistance donning and doffing all UE clothes with PRN adaptive equipment while supported sitting while implementing energy conservation techniques  (Progressing)       Start:  06/11/25    Expected End:  06/25/25            Patient with complete lower body dressing with contact guard assist level of assistance donning and doffing all LE clothes  with PRN adaptive equipment while supported sitting while implementing energy conservation techniques  (Progressing)       Start:  06/11/25    Expected End:  06/25/25            Patient will complete toileting including hygiene clothing management/hygiene with contact guard assist level of assistance and grab bars while implementing energy conservation techniques . (Progressing)       Start:  06/11/25    Expected End:  06/25/25               BALANCE       Patient will tolerate standing for 3  minutes to contact guard assist level of assistance with least restrictive device in order to improve functional activity tolerance for ADL tasks. (Progressing)       Start:  06/11/25    Expected End:  06/25/25               EXERCISE/STRENGTHENING       Patient will be educated on BUE light strengthening AND activity tolerance/ work simplification HEP for increased ADL performance. (Progressing)       Start:  06/11/25    Expected End:  06/25/25               TRANSFERS       Patient will complete functional transfer to toilet/ commode/ chair with arms with least restrictive device with contact guard assist level of assistance while implementing energy conservation techniques  (Progressing)       Start:  06/11/25    Expected End:  06/25/25                                  [1]   Past Medical History:  Diagnosis Date    GERD (gastroesophageal reflux disease)     HLD (hyperlipidemia)      HTN (hypertension)     Liver disease     Lung neoplasm     MS (multiple sclerosis) (Multi)     Pleural effusion

## 2025-06-16 NOTE — PROGRESS NOTES
Physical Therapy    Physical Therapy Treatment    Patient Name: Mildred Moyer  MRN: [id number]  Department: [place] [address]  Room: [address] [place] [address]  Today's Date: 6/16/2025  Time Calculation  Start Time: 1054  Stop Time: 1120  Time Calculation (min): 26 min         Assessment/Plan   PT Assessment  Rehab Prognosis: Fair  Barriers to Discharge Home: Caregiver assistance, Physical needs  Caregiver Assistance: Patient lives alone and/or does not have reliable caregiver assistance  Physical Needs: Ambulating household distances limited by function/safety, 24hr mobility assistance needed, 24hr ADL assistance needed, High falls risk due to function or environment  End of Session Communication: Bedside nurse  Assessment Comment:  (pt demo fair jin to increased activity,req frequent rest periods.)  End of Session Patient Position: Up in chair, Alarm on  PT Plan  Inpatient/Swing Bed or Outpatient: Inpatient  PT Plan  Treatment/Interventions: Bed mobility, Transfer training, Gait training, Therapeutic activity  PT Plan: Ongoing PT  PT Frequency: 3 times per week  PT Discharge Recommendations: Moderate intensity level of continued care  Equipment Recommended upon Discharge: Wheeled walker, Wheelchair  PT Recommended Transfer Status: Assist x1, Assistive device  PT - OK to Discharge:  (per PT POC)    PT Visit Info:  PT Received On: 06/16/25     General Visit Information:   General  Reason for Referral: 70 y.o. F s/p presenting with shortness of breath, s/p placement on pigtail chest tube 6/9/25. Pt admitted for plueral effusion.  Referred By: LEWIS Rothman (APRN-CNP)  Prior to Session Communication: Bedside nurse  Patient Position Received: Up in chair, Alarm on  Preferred Learning Style: auditory, kinesthetic, verbal  General Comment:  (pt agreeable to tx. SOB noted)    Subjective   Precautions:  Precautions  Medical Precautions: Oxygen therapy device and L/min     Date/Time Vitals Session Patient Position Pulse Resp SpO2 BP MAP (mmHg)    06/16/25  1054 --  --  112  --  95 %  --  --     06/16/25 1132 --  --  109  17  95 %  124/70  88                 Objective   Pain:  Pain Assessment  Pain Assessment: 0-10  0-10 (Numeric) Pain Score: 0 - No pain  Cognition:  Cognition  Orientation Level: Oriented X4  Coordination:     Postural Control:  Static Sitting Balance  Static Sitting-Balance Support: No upper extremity supported  Static Sitting-Level of Assistance: Close supervision  Dynamic Sitting Balance  Dynamic Sitting-Balance Support: Bilateral upper extremity supported  Dynamic Sitting-Level of Assistance: Contact guard  Dynamic Sitting-Balance: Forward lean, Reaching for objects, Trunk control activities  Static Standing Balance  Static Standing-Balance Support: Bilateral upper extremity supported  Static Standing-Level of Assistance: Minimum assistance  Static Standing-Comment/Number of Minutes:  (with FWW)  Extremity/Trunk Assessments:    Activity Tolerance:  Activity Tolerance  Endurance: Decreased tolerance for upright activites  Treatments:            Bed Mobility  Bed Mobility: Yes  Bed Mobility 1  Bed Mobility 1: Sitting to supine  Level of Assistance 1: Close supervision  Bed Mobility Comments 1:  (pt able to elevate BLE's into bed with mod effort)    Ambulation/Gait Training  Ambulation/Gait Training Performed: Yes  Ambulation/Gait Training 1  Surface 1: Level tile  Device 1: Rolling walker  Assistance 1: Minimum assistance, Moderate assistance  Quality of Gait 1: Narrow base of support, Inconsistent stride length, Decreased step length, Forward flexed posture  Comments/Distance (ft) 1: 5ft+10ft+5ft (pt demo slwo short steps,fair endurance,increased SOB ntoed,multiple seated rest periods needed.)  Transfers  Transfer: Yes  Transfer 1  Transfer From 1: Sit to, Stand to  Technique 1: Sit to stand, Stand to sit  Transfer Device 1: Walker  Transfer Level of Assistance 1: Minimum assistance, Moderate assistance  Trials/Comments 1: 3 (pt req increased cues  for proper hand placement and sequencing, poor eccentric control descending to sitting.)    Outcome Measures:  [facility] Basic Mobility  Turning from your back to your side while in a flat bed without using bedrails: A little  Moving from lying on your back to sitting on the side of a flat bed without using bedrails: A little  Moving to and from bed to chair (including a wheelchair): A little  Standing up from a chair using your arms (e.g. wheelchair or bedside chair): A lot  To walk in hospital room: A lot  Climbing 3-5 steps with railing: Total  Basic Mobility - Total Score: 14    Education Documentation  Precautions, taught by Odin Alberto PTA at 6/16/2025 11:40 AM.  Learner: Patient  Readiness: Acceptance  Method: Explanation  Response: Needs Reinforcement    Body Mechanics, taught by Odin Alberto PTA at 6/16/2025 11:40 AM.  Learner: Patient  Readiness: Acceptance  Method: Explanation  Response: Needs Reinforcement    Mobility Training, taught by Odin Alberot PTA at 6/16/2025 11:40 AM.  Learner: Patient  Readiness: Acceptance  Method: Explanation  Response: Needs Reinforcement    Education Comments  No comments found.        OP EDUCATION:       Encounter Problems       Encounter Problems (Active)       Balance       Goal 1 (Progressing)       Start:  06/11/25    Expected End:  06/25/25       Pt performs all sitting balance IND and standing balance with standby assist using wheeled walker            Mobility       STG - Patient will ambulate (Progressing)       Start:  06/11/25    Expected End:  06/25/25       25 ft with standby assist using wheeled walker with proper gait mechanics            PT Problem       PT Goal 1 (Progressing)       Start:  06/11/25    Expected End:  06/25/25       Pt demonstrates IND in performing 2 sets of 12 reps of prescribed BLE HEP            PT Transfers       STG - Patient will perform bed mobility (Progressing)       Start:  06/11/25    Expected End:   06/25/25       IND         STG - Patient will transfer sit to and from stand (Progressing)       Start:  06/11/25    Expected End:  06/25/25       With standby assist using wheeled walker with proper body mechanics            Pain - Adult

## 2025-06-16 NOTE — DISCHARGE SUMMARY
Discharge Diagnosis  Pleural effusion           Issues Requiring Follow-Up  With oncology  Follow with pulmonary    Discharge Meds     Medication List      START taking these medications     acetaminophen 325 mg tablet; Commonly known as: Tylenol; Take 2 tablets   (650 mg) by mouth every 4 hours if needed for mild pain (1 - 3) or   moderate pain (4 - 6).; Replaces: acetaminophen 650 mg ER tablet   ipratropium-albuteroL 0.5-2.5 mg/3 mL nebulizer solution; Commonly known   as: Duo-Neb; Take 3 mL by nebulization every 2 hours if needed for   wheezing or shortness of breath.   ondansetron 4 mg tablet; Commonly known as: Zofran; Take 1 tablet (4 mg)   by mouth every 8 hours if needed for nausea or vomiting.   oxygen gas therapy; Commonly known as: O2; Inhale 1 each continuously.     CHANGE how you take these medications     cyclobenzaprine 10 mg tablet; Commonly known as: Flexeril; Take 1 tablet   (10 mg) by mouth 2 times a day as needed for muscle spasms.; What changed:   when to take this, reasons to take this, Another medication with the same   name was removed. Continue taking this medication, and follow the   directions you see here.   oxyCODONE 5 mg immediate release tablet; Commonly known as: Roxicodone;   Take 0.5 tablets (2.5 mg) by mouth 3 times a day.; What changed: how much   to take, when to take this, reasons to take this     CONTINUE taking these medications     buPROPion  mg 24 hr tablet; Commonly known as: Wellbutrin XL   capivasertib 200 mg tablet; Commonly known as: Truqap; Take 2 tablets   (400 mg) by mouth 2 times a day for 4 days on and 3 days off as directed.   cetirizine 10 mg tablet; Commonly known as: ZyrTEC; Take 1 tablet (10   mg) by mouth once daily.   cyanocobalamin 1,000 mcg tablet; Commonly known as: Vitamin B-12   dextromethorphan-quinidine 20-10 mg capsule; Commonly known as: NUEDEXTA   DULoxetine 60 mg DR capsule; Commonly known as: Cymbalta   ferrous sulfate 325 mg (65 mg  elemental) tablet   hydrOXYzine HCL 25 mg tablet; Commonly known as: Atarax   loperamide 2 mg tablet; Commonly known as: Imodium A-D; Take 2 tablets   (4 mg) by mouth if needed for diarrhea (take 2 tablets after first loose   stool, 1 tablet after each subsequnet loose stool. max of 8 tabs per day).   melatonin 10 mg tablet extended release   omeprazole 20 mg DR capsule; Commonly known as: PriLOSEC   potassium chloride CR 20 mEq ER tablet; Commonly known as: Klor-Con M20   propranolol 20 mg tablet; Commonly known as: Inderal   rosuvastatin 20 mg tablet; Commonly known as: Crestor     STOP taking these medications     acetaminophen 650 mg ER tablet; Commonly known as: Tylenol 8 HOUR;   Replaced by: acetaminophen 325 mg tablet   hydroCHLOROthiazide 12.5 mg capsule; Commonly known as: Microzide   methylPREDNISolone 4 mg tablet; Commonly known as: Medrol   oxyCODONE-acetaminophen 5-325 mg tablet; Commonly known as: Percocet   oxygen DME/Hospice therapy; Commonly known as: O2       Test Results Pending At Discharge  Pending Labs       Order Current Status    Body Fluid Cell Count With Differential In process            Hospital Course   Patient with a past medical history of breast carcinoma with meta stasis recurrent pleural effusions on the right side which needle PleurX catheter chronic hypoxic respiratory failure on 3 L of oxygen hypertension dyslipidemia who has had thoracentesis done on the left side for few times now presents with worsening shortness of breath and recurrent pleural effusion  Also noted to have elevated white count  Imaging showed a collapsed lung on the right side with bilateral effusions  Consulted interventional radiology for thoracentesis  The effusion on the left side had multiple loculations therefore CT surgery was consulted  A pigtail was placed and lytics were used to help with the loculations  Successful procedure done and after a couple days of chest tube drainage that pigtail was  removed  Patient's breathing has remained stable  The elevated white count is likely reactive with a normal procalcitonin  Will be continued with the antibiotics because the patient's lung collapse right lung with possibility of postobstructive pneumonia  White count is coming down she has completed course of antibiotics  Patient is quite deconditioned more DLM be going to rehab facility for therapy    Discharge in stable condition    Pertinent Physical Exam At Time of Discharge  Physical Exam    Constitutional   General appearance: Alert and in no acute distress.     Pulmonary   Respiratory assessment: On nasal cannula  Auscultation of Lungs: Clear bilateral breath sounds.   Cardiovascular   Auscultation of heart: Apical pulse normal, heart rate and rhythm normal, normal S1 and S2, no murmurs and no pericardial rub.    Exam for edema: No peripheral edema.   Abdomen   Abdominal Exam: No bruits, normal bowel sounds, soft, non-tender, no abdominal mass palpated.    Liver and Spleen exam: No hepato-splenomegaly.   Musculoskeletal     Inspection/palpation of joints, bones and muscles: No joint swelling. Normal movement of all extremities.   Skin   Skin inspection: Normal skin color and pigmentation, normal skin turgor and no visible rash.   Neurologic   Cranial nerves: Nerves 2-12 were intact, no focal neuro defects.\  Outpatient Follow-Up  Future Appointments   Date Time Provider Department Center   6/23/2025 11:30 AM INF 03 [place] [id number] [facility]   7/7/2025  2:30 PM Anatoly Guzman MD [id number] [facility]   7/7/2025  3:30 PM INF 08 [place] [id number] [facility]   7/21/2025 12:30 PM INF 01 [place] [id number] [facility]   8/18/2025 12:00 PM INF 01 [place] [id number] [facility]   9/15/2025 12:00 PM INF 01 [place] [id number] [facility]   10/13/2025 12:00 PM INF 01 [place] [id number] [facility]   11/10/2025 12:00 PM INF 01 [place] [id number] [facility]   12/8/2025 12:00 PM INF 01 [place] [id number] [facility]   1/5/2026 12:00 PM INF 01 [place]  [id number] [facility]   2/2/2026 12:00 PM INF 01 [place] [id number] [facility]   3/2/2026 12:00 PM INF 01 [place] [id number] [facility]   3/30/2026 12:00 PM INF 01 [place] [id number] [facility]   4/27/2026 12:00 PM INF 01 [place] [id number] [facility]     Patient seen at bedside. Events from the last visit reviewed. Discussed with staff. Results of tests and investigations from last visit reviewed and discussed with patient/Family. Electronic chart on [facility] reviewed. Input / Recommendations  from consultants  appreciated and reviewed and agreed with.     discharge summary and profile completed. medications reviewed and discussed with patient and family.  scripts completed and signed.     total discharge time in excess of 30 minutes.      Clarence Mohan MD

## 2025-06-16 NOTE — PROGRESS NOTES
"Mildred Moyer is a 70 y.o. female on day 11 of admission presenting with Pleural effusion.      Interval Hx and Subjective Reports feeling OK at present.  Patient's sister and daughter Maria Ines at bedside, awaiting pulm input regarding pleural effusion.  Patient denies pain, has been refusing scheduled oxycodone as she has not felt she needed it.         Objective  Blood pressure 134/80, pulse 104, temperature 36.2 °C (97.2 °F), temperature source Temporal, resp. rate 18, height 1.626 m (5' 4.02\"), weight 52 kg (114 lb 9.6 oz), SpO2 98%.    General:  appears frail, thin, ill    Neuro: alert, oriented to situation.  No focal findings  Psych:  normal affect, engaged, cooperative  HEENT:  oral mucosa moist without exudate, tongue midline.  PERRLA, no discharge.     Resps:  + conversational dyspnea and MCCONNELL, lungs diminished   Card:  RRR, no murmurs, rubs, or gallops.    GI:  normal bowel sounds, soft and non tender, moved bowels this AM  :  deferred  MS:  No injury or deformity noted        Imaging  XR chest 1 view  Result Date: 6/16/2025  Bilateral pleural and parenchymal opacities as described, stable on the right but progressed on the left.   Stable right-sided volume loss with rightward mediastinal shift.   MACRO: None   Signed by: Kee Pelletier 6/16/2025 7:59 AM Dictation workstation:   [id number]    XR chest 1 view  Result Date: 6/15/2025  Redemonstration of near complete opacification of the right hemithorax with large effusion. Moderate-sized left pleural effusion and left basilar airspace disease also present. Overall no change from the prior     MACRO: None   Signed by: Cade Alcala 6/15/2025 11:08 AM Dictation workstation:   [id number]      Cardiology, Vascular, and Other Imaging  No other imaging results found for the past 2 days     No results found for this or any previous visit (from the past 24 hours).   Scheduled medications  Scheduled Medications[1]  Continuous medications  Continuous " Medications[2]  PRN medications  PRN Medications[3]     Dietary Orders (From admission, onward)       Start     Ordered    06/13/25 1133  Adult diet Regular; 1500 mL fluid  Diet effective now        Question Answer Comment   Diet type Regular    Dietary fluid restriction / 24h: 1500 mL fluid        06/13/25 1132    06/11/25 1343  Oral nutritional supplements  Until discontinued        Question Answer Comment   Deliver with Lunch    Select supplement: Mighty Shake        06/11/25 1343    06/06/25 1802  Oral nutritional supplements  Until discontinued        Comments: vanilla   Question Answer Comment   Deliver with Breakfast vanilla   Deliver with Dinner    Select supplement: Ensure Plus High Protein        06/06/25 1802    06/05/25 1727  May Participate in Room Service With Assistance  ( ROOM SERVICE MAY PARTICIPATE WITH ASSISTANCE)  Once        Question:  .  Answer:  Yes    06/05/25 1726                     Assessment/Plan     Mildred Moyer is a 70 y.o. female with past medical history of metastatic breast CA dx 2023(liver, lungs)with recurrent pleural effusions, hx of Pleurex but not currently.  Was seen in oncology office on 6/5 and was very short of breath, was directed to ED for possible thoracentesis. For left sided effusion.    Patient with chronic hypoxic respiratory failure on home O2, HTN, HLD, GERD, MS, anxiety and depression.  She had undergone thoracentesis on the left 5/29 for 1.1 liter.  Imaging in our ED showed large bilateral effusions.  She underwent thoracentesis 6/6 for 400 ml and noted effusion to be loculated. Pulm suspects this effusion to be malignant and recommending another pleurex on the left, likely as outpatient.  She did have a chest tube placed on the 9th on the left side.  Imaging 4/2025 noted enlargement of breast mass, increase right supraclavicular LAD and increased pleural and pulm mets, liver mets also newly identified at this time.  6/5 with oncology reviewed repeat bx of  mass c/w progressive disease and a plan was made to start palliative chemo if possible.       Palliative care consulted to assist with goals of care.  Patient has ACP documents with siblings Danny Ralph as first agent and Magalis Sumner as second.  Patient has been DNR on recent admissions.  Patient admits to being very tired today, finding it difficult to talk but agreeable to chat.  We reviewed her past code status, she is clear she would not want resuscitative measures and OK with changing code status.  Her goal is to return home, she does not want to have to live in nursing facility.  We did discuss future plans, she reports she wants to try palliative chemo, but unclear if she is even a candidate given how debilitated she is currently.  I did discuss hospice briefly and she is mostly associating that with having to live in nursing home.   I did speak with patient's sister. She is flying into [place] late tomorrow afternoon and we made plans to meet Thursday at 11.  She reports patient has likely be overstating her ability to manage and does not think she can return to her retirement.  Sister reports she has been thinking about hospice for awhile but patient has been resistant.    6/12/25  Myself and Bere SILVA met with sister privately while patient down at [place].  We discussed SNF versus hospice as well as whether or not patient could tolerate her ADLs in retirement again.  I expressed concern that it's certainly appropriate to try for some therapy, however I don't think patient has enough reserve to make extensive progress and may not be able to return to [facility].  Patient has expressed she wants to try for rehab.  Patient likely a poor candidate for palliative chemo and we did discuss that hospice would not cover those meds.  When patient returned from [place], we discussed what rehab might look like and that if she did well, then that would be fantastic, but if she was not able to progress, she would be in a safe place  where her needs could be met long term. We discussed hospice as an additional support after rehab.  Patient agreeable to this plan    6/16/25 Patient's sister called early this AM reporting patient just seems weaker, not sure if she can manage PT at all.  By the time sister came to bedside around 1:15, patient had actually done very well for PT and OT and sister seems more reassured patient can transfer for rehab therapy.  We again reviewed that if  patient fails to progress, they can simply ask to transition to hospice.  Family and patient verbalized agreement.  -continue to honor DNR/DNI, OK for ICU if needed  -[facility] is now at bedside for transport purposes     Chest wall pain from chest tube(since removed):  patient denies at present and family says patient has seemed comfortable.   Patient has declined scheduled oxycodone for the last 24 hours  -discontinuing scheduled oxycodone 2.5 mg    -continue oxycodone 5 mg PO Q 6 hours PRN for breakthrough  -continue Morphine 2 mg IV Q 4 hours PRN for more severe breakthrough  -continue cyclobenzaprine 10 mg BID PRN        I spent 15 minutes in the professional and overall care of this patient related to ACP in addition to other time spent in assessment, chart review, and coordination of care     Martha Medel APRN CNP          [1] buPROPion XL, 300 mg, oral, q24h  [START ON 6/30/2025] capivasertib, 400 mg, oral, 2 times per day on Monday Tuesday Wednesday Thursday  cyanocobalamin, 1,000 mcg, oral, Daily  enoxaparin, 40 mg, subcutaneous, q24h  ferrous sulfate, 65 mg of elemental iron, oral, Daily  hydrOXYzine HCL, 25 mg, oral, Daily  lidocaine, 2 patch, transdermal, Daily  melatonin, 6 mg, oral, Nightly  oxyCODONE, 2.5 mg, oral, TID  pantoprazole, 40 mg, oral, Daily before breakfast   Or  pantoprazole, 40 mg, intravenous, Daily before breakfast  polyethylene glycol, 17 g, oral, Daily  potassium chloride CR, 20 mEq, oral, Daily  rosuvastatin, 20 mg, oral,  Daily  [2]    [3] PRN medications: acetaminophen **OR** acetaminophen **OR** acetaminophen, benzocaine-menthol, cyclobenzaprine, guaiFENesin, ipratropium-albuteroL, morphine, ondansetron **OR** ondansetron, oxyCODONE, oxygen

## 2025-06-16 NOTE — TELEPHONE ENCOUNTER
Angelika called because Mildred has been hospitalized since 06/05. She is very weak. Angelika said she has found an acute rehab/long term care for Mildred to go to. She is supposed to start a new cancer regimen on 06/23/25 here. Angelika is asking if we want her to still start that or if she needs time to get stronger. Please call Angelika at [phone]

## 2025-06-16 NOTE — TELEPHONE ENCOUNTER
Dr. Guzman spoke with Angelika and instructed her to hold the Truqap for another week or so. Angelika will call back with how Mildred is feeling after 1 week

## 2025-06-16 NOTE — PROGRESS NOTES
06/16/25 1552   Discharge Planning   Expected Discharge Disposition SNF  ([facility])   Does the patient need discharge transport arranged? Yes   RoundTrip coordination needed? Yes   Has discharge transport been arranged? Yes  (Family at bedside and aware)   What day is the transport expected? 06/16/25  (Report number for nurse to call [phone])   What time is the transport expected? 1800          Patient had auth for SNF. Messaged to facility to see if able to accept today and answer was Yes. Transportation requested from [facility] and confirmed for 6 pm to go to [facility]. Everyone made aware of transport for 6 pm. Bedside nurse provided with report number- [phone].

## 2025-06-16 NOTE — CARE PLAN
"The patient's goals for the shift include \"try to go to sleep\"    The clinical goals for the shift include no reports of pain by end of shift    "

## 2025-06-17 ENCOUNTER — TELEPHONE (OUTPATIENT)
Dept: HEMATOLOGY/ONCOLOGY | Facility: CLINIC | Age: 70
End: 2025-06-17
Payer: COMMERCIAL

## 2025-06-17 NOTE — TELEPHONE ENCOUNTER
"Spoke with Angelika who stated Mildred just got moved to the new facility for rehab and Mildred is exhausted. She is not sure if Mildred will be able to make it into our facility to receive Faslodex and if she is able, she doesn't believe Mildred has any \"meat\" on her body to receive the injection. The family and staff are planning a meeting this thurs or Friday to discuss goals of care, possibly hospice.  Per dr. Guzman, Mildred does not need to come on 6/23 d/t the progressive nature of her disease. They can wait to see how she is doing. This information was discussed with Angelika. She voiced understanding with teach back  Mildred has moved to [facility]  "

## 2025-06-17 NOTE — TELEPHONE ENCOUNTER
Angelika mckeon, Dr. Guzman had cancelled her chemo treatment but they didn't know if they are still supposed to come for the Faslodex on 6/23.

## 2025-06-17 NOTE — NURSING NOTE
[facility] arrived at this time to transport pt to [facility]. Pt discharged with all personal belongings in her possession. Pt stated she will call her family and give them update.

## 2025-06-19 ENCOUNTER — SPECIALTY PHARMACY (OUTPATIENT)
Dept: HEMATOLOGY/ONCOLOGY | Facility: CLINIC | Age: 70
End: 2025-06-19
Payer: COMMERCIAL

## 2025-06-19 ENCOUNTER — ONCOLOGY MEDICATION OUTREACH (OUTPATIENT)
Dept: HEMATOLOGY/ONCOLOGY | Facility: CLINIC | Age: 70
End: 2025-06-19
Payer: COMMERCIAL

## 2025-06-19 NOTE — PROGRESS NOTES
[facility] Clinical Note  Initial Patient Education     Introduction  Mildred Moyer is a 70 y.o. female who is on the specialty pharmacy service for management of: Oncology Core.    Mildred Moyer is initiating the following therapy: Truqap 400mg BID for 4  days then 3 days off       Medication receipt date: Shipment postponed due to admission    Duration of therapy: Until drug toxicity or progression    The most recent encounter visit with the referring prescriber Dr. Guzman on 6/17/25 was reviewed.  Pharmacy will continue to collaborate in the care of this patient with the referring prescriber.    Clinical Background  An initial assessment was conducted prior to first fill of the medication to determine the appropriateness of therapy given the patient's diagnosis, medication list, comorbidities, allergies, medical history, patient's ability to self administer medication, and therapeutic goals based on possible outcomes of therapy. Refer to initial assessment task completed on 6/10/25.    Labs for clinical appropriateness that were reviewed include:   Oncology - CBC-diff:   Lab Results   Component Value Date    WBC 14.7 (H) 06/16/2025    RBC 3.26 (L) 06/16/2025    HGB 10.2 (L) 06/16/2025    HCT 29.8 (L) 06/16/2025    MCV 91 06/16/2025    MCHC 34.2 06/16/2025     (H) 06/16/2025    RDW 13.9 06/16/2025    NEUTOPHILPCT 88.6 06/05/2025    IGPCT 1.0 (H) 06/05/2025    LYMPHOPCT 6.2 06/05/2025    MONOPCT 3.8 06/05/2025    EOSPCT 0.2 06/05/2025    BASOPCT 0.2 06/05/2025    NEUTROABS 17.43 (H) 06/05/2025    LYMPHSABS 1.21 06/05/2025    MONOSABS 0.74 06/05/2025    EOSABS 0.03 06/05/2025    BASOSABS 0.03 06/05/2025    and CMP:   Lab Results   Component Value Date    GLUCOSE 92 06/16/2025     (L) 06/16/2025    K 3.7 06/16/2025    CL 93 (L) 06/16/2025    CO2 26 06/16/2025    ANIONGAP 16 06/16/2025    BUN 12 06/16/2025    CREATININE 0.55 06/16/2025    GFRF 68 09/07/2023    CALCIUM  8.2 (L) 06/16/2025    ALBUMIN 3.8 06/05/2025    ALKPHOS 72 06/05/2025    PROT 6.9 06/06/2025    AST 27 06/05/2025    BILITOT 0.5 06/05/2025    ALT 13 06/05/2025       Education/Discussion  Mildred was contacted on 6/19/2025 at 8:08 AM for a pharmacy visit with encounter number [id number] from:   [facility]  [facility]  [address]  [place] [address]  Dept: [phone]  Dept Fax: [phone]  Mildred consented to a/an In person visit, which was performed.    Medication Start Date (planned or actual): 6/30/25         Education was conducted prior to start of therapy? Yes    Education discussed includes the following:  Patient Education  Counseled the Patient on the Following : Doses and administration, Possible drug interactions, Lab monitoring and follow-up, Adherence and missed doses, Safe handling, storage, and disposal, Possible side effects and management  Learner: Patient, Family  Education Method: Explanation  Education Response: Verbalizes understanding  Additional details of the medication specific counseling are found within the linked patient education flowsheet.     The follow up timeline was discussed. Every person responds to and reacts to therapy differently. Patient should be assessed for efficacy and tolerability in approximately: 3 months    Provided education on goals and possible outcomes of therapy:  Adherence with therapy  Timely completion of appropriate labs  Timely and appropriate follow up with provider  Identify and address medication interactions with presciption medications, OTC medications and supplements  Optimize or maintain quality of life  Oncology: Manage side effects (ex: nausea/vomiting, constipation, fatigue) in conjunction with care team           The importance of adherence was discussed and they were advised to take the medication as prescribed by their provider.         Impression/Plan  Review and Assessment   Reviewed During This  Encounter: Medications  Medications Assessed for Appropriate Use, Dose, Route, Frequency, and Duration: Yes  Medication Reconciliation Completed: No (Comment)  Drug Interactions Evaluated: Yes  Clinically Relevant Drug Interactions Identified: No    This patient has been identified as high risk due to Co-morbidities or Geriatric (over 65 years of age).  The following action was taken: N/A.    QOL/Patient Satisfaction  Rate your quality of life on scale of 1-10: 6  Rate your satisfaction with  Specialty Pharmacy on scale of 1-10: 10 - Completely satisfied    The  Specialty Pharmacy Welcome packet may be viewed here:   Specialty Pharmacy Welcome Packet     Or by scanning QR code:      Provided contact information ([phone]) for [facility] and reviewed dispensing process, refill timeline and patient management follow up. Advised to contact the pharmacy if there are any adverse effects and/or changes to medication list, including prescriptions, OTC medications, herbal products, or supplements. Confirmed understanding of education conducted during assessment. All questions and concerns were addressed and patient was encouraged to reach out for additional questions or concerns.      Danny Hays, JovanyD

## 2025-06-23 ENCOUNTER — APPOINTMENT (OUTPATIENT)
Dept: HEMATOLOGY/ONCOLOGY | Facility: CLINIC | Age: 70
End: 2025-06-23
Payer: COMMERCIAL

## 2025-06-24 ENCOUNTER — APPOINTMENT (OUTPATIENT)
Dept: SURGERY | Facility: CLINIC | Age: 70
End: 2025-06-24
Payer: COMMERCIAL

## 2025-06-26 NOTE — TELEPHONE ENCOUNTER
Call placed to family to follow up on plan to start treatment. Was informed by pt's sisterAngelika that pt passed this morning. Provided support to pt's brother and sister. No additional needs at this time.    Price (Do Not Change): 0.00 Instructions: This plan will send the code FBSE to the PM system.  DO NOT or CHANGE the price. Detail Level: Generalized

## 2025-07-07 ENCOUNTER — APPOINTMENT (OUTPATIENT)
Dept: HEMATOLOGY/ONCOLOGY | Facility: CLINIC | Age: 70
End: 2025-07-07
Payer: COMMERCIAL

## 2025-07-21 ENCOUNTER — APPOINTMENT (OUTPATIENT)
Dept: HEMATOLOGY/ONCOLOGY | Facility: CLINIC | Age: 70
End: 2025-07-21
Payer: COMMERCIAL

## 2025-08-18 ENCOUNTER — APPOINTMENT (OUTPATIENT)
Dept: HEMATOLOGY/ONCOLOGY | Facility: CLINIC | Age: 70
End: 2025-08-18
Payer: COMMERCIAL

## 2025-09-15 ENCOUNTER — APPOINTMENT (OUTPATIENT)
Dept: HEMATOLOGY/ONCOLOGY | Facility: CLINIC | Age: 70
End: 2025-09-15
Payer: COMMERCIAL

## 2025-10-13 ENCOUNTER — APPOINTMENT (OUTPATIENT)
Dept: HEMATOLOGY/ONCOLOGY | Facility: CLINIC | Age: 70
End: 2025-10-13
Payer: COMMERCIAL

## 2025-11-10 ENCOUNTER — APPOINTMENT (OUTPATIENT)
Dept: HEMATOLOGY/ONCOLOGY | Facility: CLINIC | Age: 70
End: 2025-11-10
Payer: COMMERCIAL

## 2025-12-08 ENCOUNTER — APPOINTMENT (OUTPATIENT)
Dept: HEMATOLOGY/ONCOLOGY | Facility: CLINIC | Age: 70
End: 2025-12-08
Payer: COMMERCIAL

## 2026-01-05 ENCOUNTER — APPOINTMENT (OUTPATIENT)
Dept: HEMATOLOGY/ONCOLOGY | Facility: CLINIC | Age: 71
End: 2026-01-05
Payer: COMMERCIAL

## 2026-02-02 ENCOUNTER — APPOINTMENT (OUTPATIENT)
Dept: HEMATOLOGY/ONCOLOGY | Facility: CLINIC | Age: 71
End: 2026-02-02
Payer: COMMERCIAL

## 2026-03-02 ENCOUNTER — APPOINTMENT (OUTPATIENT)
Dept: HEMATOLOGY/ONCOLOGY | Facility: CLINIC | Age: 71
End: 2026-03-02
Payer: COMMERCIAL

## 2026-03-30 ENCOUNTER — APPOINTMENT (OUTPATIENT)
Dept: HEMATOLOGY/ONCOLOGY | Facility: CLINIC | Age: 71
End: 2026-03-30
Payer: COMMERCIAL

## 2026-04-27 ENCOUNTER — APPOINTMENT (OUTPATIENT)
Dept: HEMATOLOGY/ONCOLOGY | Facility: CLINIC | Age: 71
End: 2026-04-27
Payer: COMMERCIAL

## (undated) DEVICE — CARE KIT, LAPAROSCOPIC, ADVANCED

## (undated) DEVICE — KIT, CATHETER, PLEURAL DENVER

## (undated) DEVICE — ANTIFOG, SOLUTION, FOG-OUT

## (undated) DEVICE — GLOVE, SURGICAL, PROTEXIS PI BLUE W/NEUTHERA, 6.5, PF, LF

## (undated) DEVICE — DRAPE, INCISE, ANTIMICROBIAL, IOBAN 2, LARGE, 17 X 23 IN, DISPOSABLE, STERILE

## (undated) DEVICE — GOWN, ASTOUND, XL

## (undated) DEVICE — SUTURE, MONOCRYL, 4-0, 18 IN, PS2, UNDYED

## (undated) DEVICE — BLADE, CARBON STEEL, 10, STERILE, DISP

## (undated) DEVICE — DRESSING, ADHESIVE, ISLAND, TELFA, 2 X 3.75 IN, LF

## (undated) DEVICE — Device

## (undated) DEVICE — DRESSING, ISLAND, TELFA, 4 X 5 IN

## (undated) DEVICE — DRAPE, SHEET, UTILITY, NON ABSORBENT, 18 X 26 IN, LF

## (undated) DEVICE — SUTURE, VICRYL, 2-0, 27 IN, CT-1, VIOLET

## (undated) DEVICE — COVER, LIGHT HANDLE, SURGICAL, FLEXIBLE, DISPOSABLE, STERILE

## (undated) DEVICE — STRIP, SKIN CLOSURE, STERI STRIP, REINFORCED, 0.5 X 4 IN

## (undated) DEVICE — SUTURE, ETHIBOND, XTRA, 30 IN, 0, CT-1, GREEN

## (undated) DEVICE — DRAPE, FLUID WARMER

## (undated) DEVICE — COLLECTION UNIT, DRAINAGE, THORACIC, SINGLE TUBE, DRY SUCTION, ATS COMPATIBLE, OASIS 3600, LF

## (undated) DEVICE — DRAPE, TIBURON, SPLIT SHEET, REINF ADH STRIP, 77X122

## (undated) DEVICE — SHEET, SPLIT, CARDIOVASCULAR TIBURON